# Patient Record
Sex: MALE | Race: BLACK OR AFRICAN AMERICAN | NOT HISPANIC OR LATINO | URBAN - METROPOLITAN AREA
[De-identification: names, ages, dates, MRNs, and addresses within clinical notes are randomized per-mention and may not be internally consistent; named-entity substitution may affect disease eponyms.]

---

## 2023-07-19 ENCOUNTER — OFFICE VISIT (OUTPATIENT)
Dept: FAMILY MEDICINE CLINIC | Facility: CLINIC | Age: 58
End: 2023-07-19

## 2023-07-19 VITALS
HEART RATE: 84 BPM | RESPIRATION RATE: 19 BRPM | DIASTOLIC BLOOD PRESSURE: 78 MMHG | SYSTOLIC BLOOD PRESSURE: 104 MMHG | HEIGHT: 69 IN | WEIGHT: 246 LBS | OXYGEN SATURATION: 97 % | TEMPERATURE: 98.6 F | BODY MASS INDEX: 36.43 KG/M2

## 2023-07-19 DIAGNOSIS — I10 PRIMARY HYPERTENSION: Primary | ICD-10-CM

## 2023-07-19 DIAGNOSIS — E11.311 TYPE 2 DIABETES MELLITUS WITH RETINOPATHY AND MACULAR EDEMA, UNSPECIFIED LATERALITY, UNSPECIFIED RETINOPATHY SEVERITY, UNSPECIFIED WHETHER LONG TERM INSULIN USE (HCC): ICD-10-CM

## 2023-07-19 DIAGNOSIS — Z86.79 HISTORY OF CAROTID ARTERY DISSECTION: ICD-10-CM

## 2023-07-19 DIAGNOSIS — Z13.9 DUE FOR SCREENING: ICD-10-CM

## 2023-07-19 PROCEDURE — 99203 OFFICE O/P NEW LOW 30 MIN: CPT | Performed by: FAMILY MEDICINE

## 2023-07-19 RX ORDER — INSULIN GLARGINE 100 [IU]/ML
35 INJECTION, SOLUTION SUBCUTANEOUS DAILY
Qty: 15 ML | Refills: 3
Start: 2023-07-19

## 2023-07-19 RX ORDER — VALSARTAN 320 MG/1
320 TABLET ORAL DAILY
Qty: 90 TABLET | Refills: 3
Start: 2023-07-19

## 2023-07-19 RX ORDER — ATENOLOL 25 MG/1
25 TABLET ORAL DAILY
Qty: 30 TABLET | Refills: 5
Start: 2023-07-19

## 2023-07-19 RX ORDER — ATENOLOL 25 MG/1
25 TABLET ORAL DAILY
Qty: 30 TABLET | Refills: 5 | Status: CANCELLED | OUTPATIENT
Start: 2023-07-19

## 2023-07-19 RX ORDER — ATORVASTATIN CALCIUM 40 MG/1
40 TABLET, FILM COATED ORAL DAILY
Qty: 90 TABLET | Refills: 3
Start: 2023-07-19

## 2023-07-19 RX ORDER — WARFARIN SODIUM 5 MG/1
TABLET ORAL
Qty: 90 TABLET | Refills: 1
Start: 2023-07-19

## 2023-07-19 NOTE — ASSESSMENT & PLAN NOTE
Home meds: Lantus 35 units daily, semaglutide 0.5 mg weekly, atorvastatin 40 mg daily  Endorses med adherence  No results found for: "HGBA1C"

## 2023-07-20 ENCOUNTER — APPOINTMENT (OUTPATIENT)
Dept: LAB | Facility: CLINIC | Age: 58
End: 2023-07-20

## 2023-07-20 DIAGNOSIS — Z13.9 DUE FOR SCREENING: ICD-10-CM

## 2023-07-20 DIAGNOSIS — Z86.79 HISTORY OF CAROTID ARTERY DISSECTION: ICD-10-CM

## 2023-07-20 DIAGNOSIS — E11.311 TYPE 2 DIABETES MELLITUS WITH RETINOPATHY AND MACULAR EDEMA, UNSPECIFIED LATERALITY, UNSPECIFIED RETINOPATHY SEVERITY, UNSPECIFIED WHETHER LONG TERM INSULIN USE (HCC): ICD-10-CM

## 2023-07-20 LAB
BASOPHILS # BLD AUTO: 0.06 THOUSANDS/ÂΜL (ref 0–0.1)
BASOPHILS NFR BLD AUTO: 1 % (ref 0–1)
CREAT UR-MCNC: 251 MG/DL
EOSINOPHIL # BLD AUTO: 0.31 THOUSAND/ÂΜL (ref 0–0.61)
EOSINOPHIL NFR BLD AUTO: 5 % (ref 0–6)
ERYTHROCYTE [DISTWIDTH] IN BLOOD BY AUTOMATED COUNT: 12.4 % (ref 11.6–15.1)
EST. AVERAGE GLUCOSE BLD GHB EST-MCNC: 151 MG/DL
HBA1C MFR BLD: 6.9 %
HCT VFR BLD AUTO: 45.3 % (ref 36.5–49.3)
HGB BLD-MCNC: 14.8 G/DL (ref 12–17)
IMM GRANULOCYTES # BLD AUTO: 0.02 THOUSAND/UL (ref 0–0.2)
IMM GRANULOCYTES NFR BLD AUTO: 0 % (ref 0–2)
LYMPHOCYTES # BLD AUTO: 1.74 THOUSANDS/ÂΜL (ref 0.6–4.47)
LYMPHOCYTES NFR BLD AUTO: 28 % (ref 14–44)
MCH RBC QN AUTO: 31.6 PG (ref 26.8–34.3)
MCHC RBC AUTO-ENTMCNC: 32.7 G/DL (ref 31.4–37.4)
MCV RBC AUTO: 97 FL (ref 82–98)
MICROALBUMIN UR-MCNC: 15.5 MG/L (ref 0–20)
MICROALBUMIN/CREAT 24H UR: 6 MG/G CREATININE (ref 0–30)
MONOCYTES # BLD AUTO: 0.45 THOUSAND/ÂΜL (ref 0.17–1.22)
MONOCYTES NFR BLD AUTO: 7 % (ref 4–12)
NEUTROPHILS # BLD AUTO: 3.69 THOUSANDS/ÂΜL (ref 1.85–7.62)
NEUTS SEG NFR BLD AUTO: 59 % (ref 43–75)
NRBC BLD AUTO-RTO: 0 /100 WBCS
PLATELET # BLD AUTO: 215 THOUSANDS/UL (ref 149–390)
PMV BLD AUTO: 11.4 FL (ref 8.9–12.7)
RBC # BLD AUTO: 4.68 MILLION/UL (ref 3.88–5.62)
WBC # BLD AUTO: 6.27 THOUSAND/UL (ref 4.31–10.16)

## 2023-07-20 PROCEDURE — 36415 COLL VENOUS BLD VENIPUNCTURE: CPT

## 2023-07-20 PROCEDURE — 83036 HEMOGLOBIN GLYCOSYLATED A1C: CPT

## 2023-07-20 PROCEDURE — 86803 HEPATITIS C AB TEST: CPT

## 2023-07-20 PROCEDURE — 85025 COMPLETE CBC W/AUTO DIFF WBC: CPT

## 2023-07-20 PROCEDURE — 87389 HIV-1 AG W/HIV-1&-2 AB AG IA: CPT

## 2023-07-20 PROCEDURE — 82570 ASSAY OF URINE CREATININE: CPT

## 2023-07-20 PROCEDURE — 82043 UR ALBUMIN QUANTITATIVE: CPT

## 2023-07-20 NOTE — PROGRESS NOTES
Name: Carnella Skiff      : 1965      MRN: [id number]  Encounter Provider: Sam Mayberry MD  Encounter Date: 2023   Encounter department: [address] [facility]     1. Primary hypertension  Assessment & Plan:  Home meds: Valsartan 320 mg daily; atenolol 25 mg daily  Blood pressure today 104/78 ; at goal        Orders:  -     valsartan (DIOVAN) 320 MG tablet; Take 1 tablet (320 mg total) by mouth daily  -     atenolol (TENORMIN) 25 mg tablet; Take 1 tablet (25 mg total) by mouth daily    2. 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, unspecified laterality, unspecified retinopathy severity, unspecified whether long term insulin use (HCC)  Assessment & Plan:  Home meds: Lantus 35 units daily, semaglutide 0.5 mg weekly, atorvastatin 40 mg daily  Endorses med adherence  No results found for: "HGBA1C"    Orders:  -     Insulin Glargine Solostar (Lantus SoloStar) 100 UNIT/ML SOPN; Inject 0.35 mL (35 Units total) under the skin in the morning  -     semaglutide, 0.25 or 0.5 mg/dose, (Ozempic, 0.25 or 0.5 MG/DOSE,) 2 mg/3 mL injection pen; Inject 0.75 mL (0.5 mg total) under the skin every 7 days  -     atorvastatin (LIPITOR) 40 mg tablet; Take 1 tablet (40 mg total) by mouth daily  -     HEMOGLOBIN A1C W/ EAG ESTIMATION; Future  -     Albumin / creatinine urine ratio; Future    3. History of carotid artery dissection  Comments:  Patient will benefit from establishing care with cardiology as soon as possible. Orders:  -     warfarin (Coumadin) 5 mg tablet; Patient is establishing care and brought in med list. Takes Warfarin once daily.  -     Protime-INR; Future  -     CBC and differential; Future  -     Ambulatory Referral to Cardiology; Future    4. Due for screening  -     : HIV 1/2 [facility] for 2 yr old and above; Future  -     Hepatitis C antibody;  Future      Depression Screening and Follow-up Plan: Patient was screened for depression during today's encounter. They screened negative with a PHQ-2 score of 0. Subjective     Melony Santos is a 61 y.o. male with pmhx of carotid artery dissection, type 2 diabetes mellitus, sarcoidosis and hypertension presenting today to establish care and for review of chronic conditions. Patient recently moved from [place] in February 2023. He has not sought primary care till now. Follows cardiology in [place] but plans to transition care in the near future. States has MRI/CT scanning of the chest between July 25 to 32 in [place]. He just started work orientation at [email or web address]. He used to work in the [employer] prior to this. We will gradually transition care and follow patient for chronic conditions. Patient reports that they are doing well/ have been compliant with medications.  Problems stable unless otherwise mentioned.  No acute complaints. Review of Systems   Respiratory: Positive for shortness of breath. Cardiovascular: Positive for leg swelling. Negative for chest pain and palpitations. Gastrointestinal: Negative for abdominal pain. No past medical history on file. No past surgical history on file. No family history on file.   Social History     Socioeconomic History   • Marital status: Unknown     Spouse name: None   • Number of children: None   • Years of education: None   • Highest education level: None   Occupational History   • None   Tobacco Use   • Smoking status: Never   • Smokeless tobacco: Never   Substance and Sexual Activity   • Alcohol use: Never   • Drug use: Never   • Sexual activity: None   Other Topics Concern   • None   Social History Narrative   • None     Social Determinants of Health     Financial Resource Strain: Low Risk  (7/19/2023)    Overall Financial Resource Strain (CARDIA)    • Difficulty of Paying Living Expenses: Not hard at all   Food Insecurity: No Food Insecurity (7/19/2023)    Hunger Vital Sign    • Worried About [place] in the Last Year: Never true    • Ran Out of Food in the Last Year: Never true   Transportation Needs: No Transportation Needs (7/19/2023)    PRAPARE - Transportation    • Lack of Transportation (Medical): No    • Lack of Transportation (Non-Medical): No   Physical Activity: Not on file   Stress: Not on file   Social Connections: Not on file   Intimate Partner Violence: Not on file   Housing Stability: Not on file     No current outpatient medications on file prior to visit. No Known Allergies    There is no immunization history on file for this patient. Objective     /78 (BP Location: Right arm, Patient Position: Sitting, Cuff Size: Large)   Pulse 84   Temp 98.6 °F (37 °C) (Temporal)   Resp 19   Ht 5' 9" (1.753 m)   Wt 112 kg (246 lb)   SpO2 97%   BMI 36.33 kg/m²     Physical Exam  Constitutional:       General: He is not in acute distress. Appearance: He is obese. He is not ill-appearing or toxic-appearing. Eyes:      General: No scleral icterus. Conjunctiva/sclera: Conjunctivae normal.   Cardiovascular:      Rate and Rhythm: Normal rate and regular rhythm. Pulses: Normal pulses. Pulmonary:      Effort: Pulmonary effort is normal.      Breath sounds: Normal breath sounds. No wheezing. Abdominal:      Tenderness: There is no right CVA tenderness or left CVA tenderness. Musculoskeletal:         General: Swelling present. Right lower leg: Edema present. Left lower leg: Edema present. Comments: Observable swelling of the right upper and lower extremities  +2 pitting edema to right lower extremity  +3 pitting edema to left lower extremity  Left upper extremity weakness noted   Skin:     General: Skin is warm. Capillary Refill: Capillary refill takes less than 2 seconds. Neurological:      Mental Status: He is alert and oriented to person, place, and time. Motor: Weakness present.       Gait: Gait abnormal. Psychiatric:         Behavior: Behavior normal.       Otis Hernandez MD

## 2023-07-21 LAB
HCV AB SER QL: NORMAL
HIV 1+2 AB+HIV1 P24 AG SERPL QL IA: NORMAL
HIV 2 AB SERPL QL IA: NORMAL
HIV1 AB SERPL QL IA: NORMAL
HIV1 P24 AG SERPL QL IA: NORMAL

## 2023-07-25 ENCOUNTER — TELEPHONE (OUTPATIENT)
Dept: FAMILY MEDICINE CLINIC | Facility: CLINIC | Age: 58
End: 2023-07-25

## 2023-07-25 DIAGNOSIS — Z86.79 HISTORY OF CAROTID ARTERY DISSECTION: Primary | ICD-10-CM

## 2023-07-26 NOTE — TELEPHONE ENCOUNTER
Telephone call to patient to discuss lab results. Patient states he was told by lab personnel that there was not enough sample for the PT-INR. Reordered. - Discussed Hgb A1c goal, importance of good glycemic and BP control on kidney function. Patient stated he has been noticing hypoglycemic episodes throughout the day and at night but experiences no symptoms. He stat ed the numbers come back up without an intervention. He wears a CGM. Patient instructed will evaluate next office visit. Demonstrates understanding and is in agreement with plan.

## 2023-08-03 ENCOUNTER — APPOINTMENT (OUTPATIENT)
Dept: LAB | Facility: CLINIC | Age: 58
End: 2023-08-03
Payer: COMMERCIAL

## 2023-08-03 DIAGNOSIS — Z86.79 HISTORY OF CAROTID ARTERY DISSECTION: ICD-10-CM

## 2023-08-03 LAB
INR PPP: 2.19 (ref 0.84–1.19)
PROTHROMBIN TIME: 24.6 SECONDS (ref 11.6–14.5)

## 2023-08-03 PROCEDURE — 36415 COLL VENOUS BLD VENIPUNCTURE: CPT

## 2023-08-03 PROCEDURE — 85610 PROTHROMBIN TIME: CPT

## 2023-08-15 ENCOUNTER — HOSPITAL ENCOUNTER (OUTPATIENT)
Dept: RADIOLOGY | Facility: HOSPITAL | Age: 58
Discharge: HOME | End: 2023-08-15
Payer: COMMERCIAL

## 2023-08-15 ENCOUNTER — TELEPHONE (OUTPATIENT)
Dept: OTHER | Facility: HOSPITAL | Age: 58
End: 2023-08-15

## 2023-08-15 ENCOUNTER — OFFICE VISIT (OUTPATIENT)
Dept: FAMILY MEDICINE CLINIC | Facility: CLINIC | Age: 58
End: 2023-08-15

## 2023-08-15 VITALS
OXYGEN SATURATION: 98 % | TEMPERATURE: 98 F | DIASTOLIC BLOOD PRESSURE: 82 MMHG | BODY MASS INDEX: 35.1 KG/M2 | SYSTOLIC BLOOD PRESSURE: 126 MMHG | HEIGHT: 69 IN | WEIGHT: 237 LBS | HEART RATE: 76 BPM | RESPIRATION RATE: 18 BRPM

## 2023-08-15 DIAGNOSIS — M79.5 FOREIGN BODY (FB) IN SOFT TISSUE: ICD-10-CM

## 2023-08-15 DIAGNOSIS — L03.011 PARONYCHIA OF RIGHT INDEX FINGER: Primary | ICD-10-CM

## 2023-08-15 PROCEDURE — 99214 OFFICE O/P EST MOD 30 MIN: CPT

## 2023-08-15 PROCEDURE — 73140 X-RAY EXAM OF FINGER(S): CPT

## 2023-08-15 RX ORDER — CEPHALEXIN 500 MG/1
500 CAPSULE ORAL EVERY 8 HOURS SCHEDULED
Qty: 21 CAPSULE | Refills: 0 | Status: SHIPPED | OUTPATIENT
Start: 2023-08-15 | End: 2023-08-22

## 2023-08-15 NOTE — ASSESSMENT & PLAN NOTE
Erythema and swelling lateral edge of nail bed right index finger with fluctuant area/visible pus under the skin. No fever, no drainage. Pt concerned that foreign body (steel wool) is embedded in his finger.  - Keflex 500 mg TID x7 days.   - Soak finger in warm water TID and then apply mupirocin TID.  - Follow up with PCP as scheduled tomorrow.
                      15.6   8.95  )-----------( 265      ( 27 Jul 2023 22:14 )             44.6   07-27    139  |  109<H>  |  13  ----------------------------<  114<H>  3.4<L>   |  25  |  1.11    Ca    8.8      27 Jul 2023 22:14    TPro  8.1  /  Alb  4.2  /  TBili  0.5  /  DBili  x   /  AST  18  /  ALT  26  /  AlkPhos  57  07-27

## 2023-08-15 NOTE — TELEPHONE ENCOUNTER
Attempted to call patient to discuss x-ray reading after receiving "immediate finding" message via [employer]. Unfortunately, I had no answer, but left a voice mail recommending patient to follow up with his PCP tomorrow, since he has a visit scheduled. Patient was seen in the office today for possible foreign body in finger and started on antibiotics.

## 2023-08-15 NOTE — PROGRESS NOTES
Name: Hermann Hernandez      : 1965      MRN: [id number]  Encounter Provider: MARIE Rascon  Encounter Date: 8/15/2023   Encounter department: [address]     1. Paronychia of right index finger  Assessment & Plan:  Erythema and swelling lateral edge of nail bed right index finger with fluctuant area/visible pus under the skin. No fever, no drainage. Pt concerned that foreign body (steel wool) is embedded in his finger.  - Keflex 500 mg TID x7 days.   - Soak finger in warm water TID and then apply mupirocin TID.  - Follow up with PCP as scheduled tomorrow. Orders:  -     cephalexin (KEFLEX) 500 mg capsule; Take 1 capsule (500 mg total) by mouth every 8 (eight) hours for 7 days  -     mupirocin (BACTROBAN) 2 % ointment; Apply topically 3 (three) times a day    2. Foreign body (FB) in soft tissue  -     XR finger right second digit-index; Future; Expected date: 08/15/2023         Subjective     HPI     Luis Carrasco presents to the office for a SAME DAY appt for c/o finger swelling that started about 1 week ago and is getting worse over time. Pt states the skin of his finger was pierced with a piece of steel wool and believes a fragment may have become embedded in his skin. Swelling and pain have worsened over the past week, and today, pt noticed a collection of pus under the skin. There has been no open area or drainage, no fever or other symptoms. Pt has applied alcohol to the area but this did not help. Review of Systems   Constitutional: Negative for chills, fatigue, fever and unexpected weight change. Respiratory: Negative. Cardiovascular: Negative. Gastrointestinal: Negative. Musculoskeletal:        Finger pain and swelling. Neurological: Negative. All other systems reviewed and are negative. No past medical history on file.   Past Surgical History:   Procedure Laterality Date   • HERNIA REPAIR      At 22years old     No family history on file. Social History     Socioeconomic History   • Marital status: Unknown     Spouse name: None   • Number of children: None   • Years of education: None   • Highest education level: None   Occupational History   • None   Tobacco Use   • Smoking status: Never     Passive exposure: Never   • Smokeless tobacco: Never   Substance and Sexual Activity   • Alcohol use: Never   • Drug use: Never   • Sexual activity: None   Other Topics Concern   • None   Social History Narrative   • None     Social Determinants of Health     Financial Resource Strain: Low Risk  (7/19/2023)    Overall Financial Resource Strain (CARDIA)    • Difficulty of Paying Living Expenses: Not hard at all   Food Insecurity: No Food Insecurity (7/19/2023)    Hunger Vital Sign    • Worried About Running Out of Food in the Last Year: Never true    • Ran Out of Food in the Last Year: Never true   Transportation Needs: No Transportation Needs (7/19/2023)    PRAPARE - Transportation    • Lack of Transportation (Medical): No    • Lack of Transportation (Non-Medical): No   Physical Activity: Not on file   Stress: Not on file   Social Connections: Not on file   Intimate Partner Violence: Not on file   Housing Stability: Not on file     Current Outpatient Medications on File Prior to Visit   Medication Sig   • atenolol (TENORMIN) 25 mg tablet Take 1 tablet (25 mg total) by mouth daily   • atorvastatin (LIPITOR) 40 mg tablet Take 1 tablet (40 mg total) by mouth daily   • Insulin Glargine Solostar (Lantus SoloStar) 100 UNIT/ML SOPN Inject 0.35 mL (35 Units total) under the skin in the morning   • semaglutide, 0.25 or 0.5 mg/dose, (Ozempic, 0.25 or 0.5 MG/DOSE,) 2 mg/3 mL injection pen Inject 0.75 mL (0.5 mg total) under the skin every 7 days   • valsartan (DIOVAN) 320 MG tablet Take 1 tablet (320 mg total) by mouth daily   • warfarin (Coumadin) 5 mg tablet Patient is establishing care and brought in med list. Takes Warfarin once daily.      No Known Allergies    There is no immunization history on file for this patient. Objective     /82 (BP Location: Right arm, Patient Position: Sitting, Cuff Size: Large)   Pulse 76   Temp 98 °F (36.7 °C) (Temporal)   Resp 18   Ht 5' 9" (1.753 m)   Wt 108 kg (237 lb)   SpO2 98%   BMI 35.00 kg/m²     Physical Exam  Vitals reviewed. Constitutional:       General: He is not in acute distress. Appearance: He is obese. He is not ill-appearing or diaphoretic. HENT:      Head: Normocephalic and atraumatic. Cardiovascular:      Rate and Rhythm: Normal rate and regular rhythm. Pulmonary:      Effort: Pulmonary effort is normal. No tachypnea. Breath sounds: Normal breath sounds. No decreased breath sounds or wheezing. Musculoskeletal:      Left hand: Normal.      Comments: Erythema and swelling lateral edge of nail bed right index finger with fluctuant area/visible pus under the skin. Skin:     General: Skin is warm and dry. Capillary Refill: Capillary refill takes less than 2 seconds. Neurological:      Mental Status: He is alert and oriented to person, place, and time.    Psychiatric:         Attention and Perception: Attention normal.         Mood and Affect: Mood and affect normal.       MARIE Marte

## 2023-08-22 ENCOUNTER — TELEPHONE (OUTPATIENT)
Dept: FAMILY MEDICINE CLINIC | Facility: CLINIC | Age: 58
End: 2023-08-22

## 2023-08-23 ENCOUNTER — RA CDI HCC (OUTPATIENT)
Dept: OTHER | Facility: HOSPITAL | Age: 58
End: 2023-08-23

## 2023-08-23 ENCOUNTER — OFFICE VISIT (OUTPATIENT)
Dept: FAMILY MEDICINE CLINIC | Facility: CLINIC | Age: 58
End: 2023-08-23

## 2023-08-23 VITALS
WEIGHT: 235 LBS | RESPIRATION RATE: 18 BRPM | BODY MASS INDEX: 34.8 KG/M2 | HEART RATE: 82 BPM | HEIGHT: 69 IN | TEMPERATURE: 98.1 F | OXYGEN SATURATION: 96 % | DIASTOLIC BLOOD PRESSURE: 80 MMHG | SYSTOLIC BLOOD PRESSURE: 126 MMHG

## 2023-08-23 DIAGNOSIS — I10 PRIMARY HYPERTENSION: ICD-10-CM

## 2023-08-23 DIAGNOSIS — E11.311 TYPE 2 DIABETES MELLITUS WITH RETINOPATHY AND MACULAR EDEMA, UNSPECIFIED LATERALITY, UNSPECIFIED RETINOPATHY SEVERITY, UNSPECIFIED WHETHER LONG TERM INSULIN USE (HCC): Primary | ICD-10-CM

## 2023-08-23 DIAGNOSIS — Z13.9 DUE FOR SCREENING: ICD-10-CM

## 2023-08-23 DIAGNOSIS — L03.011 PARONYCHIA OF RIGHT INDEX FINGER: ICD-10-CM

## 2023-08-23 PROCEDURE — 99214 OFFICE O/P EST MOD 30 MIN: CPT | Performed by: FAMILY MEDICINE

## 2023-08-23 RX ORDER — VALSARTAN 320 MG/1
320 TABLET ORAL DAILY
Qty: 90 TABLET | Refills: 3 | Status: SHIPPED | OUTPATIENT
Start: 2023-08-23

## 2023-08-23 RX ORDER — FUROSEMIDE 40 MG/1
40 TABLET ORAL DAILY
COMMUNITY
End: 2023-08-31 | Stop reason: SDUPTHER

## 2023-08-23 NOTE — PROGRESS NOTES
Diabetic Foot Exam    Patient's shoes and socks removed. Right Foot/Ankle   Right Foot Inspection  Skin Exam: Skin not intact, no dry skin and no ulcer. Toe Exam: ROM and strength within normal limits. Sensory   Vibration: intact  Proprioception: intact  Monofilament testing: intact    Vascular  Capillary refills: < 3 seconds  The right DP pulse is 2+. The right PT pulse is 2+. Left Foot/Ankle  Left Foot Inspection  Skin Exam: Skin not intact, no dry skin and no ulcer. Toe Exam: ROM and strength within normal limits. Sensory   Vibration: intact  Proprioception: intact  Monofilament testing: intact    Vascular  Capillary refills: < 3 seconds  The left DP pulse is 2+. The left PT pulse is 2+. Assign Risk Category  No deformity present  No loss of protective sensation  No weak pulses  Risk: 0     Name: Roxane Ortiz      : 1965      MRN: [id number]  Encounter Provider: Casimiro Sadler MD  Encounter Date: 2023   Encounter department: [address] [place] [address],[address]     1. 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 Paronychia of right index finger  Assessment & Plan:  Returning to reevaluate right index finger  No fluctuance or pus noted; dry, desquamation noted along with some residual swelling. No erythema noted.   Impression: healing wound. Completed 7 days of Keflex    - Instructed patient to continue mupirocin ointment use  - keep the area clean and dry as much as possible  - Discussed strict ED precautions such as fever, pain, redness, worsening swelling.  - Schedule same day appointment if concerns. - Consider repeating XR once completely healed      3. Primary hypertension  Assessment & Plan:  126/80. At goal  Occasional dizziness with position changes- bending down to standing up. Denies fatigue, SOB or lightheadedness when walking or climbing stairs  Brought in bottle of Furosemide stating was prescribed by his cardiologist in [facility] when he began to get short of breath months ago. Of note, patient is new to practice and is starting to transition care to [place] from [facility]. See media for scanned results. Home meds: Valsartan 320 mg qd; Furosemide 40 mg qd    - Continue medication reconciliation each visit  - Monitor weight daily  - Orthostatics next visit  - Low sodium diet along with increasing movement daily. Orders:  -     valsartan (DIOVAN) 320 MG tablet; Take 1 tablet (320 mg total) by mouth daily    4. Due for screening  -     Ambulatory Referral to Gastroenterology; Future         Subjective      Hardik Nguyễn is a 61 y.o. male with pmhx of carotid artery dissection, T2DM and HTN presenting today for review of chronic conditions. Patient reports that they are doing well/ have been compliant with medications.  Problems stable unless otherwise mentioned.  No acute complaints. Review of Systems   Respiratory: Negative for shortness of breath. Cardiovascular: Negative for chest pain and palpitations. Gastrointestinal: Negative for abdominal distention. Musculoskeletal: Negative for arthralgias. Skin: Negative for color change. Neurological: Positive for weakness. Negative for light-headedness and headaches.        Current Outpatient Medications on File Prior to Visit   Medication Sig   • atenolol (TENORMIN) 25 mg tablet Take 1 tablet (25 mg total) by mouth daily   • atorvastatin (LIPITOR) 40 mg tablet Take 1 tablet (40 mg total) by mouth daily   • furosemide (LASIX) 40 mg tablet Take 40 mg by mouth in the morning   • Insulin Glargine Solostar (Lantus SoloStar) 100 UNIT/ML SOPN Inject 0.35 mL (35 Units total) under the skin in the morning   • mupirocin (BACTROBAN) 2 % ointment Apply topically 3 (three) times a day   • semaglutide, 0.25 or 0.5 mg/dose, (Ozempic, 0.25 or 0.5 MG/DOSE,) 2 mg/3 mL injection pen Inject 0.75 mL (0.5 mg total) under the skin every 7 days   • warfarin (Coumadin) 5 mg tablet Patient is establishing care and brought in med list. Takes Warfarin once daily. Objective     /80 (BP Location: Right arm, Patient Position: Sitting, Cuff Size: Standard)   Pulse 82   Temp 98.1 °F (36.7 °C) (Temporal)   Resp 18   Ht 5' 9" (1.753 m)   Wt 107 kg (235 lb)   SpO2 96%   BMI 34.70 kg/m²     Physical Exam  Constitutional:       General: He is not in acute distress. Appearance: He is obese. He is not ill-appearing. Eyes:      General: No scleral icterus. Cardiovascular:      Pulses: Normal pulses. no weak pulses          Dorsalis pedis pulses are 2+ on the right side and 2+ on the left side. Posterior tibial pulses are 2+ on the right side and 2+ on the left side. Heart sounds: Normal heart sounds. Pulmonary:      Effort: Pulmonary effort is normal.      Breath sounds: Normal breath sounds. No wheezing or rales. Abdominal:      Tenderness: There is no abdominal tenderness. Musculoskeletal:      Right lower leg: Edema present. Left lower leg: Edema present. Comments: trace edema to RLE  +1 pitting edema to LLE   Feet:      Right foot:      Skin integrity: No ulcer or dry skin. Left foot:      Skin integrity: No ulcer or dry skin. Skin:     General: Skin is warm. Capillary Refill: Capillary refill takes less than 2 seconds. Findings: No lesion.       Comments: Right index finger paronychia appears dry, no fluctuance, erythema or increased warmth. Desquamation noted. Neurological:      Mental Status: He is alert and oriented to person, place, and time. Mental status is at baseline. Motor: Weakness present.       Gait: Gait abnormal.      Comments: Residual left sided weakness from prior strokes   Psychiatric:         Mood and Affect: Mood normal.       Nisha Abbott MD

## 2023-08-23 NOTE — TELEPHONE ENCOUNTER
Telephone call to patient to discuss INR result. States still has ongoing swelling of his finger for which he was treated with Keflex x7 days. He will be seen in the office tomorrow.

## 2023-08-23 NOTE — PROGRESS NOTES
[address] [facility] coding opportunities     Z79.4     Chart Reviewed number of suggestions sent to Provider: 1     Patients Insurance     Medicare Insurance: [employer]

## 2023-08-23 NOTE — PATIENT INSTRUCTIONS
Please schedule nurse visit for IR[facility] eye exam on next Tuesday. You also need a medicare annual wellness visit soon.

## 2023-08-24 NOTE — ASSESSMENT & PLAN NOTE
Lab Results   Component Value Date    HGBA1C 6.9 (H) 07/20/2023     Patient reports he wears a CGM  Notices hypoglycemia in the AM before breakfast- 50's but not consistently  Home meds:  Lantus 35 units in the AM, Ozempic weekly    - Discussed having a bedtime snack with complex carbohydrate and healthy fats  - Continue home meds  - Reassess next visit

## 2023-08-24 NOTE — ASSESSMENT & PLAN NOTE
Returning to reevaluate right index finger  No fluctuance or pus noted; dry, desquamation noted along with some residual swelling. No erythema noted. Impression: healing wound. Completed 7 days of Keflex    - Instructed patient to continue mupirocin ointment use  - keep the area clean and dry as much as possible  - Discussed strict ED precautions such as fever, pain, redness, worsening swelling.  - Schedule same day appointment if concerns.   - Consider repeating XR once completely healed

## 2023-08-24 NOTE — ASSESSMENT & PLAN NOTE
126/80. At goal  Occasional dizziness with position changes- bending down to standing up. Denies fatigue, SOB or lightheadedness when walking or climbing stairs  Brought in bottle of Furosemide stating was prescribed by his cardiologist in [facility] when he began to get short of breath months ago. Of note, patient is new to practice and is starting to transition care to [place] from [facility]. See media for scanned results. Home meds: Valsartan 320 mg qd; Furosemide 40 mg qd    - Continue medication reconciliation each visit  - Monitor weight daily  - Orthostatics next visit  - Low sodium diet along with increasing movement daily.

## 2023-08-31 ENCOUNTER — TELEPHONE (OUTPATIENT)
Dept: FAMILY MEDICINE CLINIC | Facility: CLINIC | Age: 58
End: 2023-08-31

## 2023-08-31 DIAGNOSIS — Z86.79 HISTORY OF CAROTID ARTERY DISSECTION: ICD-10-CM

## 2023-08-31 DIAGNOSIS — E11.311 TYPE 2 DIABETES MELLITUS WITH RETINOPATHY AND MACULAR EDEMA, UNSPECIFIED LATERALITY, UNSPECIFIED RETINOPATHY SEVERITY, UNSPECIFIED WHETHER LONG TERM INSULIN USE (HCC): Primary | ICD-10-CM

## 2023-08-31 RX ORDER — FUROSEMIDE 40 MG/1
40 TABLET ORAL DAILY
Qty: 90 TABLET | Refills: 3 | Status: SHIPPED | OUTPATIENT
Start: 2023-08-31

## 2023-08-31 NOTE — TELEPHONE ENCOUNTER
My name is Hayden Alba.  I needed a refill for my water pills and the needles for the pin for the insulin

## 2023-10-03 DIAGNOSIS — E11.311 TYPE 2 DIABETES MELLITUS WITH RETINOPATHY AND MACULAR EDEMA, UNSPECIFIED LATERALITY, UNSPECIFIED RETINOPATHY SEVERITY, UNSPECIFIED WHETHER LONG TERM INSULIN USE (HCC): ICD-10-CM

## 2023-10-04 RX ORDER — INSULIN GLARGINE 100 [IU]/ML
35 INJECTION, SOLUTION SUBCUTANEOUS DAILY
Qty: 15 ML | Refills: 3 | Status: SHIPPED | OUTPATIENT
Start: 2023-10-04

## 2023-10-06 ENCOUNTER — CONSULT (OUTPATIENT)
Dept: CARDIOLOGY CLINIC | Facility: CLINIC | Age: 58
End: 2023-10-06
Payer: COMMERCIAL

## 2023-10-06 VITALS
DIASTOLIC BLOOD PRESSURE: 78 MMHG | HEIGHT: 69 IN | HEART RATE: 61 BPM | WEIGHT: 234 LBS | BODY MASS INDEX: 34.66 KG/M2 | SYSTOLIC BLOOD PRESSURE: 114 MMHG

## 2023-10-06 DIAGNOSIS — I10 PRIMARY HYPERTENSION: ICD-10-CM

## 2023-10-06 DIAGNOSIS — Z86.79 HISTORY OF CAROTID ARTERY DISSECTION: Primary | ICD-10-CM

## 2023-10-06 DIAGNOSIS — R20.9 ALTERATION OF SENSATION AS LATE EFFECT OF CEREBROVASCULAR ACCIDENT (CVA): ICD-10-CM

## 2023-10-06 DIAGNOSIS — I63.031 CEREBROVASCULAR ACCIDENT (CVA) DUE TO THROMBOSIS OF RIGHT CAROTID ARTERY (HCC): ICD-10-CM

## 2023-10-06 DIAGNOSIS — E11.311 TYPE 2 DIABETES MELLITUS WITH RETINOPATHY AND MACULAR EDEMA, WITH LONG-TERM CURRENT USE OF INSULIN, UNSPECIFIED LATERALITY, UNSPECIFIED RETINOPATHY SEVERITY (HCC): ICD-10-CM

## 2023-10-06 DIAGNOSIS — I69.398 ALTERATION OF SENSATION AS LATE EFFECT OF CEREBROVASCULAR ACCIDENT (CVA): ICD-10-CM

## 2023-10-06 DIAGNOSIS — Z79.4 TYPE 2 DIABETES MELLITUS WITH RETINOPATHY AND MACULAR EDEMA, WITH LONG-TERM CURRENT USE OF INSULIN, UNSPECIFIED LATERALITY, UNSPECIFIED RETINOPATHY SEVERITY (HCC): ICD-10-CM

## 2023-10-06 DIAGNOSIS — I44.7 LBBB (LEFT BUNDLE BRANCH BLOCK): ICD-10-CM

## 2023-10-06 PROCEDURE — 99204 OFFICE O/P NEW MOD 45 MIN: CPT

## 2023-10-06 PROCEDURE — 93000 ELECTROCARDIOGRAM COMPLETE: CPT

## 2023-10-06 RX ORDER — METOPROLOL SUCCINATE 25 MG/1
25 TABLET, EXTENDED RELEASE ORAL
COMMUNITY
Start: 2023-09-25

## 2023-10-06 RX ORDER — SILDENAFIL 50 MG/1
50 TABLET, FILM COATED ORAL DAILY PRN
COMMUNITY
Start: 2023-07-06

## 2023-10-06 NOTE — PROGRESS NOTES
Cardiology Consultation   MD Atul Atkins MD, Job DO Etienne, [place] Rebecca, FACP  MD Ganesh Jones DO, Dorn Kehr, DO, [facility]  -------------------------------------------------------------------  [facility]  [address] - [address] [address]  Phone: [phone]  Fax: [phone]  10/06/23  Ruchi Quigley  YOB: 1965   MRN: [id number]      Referring Physician: Chavez Salinas MD  [facility],  [address] [place] [address]     HPI:  I am seeing this patient in cardiology consultation for:  Probable Cardiomyopathy    Ruchi Quigley is a 61 y.o. male with:   3 Strokes 2 in 2006 and another in 2009 - on warfarin now  Sarcoidosis - Known in lungs and liver, not known if is in his heart  Carotid dissection  LBBB  Insulin Dependant DM  HTN    Tobacco: None  Alcohol: 3x/year  Drugs: none    Family History: no premature CAD or SCD    In July 2023 in [place] he had a CT angiogram of the coronaries  That showed a coronary calcium score 6 with all the calcium located in the LAD territory  He also has significant elevation of the right hemidiaphragm with significant right lower lobe and less prominent right middle lobe atelectatic changes  The angiogram portion of the test is not in the scanned documents in his chart. His other workup for [facility] is also not available right now but he states he has a cardiac MRI scheduled there this month    He has difficulty articulating his past history due to the 3 strokes that he has had    EKG today is with LBBB    He does not recall being told he has heart failure? But his is on meds for CHF and my suspicion for a NICM is high with his med profile, and LBBB, and need for cardiac MRI - will need his records from [facility] to confirm        Review of Systems   Constitutional:  Negative for chills and fever. HENT:  Negative for facial swelling and sore throat.     Eyes:  Negative for visual disturbance. Respiratory:  Negative for cough, chest tightness, shortness of breath and wheezing. Cardiovascular:  Positive for leg swelling (l leg swelling). Negative for chest pain and palpitations. Gastrointestinal:  Negative for abdominal pain, blood in stool, constipation, diarrhea, nausea and vomiting. Endocrine: Negative for cold intolerance and heat intolerance. Genitourinary:  Negative for decreased urine volume, difficulty urinating, dysuria and hematuria. Musculoskeletal:  Negative for arthralgias, back pain and myalgias. Skin:  Negative for rash. Neurological:  Negative for dizziness, syncope, weakness and numbness. Psychiatric/Behavioral:  Negative for agitation, behavioral problems and confusion. The patient is not nervous/anxious. OBJECTIVE  Vitals:    10/06/23 1018   BP: 114/78   Pulse: 61       Physical Exam   Constitutional: awake, alert and oriented, in no acute distress, no obvious deformities, obese male  Head: Normocephalic, without obvious abnormality, atraumatic  Eyes: conjunctivae clear and moist. Sclera anicteric. No xanthelasmas. Pupils equal bilaterally. Extraocular motions are full. Ear nose mouth and throat: ears are symmetrical bilaterally, hearing appears to be equal bilaterally, no nasal discharge or epistaxis, oropharynx is clear with moist mucous membranes  Neck: Trachea is midline, neck is supple, no thyromegaly or significant lymphadenopathy, there is full range of motion.   Lungs: clear to auscultation bilaterally, no wheezes, no rales, no rhonchi, no accessory muscle use, breathing is nonlabored  Heart: regular rate and rhythm, S1, S2 normal, no murmur, no click, no rub and no gallop, no lower extremity edema  Abdomen: Obese, soft, non-tender; bowel sounds normal; no masses, no organomegaly  Psychiatric: Patient is oriented to time, place, person, mood/affect is negative for depression, anxiety, agitation, appears to have appropriate insight  Skin: Skin is warm, dry, intact. No obvious rashes or lesions on exposed extremities. Nail beds are pink with no cyanosis or clubbing. EKG:  Results for orders placed or performed in visit on 10/06/23   POCT ECG    Impression    Sinus rhythm at 61 bpm with a left bundle branch block        The ASCVD Risk score (Javan STOKES, et al., 2019) failed to calculate for the following reasons: The patient has a prior MI or stroke diagnosis    IMPRESSION:  Probable Non ischemic cardiomyopathy? Need his records from [address] in lungs and liver, not known if is in his heart  Need his records from [facility]  3 Strokes 2 in 2006 and another in 2009 - on warfarin now  Carotid dissection  LBBB  Insulin Dependant DM  HTN    DISCUSSION/RECOMMENDATIONS:  He presents today to establish care with cardiology in [place]. He has had extensive work-up in [place] but the only records I have regarding this are the CT calcium score portion of his CT coronary angiogram.  The angiogram portion is not available nor are prior echoes or ischemic evaluations other than this. He states that he does have a cardiac MRI scheduled in October. He denies being told he ever has heart failure but my suspicion for an underlying cardiomyopathy is high with his ECG showing a left bundle branch block as well as his medication profile including Toprol-XL, valsartan and Lasix. We will fax for the rest of his records from his hospital in [place] today to get a more complete picture of his overall condition. He is currently on warfarin as well but this is for his prior stroke history. Continue with warfarin  Continue with current dose Lipitor 40 mg  Continue with Lasix Toprol-XL valsartan at current doses without alteration today until we get his prior records. I will plan to follow-up with him once we get his records from [place] as well as his new cardiac MRI.   He also states he has a history of sarcoidosis but is unsure if this is affecting his heart. MRI will delineate this further    Cliff Ruby DO, [facility]  --------------------------------------------------------------------------------  TREADMILL STRESS  No results found for this or any previous visit.     ----------------------------------------------------------------------------------------------  NUCLEAR STRESS TEST: No results found for this or any previous visit. No results found for this or any previous visit.      --------------------------------------------------------------------------------  CATH:  No results found for this or any previous visit.    --------------------------------------------------------------------------------  ECHO:   No results found for this or any previous visit. No results found for this or any previous visit.    --------------------------------------------------------------------------------  HOLTER  No results found for this or any previous visit.    --------------------------------------------------------------------------------  CAROTIDS  No results found for this or any previous visit. Diagnoses and all orders for this visit:    History of carotid artery dissection  Comments:  Patient will benefit from establishing care with cardiology as soon as possible. 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 No pertinent past medical history. Past Surgical History:   Procedure Laterality Date   • HERNIA REPAIR      At 22years old         Medications  Current Outpatient Medications   Medication Sig Dispense Refill   • atorvastatin (LIPITOR) 40 mg tablet Take 1 tablet (40 mg total) by mouth daily 90 tablet 3   • furosemide (LASIX) 40 mg tablet Take 1 tablet (40 mg total) by mouth in the morning 90 tablet 3   • Insulin Glargine Solostar (Lantus SoloStar) 100 UNIT/ML SOPN Inject 0.35 mL (35 Units total) under the skin in the morning 15 mL 3   • metoprolol succinate (TOPROL-XL) 25 mg 24 hr tablet Take 25 mg by mouth     • semaglutide, 0.25 or 0.5 mg/dose, (Ozempic, 0.25 or 0.5 MG/DOSE,) 2 mg/3 mL injection pen Inject 0.75 mL (0.5 mg total) under the skin every 7 days 9 mL 1   • valsartan (DIOVAN) 320 MG tablet Take 1 tablet (320 mg total) by mouth daily 90 tablet 3   • warfarin (Coumadin) 5 mg tablet Patient is establishing care and brought in med list. Takes Warfarin once daily. 90 tablet 1   • Continuous Blood Gluc Sensor (FreeStyle Ana María 2 Sensor) MISC      • Insulin Pen Needle 32G X 4 MM MISC Use 1 (one) time for 1 dose 90 each 3   • mupirocin (BACTROBAN) 2 % ointment Apply topically 3 (three) times a day (Patient not taking: Reported on 10/6/2023) 30 g 0   • sildenafil (VIAGRA) 50 MG tablet Take 50 mg by mouth daily as needed (Patient not taking: Reported on 10/6/2023)       No current facility-administered medications for this visit.         No Known Allergies    Social History     Socioeconomic History   • Marital status: Unknown     Spouse name: Not on file   • Number of children: Not on file   • Years of education: Not on file   • Highest education level: Not on file   Occupational History   • Not on file   Tobacco Use   • Smoking status: Never     Passive exposure: Never • Smokeless tobacco: Never   Substance and Sexual Activity   • Alcohol use: Never   • Drug use: Never   • Sexual activity: Not on file   Other Topics Concern   • Not on file   Social History Narrative   • Not on file     Social Determinants of Health     Financial Resource Strain: Low Risk  (7/19/2023)    Overall Financial Resource Strain (CARDIA)    • Difficulty of Paying Living Expenses: Not hard at all   Food Insecurity: No Food Insecurity (7/19/2023)    Hunger Vital Sign    • Worried About Running Out of Food in the Last Year: Never true    • Ran Out of Food in the Last Year: Never true   Transportation Needs: No Transportation Needs (7/19/2023)    PRAPARE - Transportation    • Lack of Transportation (Medical): No    • Lack of Transportation (Non-Medical): No   Physical Activity: Not on file   Stress: Not on file   Social Connections: Not on file   Intimate Partner Violence: Not on file   Housing Stability: Not on file        History reviewed. No pertinent family history.     Lab Results   Component Value Date    WBC 6.27 07/20/2023    HGB 14.8 07/20/2023    HCT 45.3 07/20/2023    MCV 97 07/20/2023     07/20/2023      No results found for: "SODIUM", "K", "CL", "CO2", "BUN", "CREATININE", "GLUC", "CALCIUM"   Lab Results   Component Value Date    HGBA1C 6.9 (H) 07/20/2023      No results found for: "CHOL"  No results found for: "HDL"  No results found for: "LDLCALC"  No results found for: "TRIG"  No results found for: "CHOLHDL"   Lab Results   Component Value Date    INR 2.19 (H) 08/03/2023    PROTIME 24.6 (H) 08/03/2023          Patient Active Problem List    Diagnosis Date Noted   • LBBB (left bundle branch block) 10/06/2023   • Alteration of sensation as late effect of cerebrovascular accident (CVA) 10/06/2023   • Paronychia of right index finger 08/15/2023   • Primary hypertension 07/19/2023   • 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 Occasional wrong word or "sound a like" substitutions may have occurred due to the inherent limitations of voice recognition software. Read the chart carefully and recognize, using context, where substitutions have occurred.     William Louis DO, [facility]  10/6/2023 11:05 AM

## 2023-10-13 ENCOUNTER — TELEPHONE (OUTPATIENT)
Dept: FAMILY MEDICINE CLINIC | Facility: CLINIC | Age: 58
End: 2023-10-13

## 2023-10-13 NOTE — TELEPHONE ENCOUNTER
10/13/23    Pt came into the office today requesting refill on the following med:  warfarin (Coumadin) 5 mg tablet   atorvastatin (LIPITOR) 40 mg tablet     Pt state that he warfarin  Med is most Important for him to have first.    Pt would like for med to be sent to the [address] [facility] in his chart. Pt also stated that Pharmacy states that they dont have any script on file for the Med and scripts needs to be resent. Any questions, please contact Pt.       PT last Appt: 08/23/23  PT Next Appt: 11/24/23

## 2023-10-14 DIAGNOSIS — Z86.79 HISTORY OF CAROTID ARTERY DISSECTION: ICD-10-CM

## 2023-10-14 DIAGNOSIS — E11.311 TYPE 2 DIABETES MELLITUS WITH RETINOPATHY AND MACULAR EDEMA, UNSPECIFIED LATERALITY, UNSPECIFIED RETINOPATHY SEVERITY, UNSPECIFIED WHETHER LONG TERM INSULIN USE (HCC): ICD-10-CM

## 2023-10-14 RX ORDER — WARFARIN SODIUM 5 MG/1
TABLET ORAL
Qty: 90 TABLET | Refills: 1 | Status: SHIPPED | OUTPATIENT
Start: 2023-10-14

## 2023-10-14 RX ORDER — ATORVASTATIN CALCIUM 40 MG/1
40 TABLET, FILM COATED ORAL DAILY
Qty: 90 TABLET | Refills: 3 | Status: SHIPPED | OUTPATIENT
Start: 2023-10-14

## 2023-11-13 ENCOUNTER — TELEPHONE (OUTPATIENT)
Dept: GASTROENTEROLOGY | Facility: MEDICAL CENTER | Age: 58
End: 2023-11-13

## 2023-11-13 ENCOUNTER — CONSULT (OUTPATIENT)
Dept: GASTROENTEROLOGY | Facility: CLINIC | Age: 58
End: 2023-11-13
Payer: COMMERCIAL

## 2023-11-13 VITALS
WEIGHT: 234.4 LBS | SYSTOLIC BLOOD PRESSURE: 120 MMHG | TEMPERATURE: 96.6 F | BODY MASS INDEX: 34.61 KG/M2 | HEART RATE: 69 BPM | DIASTOLIC BLOOD PRESSURE: 77 MMHG

## 2023-11-13 DIAGNOSIS — Z13.9 DUE FOR SCREENING: ICD-10-CM

## 2023-11-13 DIAGNOSIS — Z86.79 HISTORY OF CAROTID ARTERY DISSECTION: ICD-10-CM

## 2023-11-13 DIAGNOSIS — E66.9 CLASS 1 OBESITY WITH SERIOUS COMORBIDITY IN ADULT, UNSPECIFIED BMI, UNSPECIFIED OBESITY TYPE: ICD-10-CM

## 2023-11-13 DIAGNOSIS — Z86.010 HX OF COLONIC POLYPS: Primary | ICD-10-CM

## 2023-11-13 PROCEDURE — 99204 OFFICE O/P NEW MOD 45 MIN: CPT | Performed by: INTERNAL MEDICINE

## 2023-11-13 RX ORDER — POLYETHYLENE GLYCOL 3350, SODIUM SULFATE ANHYDROUS, SODIUM BICARBONATE, SODIUM CHLORIDE, POTASSIUM CHLORIDE 236; 22.74; 6.74; 5.86; 2.97 G/4L; G/4L; G/4L; G/4L; G/4L
4000 POWDER, FOR SOLUTION ORAL ONCE
Qty: 4000 ML | Refills: 0 | Status: SHIPPED | OUTPATIENT
Start: 2023-11-13 | End: 2023-11-13

## 2023-11-13 NOTE — TELEPHONE ENCOUNTER
BLOOD THINNER CLEARANCE     Our mutual patient is scheduled for procedure: Colonoscopy     On: 12/19/2023     With: Dr. Santana Pittman     He is taking the following blood thinner: Coumadin                                              Can this be stopped 5 days prior to the procedure?             Physician Approving clearance: ________________________

## 2023-11-13 NOTE — PROGRESS NOTES
Consultation - [address]  [facility]  Suze Khan Kehinde 60 y.o. male MRN: [id number]  @ Encounter: [id number]    ASSESSMENT AND PLAN:      69-year-old male with past medical history of hypertension, diabetes mellitus type 2, carotid artery dissection (2006) on Coumadin who is presenting for colon cancer screening. History of colon polyps  History of carotid artery dissection on Coumadin  Obesity  Average risk. Coumadin will need to be held. Has not been followed by neurology so we will need to request PCP for clearance. Schedule screening colonoscopy  GoLytely/Dulcolax prep ordered    Follow up as needed. Thank you for this consultation. ______________________________________________________________________    HPI:  69-year-old male with past medical history of hypertension, diabetes mellitus type 2, carotid artery dissection on Coumadin who is presenting for colon cancer screening. He feels well and denies any nausea, vomiting, abdominal pain, diarrhea, constipation, blood in stool. Has lost weight but intentionally. Had colonoscopy 8 years ago and reports having 2-3 polyps. No report is available. HEALTHCARE MAINTENANCE:   Hepatitis C screening negative in 2023  Last EGD: None prior  Last Colonoscopy: 8 years ago, per patient, no report available. Does report history of polyps. REVIEW OF SYSTEMS:    Review of Systems   Constitutional:  Negative for chills and fever. HENT:  Negative for congestion and sinus pressure. Respiratory:  Negative for cough and shortness of breath. Cardiovascular:  Negative for chest pain, palpitations and leg swelling. Gastrointestinal:  Negative for abdominal pain, diarrhea, nausea and vomiting. Genitourinary:  Negative for dysuria and hematuria. Musculoskeletal:  Negative for arthralgias and back pain. Skin:  Negative for color change and rash. Neurological:  Negative for dizziness and headaches.    Psychiatric/Behavioral:  Negative for agitation and confusion. All other systems reviewed and are negative. Historical Information   No past medical history on file. Past Surgical History:   Procedure Laterality Date    HERNIA REPAIR      At 22years old     Social History   Social History     Substance and Sexual Activity   Alcohol Use Never     Social History     Substance and Sexual Activity   Drug Use Never     Social History     Tobacco Use   Smoking Status Never    Passive exposure: Never   Smokeless Tobacco Never     No family history on file. Meds/Allergies     (Not in a hospital admission)    No current facility-administered medications for this visit. No Known Allergies    Objective     There were no vitals taken for this visit. There is no height or weight on file to calculate BMI. [unfilled]      PHYSICAL EXAM:      Physical Exam  Vitals and nursing note reviewed. Constitutional:       General: He is not in acute distress. Appearance: Normal appearance. He is well-developed. He is obese. He is not ill-appearing. HENT:      Head: Normocephalic and atraumatic. Mouth/Throat:      Mouth: Mucous membranes are moist.   Eyes:      Extraocular Movements: Extraocular movements intact. Conjunctiva/sclera: Conjunctivae normal.   Cardiovascular:      Rate and Rhythm: Normal rate. Pulses: Normal pulses. Pulmonary:      Effort: Pulmonary effort is normal.   Abdominal:      General: Abdomen is flat. Bowel sounds are normal. There is no distension. Palpations: Abdomen is soft. Tenderness: There is no abdominal tenderness. There is no guarding. Musculoskeletal:      Cervical back: Neck supple. Right lower leg: No edema. Left lower leg: No edema. Skin:     General: Skin is warm and dry. Neurological:      General: No focal deficit present. Mental Status: He is alert and oriented to person, place, and time.    Psychiatric:         Mood and Affect: Mood normal.         Behavior: Behavior normal.         Lab Results:   No visits with results within 1 Day(s) from this visit. Latest known visit with results is:   Appointment on 08/03/2023   Component Date Value    Protime 08/03/2023 24.6 (H)     INR 08/03/2023 2.19 (H)        Imaging Studies: I have personally reviewed pertinent imaging studies. [address] [facility] [address]   Gastroenterology Fellow  PGY-5  Available via [employer]  11/13/2023 1:25 PM

## 2023-11-14 ENCOUNTER — TELEPHONE (OUTPATIENT)
Dept: FAMILY MEDICINE CLINIC | Facility: CLINIC | Age: 58
End: 2023-11-14

## 2023-11-14 DIAGNOSIS — Z86.79 HISTORY OF CAROTID ARTERY DISSECTION: Primary | ICD-10-CM

## 2023-11-14 NOTE — TELEPHONE ENCOUNTER
Spoke with PCP about this case    Patient has had multiple strokes in the past.  Unclear full cardiac history, may be atrial fibrillation related    Upon chart review it looks like patient has had history of pulmonary embolism also    Clinical pharmacist feels patient is high enough risk that they should be bridged with Lovenox if warfarin is to be held for upcoming procedure in December    Reviewed [id number] score and warfarin bridging with PCP - happy to help with further recommendations as needed    Pharmacist Tracking Tool  Reason For Outreach: Embedded Pharmacist  Demographics:  Intervention Method: Chart Review  Type of Intervention: New  Topics Addressed: Anticoagulation  Pharmacologic Interventions: Dose or Frequency Adjusted  Non-Pharmacologic Interventions: Care coordination, Disease state education, and Medication Monitoring  Time:  Direct Patient Care:  0  mins  Care Coordination:  10  mins  Recommendation Recipient: Provider  Outcome: Accepted

## 2023-11-17 ENCOUNTER — TELEPHONE (OUTPATIENT)
Dept: FAMILY MEDICINE CLINIC | Facility: CLINIC | Age: 58
End: 2023-11-17

## 2023-11-17 DIAGNOSIS — E11.311 TYPE 2 DIABETES MELLITUS WITH RETINOPATHY AND MACULAR EDEMA, UNSPECIFIED LATERALITY, UNSPECIFIED RETINOPATHY SEVERITY, UNSPECIFIED WHETHER LONG TERM INSULIN USE (HCC): Primary | ICD-10-CM

## 2023-11-17 NOTE — TELEPHONE ENCOUNTER
Telephone call made to patient to discuss need to bridge coumadin with Lovenox for his colonoscopy coming up on December 19th 2023. Once renal function is determined, will determine Lovenox bridge schedule and discuss at next appointment on 11/24/23. CMP ordered and dicussed with patient. He demonstrates understanding and is in agreement with plan.

## 2023-11-17 NOTE — TELEPHONE ENCOUNTER
Patient will need a Lovenox bridge leading up to his colonoscopy. I have contacted him regarding this and he is scheduled to see me in the office on November 24 th 2023 when we will go over this in more detail. Thank you.

## 2023-11-21 ENCOUNTER — APPOINTMENT (OUTPATIENT)
Dept: LAB | Facility: CLINIC | Age: 58
End: 2023-11-21
Payer: COMMERCIAL

## 2023-11-21 DIAGNOSIS — E11.311 TYPE 2 DIABETES MELLITUS WITH RETINOPATHY AND MACULAR EDEMA, UNSPECIFIED LATERALITY, UNSPECIFIED RETINOPATHY SEVERITY, UNSPECIFIED WHETHER LONG TERM INSULIN USE (HCC): ICD-10-CM

## 2023-11-21 LAB
ALBUMIN SERPL BCP-MCNC: 4.2 G/DL (ref 3.5–5)
ALP SERPL-CCNC: 81 U/L (ref 34–104)
ALT SERPL W P-5'-P-CCNC: 36 U/L (ref 7–52)
ANION GAP SERPL CALCULATED.3IONS-SCNC: 9 MMOL/L
AST SERPL W P-5'-P-CCNC: 28 U/L (ref 13–39)
BILIRUB SERPL-MCNC: 0.94 MG/DL (ref 0.2–1)
BUN SERPL-MCNC: 18 MG/DL (ref 5–25)
CALCIUM SERPL-MCNC: 9.3 MG/DL (ref 8.4–10.2)
CHLORIDE SERPL-SCNC: 102 MMOL/L (ref 96–108)
CHOLEST SERPL-MCNC: 122 MG/DL
CO2 SERPL-SCNC: 30 MMOL/L (ref 21–32)
CREAT SERPL-MCNC: 1.06 MG/DL (ref 0.6–1.3)
GFR SERPL CREATININE-BSD FRML MDRD: 76 ML/MIN/1.73SQ M
GLUCOSE P FAST SERPL-MCNC: 153 MG/DL (ref 65–99)
HDLC SERPL-MCNC: 40 MG/DL
INR PPP: 3.46 (ref 0.84–1.19)
LDLC SERPL CALC-MCNC: 58 MG/DL (ref 0–100)
POTASSIUM SERPL-SCNC: 3.9 MMOL/L (ref 3.5–5.3)
PROT SERPL-MCNC: 7.1 G/DL (ref 6.4–8.4)
PROTHROMBIN TIME: 34.1 SECONDS (ref 11.6–14.5)
SODIUM SERPL-SCNC: 141 MMOL/L (ref 135–147)
TRIGL SERPL-MCNC: 122 MG/DL

## 2023-11-21 PROCEDURE — 80053 COMPREHEN METABOLIC PANEL: CPT

## 2023-11-21 PROCEDURE — 80061 LIPID PANEL: CPT

## 2023-11-21 PROCEDURE — 36415 COLL VENOUS BLD VENIPUNCTURE: CPT

## 2023-11-21 PROCEDURE — 85610 PROTHROMBIN TIME: CPT

## 2023-11-29 ENCOUNTER — ANTICOAG VISIT (OUTPATIENT)
Dept: FAMILY MEDICINE CLINIC | Facility: CLINIC | Age: 58
End: 2023-11-29

## 2023-11-29 DIAGNOSIS — Z79.01 LONG TERM (CURRENT) USE OF ANTICOAGULANTS: Primary | ICD-10-CM

## 2023-11-29 DIAGNOSIS — Z86.79 HISTORY OF CAROTID ARTERY DISSECTION: ICD-10-CM

## 2023-11-29 DIAGNOSIS — Z86.711 HISTORY OF PULMONARY EMBOLISM: ICD-10-CM

## 2023-11-29 RX ORDER — LIDOCAINE AND PRILOCAINE 25; 25 MG/G; MG/G
CREAM TOPICAL AS NEEDED
Qty: 1 G | Refills: 0 | Status: SHIPPED | OUTPATIENT
Start: 2023-11-29

## 2023-11-29 NOTE — PROGRESS NOTES
Date Enoxaparin AM Enoxaparin PM Warfarin Notes   Pre-op Day -5 HOLD HOLD HOLD    Pre-op Day -4 HOLD HOLD HOLD    Pre-op Day -3 100 mg 100 mg HOLD    Pre-op Day -2 100 mg 100 mg HOLD    Pre-op Day -1   100 mg HOLD HOLD    Procedure Day- 12/19/2023 HOLD HOLD 10 mg (2 tabs) Confirm with surgery team anticoagulation can be restarted (2x average daily warfarin  dose)   Post-op Day +1 HOLD 100 mg 10 mg (2 tabs) (2x average daily warfarin dose)   Post-op Day +2 100 mg 100 mg 7.5 mg (1+ 1/2 tabs) (1.5x weekly warfarin dose)   Post-op Day +3 100 mg 100 mg 5 mg (1 tabs) Return to home dose (Return to weekly dose) Consider INR   Post-op Day +4 100 mg 100 mg 5 mg (1 tabs) Last lovenox dose today. Consider INR     Telephone call placed to [place]. Unable to reach. Left a voicemail detailing need for bridging with Lovenox prior to surgery given his risk factors. Patient is scheduled to see me on Friday, December 1, 2023 at which time we will discuss the schedule in greater detail and provide a copy of the schedule to the patient. There is no need to hold or stop warfarin at this time. Patient to continue to take current dose of warfarin 5 mg daily. We will proceed with hold to start 5 days before the day of colonoscopy-12/19/2023. Patient to repeat INR 1 to 2 weeks after procedure.

## 2023-11-30 NOTE — PATIENT INSTRUCTIONS
Please schedule an appointment to get your Medicare Annual Wellness visit in January 2024. You should schedule an appointment for your IRIS eye exam on any available Tuesday.

## 2023-11-30 NOTE — PROGRESS NOTES
Name: Doron Russell      : 1965      MRN: [id number]  Encounter Provider: Jose Angel Castañeda MD  Encounter Date: 2023   Encounter department: [address] [facility],[address]     1. 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 Called pharmacy- confirmed last filled about a year ago  Fasting BG in the 120's; random in office today was 146. Had waffles for breakfast.    - Discussed need to restart Ozempic/Semaglutide weekly considering generic formulary  - Return to clinic in 3 months to follow up  - Discussed need for IRIS eye exam      Orders:  -     POCT hemoglobin A1c  -     semaglutide, 0.25 or 0.5 mg/dose, (Ozempic, 0.25 or 0.5 MG/DOSE,) 2 mg/3 mL injection pen; Inject 0.75 mL (0.5 mg total) under the skin every 7 days  -     Insulin Pen Needle 32G X 4 MM MISC; Use 1 (one) time for 1 dose    2. Long term (current) use of anticoagulants  Assessment & Plan:  Patient scheduled for colonoscopy on 2023  Placed on lovenox bridge per schedule on chart- See anticoag visit note. Verbalized understanding.    - Phone call to pharmacy to dispense 12 doses per schedule  - Discussed proper use to reduce discomfort and skin irritation  - could use EMLA cream prior to injection. Orders:  -     Protime-INR; Future; Expected date: 2023  -     enoxaparin (Lovenox) 100 mg/mL; Inject 1 mL (100 mg total) under the skin every 12 (twelve) hours for 12 doses Per guidelines provided to patient. Please follow the schedule as provided to you. Do not start before 2023.     3. Primary hypertension  Assessment & Plan:  112/70; at goal  Valsartan 320 mg daily; Toprol-XL 25 mg; Furosemide 40 mg daily      - continue to reconcile meds each visit  - continue current meds; no dose adjustment today. 4. 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 The BMI is above normal. Exercise recommendations include exercising 3-5 times per week. Rationale for BMI follow-up plan is due to patient being overweight or obese. Subjective        Vero Hogan is a 60 y.o. male with pmhx of HTN, T2DM, and long term use of anticoagulants presenting today for the review of chronic conditions- Lovenox bridge for upcoming colonoscopy and following up on glycemic control. Patient is accompanied by significant other. Patient reports that they are doing well and have been compliant with medications. Problems stable unless otherwise mentioned. No acute complaints. Update: Phone call to pharmacy to reconcile meds: Patient has not filled Cristofer Elders or Ozempic for over a year. He only fills the Lantus which he is currently taking. Telephone call to Leana who stated he has since discarded the short acting insulin which he got from a doctor in [place]. We will continue to follow up with Leana to get his meds fully reconciled and ensure safety. Review of Systems   Constitutional:  Negative for chills and fever. HENT:  Negative for ear pain and sore throat. Eyes:  Negative for pain and visual disturbance. Respiratory:  Negative for cough and shortness of breath. Cardiovascular:  Negative for chest pain and palpitations. Gastrointestinal:  Negative for abdominal pain and vomiting. Genitourinary:  Negative for dysuria and hematuria. Musculoskeletal:  Negative for arthralgias and back pain.    Skin:  Negative for color change and rash. Neurological:  Positive for dizziness. Negative for syncope. All other systems reviewed and are negative. Current Outpatient Medications on File Prior to Visit   Medication Sig    atorvastatin (LIPITOR) 40 mg tablet Take 1 tablet (40 mg total) by mouth daily    Continuous Blood Gluc Sensor (FreeStyle Ana María 2 Sensor) MISC     furosemide (LASIX) 40 mg tablet Take 1 tablet (40 mg total) by mouth in the morning    Insulin Glargine Solostar (Lantus SoloStar) 100 UNIT/ML SOPN Inject 0.35 mL (35 Units total) under the skin in the morning    lidocaine-prilocaine (EMLA) cream Apply topically as needed for mild pain    metoprolol succinate (TOPROL-XL) 25 mg 24 hr tablet Take 25 mg by mouth    valsartan (DIOVAN) 320 MG tablet Take 1 tablet (320 mg total) by mouth daily    warfarin (Coumadin) 5 mg tablet Patient is establishing care and brought in med list. Takes Warfarin once daily. [DISCONTINUED] semaglutide, 0.25 or 0.5 mg/dose, (Ozempic, 0.25 or 0.5 MG/DOSE,) 2 mg/3 mL injection pen Inject 0.75 mL (0.5 mg total) under the skin every 7 days    mupirocin (BACTROBAN) 2 % ointment Apply topically 3 (three) times a day (Patient not taking: Reported on 10/6/2023)    polyethylene glycol (Golytely) 4000 mL solution Take 4,000 mL by mouth once for 1 dose Take 4000 mL by mouth once for 1 dose. Use as directed    sildenafil (VIAGRA) 50 MG tablet Take 50 mg by mouth daily as needed (Patient not taking: Reported on 10/6/2023)    [DISCONTINUED] Insulin Pen Needle 32G X 4 MM MISC Use 1 (one) time for 1 dose       Objective     /70 (BP Location: Right arm, Patient Position: Sitting, Cuff Size: Standard)   Pulse 61   Temp 98.2 °F (36.8 °C) (Temporal)   Wt 109 kg (239 lb 3.2 oz)   SpO2 95%   BMI 35.32 kg/m²     Physical Exam  Vitals reviewed. Constitutional:       General: He is not in acute distress. Appearance: He is not ill-appearing, toxic-appearing or diaphoretic.    HENT: Head: Normocephalic. Right Ear: External ear normal.      Left Ear: External ear normal.      Nose: Nose normal.      Mouth/Throat:      Mouth: Mucous membranes are moist.   Eyes:      Extraocular Movements: Extraocular movements intact. Cardiovascular:      Rate and Rhythm: Normal rate and regular rhythm. Pulses: Normal pulses. Heart sounds: Normal heart sounds. No murmur heard. Pulmonary:      Effort: Pulmonary effort is normal. No respiratory distress. Chest:      Chest wall: No tenderness. Abdominal:      General: Bowel sounds are normal.      Palpations: Abdomen is soft. Musculoskeletal:         General: Normal range of motion. Cervical back: Normal range of motion and neck supple. Right lower leg: No edema. Left lower leg: No edema. Skin:     General: Skin is warm. Capillary Refill: Capillary refill takes less than 2 seconds. Neurological:      General: No focal deficit present. Mental Status: He is alert and oriented to person, place, and time. Motor: Weakness present.       Gait: Gait abnormal.      Comments: Left-sided weakness   Psychiatric:         Mood and Affect: Mood normal.         Behavior: Behavior normal.     Edith Garcias MD

## 2023-12-01 ENCOUNTER — OFFICE VISIT (OUTPATIENT)
Dept: FAMILY MEDICINE CLINIC | Facility: CLINIC | Age: 58
End: 2023-12-01

## 2023-12-01 VITALS
TEMPERATURE: 98.2 F | HEART RATE: 61 BPM | SYSTOLIC BLOOD PRESSURE: 112 MMHG | WEIGHT: 239.2 LBS | BODY MASS INDEX: 35.32 KG/M2 | OXYGEN SATURATION: 95 % | DIASTOLIC BLOOD PRESSURE: 70 MMHG

## 2023-12-01 DIAGNOSIS — R20.9 ALTERATION OF SENSATION AS LATE EFFECT OF CEREBROVASCULAR ACCIDENT (CVA): ICD-10-CM

## 2023-12-01 DIAGNOSIS — I69.398 ALTERATION OF SENSATION AS LATE EFFECT OF CEREBROVASCULAR ACCIDENT (CVA): ICD-10-CM

## 2023-12-01 DIAGNOSIS — Z79.01 LONG TERM (CURRENT) USE OF ANTICOAGULANTS: ICD-10-CM

## 2023-12-01 DIAGNOSIS — E11.311 TYPE 2 DIABETES MELLITUS WITH RETINOPATHY AND MACULAR EDEMA, UNSPECIFIED LATERALITY, UNSPECIFIED RETINOPATHY SEVERITY, UNSPECIFIED WHETHER LONG TERM INSULIN USE (HCC): Primary | ICD-10-CM

## 2023-12-01 DIAGNOSIS — I10 PRIMARY HYPERTENSION: ICD-10-CM

## 2023-12-01 PROBLEM — L03.011 PARONYCHIA OF RIGHT INDEX FINGER: Status: RESOLVED | Noted: 2023-08-15 | Resolved: 2023-12-01

## 2023-12-01 LAB — SL AMB POCT HEMOGLOBIN AIC: 7.9 (ref ?–6.5)

## 2023-12-01 PROCEDURE — 83036 HEMOGLOBIN GLYCOSYLATED A1C: CPT | Performed by: FAMILY MEDICINE

## 2023-12-01 PROCEDURE — 99213 OFFICE O/P EST LOW 20 MIN: CPT | Performed by: FAMILY MEDICINE

## 2023-12-01 RX ORDER — SEMAGLUTIDE 0.68 MG/ML
0.5 INJECTION, SOLUTION SUBCUTANEOUS
Qty: 9 ML | Refills: 1 | Status: SHIPPED | OUTPATIENT
Start: 2023-12-01

## 2023-12-01 RX ORDER — ENOXAPARIN SODIUM 100 MG/ML
100 INJECTION SUBCUTANEOUS EVERY 12 HOURS
Qty: 12 ML | Refills: 0 | Status: SHIPPED | OUTPATIENT
Start: 2023-12-01 | End: 2023-12-01 | Stop reason: SDUPTHER

## 2023-12-01 RX ORDER — ENOXAPARIN SODIUM 100 MG/ML
100 INJECTION SUBCUTANEOUS EVERY 12 HOURS
Qty: 12 ML | Refills: 0 | Status: SHIPPED | OUTPATIENT
Start: 2023-12-14 | End: 2023-12-20

## 2023-12-01 NOTE — ASSESSMENT & PLAN NOTE
112/70; at goal  Valsartan 320 mg daily; Toprol-XL 25 mg; Furosemide 40 mg daily      - continue to reconcile meds each visit  - continue current meds; no dose adjustment today.

## 2023-12-01 NOTE — ASSESSMENT & PLAN NOTE
Patient scheduled for colonoscopy on 12/19/2023  Placed on lovenox bridge per schedule on chart- See anticoag visit note. Verbalized understanding.    - Phone call to pharmacy to dispense 12 doses per schedule  - Discussed proper use to reduce discomfort and skin irritation  - could use EMLA cream prior to injection.

## 2023-12-01 NOTE — ASSESSMENT & PLAN NOTE
Lab Results   Component Value Date    HGBA1C 6.9 (H) 07/20/2023      POCT Hgb A1c today- 7.9  Currently on 35 units of Glargine nightly  Stopped taking Ozempic due to cost. Called pharmacy- confirmed last filled about a year ago  Fasting BG in the 120's; random in office today was 146.  Had waffles for breakfast.    - Discussed need to restart Ozempic/Semaglutide weekly considering generic formulary  - Return to clinic in 3 months to follow up  - Discussed need for IRIS eye exam

## 2023-12-04 ENCOUNTER — TELEPHONE (OUTPATIENT)
Dept: FAMILY MEDICINE CLINIC | Facility: CLINIC | Age: 58
End: 2023-12-04

## 2023-12-04 NOTE — TELEPHONE ENCOUNTER
PCP SIGNATURE NEEDED FOR [facility] - medical Clarification Request FORM RECEIVED VIA FAX AND PLACED IN PCP FOLDER TO BE DELIVERED AT ASSIGNED TIMES.

## 2023-12-05 ENCOUNTER — ANESTHESIA (OUTPATIENT)
Dept: ANESTHESIOLOGY | Facility: HOSPITAL | Age: 58
End: 2023-12-05

## 2023-12-05 ENCOUNTER — ANESTHESIA EVENT (OUTPATIENT)
Dept: ANESTHESIOLOGY | Facility: HOSPITAL | Age: 58
End: 2023-12-05

## 2023-12-05 ENCOUNTER — TELEPHONE (OUTPATIENT)
Dept: FAMILY MEDICINE CLINIC | Facility: CLINIC | Age: 58
End: 2023-12-05

## 2023-12-05 NOTE — TELEPHONE ENCOUNTER
Pt came into our office to [facility] from provider in [place]. Copies will be scanned into pt's chart for review.      Also pt stated in the last visit was discuss for him to provide name of insulin he was prescribed, (Humulin kwik pen)

## 2023-12-07 ENCOUNTER — TELEPHONE (OUTPATIENT)
Dept: GASTROENTEROLOGY | Facility: MEDICAL CENTER | Age: 58
End: 2023-12-07

## 2023-12-07 NOTE — TELEPHONE ENCOUNTER
Called to confirm procedure and confirm coumadin hold and lovenox bridge. Patient stated he cannot afford Lovenox and the pharmacy is trying to charge him $700. Please advise on next steps. Thank you . Included Dr. Santana Pittman in message so he can review recommendations.

## 2023-12-11 ENCOUNTER — TELEPHONE (OUTPATIENT)
Dept: FAMILY MEDICINE CLINIC | Facility: CLINIC | Age: 58
End: 2023-12-11

## 2023-12-11 NOTE — TELEPHONE ENCOUNTER
Spoke with Dr. Daphney Matt about patient case. Patient is a candidate for Lovenox bridging as warfarin needs to be held for 5 days prior to his procedure. Patient has a history of DVT and multiple strokes. Dr. Jace Valentin did an excellent job covering Lovenox bridge instructions and her last note. (See last coag note)     Was informed that patient was told that the Lovenox was 100s of dollars from the pharmacy. This should not be the case under Medicare, called the pharmacy to clarify. Pharmacist there confirms that the Lovenox is $29. The Jennifer Platts however was over $440. Recommend patient continue with Lovenox plan and consider obtaining Jardiance or Jennifer Platts through  discount programs in the future as he will very likely be approved.   Recommend reaching out to Ortiz Bunn if the team is interested in pursuing this    Dr. Daphney Matt confirms she will contact pt    Pharmacist Tracking Tool  Reason For Outreach: Embedded Pharmacist  Demographics:  Intervention Method: Phone  Type of Intervention: Follow-Up  Topics Addressed: Anticoagulation  Pharmacologic Interventions: N/A  Non-Pharmacologic Interventions: Care coordination and Chart update  Time:  Direct Patient Care:  0  mins  Care Coordination:  10  mins  Recommendation Recipient: Provider  Outcome: Accepted     Vasyl Yarbrough, PharmD, [facility]  Ambulatory Care Clinical Pharmacist

## 2023-12-12 ENCOUNTER — TELEPHONE (OUTPATIENT)
Dept: FAMILY MEDICINE CLINIC | Facility: CLINIC | Age: 58
End: 2023-12-12

## 2023-12-12 NOTE — TELEPHONE ENCOUNTER
PCP SIGNATURE NEEDED FOR [facility] FORM RECEIVED VIA FAX AND PLACED IN PCP FOLDER TO BE DELIVERED AT ASSIGNED TIMES.       Medical clarification order # [id number]

## 2023-12-18 ENCOUNTER — ANESTHESIA EVENT (OUTPATIENT)
Dept: GASTROENTEROLOGY | Facility: HOSPITAL | Age: 58
End: 2023-12-18

## 2023-12-18 RX ORDER — SODIUM CHLORIDE, SODIUM LACTATE, POTASSIUM CHLORIDE, CALCIUM CHLORIDE 600; 310; 30; 20 MG/100ML; MG/100ML; MG/100ML; MG/100ML
125 INJECTION, SOLUTION INTRAVENOUS CONTINUOUS
Status: CANCELLED | OUTPATIENT
Start: 2023-12-18

## 2023-12-19 ENCOUNTER — ANESTHESIA (OUTPATIENT)
Dept: GASTROENTEROLOGY | Facility: HOSPITAL | Age: 58
End: 2023-12-19

## 2023-12-19 ENCOUNTER — HOSPITAL ENCOUNTER (OUTPATIENT)
Dept: GASTROENTEROLOGY | Facility: HOSPITAL | Age: 58
Setting detail: OUTPATIENT SURGERY
Discharge: HOME | End: 2023-12-19
Payer: COMMERCIAL

## 2023-12-19 VITALS
DIASTOLIC BLOOD PRESSURE: 86 MMHG | OXYGEN SATURATION: 96 % | HEART RATE: 65 BPM | SYSTOLIC BLOOD PRESSURE: 128 MMHG | TEMPERATURE: 98 F | RESPIRATION RATE: 16 BRPM

## 2023-12-19 DIAGNOSIS — Z86.010 HX OF COLONIC POLYPS: ICD-10-CM

## 2023-12-19 LAB — GLUCOSE SERPL-MCNC: 118 MG/DL (ref 65–140)

## 2023-12-19 PROCEDURE — 45385 COLONOSCOPY W/LESION REMOVAL: CPT | Performed by: STUDENT IN AN ORGANIZED HEALTH CARE EDUCATION/TRAINING PROGRAM

## 2023-12-19 PROCEDURE — 82948 REAGENT STRIP/BLOOD GLUCOSE: CPT

## 2023-12-19 PROCEDURE — 88305 TISSUE EXAM BY PATHOLOGIST: CPT | Performed by: STUDENT IN AN ORGANIZED HEALTH CARE EDUCATION/TRAINING PROGRAM

## 2023-12-19 RX ORDER — SODIUM CHLORIDE, SODIUM LACTATE, POTASSIUM CHLORIDE, CALCIUM CHLORIDE 600; 310; 30; 20 MG/100ML; MG/100ML; MG/100ML; MG/100ML
125 INJECTION, SOLUTION INTRAVENOUS CONTINUOUS
Status: DISCONTINUED | OUTPATIENT
Start: 2023-12-19 | End: 2023-12-23 | Stop reason: HOSPADM

## 2023-12-19 RX ORDER — PROPOFOL 10 MG/ML
INJECTION, EMULSION INTRAVENOUS AS NEEDED
Status: DISCONTINUED | OUTPATIENT
Start: 2023-12-19 | End: 2023-12-19

## 2023-12-19 RX ADMIN — SIMETHICONE 40 MG: 20 EMULSION ORAL at 11:19

## 2023-12-19 RX ADMIN — PROPOFOL 50 MG: 10 INJECTION, EMULSION INTRAVENOUS at 11:11

## 2023-12-19 RX ADMIN — PROPOFOL 30 MG: 10 INJECTION, EMULSION INTRAVENOUS at 11:26

## 2023-12-19 RX ADMIN — PROPOFOL 50 MG: 10 INJECTION, EMULSION INTRAVENOUS at 11:24

## 2023-12-19 RX ADMIN — PROPOFOL 50 MG: 10 INJECTION, EMULSION INTRAVENOUS at 11:13

## 2023-12-19 RX ADMIN — PROPOFOL 20 MG: 10 INJECTION, EMULSION INTRAVENOUS at 11:14

## 2023-12-19 RX ADMIN — PROPOFOL 20 MG: 10 INJECTION, EMULSION INTRAVENOUS at 11:27

## 2023-12-19 RX ADMIN — SODIUM CHLORIDE, SODIUM LACTATE, POTASSIUM CHLORIDE, AND CALCIUM CHLORIDE 125 ML/HR: .6; .31; .03; .02 INJECTION, SOLUTION INTRAVENOUS at 10:50

## 2023-12-19 RX ADMIN — PROPOFOL 40 MG: 10 INJECTION, EMULSION INTRAVENOUS at 11:17

## 2023-12-19 RX ADMIN — PROPOFOL 40 MG: 10 INJECTION, EMULSION INTRAVENOUS at 11:20

## 2023-12-19 NOTE — H&P
History and Physical - [facility]  Fadi Busby 58 y.o. male MRN: [id number]          HPI: Fadi Busby is a 58 y.o. year old male who presents for screening colonoscopy. No family history of colon cancer.    Reports having a colonoscopy in [place] 8 years ago.      REVIEW OF SYSTEMS: Per the HPI, and otherwise unremarkable.    Historical Information   Past Medical History:   Diagnosis Date    Paronychia of right index finger 08/15/2023     Past Surgical History:   Procedure Laterality Date    HERNIA REPAIR      At 18 years old     Social History   Social History     Substance and Sexual Activity   Alcohol Use Never     Social History     Substance and Sexual Activity   Drug Use Never     Social History     Tobacco Use   Smoking Status Never    Passive exposure: Never   Smokeless Tobacco Never     History reviewed. No pertinent family history.    Meds/Allergies       Current Outpatient Medications:     enoxaparin (Lovenox) 100 mg/mL    furosemide (LASIX) 40 mg tablet    Insulin Glargine Solostar (Lantus SoloStar) 100 UNIT/ML SOPN    lidocaine-prilocaine (EMLA) cream    metoprolol succinate (TOPROL-XL) 25 mg 24 hr tablet    valsartan (DIOVAN) 320 MG tablet    atorvastatin (LIPITOR) 40 mg tablet    Continuous Blood Gluc Sensor (FreeStyle Ana María 2 Sensor) MISC    Insulin Pen Needle 32G X 4 MM MISC    mupirocin (BACTROBAN) 2 % ointment    polyethylene glycol (Golytely) 4000 mL solution    semaglutide, 0.25 or 0.5 mg/dose, (Ozempic, 0.25 or 0.5 MG/DOSE,) 2 mg/3 mL injection pen    sildenafil (VIAGRA) 50 MG tablet    warfarin (Coumadin) 5 mg tablet    No Known Allergies    Objective     There were no vitals taken for this visit.      PHYSICAL EXAM    GEN: NAD  CARDIO: RRR  PULM: CTA bilaterally  ABD: soft, non-tender, non-distended  EXT: no lower extremity edema  NEURO: AAOx3      ASSESSMENT/PLAN:  58 y.o. year old male here for colonoscopy; he is stable and optimized for his procedure.         Implemented All Universal Safety Interventions:  [place] to call system. Call bell, personal items and telephone within reach. Instruct patient to call for assistance. Room bathroom lighting operational. Non-slip footwear when patient is off stretcher. Physically safe environment: no spills, clutter or unnecessary equipment. Stretcher in lowest position, wheels locked, appropriate side rails in place.

## 2023-12-19 NOTE — ANESTHESIA POSTPROCEDURE EVALUATION
Post-Op Assessment Note    CV Status:  Stable  Pain Score: 0    Pain management: adequate       Mental Status:  Alert   Hydration Status:  Stable   PONV Controlled:  Controlled   Airway Patency:  Patent     Post Op Vitals Reviewed: Yes      Staff: CRNA               BP   123/85   Temp      Pulse  70   Resp   18   SpO2   99

## 2023-12-19 NOTE — ANESTHESIA PREPROCEDURE EVALUATION
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         Additional Monitors:     Airway Plan:            Plan Factors-Exercise tolerance (METS): <4 METS.    Chart reviewed.    Patient summary reviewed.    Patient is not a current smoker.  Patient did not smoke on day of surgery.            Induction- intravenous.    Postoperative Plan-     Informed Consent- Anesthetic plan and risks discussed with patient.  I personally reviewed this patient with the CRNA. Discussed and agreed on the Anesthesia Plan with the CRNA..

## 2023-12-22 PROCEDURE — 88305 TISSUE EXAM BY PATHOLOGIST: CPT | Performed by: STUDENT IN AN ORGANIZED HEALTH CARE EDUCATION/TRAINING PROGRAM

## 2023-12-27 ENCOUNTER — LAB (OUTPATIENT)
Dept: LAB | Facility: CLINIC | Age: 58
End: 2023-12-27
Payer: COMMERCIAL

## 2023-12-27 DIAGNOSIS — Z79.01 LONG TERM (CURRENT) USE OF ANTICOAGULANTS: ICD-10-CM

## 2023-12-27 LAB
INR PPP: 2.25 (ref 0.84–1.19)
PROTHROMBIN TIME: 24.4 SECONDS (ref 11.6–14.5)

## 2023-12-27 PROCEDURE — 36415 COLL VENOUS BLD VENIPUNCTURE: CPT

## 2023-12-27 PROCEDURE — 85610 PROTHROMBIN TIME: CPT

## 2024-01-04 DIAGNOSIS — E11.311 TYPE 2 DIABETES MELLITUS WITH RETINOPATHY AND MACULAR EDEMA, WITH LONG-TERM CURRENT USE OF INSULIN, UNSPECIFIED LATERALITY, UNSPECIFIED RETINOPATHY SEVERITY (HCC): Primary | ICD-10-CM

## 2024-01-04 DIAGNOSIS — Z79.4 TYPE 2 DIABETES MELLITUS WITH RETINOPATHY AND MACULAR EDEMA, WITH LONG-TERM CURRENT USE OF INSULIN, UNSPECIFIED LATERALITY, UNSPECIFIED RETINOPATHY SEVERITY (HCC): Primary | ICD-10-CM

## 2024-01-10 ENCOUNTER — OFFICE VISIT (OUTPATIENT)
Dept: CARDIOLOGY CLINIC | Facility: CLINIC | Age: 59
End: 2024-01-10
Payer: COMMERCIAL

## 2024-01-10 VITALS
HEART RATE: 69 BPM | BODY MASS INDEX: 34.63 KG/M2 | WEIGHT: 233.8 LBS | DIASTOLIC BLOOD PRESSURE: 59 MMHG | SYSTOLIC BLOOD PRESSURE: 101 MMHG | HEIGHT: 69 IN

## 2024-01-10 DIAGNOSIS — I44.7 LBBB (LEFT BUNDLE BRANCH BLOCK): ICD-10-CM

## 2024-01-10 DIAGNOSIS — I42.8 NICM (NONISCHEMIC CARDIOMYOPATHY) (HCC): Primary | ICD-10-CM

## 2024-01-10 DIAGNOSIS — I50.20 HFREF (HEART FAILURE WITH REDUCED EJECTION FRACTION) (HCC): ICD-10-CM

## 2024-01-10 DIAGNOSIS — E66.01 OBESITY, MORBID (HCC): ICD-10-CM

## 2024-01-10 DIAGNOSIS — E11.311 TYPE 2 DIABETES MELLITUS WITH RETINOPATHY AND MACULAR EDEMA, UNSPECIFIED LATERALITY, UNSPECIFIED RETINOPATHY SEVERITY, UNSPECIFIED WHETHER LONG TERM INSULIN USE (HCC): ICD-10-CM

## 2024-01-10 PROCEDURE — 99214 OFFICE O/P EST MOD 30 MIN: CPT

## 2024-01-10 NOTE — PROGRESS NOTES
Cardiology   MD Dannie Gamez MD, FACC  Eulogio Ballesteros DO, [facility] FACP Ather Mansoor, MD Rujul Patel, DO, [facility]  Dayton Calderon DO, [facility]  -------------------------------------------------------------------  [facility]  [address] [place] [address]  Phone: [phone]  Fax: [phone]  01/10/24  Fadi Busby  YOB: 1965   MRN: [id number]      Referring Physician: Osmany Thornton MD  [address]  [address]  [place] [address]     HPI: Fadi Busby is a 58 y.o. male with:   Non ischemic CM EF 25-30%    Sarcoidosis - Known in lungs and liver    3 Strokes 2 in 2006 and another in 2009 - on warfarin now  Carotid dissection  LBBB  Insulin Dependant DM  HTN  Hx of DVT / PE in past    He had an echo in June 2023 that shows severely dilated LV  Dilated cardiomyopathy  Severe global hypokinesis  EF 25 to 30%  Paradoxical septal motion  Left atrium mildly dilated  Mild MR  Trace TR    Cardiac MRI October 25, 2023 in [place] showed  Leak gadolinium enhancement in a nonischemic distribution, small amount of patchy mid myocardial late gadolinium enhancement in the mid to apical inferolateral wall, findings suggestive of prior myocarditis versus nonischemic cardiomyopathy  EF 30%  RVEF 56%    Coronary CT angiogram  Calcium score 6 in the LAD  *The results of the angiogram portion were not included in the scanned records*    He presents for follow up, He states he is doing well, no CP or SOB or LE edema or weight gain. We received his records from [place].    Review of Systems   Constitutional:  Negative for chills and fever.   HENT:  Negative for facial swelling and sore throat.    Eyes:  Negative for visual disturbance.   Respiratory:  Negative for cough, chest tightness, shortness of breath and wheezing.    Cardiovascular:  Negative for chest pain, palpitations and leg swelling.   Gastrointestinal:  Negative for abdominal pain,  blood in stool, constipation, diarrhea, nausea and vomiting.   Endocrine: Negative for cold intolerance and heat intolerance.   Genitourinary:  Negative for decreased urine volume, difficulty urinating, dysuria and hematuria.   Musculoskeletal:  Negative for arthralgias, back pain and myalgias.   Skin:  Negative for rash.   Neurological:  Negative for dizziness, syncope, weakness and numbness.   Psychiatric/Behavioral:  Negative for agitation, behavioral problems and confusion. The patient is not nervous/anxious.         OBJECTIVE  Vitals:    01/10/24 0804   BP: 101/59   Pulse: 69       Physical Exam  Constitutional: awake, alert and oriented, in no acute distress, no obvious deformities, obese male  Head: Normocephalic, without obvious abnormality, atraumatic  Eyes: conjunctivae clear and moist. Sclera anicteric. No xanthelasmas. Pupils equal bilaterally. Extraocular motions are full.  Ear nose mouth and throat: ears are symmetrical bilaterally, hearing appears to be equal bilaterally, no nasal discharge or epistaxis, oropharynx is clear with moist mucous membranes  Neck: Trachea is midline, neck is supple, no thyromegaly or significant lymphadenopathy, there is full range of motion.  Lungs: clear to auscultation bilaterally, no wheezes, no rales, no rhonchi, no accessory muscle use, breathing is nonlabored  Heart: regular rate and rhythm, S1, S2 normal, no murmur, no click, no rub and no gallop, no lower extremity edema  Abdomen: Obese, soft, non-tender; bowel sounds normal; no masses, no organomegaly  Psychiatric: Patient is oriented to time, place, person, mood/affect is negative for depression, anxiety, agitation, appears to have appropriate insight  Skin: Skin is warm, dry, intact. No obvious rashes or lesions on exposed extremities. Nail beds are pink with no cyanosis or clubbing.     EKG:  No results found for this visit on 01/10/24.     IMPRESSION:  Non ischemic CM EF 25-30%  He had an echo in June 2023  that shows severely dilated LV  Dilated cardiomyopathy  Severe global hypokinesis  EF 25 to 30%  Paradoxical septal motion  Left atrium mildly dilated  Mild MR  Trace TR    Cardiac MRI October 25, 2023 in [place] showed  Leak gadolinium enhancement in a nonischemic distribution, small amount of patchy mid myocardial late gadolinium enhancement in the mid to apical inferolateral wall, findings suggestive of prior myocarditis versus nonischemic cardiomyopathy  LVEF 30%  RVEF 56%    He says he had what sounds to be a cardiac catheterization 2006 when he was having chest pain, was told he had no blockages at that time and likely a Takotsubo cardiomyopathy.    Sarcoidosis - Known in lungs and liver    3 Strokes 2 in 2006 and another in 2009 - on warfarin now  Carotid dissection  LBBB  Insulin Dependant DM  HTN  Hx of DVT / PE in past      DISCUSSION/RECOMMENDATIONS:  He presents today for follow-up.  He states he is feeling well but his records indicate what I suspected when I first met him, he has a what appears to be nonischemic cardiomyopathy.  EF is reduced to about 25 to 30%, most recently an MRI in October showed EF 30%  He is currently on valsartan 320 mg and Toprol-XL as well as Lasix.  He appears euvolemic today.  Given his persistent reduced EF and left bundle branch block, will plan for referral to EP for evaluation for pacemaker ICD  With his nonischemic cardiomyopathy, we will also plan for referral to advanced heart failure for further workup, in addition to getting a new echo with 3D EF and strain imaging.  May ultimately need workup for amyloid or cardiac sarcoid depending.  Continue with Coumadin with his multiple stroke history as well as DVT and PE history in the past  Continue atorvastatin for CVA history    Dayton Calderon DO, [facility], [facility]ALPH  --------------------------------------------------------------------------------  TREADMILL STRESS  No results found for this or any previous visit.      ----------------------------------------------------------------------------------------------  NUCLEAR STRESS TEST: No results found for this or any previous visit.    No results found for this or any previous visit.      --------------------------------------------------------------------------------  CATH:  No results found for this or any previous visit.    --------------------------------------------------------------------------------  ECHO:   No results found for this or any previous visit.    No results found for this or any previous visit.    --------------------------------------------------------------------------------  HOLTER  No results found for this or any previous visit.    No results found for this or any previous visit.    --------------------------------------------------------------------------------  CAROTIDS  No results found for this or any previous visit.     --------------------------------------------------------------------------------  Diagnoses and all orders for this visit:    NICM (nonischemic cardiomyopathy) ([facility])  -     Ambulatory referral to Cardiac Electrophysiology; Future  -     Ambulatory Referral to Cardiology; Future  -     Echo complete w/ contrast if indicated; Future    HFrEF (heart failure with reduced ejection fraction) ([facility])  -     Ambulatory referral to Cardiac Electrophysiology; Future  -     Ambulatory Referral to Cardiology; Future  -     Echo complete w/ contrast if indicated; Future    LBBB (left bundle branch block)  -     Ambulatory referral to Cardiac Electrophysiology; Future  -     Ambulatory Referral to Cardiology; Future    Obesity, morbid ([facility])    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, unspecified laterality, unspecified retinopathy severity, unspecified whether long term insulin use ([facility])       ======================================================    Past Medical History:   Diagnosis Date   • Paronychia of right index finger  08/15/2023     Past Surgical History:   Procedure Laterality Date   • HERNIA REPAIR      At 18 years old         Medications  Current Outpatient Medications   Medication Sig Dispense Refill   • atorvastatin (LIPITOR) 40 mg tablet Take 1 tablet (40 mg total) by mouth daily 90 tablet 3   • Continuous Blood Gluc Sensor (FreeStyle Ana María 2 Sensor) MISC TAKE AS DIRECTED 2 each 1   • furosemide (LASIX) 40 mg tablet Take 1 tablet (40 mg total) by mouth in the morning 90 tablet 3   • Insulin Glargine Solostar (Lantus SoloStar) 100 UNIT/ML SOPN Inject 0.35 mL (35 Units total) under the skin in the morning 15 mL 3   • metoprolol succinate (TOPROL-XL) 25 mg 24 hr tablet Take 25 mg by mouth     • valsartan (DIOVAN) 320 MG tablet Take 1 tablet (320 mg total) by mouth daily 90 tablet 3   • warfarin (Coumadin) 5 mg tablet Patient is establishing care and brought in med list. Takes Warfarin once daily. 90 tablet 1   • enoxaparin (Lovenox) 100 mg/mL Inject 1 mL (100 mg total) under the skin every 12 (twelve) hours for 12 doses Per guidelines provided to patient. Please follow the schedule as provided to you. Do not start before December 14, 2023. 12 mL 0   • Insulin Pen Needle 32G X 4 MM MISC Use 1 (one) time for 1 dose 90 each 3   • lidocaine-prilocaine (EMLA) cream Apply topically as needed for mild pain (Patient not taking: Reported on 1/10/2024) 1 g 0   • mupirocin (BACTROBAN) 2 % ointment Apply topically 3 (three) times a day (Patient not taking: Reported on 10/6/2023) 30 g 0   • polyethylene glycol (Golytely) 4000 mL solution Take 4,000 mL by mouth once for 1 dose Take 4000 mL by mouth once for 1 dose. Use as directed 4000 mL 0   • semaglutide, 0.25 or 0.5 mg/dose, (Ozempic, 0.25 or 0.5 MG/DOSE,) 2 mg/3 mL injection pen Inject 0.75 mL (0.5 mg total) under the skin every 7 days (Patient not taking: Reported on 12/19/2023) 9 mL 1   • sildenafil (VIAGRA) 50 MG tablet Take 50 mg by mouth daily as needed (Patient not taking:  "Reported on 10/6/2023)       No current facility-administered medications for this visit.        No Known Allergies    Social History     Socioeconomic History   • Marital status: Unknown     Spouse name: Not on file   • Number of children: Not on file   • Years of education: Not on file   • Highest education level: Not on file   Occupational History   • Not on file   Tobacco Use   • Smoking status: Never     Passive exposure: Never   • Smokeless tobacco: Never   Substance and Sexual Activity   • Alcohol use: Never   • Drug use: Never   • Sexual activity: Not on file   Other Topics Concern   • Not on file   Social History Narrative   • Not on file     Social Determinants of Health     Financial Resource Strain: Low Risk  (7/19/2023)    Overall Financial Resource Strain (CARDIA)    • Difficulty of Paying Living Expenses: Not hard at all   Food Insecurity: No Food Insecurity (7/19/2023)    Hunger Vital Sign    • Worried About Running Out of Food in the Last Year: Never true    • Ran Out of Food in the Last Year: Never true   Transportation Needs: No Transportation Needs (7/19/2023)    PRAPARE - Transportation    • Lack of Transportation (Medical): No    • Lack of Transportation (Non-Medical): No   Physical Activity: Not on file   Stress: Not on file   Social Connections: Not on file   Intimate Partner Violence: Not on file   Housing Stability: Not on file        History reviewed. No pertinent family history.    Lab Results   Component Value Date    WBC 6.27 07/20/2023    HGB 14.8 07/20/2023    HCT 45.3 07/20/2023    MCV 97 07/20/2023     07/20/2023      Lab Results   Component Value Date    SODIUM 141 11/21/2023    K 3.9 11/21/2023     11/21/2023    CO2 30 11/21/2023    BUN 18 11/21/2023    CREATININE 1.06 11/21/2023    CALCIUM 9.3 11/21/2023      Lab Results   Component Value Date    HGBA1C 7.9 (A) 12/01/2023      No results found for: \"CHOL\"  Lab Results   Component Value Date    HDL 40 11/21/2023 " "    Lab Results   Component Value Date    LDLCALC 58 11/21/2023     Lab Results   Component Value Date    TRIG 122 11/21/2023     No results found for: \"CHOLHDL\"   Lab Results   Component Value Date    INR 2.25 (H) 12/27/2023    INR 3.46 (H) 11/21/2023    INR 2.19 (H) 08/03/2023    PROTIME 24.4 (H) 12/27/2023    PROTIME 34.1 (H) 11/21/2023    PROTIME 24.6 (H) 08/03/2023          Patient Active Problem List    Diagnosis Date Noted   • Obesity, morbid (HCC) 01/10/2024   • Long term (current) use of anticoagulants 11/29/2023   • History of pulmonary embolism 11/29/2023   • LBBB (left bundle branch block) 10/06/2023   • 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 Occasional wrong word or \"sound a like\" substitutions may have occurred due to the inherent limitations of voice recognition software. Read the chart carefully and recognize, using context, where substitutions have occurred.    Dayton Calderon DO, FACC  1/10/2024 8:49 AM          "

## 2024-01-12 DIAGNOSIS — I42.8 NICM (NONISCHEMIC CARDIOMYOPATHY) (HCC): ICD-10-CM

## 2024-01-12 DIAGNOSIS — I50.20 HFREF (HEART FAILURE WITH REDUCED EJECTION FRACTION) (HCC): Primary | ICD-10-CM

## 2024-01-12 DIAGNOSIS — I44.7 LBBB (LEFT BUNDLE BRANCH BLOCK): ICD-10-CM

## 2024-01-24 ENCOUNTER — OFFICE VISIT (OUTPATIENT)
Dept: FAMILY MEDICINE CLINIC | Facility: CLINIC | Age: 59
End: 2024-01-24

## 2024-01-24 ENCOUNTER — PATIENT OUTREACH (OUTPATIENT)
Dept: FAMILY MEDICINE CLINIC | Facility: CLINIC | Age: 59
End: 2024-01-24

## 2024-01-24 ENCOUNTER — TELEPHONE (OUTPATIENT)
Dept: FAMILY MEDICINE CLINIC | Facility: CLINIC | Age: 59
End: 2024-01-24

## 2024-01-24 ENCOUNTER — HOSPITAL ENCOUNTER (OUTPATIENT)
Dept: NON INVASIVE DIAGNOSTICS | Facility: HOSPITAL | Age: 59
Discharge: HOME | End: 2024-01-24
Payer: COMMERCIAL

## 2024-01-24 ENCOUNTER — RA CDI HCC (OUTPATIENT)
Dept: OTHER | Facility: HOSPITAL | Age: 59
End: 2024-01-24

## 2024-01-24 ENCOUNTER — OFFICE VISIT (OUTPATIENT)
Dept: CARDIOLOGY CLINIC | Facility: CLINIC | Age: 59
End: 2024-01-24
Payer: COMMERCIAL

## 2024-01-24 VITALS
WEIGHT: 238 LBS | OXYGEN SATURATION: 97 % | HEART RATE: 64 BPM | DIASTOLIC BLOOD PRESSURE: 64 MMHG | TEMPERATURE: 98 F | BODY MASS INDEX: 35.25 KG/M2 | RESPIRATION RATE: 18 BRPM | SYSTOLIC BLOOD PRESSURE: 102 MMHG | HEIGHT: 69 IN

## 2024-01-24 VITALS
HEART RATE: 56 BPM | WEIGHT: 235.1 LBS | BODY MASS INDEX: 34.82 KG/M2 | SYSTOLIC BLOOD PRESSURE: 106 MMHG | DIASTOLIC BLOOD PRESSURE: 66 MMHG | HEIGHT: 69 IN

## 2024-01-24 VITALS
WEIGHT: 233 LBS | BODY MASS INDEX: 34.51 KG/M2 | HEART RATE: 69 BPM | HEIGHT: 69 IN | SYSTOLIC BLOOD PRESSURE: 101 MMHG | DIASTOLIC BLOOD PRESSURE: 59 MMHG

## 2024-01-24 DIAGNOSIS — Z87.891 HISTORY OF SMOKING 10-25 PACK YEARS: ICD-10-CM

## 2024-01-24 DIAGNOSIS — I50.20 HFREF (HEART FAILURE WITH REDUCED EJECTION FRACTION) (HCC): ICD-10-CM

## 2024-01-24 DIAGNOSIS — I42.8 NICM (NONISCHEMIC CARDIOMYOPATHY) (HCC): ICD-10-CM

## 2024-01-24 DIAGNOSIS — Z79.01 LONG TERM (CURRENT) USE OF ANTICOAGULANTS: ICD-10-CM

## 2024-01-24 DIAGNOSIS — Z00.00 MEDICARE ANNUAL WELLNESS VISIT, INITIAL: Primary | ICD-10-CM

## 2024-01-24 DIAGNOSIS — M25.511 ACUTE PAIN OF RIGHT SHOULDER: ICD-10-CM

## 2024-01-24 DIAGNOSIS — I44.7 LBBB (LEFT BUNDLE BRANCH BLOCK): ICD-10-CM

## 2024-01-24 DIAGNOSIS — E11.311 TYPE 2 DIABETES MELLITUS WITH RETINOPATHY AND MACULAR EDEMA, WITH LONG-TERM CURRENT USE OF INSULIN, UNSPECIFIED LATERALITY, UNSPECIFIED RETINOPATHY SEVERITY (HCC): ICD-10-CM

## 2024-01-24 DIAGNOSIS — Z79.4 TYPE 2 DIABETES MELLITUS WITH RETINOPATHY AND MACULAR EDEMA, WITH LONG-TERM CURRENT USE OF INSULIN, UNSPECIFIED LATERALITY, UNSPECIFIED RETINOPATHY SEVERITY (HCC): ICD-10-CM

## 2024-01-24 DIAGNOSIS — Z20.2 TRICHOMONAS CONTACT, TREATED: ICD-10-CM

## 2024-01-24 LAB
AORTIC ROOT: 3.7 CM
AORTIC VALVE MEAN VELOCITY: 8.7 M/S
APICAL FOUR CHAMBER EJECTION FRACTION: 29 %
ASCENDING AORTA: 3.5 CM
AV LVOT MEAN GRADIENT: 3 MMHG
AV LVOT PEAK GRADIENT: 4 MMHG
AV MEAN GRADIENT: 4 MMHG
AV PEAK GRADIENT: 6 MMHG
AV VELOCITY RATIO: 0.83
BSA FOR ECHO PROCEDURE: 2.2 M2
DOP CALC AO PEAK VEL: 1.25 M/S
DOP CALC AO VTI: 21.73 CM
DOP CALC LVOT PEAK VEL VTI: 20.1 CM
DOP CALC LVOT PEAK VEL: 1.04 M/S
E WAVE DECELERATION TIME: 297 MS
E/A RATIO: 0.93
FRACTIONAL SHORTENING: 19 (ref 28–44)
GLOBAL LONGITUIDAL STRAIN: -11 %
INTERVENTRICULAR SEPTUM IN DIASTOLE (PARASTERNAL SHORT AXIS VIEW): 1.2 CM
INTERVENTRICULAR SEPTUM: 1.2 CM (ref 0.6–1.1)
LAAS-AP2: 16.9 CM2
LAAS-AP4: 13.2 CM2
LEFT ATRIUM SIZE: 4.2 CM
LEFT ATRIUM VOLUME (MOD BIPLANE): 40 ML
LEFT ATRIUM VOLUME INDEX (MOD BIPLANE): 18.1 ML/M2
LEFT INTERNAL DIMENSION IN SYSTOLE: 3.8 CM (ref 2.1–4)
LEFT VENTRICLE DIASTOLIC VOLUME (MOD BIPLANE): 159 ML
LEFT VENTRICLE DIASTOLIC VOLUME INDEX (MOD BIPLANE): 72.3 ML/M2
LEFT VENTRICLE SYSTOLIC VOLUME (MOD BIPLANE): 80 ML
LEFT VENTRICLE SYSTOLIC VOLUME INDEX (MOD BIPLANE): 36.4 ML/M2
LEFT VENTRICULAR INTERNAL DIMENSION IN DIASTOLE: 4.7 CM (ref 3.5–6)
LEFT VENTRICULAR POSTERIOR WALL IN END DIASTOLE: 1.2 CM
LEFT VENTRICULAR STROKE VOLUME: 38 ML
LV EF: 50 %
LVSV (TEICH): 38 ML
MV E'TISSUE VEL-SEP: 7 CM/S
MV PEAK A VEL: 0.82 M/S
MV PEAK E VEL: 76 CM/S
MV STENOSIS PRESSURE HALF TIME: 86 MS
MV VALVE AREA P 1/2 METHOD: 2.56
RA PRESSURE ESTIMATED: 3 MMHG
RIGHT VENTRICLE ID DIMENSION: 3.2 CM
SL CV LEFT ATRIUM LENGTH A2C: 4.7 CM
SL CV LV EF: 45
SL CV PED ECHO LEFT VENTRICLE DIASTOLIC VOLUME (MOD BIPLANE) 2D: 100 ML
SL CV PED ECHO LEFT VENTRICLE SYSTOLIC VOLUME (MOD BIPLANE) 2D: 62 ML
TRICUSPID ANNULAR PLANE SYSTOLIC EXCURSION: 2.4 CM

## 2024-01-24 PROCEDURE — 93000 ELECTROCARDIOGRAM COMPLETE: CPT | Performed by: INTERNAL MEDICINE

## 2024-01-24 PROCEDURE — 99204 OFFICE O/P NEW MOD 45 MIN: CPT | Performed by: INTERNAL MEDICINE

## 2024-01-24 PROCEDURE — 93306 TTE W/DOPPLER COMPLETE: CPT | Performed by: INTERNAL MEDICINE

## 2024-01-24 PROCEDURE — C8929 TTE W OR WO FOL WCON,DOPPLER: HCPCS

## 2024-01-24 PROCEDURE — G0439 PPPS, SUBSEQ VISIT: HCPCS | Performed by: FAMILY MEDICINE

## 2024-01-24 RX ORDER — METRONIDAZOLE 500 MG/1
500 TABLET ORAL EVERY 12 HOURS SCHEDULED
Qty: 14 TABLET | Refills: 0 | Status: SHIPPED | OUTPATIENT
Start: 2024-01-24 | End: 2024-01-31

## 2024-01-24 RX ORDER — SENNOSIDES 8.6 MG
650 CAPSULE ORAL EVERY 8 HOURS PRN
Qty: 30 TABLET | Refills: 0 | Status: SHIPPED | OUTPATIENT
Start: 2024-01-24

## 2024-01-24 RX ADMIN — PERFLUTREN 0.8 ML/MIN: 6.52 INJECTION, SUSPENSION INTRAVENOUS at 09:30

## 2024-01-24 NOTE — ASSESSMENT & PLAN NOTE
Lab Results   Component Value Date    HGBA1C 7.9 (A) 12/01/2023   BG-69 last night  current meds labs: Lantus nightly  Has not been able to afford Ozempic  Query dietary adherence    - referral to on site medication  to discuss options; appreciate recommendations  - Discussed walking to help control BG but with snack bars or similar options to avoid hypoglycemic episodes  - Discussed dietary options for foods with lower glycemic index such as high fiber foods and fruits- Apples, oats etc  - RTC in 3 months to fu

## 2024-01-24 NOTE — PROGRESS NOTES
HCC coding opportunities    D86.0, I11.0, Z79.4     Chart Reviewed number of suggestions sent to Provider: 3     Patients Insurance     Medicare Insurance: [employer]age

## 2024-01-24 NOTE — PATIENT INSTRUCTIONS
For the shoulder pain: Alternate using warm and cold pack several times daily and Take Tylenol every 8 hours for 4 days or 7 days if pain/discomfort persists.   Warfarin schedule while you are on Metronidazole for 7 days  Take 5 mg on Mon, Wed, Fri  Take 2.5 mg on Tues, Thurs, Saturday, Sunday    Medicare Preventive Visit Patient Instructions  Thank you for completing your Welcome to Medicare Visit or Medicare Annual Wellness Visit today. Your next wellness visit will be due in one year (1/24/2025).  The screening/preventive services that you may require over the next 5-10 years are detailed below. Some tests may not apply to you based off risk factors and/or age. Screening tests ordered at today's visit but not completed yet may show as past due. Also, please note that scanned in results may not display below.  Preventive Screenings:  Service Recommendations Previous Testing/Comments   Colorectal Cancer Screening  Colonoscopy    Fecal Occult Blood Test (FOBT)/Fecal Immunochemical Test (FIT)  Fecal DNA/Cologuard Test  Flexible Sigmoidoscopy Age: 45-75 years old   Colonoscopy: every 10 years (May be performed more frequently if at higher risk)  OR  FOBT/FIT: every 1 year  OR  Cologuard: every 3 years  OR  Sigmoidoscopy: every 5 years  Screening may be recommended earlier than age 45 if at higher risk for colorectal cancer. Also, an individualized decision between you and your healthcare provider will decide whether screening between the ages of 76-85 would be appropriate. Colonoscopy: 12/19/2023  FOBT/FIT: Not on file  Cologuard: Not on file  Sigmoidoscopy: Not on file    Screening Current     Prostate Cancer Screening Individualized decision between patient and health care provider in men between ages of 55-69   Medicare will cover every 12 months beginning on the day after your 50th birthday PSA: No results in last 5 years           Hepatitis C Screening Once for adults born between 1945 and 1965  More frequently  in patients at high risk for Hepatitis C Hep C Antibody: 07/20/2023    Screening Current   Diabetes Screening 1-2 times per year if you're at risk for diabetes or have pre-diabetes Fasting glucose: 153 mg/dL (11/21/2023)  A1C: 7.9 (12/1/2023)  Screening Not Indicated  History Diabetes   Cholesterol Screening Once every 5 years if you don't have a lipid disorder. May order more often based on risk factors. Lipid panel: 11/21/2023  Screening Current      Other Preventive Screenings Covered by Medicare:  Abdominal Aortic Aneurysm (AAA) Screening: covered once if your at risk. You're considered to be at risk if you have a family history of AAA or a male between the age of 65-75 who smoking at least 100 cigarettes in your lifetime.  Lung Cancer Screening: covers low dose CT scan once per year if you meet all of the following conditions: (1) Age 55-77; (2) No signs or symptoms of lung cancer; (3) Current smoker or have quit smoking within the last 15 years; (4) You have a tobacco smoking history of at least 20 pack years (packs per day x number of years you smoked); (5) You get a written order from a healthcare provider.  Glaucoma Screening: covered annually if you're considered high risk: (1) You have diabetes OR (2) Family history of glaucoma OR (3)  aged 50 and older OR (4)  American aged 65 and older  Osteoporosis Screening: covered every 2 years if you meet one of the following conditions: (1) Have a vertebral abnormality; (2) On glucocorticoid therapy for more than 3 months; (3) Have primary hyperparathyroidism; (4) On osteoporosis medications and need to assess response to drug therapy.  HIV Screening: covered annually if you're between the age of 15-65. Also covered annually if you are younger than 15 and older than 65 with risk factors for HIV infection. For pregnant patients, it is covered up to 3 times per pregnancy.    Immunizations:  Immunization Recommendations   Influenza Vaccine  Annual influenza vaccination during flu season is recommended for all persons aged >= 6 months who do not have contraindications   Pneumococcal Vaccine   * Pneumococcal conjugate vaccine = PCV13 (Prevnar 13), PCV15 (Vaxneuvance), PCV20 (Prevnar 20)  * Pneumococcal polysaccharide vaccine = PPSV23 (Pneumovax) Adults 19-65 yo with certain risk factors or if 65+ yo  If never received any pneumonia vaccine: recommend Prevnar 20 (PCV20)  Give PCV20 if previously received 1 dose of PCV13 or PPSV23   Hepatitis B Vaccine 3 dose series if at intermediate or high risk (ex: diabetes, end stage renal disease, liver disease)   Respiratory syncytial virus (RSV) Vaccine - COVERED BY MEDICARE PART D  * RSVPreF3 (Arexvy) CDC recommends that adults 60 years of age and older may receive a single dose of RSV vaccine using shared clinical decision-making (SCDM)   Tetanus (Td) Vaccine - COST NOT COVERED BY MEDICARE PART B Following completion of primary series, a booster dose should be given every 10 years to maintain immunity against tetanus. Td may also be given as tetanus wound prophylaxis.   Tdap Vaccine - COST NOT COVERED BY MEDICARE PART B Recommended at least once for all adults. For pregnant patients, recommended with each pregnancy.   Shingles Vaccine (Shingrix) - COST NOT COVERED BY MEDICARE PART B  2 shot series recommended in those 19 years and older who have or will have weakened immune systems or those 50 years and older     Health Maintenance Due:      Topic Date Due    Colorectal Cancer Screening  12/16/2033    HIV Screening  Completed    Hepatitis C Screening  Completed     Immunizations Due:      Topic Date Due    COVID-19 Vaccine (1) Never done    Pneumococcal Vaccine: Pediatrics (0 to 5 Years) and At-Risk Patients (6 to 64 Years) (1 - PCV) Never done    Influenza Vaccine (1) Never done     Advance Directives   What are advance directives?  Advance directives are legal documents that state your wishes and plans for  medical care. These plans are made ahead of time in case you lose your ability to make decisions for yourself. Advance directives can apply to any medical decision, such as the treatments you want, and if you want to donate organs.   What are the types of advance directives?  There are many types of advance directives, and each state has rules about how to use them. You may choose a combination of any of the following:  Living will:  This is a written record of the treatment you want. You can also choose which treatments you do not want, which to limit, and which to stop at a certain time. This includes surgery, medicine, IV fluid, and tube feedings.   Durable power of  for healthcare (DPAHC):  This is a written record that states who you want to make healthcare choices for you when you are unable to make them for yourself. This person, called a proxy, is usually a family member or a friend. You may choose more than 1 proxy.  Do not resuscitate (DNR) order:  A DNR order is used in case your heart stops beating or you stop breathing. It is a request not to have certain forms of treatment, such as CPR. A DNR order may be included in other types of advance directives.  Medical directive:  This covers the care that you want if you are in a coma, near death, or unable to make decisions for yourself. You can list the treatments you want for each condition. Treatment may include pain medicine, surgery, blood transfusions, dialysis, IV or tube feedings, and a ventilator (breathing machine).  Values history:  This document has questions about your views, beliefs, and how you feel and think about life. This information can help others choose the care that you would choose.  Why are advance directives important?  An advance directive helps you control your care. Although spoken wishes may be used, it is better to have your wishes written down. Spoken wishes can be misunderstood, or not followed. Treatments may be given  even if you do not want them. An advance directive may make it easier for your family to make difficult choices about your care.   Weight Management   Why it is important to manage your weight:  Being overweight increases your risk of health conditions such as heart disease, high blood pressure, type 2 diabetes, and certain types of cancer. It can also increase your risk for osteoarthritis, sleep apnea, and other respiratory problems. Aim for a slow, steady weight loss. Even a small amount of weight loss can lower your risk of health problems.  How to lose weight safely:  A safe and healthy way to lose weight is to eat fewer calories and get regular exercise. You can lose up about 1 pound a week by decreasing the number of calories you eat by 500 calories each day.   Healthy meal plan for weight management:  A healthy meal plan includes a variety of foods, contains fewer calories, and helps you stay healthy. A healthy meal plan includes the following:  Eat whole-grain foods more often.  A healthy meal plan should contain fiber. Fiber is the part of grains, fruits, and vegetables that is not broken down by your body. Whole-grain foods are healthy and provide extra fiber in your diet. Some examples of whole-grain foods are whole-wheat breads and pastas, oatmeal, brown rice, and bulgur.  Eat a variety of vegetables every day.  Include dark, leafy greens such as spinach, kale, yaneth greens, and mustard greens. Eat yellow and orange vegetables such as carrots, sweet potatoes, and winter squash.   Eat a variety of fruits every day.  Choose fresh or canned fruit (canned in its own juice or light syrup) instead of juice. Fruit juice has very little or no fiber.  Eat low-fat dairy foods.  Drink fat-free (skim) milk or 1% milk. Eat fat-free yogurt and low-fat cottage cheese. Try low-fat cheeses such as mozzarella and other reduced-fat cheeses.  Choose meat and other protein foods that are low in fat.  Choose beans or other  legumes such as split peas or lentils. Choose fish, skinless poultry (chicken or turkey), or lean cuts of red meat (beef or pork). Before you cook meat or poultry, cut off any visible fat.   Use less fat and oil.  Try baking foods instead of frying them. Add less fat, such as margarine, sour cream, regular salad dressing and mayonnaise to foods. Eat fewer high-fat foods. Some examples of high-fat foods include french fries, doughnuts, ice cream, and cakes.  Eat fewer sweets.  Limit foods and drinks that are high in sugar. This includes candy, cookies, regular soda, and sweetened drinks.  Exercise:  Exercise at least 30 minutes per day on most days of the week. Some examples of exercise include walking, biking, dancing, and swimming. You can also fit in more physical activity by taking the stairs instead of the elevator or parking farther away from stores. Ask your healthcare provider about the best exercise plan for you.      © Copyright [employer] 2018 Information is for End User's use only and may not be sold, redistributed or otherwise used for commercial purposes. All illustrations and images included in CareNotes® are the copyrighted property of [employer] or [employer]

## 2024-01-24 NOTE — PROGRESS NOTES
Assessment and Plan:     Problem List Items Addressed This Visit       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 (HCC)       Lab Results   Component Value Date    HGBA1C 7.9 (A) 12/01/2023   BG-69 last night  current meds labs: Lantus nightly  Has not been able to afford Ozempic  Query dietary adherence    - referral to on site medication  to discuss options; appreciate recommendations  - Discussed walking to help control BG but with snack bars or similar options to avoid hypoglycemic episodes  - Discussed dietary options for foods with lower glycemic index such as high fiber foods and fruits- Apples, oats etc  - RTC in 3 months to fu             Relevant Orders    Ambulatory Referral to Ophthalmology    LBBB (left bundle branch block)     Follows with Cardiologist- Dr Calderon  Appreciate his recommendations below:  Given his persistent reduced EF and left bundle branch block, will plan for referral to EP for evaluation for pacemaker ICD  With his nonischemic cardiomyopathy, we will also plan for referral to advanced heart failure for further workup, in addition to getting a new echo with 3D EF and strain imaging.  May ultimately need workup for amyloid or cardiac sarcoid depending.  - reviewed the above with patient including the possibility of a pacemaker ICD in the near future; he has an appointment with EP today.   - Will follow accordingly; treatment medically optimized currently  - May benefit from a DOAC for easier management. Thi has been communicated with cardiology by clinical pharmacist- Chente Lewis. Will touch base with cardiology again to obtain recommendation.         Long term (current) use of anticoagulants     See A/P trichomonas         Acute pain of right shoulder     Suspect muscle strain vs rotator cuff tear due to favoring the right arm 2/2 left sided weakness  Challenge with avoiding NSAIDS     - Trial Tylenol scheduled for 4 days with heat/cold pad  application. May continue for a week if needed.  - Rest RUE as much as possible to avoid overuse injury  - RTC in 2 weeks if no relieve or worsening symptoms         Relevant Medications    acetaminophen (TYLENOL) 650 mg CR tablet    Trichomonas contact, treated     Partner was recently diagnosed with Trichomonas. She was present during today's visit and is currently being treated.  Fadi is currently taking Warfarin and Flagyl could increase the risk of bleeding.    - Consulted onsite clinical pharmacist. Plan to adjust Warfarin dose by a 30% decrease in weekly dose thus  - Take 5 mg M,W,F and 2.5 mg T, Th, Sat, Sun.  - Take Flagyl as ordered  - Recheck INR in ~2 weeks         Relevant Medications    metroNIDAZOLE (FLAGYL) 500 mg tablet     Other Visit Diagnoses       Medicare annual wellness visit, initial    -  Primary    History of smoking 10-25 pack years        LDCT lung needed. Quit smoking.    Relevant Orders    CT lung screening program             Preventive health issues were discussed with patient, and age appropriate screening tests were ordered as noted in patient's After Visit Summary.  Personalized health advice and appropriate referrals for health education or preventive services given if needed, as noted in patient's After Visit Summary.     History of Present Illness:     Patient presents for a Medicare Wellness Visit      Fadi Busby is a 58 y.o. male with pmhx of pmhx of HTN, T2DM, and long term use of anticoagulants presenting today for medicare annual wellness visit. Patient is accompanied by his significant other who was diagnosed with trichomonas infection requiring treatment of her partner- Fadi. His primary complaint today is right shoulder pain that began about a week or so ago without any apparent trauma/fall. His partner believes this is due to the preferred use of his right arm due to the left sided weakness from previous strokes.     Patient reports that they are doing well. He  denies headache, SOB, palpitations and chest pain. 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 His significant other had concerns due to his prolonged periods of sitting. He endorses adherence with medications.  Problems stable unless otherwise mentioned.       Patient Care Team:  Osmany Thornton MD as PCP - General (Family Medicine)  Oriana Oconnor MD as PCP - PCP-[facility] ([facility])     Review of Systems:     Review of Systems   Eyes:  Negative for visual disturbance.   Respiratory:  Negative for shortness of breath.    Cardiovascular:  Negative for chest pain and palpitations.   Musculoskeletal:  Positive for myalgias. Negative for arthralgias.   Neurological:  Positive for weakness. Negative for dizziness, light-headedness and headaches.        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 No pertinent family history.   Social History:     Social History     Socioeconomic History    Marital status: Single     Spouse name: None    Number of children: None    Years of education: None    Highest education level: None   Occupational History    None   Tobacco Use    Smoking status: Never     Passive exposure: Never    Smokeless tobacco: Never   Vaping Use    Vaping status: Never Used   Substance and Sexual Activity     Alcohol use: Never    Drug use: Never    Sexual activity: None   Other Topics Concern    None   Social History Narrative    None     Social Determinants of Health     Financial Resource Strain: Low Risk  (1/24/2024)    Overall Financial Resource Strain (CARDIA)     Difficulty of Paying Living Expenses: Not very hard   Food Insecurity: No Food Insecurity (1/24/2024)    Hunger Vital Sign     Worried About Running Out of Food in the Last Year: Never true     Ran Out of Food in the Last Year: Never true   Transportation Needs: No Transportation Needs (1/24/2024)    PRAPARE - Transportation     Lack of Transportation (Medical): No     Lack of Transportation (Non-Medical): No   Physical Activity: Not on file   Stress: Not on file   Social Connections: Not on file   Intimate Partner Violence: Not on file   Housing Stability: Not on file      Medications and Allergies:     Current Outpatient Medications   Medication Sig Dispense Refill    acetaminophen (TYLENOL) 650 mg CR tablet Take 1 tablet (650 mg total) by mouth every 8 (eight) hours as needed for mild pain 30 tablet 0    atorvastatin (LIPITOR) 40 mg tablet Take 1 tablet (40 mg total) by mouth daily 90 tablet 3    Continuous Blood Gluc Sensor (FreeStyle Ana María 2 Sensor) MISC TAKE AS DIRECTED 2 each 1    furosemide (LASIX) 40 mg tablet Take 1 tablet (40 mg total) by mouth in the morning 90 tablet 3    Insulin Glargine Solostar (Lantus SoloStar) 100 UNIT/ML SOPN Inject 0.35 mL (35 Units total) under the skin in the morning 15 mL 3    metoprolol succinate (TOPROL-XL) 25 mg 24 hr tablet Take 25 mg by mouth      metroNIDAZOLE (FLAGYL) 500 mg tablet Take 1 tablet (500 mg total) by mouth every 12 (twelve) hours for 7 days 14 tablet 0    valsartan (DIOVAN) 320 MG tablet Take 1 tablet (320 mg total) by mouth daily 90 tablet 3    warfarin (Coumadin) 5 mg tablet Patient is establishing care and brought in med list. Takes Warfarin once daily. 90 tablet 1    Insulin Pen Needle 32G X  4 MM MISC Use 1 (one) time for 1 dose 90 each 3    semaglutide, 0.25 or 0.5 mg/dose, (Ozempic, 0.25 or 0.5 MG/DOSE,) 2 mg/3 mL injection pen Inject 0.75 mL (0.5 mg total) under the skin every 7 days (Patient not taking: Reported on 1/24/2024) 9 mL 1    sildenafil (VIAGRA) 50 MG tablet Take 50 mg by mouth daily as needed       No current facility-administered medications for this visit.     No Known Allergies   Immunizations:       There is no immunization history on file for this patient.   Health Maintenance:         Topic Date Due    Colorectal Cancer Screening  12/16/2033    HIV Screening  Completed    Hepatitis C Screening  Completed         Topic Date Due    COVID-19 Vaccine (1) Never done    Pneumococcal Vaccine: Pediatrics (0 to 5 Years) and At-Risk Patients (6 to 64 Years) (1 - PCV) Never done    Influenza Vaccine (1) Never done      Medicare Screening Tests and Risk Assessments:         Health Risk Assessment:   Patient rates overall health as fair. Patient feels that their physical health rating is same. Patient is satisfied with their life. Eyesight was rated as same. Hearing was rated as same. Patient feels that their emotional and mental health rating is same. Patients states they are often angry. Patient states they are sometimes unusually tired/fatigued. Pain experienced in the last 7 days has been none. Patient states that he has experienced no weight loss or gain in last 6 months.     Fall Risk Screening:   In the past year, patient has experienced: no history of falling in past year      Home Safety:  Patient has trouble with stairs inside or outside of their home. Patient has working smoke alarms and has working carbon monoxide detector. Home safety hazards include: none.     Nutrition:   Current diet is Regular and Limited junk food.     Medications:   Patient is not currently taking any over-the-counter supplements. Patient is able to manage medications.     Activities of Daily Living  (ADLs)/Instrumental Activities of Daily Living (IADLs):   Walk and transfer into and out of bed and chair?: Yes  Dress and groom yourself?: Yes    Bathe or shower yourself?: Yes    Feed yourself? Yes  Do your laundry/housekeeping?: No  Manage your money, pay your bills and track your expenses?: Yes  Make your own meals?: Yes    Do your own shopping?: Yes    Previous Hospitalizations:   Any hospitalizations or ED visits within the last 12 months?: No      Advance Care Planning:   Living will: No    Durable POA for healthcare: No    Advanced directive: No    Advanced directive counseling given: Yes    ACP document given: Yes    Patient declined ACP directive: No    End of Life Decisions reviewed with patient: Yes    Provider agrees with end of life decisions: Yes      Comments: Provided patient with the 5 wishes booklet.    Cognitive Screening:   Provider or family/friend/caregiver concerned regarding cognition?: No    PREVENTIVE SCREENINGS      Cardiovascular Screening:    General: Screening Current      Diabetes Screening:     General: Screening Not Indicated and History Diabetes      Colorectal Cancer Screening:     General: Screening Current      Prostate Cancer Screening:    General: Screening Not Indicated      Osteoporosis Screening:    General: Screening Not Indicated      Lung Cancer Screening:     General: Screening Not Indicated    Due for: Low Dose CT (LDCT)      Hepatitis C Screening:    General: Screening Current      Preventive Screening Comments: Patient smoked for more than 20 years but quit more than 10 years ago. However, he smoked last August 2023 during a brief period of stress and has not smoked since. He meets criteria for LDCT of lungs.     Screening, Brief Intervention, and Referral to Treatment (SBIRT)    Screening  Typical number of drinks in a day: 0  Typical number of drinks in a week: 0  Interpretation: Low risk drinking behavior.    Single Item Drug Screening:  How often have you used an  "illegal drug (including marijuana) or a prescription medication for non-medical reasons in the past year? never    Single Item Drug Screen Score: 0  Interpretation: Negative screen for possible drug use disorder    Other Counseling Topics:   Car/seat belt/driving safety and skin self-exam.     No results found.     Physical Exam:     /64 (BP Location: Left arm, Patient Position: Sitting, Cuff Size: Large)   Pulse 64   Temp 98 °F (36.7 °C) (Temporal)   Resp 18   Ht 5' 9\" (1.753 m)   Wt 108 kg (238 lb)   SpO2 97%   BMI 35.15 kg/m²     Physical Exam  HENT:      Nose: No congestion.   Cardiovascular:      Rate and Rhythm: Normal rate and regular rhythm.      Pulses: Normal pulses.      Heart sounds: Normal heart sounds.   Pulmonary:      Effort: Pulmonary effort is normal.      Breath sounds: Normal breath sounds and air entry.      Comments: Decreases breath sounds in bases. No crackles or rales.   Musculoskeletal:         General: Normal range of motion.      Right lower leg: No edema.      Left lower leg: No edema.      Comments: Full active ROM. No clicking or popping with shoulder movements.  No point tenderness on the GH or AC joints. Pain elicited with shoulder flexion only along the deltoid muscle.    Skin:     General: Skin is warm.   Neurological:      Mental Status: Mental status is at baseline.      Motor: Weakness present.      Comments: Left sided weakness-Stroke hx   Psychiatric:         Mood and Affect: Mood normal.         Behavior: Behavior normal.          Osmany Thornton MD  "

## 2024-01-24 NOTE — TELEPHONE ENCOUNTER
Discussed metronidazole treatment for trich, however patient already taking warfarin    Recommend 30% warfarin reduction while taking metronidazole.     Pt takes 5 mg daily therefore     ~ 5mg MWF, and 2.5 mg all other days is logical while taking flagyl    Then return to normal dose    ---------------------------------------------------    Pharmacist Tracking Tool  Reason For Outreach: Embedded Pharmacist  Demographics:  Intervention Method: Chart Review  Type of Intervention: Follow-Up  Topics Addressed: Anticoagulation  Pharmacologic Interventions: Drug Interaction   Non-Pharmacologic Interventions: Care coordination, Chart update, Disease state education, and Medication Monitoring  Time:  Direct Patient Care:  0  mins  Care Coordination:  10  mins  Recommendation Recipient: Provider  Outcome: Accepted     Chente Lewis, PharmD, BCACP  Ambulatory Care Clinical Pharmacist

## 2024-01-24 NOTE — PROGRESS NOTES
Medication patient assistance   was requested by provider to meet with patient following their office visit. Patient expressed difficulty affording Ozempic and his medications. Patient states he currently has Medicare with prescription Part D coverage. MPAP specialist explained that not all patients with part D coverage are eligible for the [employer] patient assistance program for Ozempic. Patient stated he is also taking Lantus. MPAP specialist gave patient and his Significant other her card. MPAP specialist gave patient applications for both the Ozempic patient assistance program and [employer] patient assistance program for Lantus. MPAP specialist encouraged patient to call if he had any questions. Patient's significant other stated they would bring application back. MPAP specialist explained that income verification may be required for the applications. MPAP specialist will complete application process once applications and all documentation is received.

## 2024-01-24 NOTE — ASSESSMENT & PLAN NOTE
Partner was recently diagnosed with Trichomonas. She was present during today's visit and is currently being treated.  Fadi is currently taking Warfarin and Flagyl could increase the risk of bleeding.    - Consulted onsite clinical pharmacist. Plan to adjust Warfarin dose by a 30% decrease in weekly dose thus  - Take 5 mg M,W,F and 2.5 mg T, Th, Sat, Sun.  - Take Flagyl as ordered  - Recheck INR in ~2 weeks

## 2024-01-24 NOTE — PROGRESS NOTES
EPS Consultation/New Patient Evaluation - Fadi Busby 58 y.o. male MRN: [id number]       Referring:Dr. Calderon    CC/HPI:   It was a pleasure to see Fadi Busby in our arrhythmia clinic at [facility]. As you know he is a 58 y.o. man with nonischemic cardiomyopathy ejection fraction of 25 to 30%, sarcoidosis (lungs, liver), carotid dissection, left bundle branch block, diabetes type 2, hypertension, history of DVT/PE who presents to discuss management of his cardiomyopathy with primary prevention defibrillator.    Patient was diagnosed with nonischemic cardiomyopathy on echocardiogram performed in June 2023 which showed severely dilated left ventricle.  Ejection fraction was noted to be 25-30%.  Patient had cardiac MRI in October 2023  which showed late gadolinium enhancement in nonischemic distribution. Patient also had coronary CT angiogram which showed very low calcium score.  He had another echocardiogram performed today which showed ejection fraction of 45%.  He now presents to discuss management of his cardiomyopathy.  His wife also provides further history.    He currently denies any significant symptoms at baseline.  He occasionally does get fatigued with exertion but denies any significant swelling in lower extremities, chest pain, orthopnea, PND or syncope.  He does have paralysis on the left side of the body and the face.  He is able to walk with little support.  His left arm has minimal movements.  He reports compliance with all his medications.    Past Medical History:  Past Medical History:   Diagnosis Date    Paronychia of right index finger 08/15/2023       Medications:      Current Outpatient Medications:     acetaminophen (TYLENOL) 650 mg CR tablet, Take 1 tablet (650 mg total) by mouth every 8 (eight) hours as needed for mild pain, Disp: 30 tablet, Rfl: 0    atorvastatin (LIPITOR) 40 mg tablet, Take 1 tablet (40 mg total) by mouth daily, Disp: 90 tablet, Rfl: 3     Continuous Blood Gluc Sensor (FreeStyle Ana María 2 Sensor) MISC, TAKE AS DIRECTED, Disp: 2 each, Rfl: 1    furosemide (LASIX) 40 mg tablet, Take 1 tablet (40 mg total) by mouth in the morning, Disp: 90 tablet, Rfl: 3    Insulin Glargine Solostar (Lantus SoloStar) 100 UNIT/ML SOPN, Inject 0.35 mL (35 Units total) under the skin in the morning, Disp: 15 mL, Rfl: 3    Insulin Pen Needle 32G X 4 MM MISC, Use 1 (one) time for 1 dose, Disp: 90 each, Rfl: 3    metoprolol succinate (TOPROL-XL) 25 mg 24 hr tablet, Take 25 mg by mouth, Disp: , Rfl:     metroNIDAZOLE (FLAGYL) 500 mg tablet, Take 1 tablet (500 mg total) by mouth every 12 (twelve) hours for 7 days, Disp: 14 tablet, Rfl: 0    sildenafil (VIAGRA) 50 MG tablet, Take 50 mg by mouth daily as needed, Disp: , Rfl:     valsartan (DIOVAN) 320 MG tablet, Take 1 tablet (320 mg total) by mouth daily, Disp: 90 tablet, Rfl: 3    warfarin (Coumadin) 5 mg tablet, Patient is establishing care and brought in med list. Takes Warfarin once daily., Disp: 90 tablet, Rfl: 1    semaglutide, 0.25 or 0.5 mg/dose, (Ozempic, 0.25 or 0.5 MG/DOSE,) 2 mg/3 mL injection pen, Inject 0.75 mL (0.5 mg total) under the skin every 7 days (Patient not taking: Reported on 1/24/2024), Disp: 9 mL, Rfl: 1  No current facility-administered medications for this visit.     No family history on file.  Social History     Socioeconomic History    Marital status: Single     Spouse name: Not on file    Number of children: Not on file    Years of education: Not on file    Highest education level: Not on file   Occupational History    Not on file   Tobacco Use    Smoking status: Never     Passive exposure: Never    Smokeless tobacco: Never   Vaping Use    Vaping status: Never Used   Substance and Sexual Activity    Alcohol use: Never    Drug use: Never    Sexual activity: Not on file   Other Topics Concern    Not on file   Social History Narrative    Not on file     Social Determinants of Health     Financial  "Resource Strain: Low Risk  (1/24/2024)    Overall Financial Resource Strain (CARDIA)     Difficulty of Paying Living Expenses: Not very hard   Food Insecurity: No Food Insecurity (1/24/2024)    Hunger Vital Sign     Worried About Running Out of Food in the Last Year: Never true     Ran Out of Food in the Last Year: Never true   Transportation Needs: No Transportation Needs (1/24/2024)    PRAPARE - Transportation     Lack of Transportation (Medical): No     Lack of Transportation (Non-Medical): No   Physical Activity: Not on file   Stress: Not on file   Social Connections: Not on file   Intimate Partner Violence: Not on file   Housing Stability: Not on file     Social History     Tobacco Use   Smoking Status Never    Passive exposure: Never   Smokeless Tobacco Never     Social History     Substance and Sexual Activity   Alcohol Use Never       Review of Systems   Constitutional: Positive for malaise/fatigue. Negative for chills and fever.   HENT: Negative.     Eyes:  Negative for blurred vision and double vision.   Cardiovascular:  Negative for chest pain, dyspnea on exertion, leg swelling, near-syncope, orthopnea, palpitations, paroxysmal nocturnal dyspnea and syncope.   Respiratory:  Negative for cough and sputum production.    Endocrine: Negative.    Skin: Negative.  Negative for rash.   Musculoskeletal: Negative.  Negative for arthritis and joint pain.   Gastrointestinal:  Negative for abdominal pain, nausea and vomiting.   Genitourinary: Negative.    Neurological:  Positive for focal weakness and paresthesias. Negative for dizziness and light-headedness.   Psychiatric/Behavioral: Negative.  The patient is not nervous/anxious.         Objective:     Vitals: Blood pressure 106/66, pulse 56, height 5' 9\" (1.753 m), weight 107 kg (235 lb 1.6 oz)., Body mass index is 34.72 kg/m².,        Physical Exam:    GEN: Fadi Busby appears well, alert and oriented x 3, pleasant and cooperative   HEENT: pupils equal, round, " and reactive to light; extraocular muscles intact  NECK: supple, no carotid bruits   HEART: regular rhythm, normal S1 and S2, no murmurs, clicks, gallops or rubs   LUNGS: clear to auscultation bilaterally; no wheezes, rales, or rhonchi   ABDOMEN: normal bowel sounds, soft, no tenderness, no distention  EXTREMITIES: peripheral pulses normal; no clubbing, cyanosis, or edema  NEURO: left arm leg weakness, facial paralysis  SKIN: normal without suspicious lesions on exposed skin      Labs & Results:  Below is the patient's most recent value for Albumin, ALT, AST, BUN, Calcium, Chloride, Cholesterol, CO2, Creatinine, GFR, Glucose, HDL, Hematocrit, Hemoglobin, Hemoglobin A1C, LDL, Magnesium, Phosphorus, Platelets, Potassium, PSA, Sodium, Triglycerides, and WBC.   Lab Results   Component Value Date    ALT 36 11/21/2023    AST 28 11/21/2023    BUN 18 11/21/2023    CALCIUM 9.3 11/21/2023     11/21/2023    CO2 30 11/21/2023    CREATININE 1.06 11/21/2023    HDL 40 11/21/2023    HCT 45.3 07/20/2023    HGB 14.8 07/20/2023    HGBA1C 7.9 (A) 12/01/2023     07/20/2023    K 3.9 11/21/2023    TRIG 122 11/21/2023    WBC 6.27 07/20/2023     Note: for a comprehensive list of the patient's lab results, access the Results Review activity.          Cardiac testing:     I personally reviewed the ECG performed in the clinic on 01/24/24. It reveals sinus bradycardia with left bundle branch block, QRS width of 168 ms.    Echocardiograms:  No results found for this or any previous visit.    No results found for this or any previous visit.      Catheterizations:   No results found for this or any previous visit.      Stress Tests:  No results found for this or any previous visit.      Holter monitor -   No results found for this or any previous visit.    No results found for this or any previous visit.        ASSESSMENT/PLAN:  Dilated cardiomyopathy  Diagnosed in June 2023 on ECHO  cMRI with non-ischemic findings   Also has  LBBB  Patient has been on optimal medical therapy with metoprolol and valsartan for > 90 days  ECHO today showed EF improved from 25-30% to 45%  He will benefit from CRT-P.  We discussed the procedure in detail and after answering questions, he opted to proceed with it  Our office will be in touch with him to schedule the procedure  Sarcoidosis  Involvement in lungs, liver   No signs of flare-up  DM II  On insulin  Last A1c 7.9  HTN  Normotensive in the clinic  Maintained on valsartan  Hx of PE  Maintained on warfarin    Follow-up after PPM

## 2024-01-25 ENCOUNTER — TELEPHONE (OUTPATIENT)
Dept: CARDIOLOGY CLINIC | Facility: CLINIC | Age: 59
End: 2024-01-25

## 2024-01-25 NOTE — ASSESSMENT & PLAN NOTE
Suspect muscle strain vs rotator cuff tear due to favoring the right arm 2/2 left sided weakness  Challenge with avoiding NSAIDS     - Trial Tylenol scheduled for 4 days with heat/cold pad application. May continue for a week if needed.  - Rest RUE as much as possible to avoid overuse injury  - RTC in 2 weeks if no relieve or worsening symptoms

## 2024-01-25 NOTE — RESULT ENCOUNTER NOTE
Please call the patient regarding their Echocardiogram results. There were some abnormalities that require routine follow up, but overall his heart appeared improved from his records in the summertime / fall EF now 45% - close to normal now - (was 25-30% in June 2023, 30% in october). Plan for follow up at next scheduled appointment.. Continue current medications at the same doses..

## 2024-01-25 NOTE — TELEPHONE ENCOUNTER
----- Message from Dayton Calderon DO sent at 1/24/2024 10:18 PM EST -----  Please call the patient regarding their Echocardiogram results. There were some abnormalities that require routine follow up, but overall his heart appeared improved from his records in the summertime / fall EF now 45% - close to normal now - (was 25-30% in June 2023, 30% in october). Plan for follow up at next scheduled appointment.. Continue current medications at the same doses..

## 2024-01-25 NOTE — ASSESSMENT & PLAN NOTE
Follows with Cardiologist- Dr Calderon  Appreciate his recommendations below:  Given his persistent reduced EF and left bundle branch block, will plan for referral to EP for evaluation for pacemaker ICD  With his nonischemic cardiomyopathy, we will also plan for referral to advanced heart failure for further workup, in addition to getting a new echo with 3D EF and strain imaging.  May ultimately need workup for amyloid or cardiac sarcoid depending.  - reviewed the above with patient including the possibility of a pacemaker ICD in the near future; he has an appointment with EP today.   - Will follow accordingly; treatment medically optimized currently  - May benefit from a DOAC for easier management. Thi has been communicated with cardiology by clinical pharmacist- Chente Lewis. Will touch base with cardiology again to obtain recommendation.

## 2024-01-26 ENCOUNTER — TELEPHONE (OUTPATIENT)
Dept: CARDIOLOGY CLINIC | Facility: CLINIC | Age: 59
End: 2024-01-26

## 2024-01-26 ENCOUNTER — PREP FOR PROCEDURE (OUTPATIENT)
Dept: CARDIOLOGY CLINIC | Facility: CLINIC | Age: 59
End: 2024-01-26

## 2024-01-26 DIAGNOSIS — I42.8 NICM (NONISCHEMIC CARDIOMYOPATHY) (HCC): ICD-10-CM

## 2024-01-26 DIAGNOSIS — I44.7 LBBB (LEFT BUNDLE BRANCH BLOCK): Primary | ICD-10-CM

## 2024-01-26 DIAGNOSIS — I44.7 LBBB (LEFT BUNDLE BRANCH BLOCK): ICD-10-CM

## 2024-01-26 DIAGNOSIS — I50.20 HFREF (HEART FAILURE WITH REDUCED EJECTION FRACTION) (HCC): Primary | ICD-10-CM

## 2024-01-26 NOTE — TELEPHONE ENCOUNTER
----- Message from Nahun Rees MD sent at 1/24/2024  8:41 PM EST -----  Regarding: biv ppm  Ishmael Owens/Lyndsay/Natasha,    Please schedule this patient for biv PPM.     Routine labs    Hold medication: none    System/Device company: [employer]    Thank you

## 2024-01-26 NOTE — TELEPHONE ENCOUNTER
Contacted patient to let him know that we are arranging Dr Rees scheduled in order to give a date for his procedure.

## 2024-01-26 NOTE — LETTER
DEVICE PROCEDURE INSTRUCTIONS    Fadi Busby   : 1965  MRN: [id number]  [address]  [facility] [address]    Procedure: Cardiac device intervention    Procedure Date: 2024    Location: [facility]  Address: [address] [place] [id number]        Labs to be done before  2024   CMP /  CBC (FASTING 8 HOURS)    BLOOD THINNER INSTRUCTIONS (Coumadin / Warfarin / Pradaxa / Eliquis / Xarelto):   WARFARIN: Continue taking this medication as prescribed.    Medication Hold:   OZEMPIC: If apply WEEKLY, DO NOT apply in the 7 day’s before your procedure.   LANTUS: TAKE your FULL regular dose the day before procedure, DO NOT apply any amount in the morning of the procedure.   LASIX: DO NOT take this medication the morning of the procedure.   VALSARTAN: DO NOT take this medication the day prior to the procedure and in the morning of the procedure.         Arrival Time: The Hospital will contact you the day prior to your procedure, usually between 4PM - 6PM to instruct you on the time and place to report. If you do not hear from a [facility]  by 6PM the evening prior to your procedure, please contact the campus you are scheduled at.     DO NOT drink or eat anything after midnight the night prior to your procedure. You may have a minimal amount of water with your AM medications. If your procedure is scheduled after 12:00 noon, you may have clear liquids until 8:00AM the morning of your procedure. (Clear liquids: 7UP, ginger ale, jello, or broth.)    Arrange for a responsible adult to drive you to and from the hospital.    You should continue to take your morning medications with a sip of water UNLESS ADVISED OTHERWISE.       DO NOT stop taking Plavix or Aspirin unless advised otherwise.     If you are currently taking Fish Oil, Krill oil and/or Vitamin E please DO NOT TAKE FOR A WEEK PRIOR TO PROCEDURE.     If you are diabetic, DO NOT take any of your  diabetic pills the morning of your procedure. Oral diabetic medications may include Glucophage, Prandin, Glyburide, Micronase, Avandia, Glucovance, Precose, Glynase, and Starlix.     Bring a list of daily medications, vitamins, minerals, herbals and nutritional supplements you take. Please include dosages and the times you take them each day.     If you are packing an overnight bag, pack minimal clothing, you will be given hospital sleepwear.   DO NOT bring money, valuables or jewelry. The wedding band is ok.     If you use CPAP machine, bring it to the hospital.     Bring your Photo ID and Insurance cards with you.     Please notify us if you have been admitted to the hospital within 30 days.    Pre-Operative Showering Instructions. ONLY FOR DEVICE PROCEDURE.    The two nights prior to your procedure, take a shower each night using the special antiseptic body scrub (Chlorhexidine Scrub Brush) you received from the office or hospital. This scrub will replace your soap at home, the sponge is pre-filled with soap.     The scrub brushes are single use only and should be discarded after use.     Shampoo your hair with regular shampoo and rinse completely before using the antiseptic scrub brush.     Using the sponge side of the scrub brush to wash your entire body from your neck down, with special attention to your armpits, chest and groin area. DO NOT USE the scrub on your face and avoid any open wounds. Do not use any other soap or wash your skin.    DO NOT USE any lotion, powder, deodorant or perfume of any kind on your skin after you shower.    AFTER THE FIRST NIGHT of using the scrub, change your bed linen sheets to a fresh clean set and clean pajamas for bedtime.    AFTER THE SECOND NIGHT of using the scrub, use clean pajamas for bedtime.    DO NOT SHOWER THE MORNING OF SURGERY, you will be prepped and cleansed at the hospital prior to your procedure.       PLEASE  THE SPONGES AT YOUR MOST CONVENIENT [facility].      FAILURE TO FOLLOW ANY OF THESE INSTRUCTIONS COULD RESULT IN THE CANCELLATION OF YOUR PROCEDURE      Please call  [phone] ([place]) or [phone] (Mg) if you do not hear from the  by 6:00PM the night before your procedure.    All patients enter through ENTRANCE B. [employer] Parking is available free of charge or park on Parking Deck B.        Thank you,   Graciela Jamison  Surgery Coordinator  [facility]   [address] [place] [address]  [place] [address]  Ph: [phone]

## 2024-01-30 NOTE — TELEPHONE ENCOUNTER
Patient scheduled for BIV  pacer implant at [facility] on  02/23/2024  with Dr Rees.    Patient aware of general instructions, labs test required.     Meds holds:   OZEMPIC: If apply WEEKLY, DO NOT apply in the 7 day’s before your procedure.   LANTUS: TAKE your FULL regular dose the day before procedure, DO NOT apply any amount in the morning of the procedure.   LASIX: DO NOT take this medication the morning of the procedure.   VALSARTAN: DO NOT take this medication the day prior to the procedure and in the morning of the procedure.     Can we please check insurance for service.

## 2024-02-05 DIAGNOSIS — E11.311 TYPE 2 DIABETES MELLITUS WITH RETINOPATHY AND MACULAR EDEMA, UNSPECIFIED LATERALITY, UNSPECIFIED RETINOPATHY SEVERITY, UNSPECIFIED WHETHER LONG TERM INSULIN USE (HCC): ICD-10-CM

## 2024-02-05 RX ORDER — INSULIN GLARGINE 100 [IU]/ML
INJECTION, SOLUTION SUBCUTANEOUS
Qty: 15 ML | Refills: 3 | Status: SHIPPED | OUTPATIENT
Start: 2024-02-05

## 2024-02-08 ENCOUNTER — PATIENT OUTREACH (OUTPATIENT)
Dept: FAMILY MEDICINE CLINIC | Facility: CLINIC | Age: 59
End: 2024-02-08

## 2024-02-08 NOTE — PROGRESS NOTES
Medication Patient Assistance Program (MPAP) Specialist  received personal reminder notification. MPAP specialist reviewed patient chart prior to outreach. MPAP specialist reached out patient regarding the applications for Jardiance through [facility] patient assistance program and Lantus through [employer] patient assistance program that MPAP specialist had provided at  patient's prior visit on 01/24/2024. Per patient he believes that application had been filled out but his partner would know. Patient stated his partner is currently at work. MPAP specialist stated she could call another time or leave patient her name and number so patient could reach out when convenient. Patient stated MPAP specialist can call back on 02/09/2024. MPAP specialist has added appointment to her calendar and placed a personal reminder. MPAP specialist thanked patient and stated she will call again tomorrow 02/09/2024 at 10:00.  MPAP specialist placed return call to patient. MPAP specialist asked patient if their meeting could be rescheduled to 11 since she has a prior appointment at 10:00. Patient stated understanding and agreed to time change from 10:00 to 11:00 am. MPAP specialist has placed meeting on her calendar for 11:00 am. MPAP specialist thanked patient.

## 2024-02-09 ENCOUNTER — PATIENT OUTREACH (OUTPATIENT)
Dept: FAMILY MEDICINE CLINIC | Facility: CLINIC | Age: 59
End: 2024-02-09

## 2024-02-09 NOTE — PROGRESS NOTES
Medication Patient Assistance Program (MPAP) Specialist  received personal reminder notification regarding return call to patient. MPAP specialist attempted to reach patient regarding his application for patient assistance program through [facility] and [employer]. MPAP specialist reviewed patient chart prior to outreach.  Patient outreach was unsuccessful. MPAP specialist left a voicemail with her name number and requested a return call regarding patient assistance program applications. MPAP specialist will attempt to reach patient in 2 weeks if return call is not received. MPAP specialist has set a personal reminder.

## 2024-02-15 ENCOUNTER — PATIENT OUTREACH (OUTPATIENT)
Dept: FAMILY MEDICINE CLINIC | Facility: CLINIC | Age: 59
End: 2024-02-15

## 2024-02-21 ENCOUNTER — APPOINTMENT (OUTPATIENT)
Dept: LAB | Facility: CLINIC | Age: 59
End: 2024-02-21
Payer: COMMERCIAL

## 2024-02-21 ENCOUNTER — PATIENT OUTREACH (OUTPATIENT)
Dept: FAMILY MEDICINE CLINIC | Facility: CLINIC | Age: 59
End: 2024-02-21

## 2024-02-21 ENCOUNTER — OFFICE VISIT (OUTPATIENT)
Dept: FAMILY MEDICINE CLINIC | Facility: CLINIC | Age: 59
End: 2024-02-21

## 2024-02-21 VITALS
OXYGEN SATURATION: 94 % | SYSTOLIC BLOOD PRESSURE: 120 MMHG | TEMPERATURE: 97.9 F | BODY MASS INDEX: 35.29 KG/M2 | HEART RATE: 69 BPM | WEIGHT: 239 LBS | DIASTOLIC BLOOD PRESSURE: 60 MMHG

## 2024-02-21 DIAGNOSIS — I42.8 NONISCHEMIC CARDIOMYOPATHY (HCC): ICD-10-CM

## 2024-02-21 DIAGNOSIS — E11.311 TYPE 2 DIABETES MELLITUS WITH RETINOPATHY AND MACULAR EDEMA, UNSPECIFIED LATERALITY, UNSPECIFIED RETINOPATHY SEVERITY, UNSPECIFIED WHETHER LONG TERM INSULIN USE (HCC): ICD-10-CM

## 2024-02-21 DIAGNOSIS — Z79.01 LONG TERM (CURRENT) USE OF ANTICOAGULANTS: ICD-10-CM

## 2024-02-21 DIAGNOSIS — E11.311 TYPE 2 DIABETES MELLITUS WITH RETINOPATHY AND MACULAR EDEMA, UNSPECIFIED LATERALITY, UNSPECIFIED RETINOPATHY SEVERITY, UNSPECIFIED WHETHER LONG TERM INSULIN USE (HCC): Primary | ICD-10-CM

## 2024-02-21 DIAGNOSIS — I10 PRIMARY HYPERTENSION: ICD-10-CM

## 2024-02-21 DIAGNOSIS — Z23 ENCOUNTER FOR IMMUNIZATION: ICD-10-CM

## 2024-02-21 PROBLEM — M25.511 ACUTE PAIN OF RIGHT SHOULDER: Status: RESOLVED | Noted: 2024-01-24 | Resolved: 2024-02-21

## 2024-02-21 PROCEDURE — 99213 OFFICE O/P EST LOW 20 MIN: CPT | Performed by: FAMILY MEDICINE

## 2024-02-21 PROCEDURE — 90686 IIV4 VACC NO PRSV 0.5 ML IM: CPT | Performed by: FAMILY MEDICINE

## 2024-02-21 PROCEDURE — 90471 IMMUNIZATION ADMIN: CPT | Performed by: FAMILY MEDICINE

## 2024-02-21 PROCEDURE — 90472 IMMUNIZATION ADMIN EACH ADD: CPT | Performed by: FAMILY MEDICINE

## 2024-02-21 PROCEDURE — 90677 PCV20 VACCINE IM: CPT | Performed by: FAMILY MEDICINE

## 2024-02-21 NOTE — PATIENT INSTRUCTIONS
Please schedule an appointment for the IRIS exam to check for damage due to diabetes.     Needs second dose of shingles vaccine

## 2024-02-21 NOTE — ASSESSMENT & PLAN NOTE
- Diagnosed on echocardiogram in June 2023 which showed severely dilated left ventricle.  Ejection fraction was noted to be 25-30%.    - Another echocardiogram performed in January 2024 which showed ejection fraction of 45%.   - Cardiac MRI in October 2023  which showed late gadolinium enhancement in nonischemic distribution.   - Coronary CT angiogram which showed very low calcium score.    - Scheduled for cardiac biventricular pacer implant on March 21, 2024

## 2024-02-21 NOTE — ASSESSMENT & PLAN NOTE
Needs PT-INR  Orders placed  Reminded patient  Plan to transition patient to a DOAC specifically Eliquis  Will transition based on PT/INR results.

## 2024-02-21 NOTE — ASSESSMENT & PLAN NOTE
Lab Results   Component Value Date    HGBA1C 7.9 (A) 12/01/2023     - Symptomatic, borderline controlled, needs further observation, and needs to follow diet more regularly   - Lantus 25 units nightly   - Home  to 334  with fasting in the mid 200's  - Processing application to be approved for Ozempic.  - Reevaluate Hg A1C next month and hopefully he will be approved for Ozempic prior to that  - Met with Medication Patient Assistance Program (MPAP) Specialist in office today  - If he fails to be approved, will put him back on insulin coverage with meals or increase Lantus to 35 units.  - Reminded re:IRIS eye exam.   - Issues reviewed with him: low cholesterol diet, weight control and daily exercise discussed, annual eye examinations at Ophthalmology discussed, glycohemoglobin and other lab monitoring discussed, and labs immediately prior to next visit

## 2024-02-21 NOTE — PROGRESS NOTES
Medication Patient Assistance Program (MPAP) Specialist  met with patient after his office visit regarding patient's applications for Lantus through [employer] and Ozempic through [employer] patient assistance programs. MPAP specialist reviewed patient chart prior to outreach. MPAP specialist reviewed patient's completed application for Ozempic. MPAP specialist highlighted and explained each section of [employer] application prior to patient filling it out. MPAP specialist confirmed that patient has her card. Patient stated he does. MPAP specialist reviewed applications for completion. MPAP specialist explained to patient it may take 10-14 business days for applications to be processed.MPAP specialist gave provider portion to provider to fill out MPAP specialist will complete application process once all documentation and portions of applications are completed.

## 2024-02-21 NOTE — PROGRESS NOTES
Name: Fadi Busby      : 1965      MRN: [id number]  Encounter Provider: Osmany Thornton MD  Encounter Date: 2024   Encounter department: [facility]    Assessment & Plan     1. 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  - Reevaluate Hg A1C next month and hopefully he will be approved for Ozempic prior to that  - Met with Medication Patient Assistance Program (MPAP) Specialist in office today  - If he fails to be approved, will put him back on insulin coverage with meals or increase Lantus to 35 units.  - Reminded re:IRIS eye exam.   - Issues reviewed with him: low cholesterol diet, weight control and daily exercise discussed, annual eye examinations at Ophthalmology discussed, glycohemoglobin and other lab monitoring discussed, and labs immediately prior to next visit     Orders:  -     Hemoglobin A1C; Future    2. Nonischemic cardiomyopathy (HCC)  Assessment & Plan:  - Diagnosed on echocardiogram in 2023 which showed severely dilated left ventricle.  Ejection fraction was noted to be 25-30%.    - Another echocardiogram performed in 2024 which showed ejection fraction of 45%.   - Cardiac MRI in 2023  which showed late gadolinium enhancement in nonischemic distribution.   - Coronary CT angiogram which showed very low calcium score.    - Scheduled for cardiac biventricular pacer implant on 2024      3. Long term (current) use of anticoagulants  Assessment & Plan:  Needs  PT-INR  Orders placed  Reminded patient  Plan to transition patient to a DOAC specifically Mateo  Will transition based on PT/INR results.      4. Primary hypertension  Assessment & Plan:  120/60; at goal    - Continue home meds no dose adjustment needed today.  Stable.      5. Encounter for immunization  Comments:  Discussed vaccines and patient agreeable  Will defer Shingles vaccine as not availble in clinic today.  Orders:  -     Pneumococcal Conjugate Vaccine 20-valent (Pcv20)  -     influenza vaccine, quadrivalent, 0.5 mL, preservative-free, for adult and pediatric patients 6 mos+ (AFLURIA, FLUARIX, FLULAVAL, FLUZONE)           Subjective      58 y.o. male for follow up of diabetes.  He is compliant most of the time with his medications although has stated that he stopped taking some of his meds due to loss of insurance at some point.  He now has reinstated medical insurance and stated that he has all his medications except Ozempic which he is currently applying to receive.  He is: compliant most of the time with his diabetic diet and home glucose monitoring. He still needs an eye exam; last eye exam result not available. No acute symptoms.   Issues addressed were low cholesterol diet, weight control and annual eye examinations at Ophthalmology and in office.              Review of Systems   HENT: Negative.     Respiratory:  Positive for shortness of breath. Negative for chest tightness and wheezing.    Cardiovascular:  Negative for chest pain, palpitations and leg swelling.   Genitourinary: Negative.    Neurological:  Positive for dizziness and weakness. Negative for syncope and headaches.       Current Outpatient Medications on File Prior to Visit   Medication Sig    Lantus SoloStar 100 units/mL SOPN INJECT 0.35 ML (35 UNITS ) UNDER THE SKIN IN THE MORNING    metoprolol succinate (TOPROL-XL) 25 mg 24 hr tablet Take 25 mg by mouth    valsartan (DIOVAN) 320 MG tablet Take 1 tablet (320 mg total) by mouth  daily    acetaminophen (TYLENOL) 650 mg CR tablet Take 1 tablet (650 mg total) by mouth every 8 (eight) hours as needed for mild pain    atorvastatin (LIPITOR) 40 mg tablet Take 1 tablet (40 mg total) by mouth daily    Continuous Blood Gluc Sensor (FreeStyle Ana María 2 Sensor) MISC TAKE AS DIRECTED    furosemide (LASIX) 40 mg tablet Take 1 tablet (40 mg total) by mouth in the morning    Insulin Pen Needle 32G X 4 MM MISC Use 1 (one) time for 1 dose    semaglutide, 0.25 or 0.5 mg/dose, (Ozempic, 0.25 or 0.5 MG/DOSE,) 2 mg/3 mL injection pen Inject 0.75 mL (0.5 mg total) under the skin every 7 days (Patient not taking: Reported on 1/24/2024)    sildenafil (VIAGRA) 50 MG tablet Take 50 mg by mouth daily as needed    warfarin (Coumadin) 5 mg tablet Patient is establishing care and brought in med list. Takes Warfarin once daily.       Objective     /60 (BP Location: Left arm, Patient Position: Sitting, Cuff Size: Standard)   Pulse 69   Temp 97.9 °F (36.6 °C) (Temporal)   Wt 108 kg (239 lb)   SpO2 94%   BMI 35.29 kg/m²     Physical Exam  Vitals reviewed.   Constitutional:       General: He is not in acute distress.     Appearance: Normal appearance. He is obese. He is not ill-appearing, toxic-appearing or diaphoretic.   HENT:      Head: Normocephalic.   Eyes:      General: No scleral icterus.     Extraocular Movements: Extraocular movements intact.   Cardiovascular:      Rate and Rhythm: Normal rate and regular rhythm.      Pulses: Normal pulses.      Heart sounds: Normal heart sounds.   Pulmonary:      Effort: Pulmonary effort is normal. No respiratory distress.      Breath sounds: Normal breath sounds.   Musculoskeletal:      Right lower leg: No edema.      Left lower leg: No edema.   Skin:     General: Skin is warm.   Neurological:      Mental Status: He is alert. Mental status is at baseline.      Gait: Gait abnormal.   Psychiatric:         Mood and Affect: Mood normal.         Behavior: Behavior normal.        Osmany Thornton MD

## 2024-02-22 ENCOUNTER — PATIENT OUTREACH (OUTPATIENT)
Dept: FAMILY MEDICINE CLINIC | Facility: CLINIC | Age: 59
End: 2024-02-22

## 2024-02-22 NOTE — PROGRESS NOTES
Medication Patient Assistance Program (MPAP) Specialist  received completed provider portion of both [employer] patient assistance program application for [employer] and Ozempic patient assistance program application for [employer]i [employer]isk. MPAP specialist reviewed patient chart prior to outreach. MPAP specialist reviewed provider and patient portion of [employer] and Heri Nordisk patient assistance program applications for completion. MPAP specialist faxed Both [employer] application and Heri Nordisk application. MPAP specialist will follow up with [employer] and Heri Nordisk patient assistance programs in 10-14 business days to determine application status. MPAP specialist has placed a personal reminder. MPAP specialist has scanned completed and faxed applications for Heri Nordisk and [employer] into patient's EPIC chart. MPAP specialist has updated medication tracking sheet.

## 2024-02-26 DIAGNOSIS — E11.311 TYPE 2 DIABETES MELLITUS WITH RETINOPATHY AND MACULAR EDEMA, UNSPECIFIED LATERALITY, UNSPECIFIED RETINOPATHY SEVERITY, UNSPECIFIED WHETHER LONG TERM INSULIN USE (HCC): ICD-10-CM

## 2024-02-28 ENCOUNTER — OFFICE VISIT (OUTPATIENT)
Dept: FAMILY MEDICINE CLINIC | Facility: CLINIC | Age: 59
End: 2024-02-28

## 2024-02-28 ENCOUNTER — TELEPHONE (OUTPATIENT)
Dept: FAMILY MEDICINE CLINIC | Facility: CLINIC | Age: 59
End: 2024-02-28

## 2024-02-28 VITALS
HEART RATE: 83 BPM | TEMPERATURE: 97.8 F | SYSTOLIC BLOOD PRESSURE: 116 MMHG | DIASTOLIC BLOOD PRESSURE: 74 MMHG | HEIGHT: 69 IN | WEIGHT: 236.8 LBS | OXYGEN SATURATION: 95 % | BODY MASS INDEX: 35.07 KG/M2

## 2024-02-28 DIAGNOSIS — E11.311 TYPE 2 DIABETES MELLITUS WITH RETINOPATHY AND MACULAR EDEMA, WITH LONG-TERM CURRENT USE OF INSULIN, UNSPECIFIED LATERALITY, UNSPECIFIED RETINOPATHY SEVERITY (HCC): ICD-10-CM

## 2024-02-28 DIAGNOSIS — Z79.01 LONG TERM (CURRENT) USE OF ANTICOAGULANTS: ICD-10-CM

## 2024-02-28 DIAGNOSIS — I42.8 NONISCHEMIC CARDIOMYOPATHY (HCC): Primary | ICD-10-CM

## 2024-02-28 DIAGNOSIS — Z79.4 TYPE 2 DIABETES MELLITUS WITH RETINOPATHY AND MACULAR EDEMA, WITH LONG-TERM CURRENT USE OF INSULIN, UNSPECIFIED LATERALITY, UNSPECIFIED RETINOPATHY SEVERITY (HCC): ICD-10-CM

## 2024-02-28 DIAGNOSIS — Z91.89 AT RISK FOR INCREASED ANXIETY: ICD-10-CM

## 2024-02-28 PROCEDURE — 99213 OFFICE O/P EST LOW 20 MIN: CPT | Performed by: FAMILY MEDICINE

## 2024-02-28 RX ORDER — DAPAGLIFLOZIN 10 MG/1
10 TABLET, FILM COATED ORAL DAILY
COMMUNITY
Start: 2024-02-01

## 2024-02-28 RX ORDER — SPIRONOLACTONE 25 MG/1
25 TABLET ORAL DAILY
COMMUNITY
Start: 2024-01-29

## 2024-02-28 NOTE — ASSESSMENT & PLAN NOTE
- Atypical chest pain that does not represent past anginal episodes which he has not had since his carotid dissection years ago  - Orthopnea in the setting of HFrEF  - Planned pacer implant next month  - Endorses changes in diet  - Reiterated sodium restriction  - Strict ED precautions provided

## 2024-02-28 NOTE — ASSESSMENT & PLAN NOTE
- Negative depression screen  - Environmental stressors and concerns about upcoming surgery  - Requesting psych referral for talk therapy  - Provided mental health resource in [place]

## 2024-02-28 NOTE — PATIENT INSTRUCTIONS
Outpatient Mental Health Resources     [place] Suicide Prevention Lifeline: Dial #452  If you prefer to text, you can reach the Crisis Text Line by texting “[place]” to [phone]    ¿Te encuentras en crisis? Envía un mensaje de texto con la palabra AYUDA al [phone] para comunicarte de manera gratuita con un Consejero de Crisis  Apoyo gratuito las 24 horas del día, los 7 días de la semana, al alcance de tu mano.    [facility] CRISIS for mental health emergencies and/or please go to your local Emergency Department Call [phone] if you or a loved one are in a mental health crisis.  You can Visit https://www.dhs.[place].gov/Services/Mental-Health-In-[place]/Documents/Suicide_Prevention_Hotlines.pdf to find 24/7 crisis phone line for other [facility].    [facility]   ? Location 1: [address] [place] [address] [phone]   ? Location 2: [address] [address] [phone]        ? English only* Serves ages 4+          ? Accepts Medicare and some commercial plans    [facility]   ? [address] [place] [address] [phone]; [phone]  ? Bilingual English/Vietnamese Serves ages 5+   ? Accepts Medical Assistance     [facility]   ? [address] [place] [address] [phone]   ? Bilingual English/Vietnamese Serves ages 14+   ? Accepts Medical Assistance, (Not currently accepting Medicare), & Major Commercial Insurances     [facility]   ? [address] [place] [address] [phone]        ? Bilingual English/Vietnamese Serves ages 6+   ? Accepts Medical Assistance & Commercial Insurance     [facility]   ? Previous [facility] location is closed  ? [address], [address] [phone]   ? Bilingual English/Vietnamese Serves ages 3+   ? Accepts Medical Assistance & Some Commercial Insurance     [facility]   ? [address] [place] [address] [phone]   ? Bilingual English/Vietnamese Serves ages 5+   ? Accepts Medical Assistance     [facility]   ? [address] [place] [address] [phone]  ? Bilingual English/Lebanese Serves ages 3+   ? Accepts Medical Assistance & Some Commercial Insurance     [facility]   ? Location 1: [address] [place] [address] [phone]        ? Location 2: [address] [place] [address]   ? Bilingual English/Lebanese Serves ages 18+  ? Accepts Medical Assistance    [facility], [facility]  ? [address], [place] [address] [phone]  ? Bilingual English/Lebanese  ? Accepts Aetna, Cigna, Optum/[employer] [facility], [place], [facility], [employer], Medicare, [employer], [facility]. No Medical Assistance    [facility] ([phone])  Medicare/Medicare Advantage, Medical Assistance/HealthChoices, Commercial, and self-pay as payment.    TEEN HELP LINE  ? 7-176-432-TALK    CRIME VICTIMS [place]       ? [phone]       ? 24/7 [facility] Hotline    [facility]       ? [phone]       ? 24/7 [facility] Hotline    www.psychologyFactor 14day.Shopperception is a resource to find psychotherapy providers, patients can filter therapist search list based on a number of criteria including language, specialty, gender, insurance, etc. Individuals seeking will need to reach out to perspective providers through information in the directory. You are encouraged to contact multiple providers to given that many providers have a significant wait list for services as well as to find a provider is a good fit for you!    [facility] is an organization of families, friends and individuals whose lives have been affected by mental illness. Together, we advocate for better lives for those individuals who have a m)ental illness. Visit: [facility].org to learn more or to search for local support resources including qualified mental health providers.

## 2024-02-28 NOTE — TELEPHONE ENCOUNTER
Saw pt in clinic with PCP.     Discussed typical CGM coverage, discussed fact that Medicare requires CGM to be filled via DME    Pt insists his insurance covers ana maría 2 at [employer].  Called [facility].  The pharmacist there confirms that the Ana María 2 is covered with no co-pay.     Called patient's insurance.  They state the CGM is not covered, and patient must go through DME.    I do not understand how this conflicting information exists.  Some sort of problem on their end.  However, we will proceed to obtain the CGM at [facility] for 0 co-pay as this is easiest on the patient and the clinic staff    ----------------------------------------    Also discussed the safety of sildenafil with PCP.  Patient has left bundle branch block and nonischemic cardiomyopathy.  He is not using any nitrates.  Reviewed available data in these clinical scenerios Do not see contraindication here for PDE 5 inhibitor. Will likely result in a benign and temporary BP decrease. ~3-5 mmHg. Reviewed the is safe to use responsibly for this patient.     Pharmacist Tracking Tool  Reason For Outreach: Embedded Pharmacist  Demographics:  Intervention Method: In Person  Type of Intervention: Follow-Up  Topics Addressed: Men's Health  Pharmacologic Interventions: N/A  Non-Pharmacologic Interventions: Cost, Disease state education, and Medication Monitoring  Time:  Direct Patient Care:  10  mins  Care Coordination:  30  mins  Recommendation Recipient: Patient/Caregiver  Outcome: Accepted     Chente Lewis, PharmD, BCACP  Ambulatory Care Clinical Pharmacist

## 2024-02-28 NOTE — PROGRESS NOTES
Name: Fadi Busby      : 1965      MRN: [id number]  Encounter Provider: Osmany Thornton MD  Encounter Date: 2024   Encounter department: [facility]    Assessment & Plan     1. Nonischemic cardiomyopathy (HCC)  Assessment & Plan:  - Atypical chest pain that does not represent past anginal episodes which he has not had since his carotid dissection years ago  - Orthopnea in the setting of HFrEF  - Planned pacer implant next month  - Endorses changes in diet  - Reiterated sodium restriction  - Strict ED precautions provided      2. At risk for increased anxiety  Assessment & Plan:  - Negative depression screen  - Environmental stressors and concerns about upcoming surgery  - Requesting psych referral for talk therapy  - Provided mental health resource in [facility]    Orders:  -     Ambulatory referral to Psych Services; Future    3. Long term (current) use of anticoagulants  Assessment & Plan:  Reiterated continued use of current dose of warfarin at 5 mg once daily given last INR at 2.29      4. 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 male presenting today to discuss upcoming surgery and to address medication questions and refills.  Patient states that his insurance no longer accepts [place] pharmacy and therefore would like to change [facility] pharmacy.  Patient also stated he needed to refill his CGM sensor which he believes is covered by his insurance and can be picked up at the [facility] pharmacy. He would also like to know if safe to take Viagra given his current condition and upcoming surgery.  Consulted clinical pharmacist for  recommendations and clarification.     Patient also mentioned that he occasionally gets stabbing and often aching pain in the left parasternal and left mid axillary areas.  This is not accompanied by diaphoresis, weakness, dizziness, lightheadedness, nausea or vomiting.  Patient does have scheduled biventricular cardiac pacer implant in March 2024. MAGNOLIA romo has also experienced SOB that improves when he raises his head with 2 or more pillows.         Review of Systems   Constitutional:  Negative for diaphoresis, fatigue and fever.   Respiratory:  Positive for shortness of breath.    Cardiovascular:  Positive for chest pain. Negative for palpitations.   Gastrointestinal:  Negative for abdominal pain, nausea and vomiting.   Neurological:  Negative for dizziness, light-headedness and headaches.       Current Outpatient Medications on File Prior to Visit   Medication Sig    Farxiga 10 MG tablet Take 10 mg by mouth daily    furosemide (LASIX) 40 mg tablet Take 1 tablet (40 mg total) by mouth in the morning    spironolactone (ALDACTONE) 25 mg tablet Take 25 mg by mouth daily    valsartan (DIOVAN) 320 MG tablet Take 1 tablet (320 mg total) by mouth daily    warfarin (Coumadin) 5 mg tablet Patient is establishing care and brought in med list. Takes Warfarin once daily.    acetaminophen (TYLENOL) 650 mg CR tablet Take 1 tablet (650 mg total) by mouth every 8 (eight) hours as needed for mild pain    atorvastatin (LIPITOR) 40 mg tablet Take 1 tablet (40 mg total) by mouth daily    Lantus SoloStar 100 units/mL SOPN INJECT 0.35 ML (35 UNITS ) UNDER THE SKIN IN THE MORNING    metoprolol succinate (TOPROL-XL) 25 mg 24 hr tablet Take 25 mg by mouth    semaglutide, 0.25 or 0.5 mg/dose, (Ozempic, 0.25 or 0.5 MG/DOSE,) 2 mg/3 mL injection pen Inject 0.75 mL (0.5 mg total) under the skin every 7 days (Patient not taking: Reported on 1/24/2024)    sildenafil (VIAGRA) 50 MG tablet Take 50 mg by mouth daily as needed    [DISCONTINUED]  "Continuous Blood Gluc Sensor (FreeStyle Ana María 2 Sensor) MISC TAKE AS DIRECTED    [DISCONTINUED] Insulin Pen Needle 32G X 4 MM MISC Use 1 (one) time for 1 dose       Objective     /74 (BP Location: Right arm, Patient Position: Sitting, Cuff Size: Standard) Comment (BP Location): Left arm has paralysis  Pulse 83   Temp 97.8 °F (36.6 °C) (Temporal)   Ht 5' 9\" (1.753 m)   Wt 107 kg (236 lb 12.8 oz)   SpO2 95%   BMI 34.97 kg/m²     Physical Exam  Vitals reviewed.   Constitutional:       General: He is not in acute distress.     Appearance: Normal appearance. He is obese. He is not ill-appearing, toxic-appearing or diaphoretic.   HENT:      Head: Normocephalic.      Right Ear: External ear normal.      Left Ear: External ear normal.      Nose: Nose normal.      Mouth/Throat:      Mouth: Mucous membranes are moist.   Eyes:      Extraocular Movements: Extraocular movements intact.   Cardiovascular:      Rate and Rhythm: Normal rate and regular rhythm.      Pulses: Normal pulses.      Heart sounds: Normal heart sounds.   Pulmonary:      Effort: Pulmonary effort is normal. No respiratory distress.      Breath sounds: Normal breath sounds.   Chest:      Chest wall: No tenderness.   Abdominal:      General: Bowel sounds are normal.      Palpations: Abdomen is soft.   Musculoskeletal:         General: Normal range of motion.      Cervical back: Normal range of motion and neck supple.      Right lower leg: No edema.      Left lower leg: No edema.   Skin:     General: Skin is warm.      Capillary Refill: Capillary refill takes less than 2 seconds.   Neurological:      Mental Status: He is alert and oriented to person, place, and time. Mental status is at baseline.      Motor: Weakness present.      Gait: Gait abnormal.   Psychiatric:         Mood and Affect: Mood normal.         Behavior: Behavior normal.       Osmany Thornton MD    "

## 2024-03-06 ENCOUNTER — PATIENT OUTREACH (OUTPATIENT)
Dept: FAMILY MEDICINE CLINIC | Facility: CLINIC | Age: 59
End: 2024-03-06

## 2024-03-06 ENCOUNTER — CLINICAL SUPPORT (OUTPATIENT)
Dept: FAMILY MEDICINE CLINIC | Facility: CLINIC | Age: 59
End: 2024-03-06

## 2024-03-06 DIAGNOSIS — E11.311 TYPE 2 DIABETES MELLITUS WITH RETINOPATHY AND MACULAR EDEMA, WITH LONG-TERM CURRENT USE OF INSULIN, UNSPECIFIED LATERALITY, UNSPECIFIED RETINOPATHY SEVERITY (HCC): Primary | ICD-10-CM

## 2024-03-06 DIAGNOSIS — Z79.4 TYPE 2 DIABETES MELLITUS WITH RETINOPATHY AND MACULAR EDEMA, WITH LONG-TERM CURRENT USE OF INSULIN, UNSPECIFIED LATERALITY, UNSPECIFIED RETINOPATHY SEVERITY (HCC): Primary | ICD-10-CM

## 2024-03-06 RX ORDER — INSULIN ASPART 100 [IU]/ML
INJECTION, SOLUTION INTRAVENOUS; SUBCUTANEOUS
Qty: 15 ML | Refills: 1 | Status: SHIPPED | OUTPATIENT
Start: 2024-03-06

## 2024-03-06 NOTE — PROGRESS NOTES
Medication Patient Assistance Program (MPAP) Specialist  met with patient during visit with Pharmacist. Patient inquired about Patient assistance program status. MPAP specialist reviewed patient chart prior to outreach. MPAP specialist provided Patient with her card.MPAP specialist stated she will call patient with program status.MPAP specialist thanked patient.   MPAP specialist reached out to [employer] to determine patient's application status.MPAP specialist spoke with [employer] representative Mikey. Per [employer] patient's application was received but information was missing. [employer] rep reviewed patient's application. [employer] rep confirmed household size and information with MPAP specialist. [employer] stated patient's application status should be determined in 24 hrs. MPAP specialist has placed personal reminder.   MPAP specialist reached out to [employer] connection patient assistance program to determine patient's application status. MPAP specialist spoke with Linda. Per [employer] rep, Patient is approved for [employer] patient assistance program for Lantus from 03/05/2024-12/31/2024. Patient's Lantus shipment has been processed but has not shipped. [employer] rep confirmed Lantus is still considered a low stock. Per [employer] rep patient's Lantus should ship in 7-10 business days. MPAP specialist will follow up with [employer] in 10 business days to determine shipment status. MPAP specialist has set a personal reminder. MPAP specialist has updated Care Coordination note. MPAP specialist has updated medication tracking sheet.

## 2024-03-06 NOTE — PROGRESS NOTES
[facility]  [address], [address]  [place] [id number] [phone]  Clinical Pharmacy Consultation    Pt 24 min late, squeezed in for visit     Encounter provider: Chente Lewis, Pharmacist    This patient was referred by PCP for pharmacy consultation. Thank you for the referral. Sending this note to PCP.    Assessment/Plan  1. 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, with long-term current use of insulin, unspecified laterality, unspecified retinopathy severity (HCC)        Type 2 diabetes:    Current Diabetes Regimen:  Farxiga 10 mg daily   Lantus 35 units once daily   Ozempic 0.5 mg weekly   Visit focused on t2dm, laron 2 download   Blood glucose marginally controlled   Most recent A1c above goal of <7%.   Currently asymptomatic and adherant with treatment plan  Sending this encounter note for PCP review/concurrence.  Counseled lifestyle improvement weight loss, follow a diabetic diet, and decrease high carbohydrates snacks.   Counseled on medication compliance and exercise as tolerated.  Results of recent BG monitoring: below   Sent per verbal request of PCP via CPA   Reviewed mechanism of action, adverse effects, benefits, risks, administration, frequency, and likely duration of therapy of novolog.   Glucose consistently higher than expected.  A1c likely worsened. With surgery planned soon, needs quick improvement  Pt on rapid acting insulin in past. With aspart/lispro generic now, should be affordable. Recommend he restart   Gillian MCGUIRE still working to obtain ozempic via pharm reimbursement programs - estimated time to obtain is about 2 weeks   Discussed medicae donut hole at length  Pt confirms he does have farxiga and it is affordable. If not in future, recommend jardiance through [facility]                 New DM regimen:   Farxiga 10 mg daily   Lantus 35 units once daily   Ozempic 0.5 mg weekly   Restart: Novolog 10 units three times  daily before meals  Permission given to also use 5 units without meals as correction insulin    FUTURE: formal correction factor addition? Titrate based on ana maría   RTC in in 6 weeks     Hyperlipidemia with clinical ASCVD event:  The ASCVD Risk score (Javan STOKES, et al., 2019) failed to calculate for the following reasons:    The patient has a prior MI or stroke diagnosis   Most recent lipid panel is within an acceptable range  2018 ACC/AHA blood cholesterol guidelines recommends high-intensity statin. Patient currently on high-intensity statin and tolerating well without side effects.   Continue atorvastatin 40 as prescribed.    HTN:   BP goal: <140/90 mmHg   In-office BP below goal, HR acceptable.     FYI to PCP:   Will pend Rx for PCP signature/concurrence  Orders placed under CPA    Medication list was updated and discussed with patient.   Discontinued medications: none   Clinically significant drug interactions identified: none   Side effects from medication(s) reported: none    The patient communicates understanding of the treatment plan.    M*Modal Dragon software was used to dictate this note. It may contain errors with dictating incorrect words or incorrect spelling. Please contact the provider directly with any questions. Thank you for your understanding.    Melani Busby is a 58 y.o. male who presents in-person for chart update.  Reason for visit: diabetes.   No particular questions or concerns.  HPI     Social History     Tobacco Use   Smoking Status Never    Passive exposure: Never   Smokeless Tobacco Never        No Known Allergies       Current Outpatient Medications:     acetaminophen (TYLENOL) 650 mg CR tablet, Take 1 tablet (650 mg total) by mouth every 8 (eight) hours as needed for mild pain, Disp: 30 tablet, Rfl: 0    atorvastatin (LIPITOR) 40 mg tablet, Take 1 tablet (40 mg total) by mouth daily, Disp: 90 tablet, Rfl: 3    Continuous Blood Gluc Sensor (FreeStyle Ana María 2 Sensor) MISC,  Take as directed, Disp: 6 each, Rfl: 1    Farxiga 10 MG tablet, Take 10 mg by mouth daily, Disp: , Rfl:     furosemide (LASIX) 40 mg tablet, Take 1 tablet (40 mg total) by mouth in the morning, Disp: 90 tablet, Rfl: 3    Insulin Pen Needle 32G X 4 MM MISC, Use 1 (one) time for 1 dose, Disp: 90 each, Rfl: 3    Lantus SoloStar 100 units/mL SOPN, INJECT 0.35 ML (35 UNITS ) UNDER THE SKIN IN THE MORNING, Disp: 15 mL, Rfl: 3    metoprolol succinate (TOPROL-XL) 25 mg 24 hr tablet, Take 25 mg by mouth, Disp: , Rfl:     semaglutide, 0.25 or 0.5 mg/dose, (Ozempic, 0.25 or 0.5 MG/DOSE,) 2 mg/3 mL injection pen, Inject 0.75 mL (0.5 mg total) under the skin every 7 days (Patient not taking: Reported on 1/24/2024), Disp: 9 mL, Rfl: 1    sildenafil (VIAGRA) 50 MG tablet, Take 50 mg by mouth daily as needed, Disp: , Rfl:     spironolactone (ALDACTONE) 25 mg tablet, Take 25 mg by mouth daily, Disp: , Rfl:     valsartan (DIOVAN) 320 MG tablet, Take 1 tablet (320 mg total) by mouth daily, Disp: 90 tablet, Rfl: 3    warfarin (Coumadin) 5 mg tablet, Patient is establishing care and brought in med list. Takes Warfarin once daily., Disp: 90 tablet, Rfl: 1    Objective  Vitals:   Wt Readings from Last 3 Encounters:   02/28/24 107 kg (236 lb 12.8 oz)   02/21/24 108 kg (239 lb)   01/24/24 106 kg (233 lb)     Temp Readings from Last 3 Encounters:   02/28/24 97.8 °F (36.6 °C) (Temporal)   02/21/24 97.9 °F (36.6 °C) (Temporal)   01/24/24 98 °F (36.7 °C) (Temporal)     BP Readings from Last 3 Encounters:   02/28/24 116/74   02/21/24 120/60   01/24/24 101/59     Pulse Readings from Last 3 Encounters:   02/28/24 83   02/21/24 69   01/24/24 69       Labs:   Lab Results   Component Value Date/Time    HGBA1C 7.9 (A) 12/01/2023 12:52 PM    HGBA1C 6.9 (H) 07/20/2023 09:35 AM       Lab Results   Component Value Date/Time    BUN 18 11/21/2023 08:54 AM    CREATININE 1.06 11/21/2023 08:54 AM    EGFR 76 11/21/2023 08:54 AM       Lab Results   Component  Value Date/Time    CREATININEUR 251.0 07/20/2023 09:35 AM    LABMICR 15.5 07/20/2023 09:35 AM    MICROALBCRE 6 07/20/2023 09:35 AM       Lab Results   Component Value Date/Time    CHOLESTEROL 122 11/21/2023 08:54 AM    TRIG 122 11/21/2023 08:54 AM    HDL 40 11/21/2023 08:54 AM    LDLCALC 58 11/21/2023 08:54 AM       Eye Exam:   Lab Results   Component Value Date/Time    RIGHTDIABRET Mild (A) 03/05/2024 10:24 AM    RIGHTMACEDEM None 03/05/2024 10:24 AM    RIGHTOTHERRE None 03/05/2024 10:24 AM    LEFTDIABRET Mild (A) 03/05/2024 10:24 AM    LEFTMACEDEM None 03/05/2024 10:24 AM    LEFTOTHERRE None 03/05/2024 10:24 AM     Chente Lewis Pharmacist    -----------------------------------------------------------------------------------------------    Pharmacist Tracking Tool  Reason For Outreach: Embedded Pharmacist  Demographics:  Intervention Method: In Person  Type of Intervention: Follow-Up  Topics Addressed: Diabetes  Pharmacologic Interventions: Medication Initiation and Dose or Frequency Adjusted  Non-Pharmacologic Interventions: Care coordination, Chart update, Cost, Disease state education, Medication Monitoring, and Personal CGM  Time:  Direct Patient Care:  35  mins  Care Coordination:  10  mins  Recommendation Recipient: Patient/Caregiver  Outcome: Accepted

## 2024-03-08 ENCOUNTER — PATIENT OUTREACH (OUTPATIENT)
Dept: FAMILY MEDICINE CLINIC | Facility: CLINIC | Age: 59
End: 2024-03-08

## 2024-03-08 NOTE — PROGRESS NOTES
Medication Patient Assistance Program (MPAP) Specialist  received personal reminder notification regarding patient's application status for Ozempic through [facility] patient assistance program. MPAP specialist reviewed patient chart prior to outreach. [facility] with [facility] representative Darci Yip patient is approved for Ozempic through [facility] patient assistance program from 03/07/2024-12/31/2024. Patient may reapply in October since he is a medicare patient. [employer] rep stated patient's Ozempic had been processed. Patient should receive shipment of Ozempic in provider's office in 10-14 business days. MPAP specialist has placed a personal reminder. MPAP specialist has updated care Coordination note. MPAP specialist has updated medication tracking sheet. MPAP specialist has added herself to patient's care team.   MPAP specialist reached out to patient to explain patient assistance program. MPAP specialist explained that patient's Lantus and Ozempic will arrive at provider's office. MPAP specialist explained that patient will be called by clinical staff. MPAP specialist explained that patient's Lantus and Ozempic will be automatically refilled and shipped by the patient assistance programs. MPAP specialist encouraged patient to call with any questions regarding the patient assistance program.

## 2024-03-12 ENCOUNTER — OFFICE VISIT (OUTPATIENT)
Dept: FAMILY MEDICINE CLINIC | Facility: CLINIC | Age: 59
End: 2024-03-12

## 2024-03-12 PROCEDURE — NC001 PR NO CHARGE

## 2024-03-12 NOTE — PATIENT INSTRUCTIONS
Work on your coping strategies given to you on handout.    Please present for your previously scheduled appointment approximately 15 minutes prior to appointment time. If you are running late or are unable to attend your appointment, please call our [place] office at [phone] or our [place] office at [phone] if applicable to notify the office of your absence.    If you are in psychological crisis including not limited to suicidal/homicidal thoughts or thoughts of self-injury, do not hesitate to call/contact your County Crisis hotline (see below) or attend to the nearest emergency department.  [facility] Crisis: [phone]  [facility] Crisis: [phone]  [facility] Crisis: [phone]  [facility] Crisis: [phone]  [facility] Crisis: [phone]  [facility] Crisis: [phone]  [facility] Crisis: [phone]  National Suicide Prevention Hotline: 1-820.638.1048
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
## 2024-03-13 ENCOUNTER — TELEPHONE (OUTPATIENT)
Dept: FAMILY MEDICINE CLINIC | Facility: CLINIC | Age: 59
End: 2024-03-13

## 2024-03-13 ENCOUNTER — PATIENT OUTREACH (OUTPATIENT)
Dept: FAMILY MEDICINE CLINIC | Facility: CLINIC | Age: 59
End: 2024-03-13

## 2024-03-13 NOTE — PROGRESS NOTES
Medication Patient Assistance Program (MPAP) Specialist  received IB message from clinical staff. MPAP specialist received IB message from clinical staff regarding patient's shipment of Lantus from [employer] patient assistance program received at provider's office.  Clinical pharmacist included in IB message.

## 2024-03-14 ENCOUNTER — PATIENT OUTREACH (OUTPATIENT)
Dept: FAMILY MEDICINE CLINIC | Facility: CLINIC | Age: 59
End: 2024-03-14

## 2024-03-14 NOTE — PROGRESS NOTES
Medication Patient Assistance Program (MPAP) Specialist  received faxed from [employer] patient assistance program. MPAP specialist reviewed patient chart prior to outreach.  Per faxed letter from [employer] patient is approved for Ozempic through the [employer] patient assistance program. Patient's current enrollment will end on 12/31/2024. Per [employer] letter patient will receive 4 month supply in 10-14 business days. MPAP specialist has scanned letter into [employer]. MPAP specialist has placed personal reminder regarding patient's Ozempic shipment.

## 2024-03-18 ENCOUNTER — APPOINTMENT (OUTPATIENT)
Dept: LAB | Facility: CLINIC | Age: 59
End: 2024-03-18
Payer: COMMERCIAL

## 2024-03-18 DIAGNOSIS — I44.7 LBBB (LEFT BUNDLE BRANCH BLOCK): ICD-10-CM

## 2024-03-18 DIAGNOSIS — I42.8 NICM (NONISCHEMIC CARDIOMYOPATHY) (HCC): ICD-10-CM

## 2024-03-18 LAB
ALBUMIN SERPL BCP-MCNC: 4.1 G/DL (ref 3.5–5)
ALP SERPL-CCNC: 60 U/L (ref 34–104)
ALT SERPL W P-5'-P-CCNC: 26 U/L (ref 7–52)
ANION GAP SERPL CALCULATED.3IONS-SCNC: 8 MMOL/L (ref 4–13)
AST SERPL W P-5'-P-CCNC: 27 U/L (ref 13–39)
BASOPHILS # BLD AUTO: 0.08 THOUSANDS/ÂΜL (ref 0–0.1)
BASOPHILS NFR BLD AUTO: 1 % (ref 0–1)
BILIRUB SERPL-MCNC: 1.38 MG/DL (ref 0.2–1)
BUN SERPL-MCNC: 18 MG/DL (ref 5–25)
CALCIUM SERPL-MCNC: 9.1 MG/DL (ref 8.4–10.2)
CHLORIDE SERPL-SCNC: 102 MMOL/L (ref 96–108)
CO2 SERPL-SCNC: 30 MMOL/L (ref 21–32)
CREAT SERPL-MCNC: 1.1 MG/DL (ref 0.6–1.3)
EOSINOPHIL # BLD AUTO: 0.36 THOUSAND/ÂΜL (ref 0–0.61)
EOSINOPHIL NFR BLD AUTO: 6 % (ref 0–6)
ERYTHROCYTE [DISTWIDTH] IN BLOOD BY AUTOMATED COUNT: 12 % (ref 11.6–15.1)
EST. AVERAGE GLUCOSE BLD GHB EST-MCNC: 220 MG/DL
GFR SERPL CREATININE-BSD FRML MDRD: 73 ML/MIN/1.73SQ M
GLUCOSE P FAST SERPL-MCNC: 124 MG/DL (ref 65–99)
HBA1C MFR BLD: 9.3 %
HCT VFR BLD AUTO: 42.6 % (ref 36.5–49.3)
HGB BLD-MCNC: 14.2 G/DL (ref 12–17)
IMM GRANULOCYTES # BLD AUTO: 0.01 THOUSAND/UL (ref 0–0.2)
IMM GRANULOCYTES NFR BLD AUTO: 0 % (ref 0–2)
INR PPP: 2.39 (ref 0.84–1.19)
LYMPHOCYTES # BLD AUTO: 1.92 THOUSANDS/ÂΜL (ref 0.6–4.47)
LYMPHOCYTES NFR BLD AUTO: 34 % (ref 14–44)
MCH RBC QN AUTO: 31.4 PG (ref 26.8–34.3)
MCHC RBC AUTO-ENTMCNC: 33.3 G/DL (ref 31.4–37.4)
MCV RBC AUTO: 94 FL (ref 82–98)
MONOCYTES # BLD AUTO: 0.43 THOUSAND/ÂΜL (ref 0.17–1.22)
MONOCYTES NFR BLD AUTO: 8 % (ref 4–12)
NEUTROPHILS # BLD AUTO: 2.8 THOUSANDS/ÂΜL (ref 1.85–7.62)
NEUTS SEG NFR BLD AUTO: 51 % (ref 43–75)
NRBC BLD AUTO-RTO: 0 /100 WBCS
PLATELET # BLD AUTO: 258 THOUSANDS/UL (ref 149–390)
PMV BLD AUTO: 10.8 FL (ref 8.9–12.7)
POTASSIUM SERPL-SCNC: 3.9 MMOL/L (ref 3.5–5.3)
PROT SERPL-MCNC: 6.9 G/DL (ref 6.4–8.4)
PROTHROMBIN TIME: 25.6 SECONDS (ref 11.6–14.5)
RBC # BLD AUTO: 4.52 MILLION/UL (ref 3.88–5.62)
SODIUM SERPL-SCNC: 140 MMOL/L (ref 135–147)
WBC # BLD AUTO: 5.6 THOUSAND/UL (ref 4.31–10.16)

## 2024-03-18 PROCEDURE — 83036 HEMOGLOBIN GLYCOSYLATED A1C: CPT

## 2024-03-18 PROCEDURE — 85025 COMPLETE CBC W/AUTO DIFF WBC: CPT

## 2024-03-19 ENCOUNTER — OFFICE VISIT (OUTPATIENT)
Dept: FAMILY MEDICINE CLINIC | Facility: CLINIC | Age: 59
End: 2024-03-19

## 2024-03-19 PROCEDURE — NC001 PR NO CHARGE
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
## 2024-03-20 ENCOUNTER — TELEPHONE (OUTPATIENT)
Dept: CARDIOLOGY CLINIC | Facility: CLINIC | Age: 59
End: 2024-03-20

## 2024-03-20 ENCOUNTER — TELEPHONE (OUTPATIENT)
Dept: FAMILY MEDICINE CLINIC | Facility: CLINIC | Age: 59
End: 2024-03-20

## 2024-03-20 ENCOUNTER — PATIENT OUTREACH (OUTPATIENT)
Dept: FAMILY MEDICINE CLINIC | Facility: CLINIC | Age: 59
End: 2024-03-20

## 2024-03-20 NOTE — TELEPHONE ENCOUNTER
Pts medication arrived today. 4 boxes of ozempic.2boxes of 4mg/3ml and 2 boxes of .68/ml    Lot# for the 4mg/3ml- [id number] EXP 09/30/2026    Lot# for the .68/ml- [id number] EXP 09/30/2025        I CALLED THE PT AND INFORMED HIM.

## 2024-03-20 NOTE — TELEPHONE ENCOUNTER
----- Message from Nahun Rees MD sent at 3/19/2024  5:02 PM EDT -----  Please call the patient about normal kidney and CBC lab results.     Thank you.

## 2024-03-21 ENCOUNTER — ANESTHESIA EVENT (OUTPATIENT)
Dept: NON INVASIVE DIAGNOSTICS | Facility: HOSPITAL | Age: 59
End: 2024-03-21
Payer: COMMERCIAL

## 2024-03-21 ENCOUNTER — APPOINTMENT (OUTPATIENT)
Dept: RADIOLOGY | Facility: HOSPITAL | Age: 59
End: 2024-03-21
Payer: COMMERCIAL

## 2024-03-21 ENCOUNTER — HOSPITAL ENCOUNTER (OUTPATIENT)
Facility: HOSPITAL | Age: 59
Setting detail: OUTPATIENT SURGERY
Discharge: HOME | End: 2024-03-21
Attending: INTERNAL MEDICINE | Admitting: INTERNAL MEDICINE
Payer: COMMERCIAL

## 2024-03-21 ENCOUNTER — ANESTHESIA (OUTPATIENT)
Dept: NON INVASIVE DIAGNOSTICS | Facility: HOSPITAL | Age: 59
End: 2024-03-21
Payer: COMMERCIAL

## 2024-03-21 VITALS
RESPIRATION RATE: 16 BRPM | BODY MASS INDEX: 34.94 KG/M2 | SYSTOLIC BLOOD PRESSURE: 118 MMHG | OXYGEN SATURATION: 96 % | HEART RATE: 74 BPM | DIASTOLIC BLOOD PRESSURE: 79 MMHG | HEIGHT: 69 IN | WEIGHT: 235.89 LBS | TEMPERATURE: 98 F

## 2024-03-21 DIAGNOSIS — I50.20 HFREF (HEART FAILURE WITH REDUCED EJECTION FRACTION) (HCC): ICD-10-CM

## 2024-03-21 DIAGNOSIS — I42.8 NICM (NONISCHEMIC CARDIOMYOPATHY) (HCC): ICD-10-CM

## 2024-03-21 DIAGNOSIS — G89.18 POST-OP PAIN: Primary | ICD-10-CM

## 2024-03-21 DIAGNOSIS — I44.7 LBBB (LEFT BUNDLE BRANCH BLOCK): ICD-10-CM

## 2024-03-21 LAB
ANION GAP SERPL CALCULATED.3IONS-SCNC: 9 MMOL/L (ref 4–13)
ATRIAL RATE: 63 BPM
ATRIAL RATE: 72 BPM
BUN SERPL-MCNC: 19 MG/DL (ref 5–25)
CALCIUM SERPL-MCNC: 8.6 MG/DL (ref 8.4–10.2)
CHLORIDE SERPL-SCNC: 105 MMOL/L (ref 96–108)
CO2 SERPL-SCNC: 26 MMOL/L (ref 21–32)
CREAT SERPL-MCNC: 0.97 MG/DL (ref 0.6–1.3)
ERYTHROCYTE [DISTWIDTH] IN BLOOD BY AUTOMATED COUNT: 12.2 % (ref 11.6–15.1)
GFR SERPL CREATININE-BSD FRML MDRD: 85 ML/MIN/1.73SQ M
GLUCOSE P FAST SERPL-MCNC: 130 MG/DL (ref 65–99)
GLUCOSE SERPL-MCNC: 130 MG/DL (ref 65–140)
HCT VFR BLD AUTO: 38.9 % (ref 36.5–49.3)
HGB BLD-MCNC: 13.1 G/DL (ref 12–17)
INR PPP: 3.03 (ref 0.84–1.19)
MCH RBC QN AUTO: 31.6 PG (ref 26.8–34.3)
MCHC RBC AUTO-ENTMCNC: 33.7 G/DL (ref 31.4–37.4)
MCV RBC AUTO: 94 FL (ref 82–98)
P AXIS: 26 DEGREES
P AXIS: 28 DEGREES
PLATELET # BLD AUTO: 238 THOUSANDS/UL (ref 149–390)
PMV BLD AUTO: 10.5 FL (ref 8.9–12.7)
POTASSIUM SERPL-SCNC: 3.8 MMOL/L (ref 3.5–5.3)
PR INTERVAL: 156 MS
PR INTERVAL: 180 MS
PROTHROMBIN TIME: 30.7 SECONDS (ref 11.6–14.5)
QRS AXIS: -40 DEGREES
QRS AXIS: 12 DEGREES
QRSD INTERVAL: 130 MS
QRSD INTERVAL: 168 MS
QT INTERVAL: 444 MS
QT INTERVAL: 488 MS
QTC INTERVAL: 486 MS
QTC INTERVAL: 499 MS
RBC # BLD AUTO: 4.15 MILLION/UL (ref 3.88–5.62)
SODIUM SERPL-SCNC: 140 MMOL/L (ref 135–147)
T WAVE AXIS: 8 DEGREES
T WAVE AXIS: 99 DEGREES
VENTRICULAR RATE: 63 BPM
VENTRICULAR RATE: 72 BPM
WBC # BLD AUTO: 5.88 THOUSAND/UL (ref 4.31–10.16)

## 2024-03-21 PROCEDURE — 85027 COMPLETE CBC AUTOMATED: CPT | Performed by: PHYSICIAN ASSISTANT

## 2024-03-21 PROCEDURE — C1898 LEAD, PMKR, OTHER THAN TRANS: HCPCS | Performed by: INTERNAL MEDICINE

## 2024-03-21 PROCEDURE — C2621 PMKR, OTHER THAN SING/DUAL: HCPCS | Performed by: INTERNAL MEDICINE

## 2024-03-21 PROCEDURE — C1730 CATH, EP, 19 OR FEW ELECT: HCPCS | Performed by: INTERNAL MEDICINE

## 2024-03-21 PROCEDURE — C1892 INTRO/SHEATH,FIXED,PEEL-AWAY: HCPCS | Performed by: INTERNAL MEDICINE

## 2024-03-21 PROCEDURE — 33225 L VENTRIC PACING LEAD ADD-ON: CPT | Performed by: INTERNAL MEDICINE

## 2024-03-21 PROCEDURE — C1887 CATHETER, GUIDING: HCPCS | Performed by: INTERNAL MEDICINE

## 2024-03-21 PROCEDURE — C1769 GUIDE WIRE: HCPCS | Performed by: INTERNAL MEDICINE

## 2024-03-21 PROCEDURE — 33208 INSRT HEART PM ATRIAL & VENT: CPT | Performed by: INTERNAL MEDICINE

## 2024-03-21 PROCEDURE — C1900 LEAD, CORONARY VENOUS: HCPCS | Performed by: INTERNAL MEDICINE

## 2024-03-21 PROCEDURE — 93010 ELECTROCARDIOGRAM REPORT: CPT | Performed by: INTERNAL MEDICINE

## 2024-03-21 PROCEDURE — NC001 PR NO CHARGE: Performed by: PHYSICIAN ASSISTANT

## 2024-03-21 PROCEDURE — C1751 CATH, INF, PER/CENT/MIDLINE: HCPCS | Performed by: INTERNAL MEDICINE

## 2024-03-21 PROCEDURE — 80048 BASIC METABOLIC PNL TOTAL CA: CPT | Performed by: PHYSICIAN ASSISTANT

## 2024-03-21 PROCEDURE — 33249 INSJ/RPLCMT DEFIB W/LEAD(S): CPT | Performed by: INTERNAL MEDICINE

## 2024-03-21 PROCEDURE — 76937 US GUIDE VASCULAR ACCESS: CPT | Performed by: INTERNAL MEDICINE

## 2024-03-21 PROCEDURE — 93005 ELECTROCARDIOGRAM TRACING: CPT

## 2024-03-21 PROCEDURE — 71045 X-RAY EXAM CHEST 1 VIEW: CPT

## 2024-03-21 PROCEDURE — 85610 PROTHROMBIN TIME: CPT | Performed by: PHYSICIAN ASSISTANT

## 2024-03-21 DEVICE — LEAD 457453 MRI US BI RCMCRD MVC
Type: IMPLANTABLE DEVICE | Site: HEART | Status: FUNCTIONAL
Brand: CAPSURE SENSE MRI™ SURESCAN™

## 2024-03-21 DEVICE — CRTP W4TR02 SERENA QUAD CRTP MRI US
Type: IMPLANTABLE DEVICE | Site: CHEST  WALL | Status: FUNCTIONAL
Brand: SERENA™ QUAD CRT-P MRI SURESCAN™

## 2024-03-21 DEVICE — LEAD 459888 MRI S-TIP US
Type: IMPLANTABLE DEVICE | Site: HEART | Status: FUNCTIONAL
Brand: ATTAIN PERFORMA™ S MRI SURESCAN™

## 2024-03-21 DEVICE — LEAD 3830 US MKT/ 69CM MRI LBBAP
Type: IMPLANTABLE DEVICE | Site: HEART | Status: FUNCTIONAL
Brand: SELECTSECURE™ MRI SURESCAN™

## 2024-03-21 DEVICE — ENVELOPE CMRM6122 ABSORB MED MR
Type: IMPLANTABLE DEVICE | Site: CHEST  WALL | Status: FUNCTIONAL
Brand: TYRX™

## 2024-03-21 RX ORDER — LIDOCAINE HYDROCHLORIDE 10 MG/ML
INJECTION, SOLUTION EPIDURAL; INFILTRATION; INTRACAUDAL; PERINEURAL AS NEEDED
Status: DISCONTINUED | OUTPATIENT
Start: 2024-03-21 | End: 2024-03-21

## 2024-03-21 RX ORDER — OXYCODONE HYDROCHLORIDE AND ACETAMINOPHEN 5; 325 MG/1; MG/1
1 TABLET ORAL EVERY 6 HOURS PRN
Qty: 10 TABLET | Refills: 0 | Status: SHIPPED | OUTPATIENT
Start: 2024-03-21 | End: 2024-03-31

## 2024-03-21 RX ORDER — PROPOFOL 10 MG/ML
INJECTION, EMULSION INTRAVENOUS CONTINUOUS PRN
Status: DISCONTINUED | OUTPATIENT
Start: 2024-03-21 | End: 2024-03-21

## 2024-03-21 RX ORDER — ACETAMINOPHEN 325 MG/1
650 TABLET ORAL EVERY 4 HOURS PRN
Status: DISCONTINUED | OUTPATIENT
Start: 2024-03-21 | End: 2024-03-21 | Stop reason: HOSPADM

## 2024-03-21 RX ORDER — CEFAZOLIN SODIUM 2 G/50ML
2000 SOLUTION INTRAVENOUS ONCE
Status: COMPLETED | OUTPATIENT
Start: 2024-03-22 | End: 2024-03-21

## 2024-03-21 RX ORDER — MIDAZOLAM HYDROCHLORIDE 2 MG/2ML
INJECTION, SOLUTION INTRAMUSCULAR; INTRAVENOUS AS NEEDED
Status: DISCONTINUED | OUTPATIENT
Start: 2024-03-21 | End: 2024-03-21

## 2024-03-21 RX ORDER — GENTAMICIN SULFATE 40 MG/ML
INJECTION, SOLUTION INTRAMUSCULAR; INTRAVENOUS CODE/TRAUMA/SEDATION MEDICATION
Status: DISCONTINUED | OUTPATIENT
Start: 2024-03-21 | End: 2024-03-21 | Stop reason: HOSPADM

## 2024-03-21 RX ORDER — OXYCODONE HYDROCHLORIDE 5 MG/1
5 TABLET ORAL ONCE
Status: COMPLETED | OUTPATIENT
Start: 2024-03-21 | End: 2024-03-21

## 2024-03-21 RX ORDER — FENTANYL CITRATE 50 UG/ML
INJECTION, SOLUTION INTRAMUSCULAR; INTRAVENOUS AS NEEDED
Status: DISCONTINUED | OUTPATIENT
Start: 2024-03-21 | End: 2024-03-21

## 2024-03-21 RX ORDER — SODIUM CHLORIDE, SODIUM LACTATE, POTASSIUM CHLORIDE, CALCIUM CHLORIDE 600; 310; 30; 20 MG/100ML; MG/100ML; MG/100ML; MG/100ML
INJECTION, SOLUTION INTRAVENOUS CONTINUOUS PRN
Status: DISCONTINUED | OUTPATIENT
Start: 2024-03-21 | End: 2024-03-21

## 2024-03-21 RX ORDER — LIDOCAINE HYDROCHLORIDE 10 MG/ML
INJECTION, SOLUTION EPIDURAL; INFILTRATION; INTRACAUDAL; PERINEURAL CODE/TRAUMA/SEDATION MEDICATION
Status: DISCONTINUED | OUTPATIENT
Start: 2024-03-21 | End: 2024-03-21 | Stop reason: HOSPADM

## 2024-03-21 RX ADMIN — FENTANYL CITRATE 25 MCG: 50 INJECTION INTRAMUSCULAR; INTRAVENOUS at 08:23

## 2024-03-21 RX ADMIN — SODIUM CHLORIDE, SODIUM LACTATE, POTASSIUM CHLORIDE, AND CALCIUM CHLORIDE: .6; .31; .03; .02 INJECTION, SOLUTION INTRAVENOUS at 07:32

## 2024-03-21 RX ADMIN — FENTANYL CITRATE 25 MCG: 50 INJECTION INTRAMUSCULAR; INTRAVENOUS at 07:46

## 2024-03-21 RX ADMIN — OXYCODONE HYDROCHLORIDE 5 MG: 5 TABLET ORAL at 12:13

## 2024-03-21 RX ADMIN — MIDAZOLAM 2 MG: 1 INJECTION INTRAMUSCULAR; INTRAVENOUS at 07:43

## 2024-03-21 RX ADMIN — ACETAMINOPHEN 650 MG: 325 TABLET, FILM COATED ORAL at 10:37

## 2024-03-21 RX ADMIN — PROPOFOL 100 MCG/KG/MIN: 10 INJECTION, EMULSION INTRAVENOUS at 07:46

## 2024-03-21 RX ADMIN — FENTANYL CITRATE 25 MCG: 50 INJECTION INTRAMUSCULAR; INTRAVENOUS at 08:54

## 2024-03-21 RX ADMIN — CEFAZOLIN SODIUM 2000 MG: 2 SOLUTION INTRAVENOUS at 07:54

## 2024-03-21 RX ADMIN — LIDOCAINE HYDROCHLORIDE 50 MG: 10 INJECTION, SOLUTION EPIDURAL; INFILTRATION; INTRACAUDAL; PERINEURAL at 07:46

## 2024-03-21 RX ADMIN — FENTANYL CITRATE 25 MCG: 50 INJECTION INTRAMUSCULAR; INTRAVENOUS at 08:26

## 2024-03-21 NOTE — DISCHARGE INSTR - AVS FIRST PAGE
Pain medications have been sent in to your home [facility] pharmacy.     Please refer to post device implantation discharge instructions and restrictions below and your device booklet/temporary card.      Keep incision dry for one week. Do not use lotions/powders/creams on incision.     Leave outer bandage in place for 1 week - it is water proof, and as long as it is fully adhered to your skin you may shower with it.  If it appears as though the bandage is coming off and/or there is any communication to the area of device incision, please then keep the whole area dry for the remaining week.  After 1 week, please remove by pulling all edges away from the center of the bandage.    No overhead reaching/pushing/pulling/lifting greater than 5-10lbs with left arm for six weeks. Please call the office if you notice redness, swelling, bleeding, or drainage from incision or if you develop fevers    Cardiac Resynchronization Therapy (CRT)    If you have any questions, please call [phone] to speak with a nurse (8:30am-4pm, or [phone] after hours). For appointments, please call [phone].    WHAT YOU SHOULD KNOW:    Your device is a biventricular pacemaker, which delivers resynchronization therapy. Cardiac resynchronization therapy (CRT) is a procedure used to treat problems with how your heart contracts. CRT is also called biventricular pacing. Your heart has 2 upper chambers, called atria, and 2 lower chambers, called ventricles. Your heartbeat is synchronized when all areas of your heart contract together properly. When the areas of your heart do not contract as they should, your heart cannot pump enough blood and oxygen to your body. You may have trouble breathing, tire easily, and have swelling in your legs and feet. With a biventricular device, there is one wire in the right atria (in most cases), one wire in the right ventricle, and a third wire that goes through a vein and wraps around to your left ventricle.  The two ventricular wires work together to help your heart contract more synchronously and efficiently.               AFTER YOU LEAVE:      Medicines:   Heart medicine may be given to help your heart beat strongly and regularly. Ask your healthcare provider for more information about heart medicines.    Take your medicine as directed. Contact your healthcare provider if you think your medicine is not helping or if you have side effects. Tell him if you are allergic to any medicine. Keep a list of the medicines, vitamins, and herbs you take. Include the amounts, and when and why you take them. Bring the list or the pill bottles to follow-up visits. Carry your medicine list with you in case of an emergency.    Driving: you are able to drive 48 hours after device is implanted    Follow up with your healthcare provider as directed: You will need to return to have your pacemaker checked. You may also need an EKG, echocardiogram, or chest x-ray to check the leads in your heart, and your doctor will order these tests if necessary. Write down your questions so you remember to ask them during your visits.    Device safety: Some electrical devices may interfere with how your pacemaker works. Some examples are cell phones, security systems, power cables, and electric monitors. Ask your healthcare provider how long you can be near these devices, and which devices to avoid. Tell caregivers you have a pacemaker before you have a procedure or surgery.    Wound care: Care for your wound as directed. Keep incision dry for one week. Do not use lotions/powders/creams on incision. Leave outer bandage in place for 1 week - it is water proof, and as long as it is fully adhered to your skin you may shower with it.  If it appears as though the bandage is coming off and/or there is any communication to the area of device incision, please then keep the whole area dry for the remaining week.  After 1 week, please remove by pulling all edges  away from the center of the bandage. Leave underlying steri-strips, if there are any in place, they will either fall off on their own or will be removed at 2 week follow up appointment. No overhead reaching/pushing/pulling/lifting greater than 5-10lbs with left arm for six weeks. Please call the office if you notice redness, swelling, bleeding, or drainage from incision or if you develop fevers      Contact your healthcare provider if:   You have new or increased swelling in your legs or feet.   You feel more tired than usual.   You have trouble breathing during activity.    Your wound is red, warm, swollen, or draining pus.   You have a fever.   You have questions or concerns about your condition or care.    Seek care immediately or call 911 if:   You feel like your heart is fluttering or jumping.   You have any of the following signs of a heart attack:    Squeezing, pressure, fullness, or pain in your chest that lasts longer than a few minutes or returns   Discomfort or pain in your back, neck, jaw, stomach, or arm   Shortness of breath or breathing problems   A sudden cold sweat, lightheadedness, dizziness, or nausea, especially with chest pain or trouble breathing        © 2014 [employer]. Information is for End User's use only and may not be sold, redistributed or otherwise used for commercial purposes. All illustrations and images included in CareNotes® are the copyrighted property of [employer], [employer]. or [employer].  The above information is an  only. It is not intended as medical advice for individual conditions or treatments. Talk to your doctor, nurse or pharmacist before following any medical regimen to see if it is safe and effective for you.

## 2024-03-21 NOTE — Clinical Note
The PACER GENERATOR MUKUND QUAD CRT-P MRI BIV - [id number] device was inserted. The leads were placed into the connector and visually verified to be in correct position. Injury current obtained.

## 2024-03-21 NOTE — ANESTHESIA PREPROCEDURE EVALUATION
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 Wall thickness is mildly increased. There is mild concentric hypertrophy. The left ventricular ejection fraction is 45% by biplane measurement. Global longitudinal strain is reduced at -11.3%. There is mild global hypokinesis with specific regional wall abnormalities.  The septum is paradoxical. Diastolic function is normal.  Left atrial filling pressure is normal.    Pulmonic Valve: There is mild regurgitation.    Physical Exam    Airway    Mallampati score: II  TM Distance: >3 FB  Neck ROM: full     Dental   Comment: Missing Teeth, No notable dental hx     Cardiovascular      Pulmonary      Other Findings        Anesthesia Plan  ASA Score- 3     Anesthesia Type- IV sedation with anesthesia with ASA Monitors.         Additional Monitors:     Airway Plan:            Plan Factors-Exercise tolerance (METS): >4 METS.    Chart reviewed. EKG reviewed. Imaging results reviewed. Existing labs reviewed. Patient summary reviewed.    Patient is not a current smoker.              Induction- intravenous.    Postoperative Plan-     Informed Consent- Anesthetic plan and risks discussed with patient and spouse.  I personally reviewed this patient with the CRNA. Discussed and agreed on the Anesthesia Plan with the CRNA..

## 2024-03-21 NOTE — ANESTHESIA PROCEDURE NOTES
Anesthesia Notable Event    Date/Time: 3/21/2024 10:40 AM    Performed by: Karri Shin MD  Authorized by: Karri Shin MD

## 2024-03-21 NOTE — NURSING NOTE
Pt maintained on  monitor. Wife at bedside throughout. Ice to left shoulder. Medicated x 2 for pain, once with Acetaminophen and once with oxycodone as noted. Dressing CDI, sling applied. EKG and pCXRAY obtained. Lunch tray provided. Pts SO also had additional food for him. Tolerated. Report to Tina. [place] [facility].

## 2024-03-21 NOTE — LETTER
[facility] CARDIAC CATH LAB  [address]  [place] [address]  Dept: [phone]    March 21, 2024     Patient: Fadi Busby   YOB: 1965   Date of Visit: 3/21/2024       To Whom it May Concern:    Fadi Busby is currently under my professional care and recently underwent a procedure. He was seen in the hospital on 3/21/2024, and his wife was present during his hospital stay. Due to post-operative care and restrictions, she will need to take care of him in the more immediate post procedure setting. Please excuse her from work starting today, 3/21/2014, until Friday, 3/29/2024, with a plan to return on Saturday, 3/30/2024.     If you have any questions or concerns, please do not hesitate to contact our office - [phone].         Sincerely,          Heather Paniagua PA-C  [facility] - Cardiac Electrophysiology

## 2024-03-21 NOTE — H&P
H&P Exam - Cardiology   Fadi Busby 58 y.o. male MRN: [id number]  Unit/Bed#: BE CATH LAB ROOM Encounter: [id number]    Assessment/Plan     Assessment:  Nonischemic cardiomyopathy, with most recent LVEF 45% per echo 1/2024  Initially diagnosed 6/2023, with LVEF 25-30%  Per reports prior coronary CTA previously performed with score of 6 in the LAD, however the angiogram results are not available  Cardiac MRI 10/2023 showed evidence of prior myocarditis versus nonischemic cardiomyopathy with LVEF 30%  Maintained on optimal medical therapy with Toprol-XL, valsartan, and spironolactone  Chronic HFmEF  Baseline left bundle branch block with wide QRS  Prior CVAs with residual left-sided weakness, 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    He will require 4 hours of bedrest and postop chest x-ray.  If no issues would anticipate discharge home later today.      History of Present Illness   HPI:  Fadi Busby is a 58 y.o. year old male with nonischemic cardiomyopathy with most recent LVEF 45% per echo 1/2024, chronic HFmEF, baseline left bundle branch block, prior CVAs with residual left-sided weakness, insulin-dependent diabetes, hypertension, and prior DVT/PE.  He recently establish care with Dr. Calderon 10/2023, but previously had his cardiac care in [place].  Per reports, he was initially noted to have a cardiomyopathy 6/2023 at which time his LVEF was noted to be 25-30%.  He underwent a CT coronary angiogram, however all reports are not available for review.  He subsequently underwent a cardiac MRI 10/2023 which showed LVEF 30% with evidence of prior myocarditis versus nonischemic cardiomyopathy.  He has been 1 teens on also medical therapy with valsartan, Toprol-XL, and spironolactone.  Subsequent echocardiogram 1/2024 showed slightly improved LVEF 45%.  He was seen in EP consultation by   "Mangrolia given his reduced EF in the setting of left bundle branch block with wide QRS.  As his ejection fraction improved, he met criteria for biventricular pacemaker implantation and he presents today to undergo that procedure.  No significant changes over the recent past.      Review of Systems  ROS as noted above, otherwise 12 point review of systems was performed and is negative.       Historical Information   Past Medical History:   Diagnosis Date    Acute pain of right shoulder     Paronychia of right index finger 08/15/2023     Past Surgical History:   Procedure Laterality Date    HERNIA REPAIR      At 18 years old     Family History: History reviewed. No pertinent family history.    Social History   Social History     Substance and Sexual Activity   Alcohol Use Never     Social History     Substance and Sexual Activity   Drug Use Never     Social History     Tobacco Use   Smoking Status Never    Passive exposure: Never   Smokeless Tobacco Never         Meds/Allergies   all medications and allergies reviewed  Home Medications:   Medications Prior to Admission   Medication    acetaminophen (TYLENOL) 650 mg CR tablet    atorvastatin (LIPITOR) 40 mg tablet    Continuous Blood Gluc Sensor (FreeStyle Ana María 2 Sensor) MISC    Farxiga 10 MG tablet    furosemide (LASIX) 40 mg tablet    insulin aspart (NovoLOG FlexPen) 100 UNIT/ML injection pen    Insulin Pen Needle 32G X 4 MM MISC    Lantus SoloStar 100 units/mL SOPN    metoprolol succinate (TOPROL-XL) 25 mg 24 hr tablet    sildenafil (VIAGRA) 50 MG tablet    spironolactone (ALDACTONE) 25 mg tablet    valsartan (DIOVAN) 320 MG tablet    warfarin (Coumadin) 5 mg tablet       No Known Allergies    Objective   Vitals: Blood pressure 111/65, pulse 69, temperature 98.1 °F (36.7 °C), temperature source Tympanic, resp. rate 16, height 5' 9\" (1.753 m), weight 107 kg (235 lb 14.3 oz), SpO2 97%.  Orthostatic Blood Pressures      Flowsheet Row Most Recent Value   Blood " Pressure 111/65 filed at 03/21/2024 0726            No intake or output data in the 24 hours ending 03/21/24 0728    Invasive Devices       Peripheral Intravenous Line  Duration             Peripheral IV 03/21/24 Right Hand <1 day                    Physical Exam  GEN: NAD, alert and oriented x 3, well appearing  SKIN: dry without significant lesions or rashes  HEENT: NCAT, PERRL, EOMs intact  NECK: No JVD appreciated  CARDIOVASCULAR: RRR  LUNGS: No respiratory distress, good overall effort, no audible wheezing  ABDOMEN: Soft, nontender, nondistended  EXTREMITIES/VASCULAR: perfused without clubbing, cyanosis, or LE edema b/l  PSYCH: Normal mood and affect  NEURO: CN ll-Xll grossly intact      Lab Results: I have personally reviewed pertinent lab results.    Results from last 7 days   Lab Units 03/18/24  0958   WBC Thousand/uL 5.60   HEMOGLOBIN g/dL 14.2   HEMATOCRIT % 42.6   PLATELETS Thousands/uL 258     Results from last 7 days   Lab Units 03/18/24  0958   POTASSIUM mmol/L 3.9   CHLORIDE mmol/L 102   CO2 mmol/L 30   BUN mg/dL 18   CREATININE mg/dL 1.10   CALCIUM mg/dL 9.1     Results from last 7 days   Lab Units 03/18/24  0958   INR  2.39*             Imaging: I have personally reviewed pertinent reports.      ECHO: No results found for this or any previous visit.      Results for orders placed during the hospital encounter of 01/24/24    Echo complete w/ contrast if indicated    Interpretation Summary    Left Ventricle: Left ventricular cavity size is normal. Wall thickness is mildly increased. There is mild concentric hypertrophy. The left ventricular ejection fraction is 45% by biplane measurement. Global longitudinal strain is reduced at -11.3%. There is mild global hypokinesis with specific regional wall abnormalities.  The septum is paradoxical. Diastolic function is normal.  Left atrial filling pressure is normal.    Pulmonic Valve: There is mild regurgitation.    Strain was performed to quantify  interventricular dyssynchrony and evaluate components of myocardial function due to Cardiomyopathy . Results from the utilization of Strain Analysis are listed in the report below.        EKG: pending      Code Status: Level 1 - Full Code

## 2024-03-21 NOTE — ANESTHESIA POSTPROCEDURE EVALUATION
Post-Op Assessment Note    CV Status:  Stable  Pain Score: 0    Pain management: adequate       Mental Status:  Alert and awake   Hydration Status:  Stable   PONV Controlled:  None   Airway Patency:  Patent     Post Op Vitals Reviewed: Yes    No anethesia notable event occurred.    Staff: CRNA, Anesthesiologist               BP   100/54   Temp 97.4   Pulse 71   Resp 12   SpO2 95

## 2024-03-22 ENCOUNTER — APPOINTMENT (EMERGENCY)
Dept: RADIOLOGY | Facility: HOSPITAL | Age: 59
End: 2024-03-22
Payer: COMMERCIAL

## 2024-03-22 ENCOUNTER — HOSPITAL ENCOUNTER (EMERGENCY)
Facility: HOSPITAL | Age: 59
Discharge: HOME | End: 2024-03-22
Attending: EMERGENCY MEDICINE
Payer: COMMERCIAL

## 2024-03-22 VITALS
WEIGHT: 237.44 LBS | BODY MASS INDEX: 35.06 KG/M2 | HEART RATE: 87 BPM | DIASTOLIC BLOOD PRESSURE: 70 MMHG | TEMPERATURE: 98.3 F | RESPIRATION RATE: 18 BRPM | OXYGEN SATURATION: 97 % | SYSTOLIC BLOOD PRESSURE: 129 MMHG

## 2024-03-22 DIAGNOSIS — R07.9 CHEST PAIN WITH LOW RISK OF ACUTE CORONARY SYNDROME: Primary | ICD-10-CM

## 2024-03-22 LAB
2HR DELTA HS TROPONIN: 4 NG/L
ALBUMIN SERPL BCP-MCNC: 3.8 G/DL (ref 3.5–5)
ALP SERPL-CCNC: 64 U/L (ref 34–104)
ALT SERPL W P-5'-P-CCNC: 25 U/L (ref 7–52)
ANION GAP SERPL CALCULATED.3IONS-SCNC: 6 MMOL/L (ref 4–13)
APTT PPP: 40 SECONDS (ref 23–37)
AST SERPL W P-5'-P-CCNC: 21 U/L (ref 13–39)
BASOPHILS # BLD AUTO: 0.04 THOUSANDS/ÂΜL (ref 0–0.1)
BASOPHILS NFR BLD AUTO: 1 % (ref 0–1)
BILIRUB DIRECT SERPL-MCNC: 0.1 MG/DL (ref 0–0.2)
BILIRUB SERPL-MCNC: 0.78 MG/DL (ref 0.2–1)
BNP SERPL-MCNC: 29 PG/ML (ref 0–100)
BUN SERPL-MCNC: 17 MG/DL (ref 5–25)
CALCIUM SERPL-MCNC: 8.7 MG/DL (ref 8.4–10.2)
CARDIAC TROPONIN I PNL SERPL HS: 29 NG/L
CARDIAC TROPONIN I PNL SERPL HS: 33 NG/L
CHLORIDE SERPL-SCNC: 102 MMOL/L (ref 96–108)
CO2 SERPL-SCNC: 30 MMOL/L (ref 21–32)
CREAT SERPL-MCNC: 1.16 MG/DL (ref 0.6–1.3)
EOSINOPHIL # BLD AUTO: 0.33 THOUSAND/ÂΜL (ref 0–0.61)
EOSINOPHIL NFR BLD AUTO: 4 % (ref 0–6)
ERYTHROCYTE [DISTWIDTH] IN BLOOD BY AUTOMATED COUNT: 12.1 % (ref 11.6–15.1)
GFR SERPL CREATININE-BSD FRML MDRD: 68 ML/MIN/1.73SQ M
GLUCOSE SERPL-MCNC: 279 MG/DL (ref 65–140)
HCT VFR BLD AUTO: 37.8 % (ref 36.5–49.3)
HGB BLD-MCNC: 12.6 G/DL (ref 12–17)
IMM GRANULOCYTES # BLD AUTO: 0.03 THOUSAND/UL (ref 0–0.2)
IMM GRANULOCYTES NFR BLD AUTO: 0 % (ref 0–2)
INR PPP: 2.93 (ref 0.84–1.19)
LYMPHOCYTES # BLD AUTO: 1.89 THOUSANDS/ÂΜL (ref 0.6–4.47)
LYMPHOCYTES NFR BLD AUTO: 23 % (ref 14–44)
MCH RBC QN AUTO: 30.8 PG (ref 26.8–34.3)
MCHC RBC AUTO-ENTMCNC: 33.3 G/DL (ref 31.4–37.4)
MCV RBC AUTO: 92 FL (ref 82–98)
MONOCYTES # BLD AUTO: 0.6 THOUSAND/ÂΜL (ref 0.17–1.22)
MONOCYTES NFR BLD AUTO: 7 % (ref 4–12)
NEUTROPHILS # BLD AUTO: 5.45 THOUSANDS/ÂΜL (ref 1.85–7.62)
NEUTS SEG NFR BLD AUTO: 65 % (ref 43–75)
NRBC BLD AUTO-RTO: 0 /100 WBCS
PLATELET # BLD AUTO: 198 THOUSANDS/UL (ref 149–390)
PMV BLD AUTO: 10.6 FL (ref 8.9–12.7)
POTASSIUM SERPL-SCNC: 3.6 MMOL/L (ref 3.5–5.3)
PROT SERPL-MCNC: 6.5 G/DL (ref 6.4–8.4)
PROTHROMBIN TIME: 30.7 SECONDS (ref 11.6–14.5)
RBC # BLD AUTO: 4.09 MILLION/UL (ref 3.88–5.62)
SODIUM SERPL-SCNC: 138 MMOL/L (ref 135–147)
WBC # BLD AUTO: 8.34 THOUSAND/UL (ref 4.31–10.16)

## 2024-03-22 PROCEDURE — 71046 X-RAY EXAM CHEST 2 VIEWS: CPT

## 2024-03-22 PROCEDURE — 93005 ELECTROCARDIOGRAM TRACING: CPT

## 2024-03-22 PROCEDURE — 80048 BASIC METABOLIC PNL TOTAL CA: CPT | Performed by: EMERGENCY MEDICINE

## 2024-03-22 PROCEDURE — 96361 HYDRATE IV INFUSION ADD-ON: CPT

## 2024-03-22 PROCEDURE — 99285 EMERGENCY DEPT VISIT HI MDM: CPT | Performed by: EMERGENCY MEDICINE

## 2024-03-22 PROCEDURE — 84484 ASSAY OF TROPONIN QUANT: CPT | Performed by: EMERGENCY MEDICINE

## 2024-03-22 PROCEDURE — 85610 PROTHROMBIN TIME: CPT | Performed by: EMERGENCY MEDICINE

## 2024-03-22 PROCEDURE — 99285 EMERGENCY DEPT VISIT HI MDM: CPT

## 2024-03-22 PROCEDURE — 83880 ASSAY OF NATRIURETIC PEPTIDE: CPT | Performed by: EMERGENCY MEDICINE

## 2024-03-22 PROCEDURE — 96374 THER/PROPH/DIAG INJ IV PUSH: CPT

## 2024-03-22 PROCEDURE — 85730 THROMBOPLASTIN TIME PARTIAL: CPT | Performed by: EMERGENCY MEDICINE

## 2024-03-22 PROCEDURE — 85025 COMPLETE CBC W/AUTO DIFF WBC: CPT | Performed by: EMERGENCY MEDICINE

## 2024-03-22 PROCEDURE — 36415 COLL VENOUS BLD VENIPUNCTURE: CPT | Performed by: EMERGENCY MEDICINE

## 2024-03-22 PROCEDURE — 80076 HEPATIC FUNCTION PANEL: CPT | Performed by: EMERGENCY MEDICINE

## 2024-03-22 RX ORDER — HYDROMORPHONE HCL/PF 1 MG/ML
0.5 SYRINGE (ML) INJECTION ONCE
Status: COMPLETED | OUTPATIENT
Start: 2024-03-22 | End: 2024-03-22

## 2024-03-22 RX ADMIN — SODIUM CHLORIDE 500 ML: 0.9 INJECTION, SOLUTION INTRAVENOUS at 16:30

## 2024-03-22 RX ADMIN — HYDROMORPHONE HYDROCHLORIDE 0.5 MG: 1 INJECTION, SOLUTION INTRAMUSCULAR; INTRAVENOUS; SUBCUTANEOUS at 16:31

## 2024-03-22 NOTE — PROGRESS NOTES
Medication Patient Assistance Program (MPAP) Specialist  received IB message regarding patient's Ozempic. MPAP specialist received IB message from clinical staff regarding patient's shipment of Ozempic from [employer] patient assistance program received at provider's office.  Patient contacted to .  Clinical pharmacist included in IB message.

## 2024-03-22 NOTE — ED PROVIDER NOTES
History  Chief Complaint   Patient presents with    Chest Pain     Pt reports had pacemaker placed yesterday. Started with SOB and lightheaded since last night     59 YO male presents with chest pain and lightheadedness. Patient states this began last night, pain has been in the Left chest an radiating to the Left arm, it has been constant, he has had some shortness of breath associated with this. Patient denies similar in the past. He did have a pacemaker placed yesterday morning, states he was feeling well afterward but thinks the anesthetic may have worn off. He tried two doses of percocet without alleviation of his discomfort. He does note two episodes of lightheadedness. Pt denies F/C/N/V/D/C, no dysuria, burning on urination or blood in urine.       History provided by:  Patient   used: No        Prior to Admission Medications   Prescriptions Last Dose Informant Patient Reported? Taking?   Continuous Blood Gluc Sensor (FreeStyle Ana María 2 Sensor) MISC   No Yes   Sig: Take as directed   Farxiga 10 MG tablet   Yes Yes   Sig: Take 10 mg by mouth daily   Insulin Pen Needle 32G X 4 MM MISC   No Yes   Sig: Use up to four times daily with insulin e11.9   Lantus SoloStar 100 units/mL SOPN   No Yes   Sig: INJECT 0.35 ML (35 UNITS ) UNDER THE SKIN IN THE MORNING   acetaminophen (TYLENOL) 650 mg CR tablet  Self, Spouse/Significant Other No Yes   Sig: Take 1 tablet (650 mg total) by mouth every 8 (eight) hours as needed for mild pain   atorvastatin (LIPITOR) 40 mg tablet  Self, Spouse/Significant Other No Yes   Sig: Take 1 tablet (40 mg total) by mouth daily   furosemide (LASIX) 40 mg tablet  Self, Spouse/Significant Other No Yes   Sig: Take 1 tablet (40 mg total) by mouth in the morning   insulin aspart (NovoLOG FlexPen) 100 UNIT/ML injection pen   No Yes   Sig: Inject 10 units three times daily before meals   metoprolol succinate (TOPROL-XL) 25 mg 24 hr tablet  Self, Spouse/Significant Other Yes Yes    Sig: Take 25 mg by mouth   oxyCODONE-acetaminophen (Percocet) 5-325 mg per tablet   No Yes   Sig: Take 1 tablet by mouth every 6 (six) hours as needed for moderate pain for up to 10 days Max Daily Amount: 4 tablets   sildenafil (VIAGRA) 50 MG tablet  Self, Spouse/Significant Other Yes Yes   Sig: Take 50 mg by mouth daily as needed   spironolactone (ALDACTONE) 25 mg tablet   Yes Yes   Sig: Take 25 mg by mouth daily   valsartan (DIOVAN) 320 MG tablet  Self, Spouse/Significant Other No Yes   Sig: Take 1 tablet (320 mg total) by mouth daily   warfarin (Coumadin) 5 mg tablet  Self, Spouse/Significant Other No Yes   Sig: Patient is establishing care and brought in med list. Takes Warfarin once daily.      Facility-Administered Medications: None       Past Medical History:   Diagnosis Date    Acute pain of right shoulder     Paronychia of right index finger 08/15/2023       Past Surgical History:   Procedure Laterality Date    CARDIAC ELECTROPHYSIOLOGY PROCEDURE N/A 3/21/2024    Procedure: Cardiac biv pacer implant;  Surgeon: Nahun Rees MD;  Location: [facility];  Service: Cardiology    HERNIA REPAIR      At 18 years old       History reviewed. No pertinent family history.  I have reviewed and agree with the history as documented.    E-Cigarette/Vaping    E-Cigarette Use Never User      E-Cigarette/Vaping Substances    Nicotine No     THC No     CBD No     Flavoring No     Other No     Unknown No      Social History     Tobacco Use    Smoking status: Never     Passive exposure: Never    Smokeless tobacco: Never   Vaping Use    Vaping status: Never Used   Substance Use Topics    Alcohol use: Never    Drug use: Never       Review of Systems   Constitutional:  Negative for fever.   HENT:  Negative for dental problem.    Eyes:  Negative for visual disturbance.   Respiratory:  Positive for shortness of breath.    Cardiovascular:  Positive for chest pain.   Gastrointestinal:  Negative for abdominal pain, nausea  and vomiting.   Genitourinary:  Negative for dysuria and frequency.   Musculoskeletal:  Negative for neck pain and neck stiffness.   Skin:  Negative for rash.   Neurological:  Positive for light-headedness. Negative for dizziness and weakness.   Psychiatric/Behavioral:  Negative for agitation, behavioral problems and confusion.    All other systems reviewed and are negative.      Physical Exam  Physical Exam  Vitals and nursing note reviewed.   Constitutional:       Appearance: He is well-developed.   HENT:      Head: Normocephalic and atraumatic.   Eyes:      Extraocular Movements: Extraocular movements intact.   Cardiovascular:      Rate and Rhythm: Normal rate and regular rhythm.      Pulses: Normal pulses.      Heart sounds: Normal heart sounds.   Pulmonary:      Effort: Pulmonary effort is normal.      Breath sounds: Normal breath sounds.   Chest:      Chest wall: Tenderness (Tender to palpation over the Left chest wall.) present.   Abdominal:      General: There is no distension.   Musculoskeletal:         General: Normal range of motion.      Cervical back: Normal range of motion.   Skin:     Findings: No rash.   Neurological:      Mental Status: He is alert and oriented to person, place, and time.   Psychiatric:         Behavior: Behavior normal.         Vital Signs  ED Triage Vitals   Temperature Pulse Respirations Blood Pressure SpO2   03/22/24 1554 03/22/24 1554 03/22/24 1554 03/22/24 1554 03/22/24 1554   98.3 °F (36.8 °C) 98 18 140/70 99 %      Temp Source Heart Rate Source Patient Position - Orthostatic VS BP Location FiO2 (%)   03/22/24 1554 03/22/24 1554 03/22/24 1554 03/22/24 1554 --   Oral Monitor Lying Right arm       Pain Score       03/22/24 1631       8           Vitals:    03/22/24 1630 03/22/24 1700 03/22/24 1756 03/22/24 1900   BP: 135/71 125/76 125/76 129/70   Pulse: 91 86 86 87   Patient Position - Orthostatic VS: Lying Lying Lying Lying         Visual Acuity      ED  Medications  Medications   sodium chloride 0.9 % bolus 500 mL (0 mL Intravenous Stopped 3/22/24 1924)   HYDROmorphone (DILAUDID) injection 0.5 mg (0.5 mg Intravenous Given 3/22/24 1631)       Diagnostic Studies  Results Reviewed       Procedure Component Value Units Date/Time    HS Troponin I 2hr [[id number]]  (Normal) Collected: 03/22/24 1837    Lab Status: Final result Specimen: Blood from Arm, Right Updated: 03/22/24 1906     hs TnI 2hr 33 ng/L      Delta 2hr hsTnI 4 ng/L     HS Troponin 0hr (reflex protocol) [[id number]]  (Normal) Collected: 03/22/24 1629    Lab Status: Final result Specimen: Blood from Arm, Right Updated: 03/22/24 1704     hs TnI 0hr 29 ng/L     B-Type Natriuretic Peptide(BNP) [[id number]]  (Normal) Collected: 03/22/24 1629    Lab Status: Final result Specimen: Blood from Arm, Right Updated: 03/22/24 1702     BNP 29 pg/mL     Protime-INR [[id number]]  (Abnormal) Collected: 03/22/24 1629    Lab Status: Final result Specimen: Blood from Arm, Right Updated: 03/22/24 1701     Protime 30.7 seconds      INR 2.93    APTT [[id number]]  (Abnormal) Collected: 03/22/24 1629    Lab Status: Final result Specimen: Blood from Arm, Right Updated: 03/22/24 1701     PTT 40 seconds     Hepatic function panel [[id number]]  (Normal) Collected: 03/22/24 1629    Lab Status: Final result Specimen: Blood from Arm, Right Updated: 03/22/24 1657     Total Bilirubin 0.78 mg/dL      Bilirubin, Direct 0.10 mg/dL      Alkaline Phosphatase 64 U/L      AST 21 U/L      ALT 25 U/L      Total Protein 6.5 g/dL      Albumin 3.8 g/dL     Basic metabolic panel [[id number]]  (Abnormal) Collected: 03/22/24 1629    Lab Status: Final result Specimen: Blood from Arm, Right Updated: 03/22/24 1657     Sodium 138 mmol/L      Potassium 3.6 mmol/L      Chloride 102 mmol/L      CO2 30 mmol/L      ANION GAP 6 mmol/L      BUN 17 mg/dL      Creatinine 1.16 mg/dL      Glucose 279 mg/dL      Calcium 8.7 mg/dL      eGFR 68 ml/min/1.73sq m     Narrative:       National Kid[employer] Foundation guidelines for Chronic Kidney Disease (CKD):     Stage 1 with normal or high GFR (GFR > 90 mL/min/1.73 square meters)    Stage 2 Mild CKD (GFR = 60-89 mL/min/1.73 square meters)    Stage 3A Moderate CKD (GFR = 45-59 mL/min/1.73 square meters)    Stage 3B Moderate CKD (GFR = 30-44 mL/min/1.73 square meters)    Stage 4 Severe CKD (GFR = 15-29 mL/min/1.73 square meters)    Stage 5 End Stage CKD (GFR <15 mL/min/1.73 square meters)  Note: GFR calculation is accurate only with a steady state creatinine    CBC and differential [[id number]] Collected: 03/22/24 1629    Lab Status: Final result Specimen: Blood from Arm, Right Updated: 03/22/24 1641     WBC 8.34 Thousand/uL      RBC 4.09 Million/uL      Hemoglobin 12.6 g/dL      Hematocrit 37.8 %      MCV 92 fL      MCH 30.8 pg      MCHC 33.3 g/dL      RDW 12.1 %      MPV 10.6 fL      Platelets 198 Thousands/uL      nRBC 0 /100 WBCs      Neutrophils Relative 65 %      Immature Grans % 0 %      Lymphocytes Relative 23 %      Monocytes Relative 7 %      Eosinophils Relative 4 %      Basophils Relative 1 %      Neutrophils Absolute 5.45 Thousands/µL      Absolute Immature Grans 0.03 Thousand/uL      Absolute Lymphocytes 1.89 Thousands/µL      Absolute Monocytes 0.60 Thousand/µL      Eosinophils Absolute 0.33 Thousand/µL      Basophils Absolute 0.04 Thousands/µL                    XR chest 2 views    (Results Pending)              Procedures  ECG 12 Lead Documentation Only    Date/Time: 3/22/2024 4:36 PM    Performed by: Aubrey Bowers MD  Authorized by: Aubrey Bowers MD    ECG reviewed by me, the ED Provider: yes    Patient location:  ED  Interpretation:     Interpretation: abnormal    Rate:     ECG rate:  100    ECG rate assessment: normal    Rhythm:     Rhythm: paced    Pacing:     Type of pacing:  Ventricular  QRS:     QRS axis:  Normal    QRS intervals:  Normal  Conduction:     Conduction: normal    ST segments:     ST segments:   Normal  T waves:     T waves: normal             ED Course  ED Course as of 03/22/24 2241   Fri Mar 22, 2024   1637 ECG evaluated by myself, interpretation included in procedure section of note.    1725 Labs reassuring, reached out to cardiology for advice since patient only recently had procedure.   1726 hs TnI 0hr: 29  In indeterminate range, will check two hour level.      1805 Reviewed case with cardiology who has looked over ECG and CXR, labs; states these appear reassuring and could be from recent pacemaker placement.   1918 Delta 2hr hsTnI: 4  <5, no signs of myocardial injury.             HEART Risk Score      Flowsheet Row Most Recent Value   Heart Score Risk Calculator    History 0 Filed at: 03/22/2024 1918   ECG 1 Filed at: 03/22/2024 1918   Age 1 Filed at: 03/22/2024 1918   Risk Factors 2 Filed at: 03/22/2024 1918   Troponin 1 Filed at: 03/22/2024 1918   HEART Score 5 Filed at: 03/22/2024 1918                                        Medical Decision Making  1. Chest pain - Patient with pacemaker placed yesterday, pain began last night. Will order ECG and troponin to rule out acute MI, CBC for leukocytosis and anemia, metabolic panel for electrolyte abnormalities and dehydration, CXR for pacemaker placement. Will give analgesia.    Problems Addressed:  Chest pain with low risk of acute coronary syndrome: acute illness or injury    Amount and/or Complexity of Data Reviewed  Labs: ordered. Decision-making details documented in ED Course.  Radiology: ordered and independent interpretation performed. Decision-making details documented in ED Course.  ECG/medicine tests: ordered and independent interpretation performed. Decision-making details documented in ED Course.  Discussion of management or test interpretation with external provider(s): Discussed with cardiology.    Risk  Prescription drug management.             Disposition  Final diagnoses:   Chest pain with low risk of acute coronary syndrome     Time  reflects when diagnosis was documented in both MDM as applicable and the Disposition within this note       Time User Action Codes Description Comment    3/22/2024  7:19 PM Aubrey Bowers Add [R07.9] Chest pain with low risk of acute coronary syndrome           ED Disposition       ED Disposition   Discharge    Condition   Stable    Date/Time   Fri Mar 22, 2024 1918    Comment   Fadi Busby discharge to home/self care.                   Follow-up Information    None         Discharge Medication List as of 3/22/2024  7:20 PM        CONTINUE these medications which have NOT CHANGED    Details   acetaminophen (TYLENOL) 650 mg CR tablet Take 1 tablet (650 mg total) by mouth every 8 (eight) hours as needed for mild pain, Starting Wed 1/24/2024, Normal      atorvastatin (LIPITOR) 40 mg tablet Take 1 tablet (40 mg total) by mouth daily, Starting Sat 10/14/2023, Normal      Continuous Blood Gluc Sensor (FreeStyle Ana María 2 Sensor) MISC Take as directed, Normal      Farxiga 10 MG tablet Take 10 mg by mouth daily, Starting Thu 2/1/2024, Historical Med      furosemide (LASIX) 40 mg tablet Take 1 tablet (40 mg total) by mouth in the morning, Starting Thu 8/31/2023, Normal      insulin aspart (NovoLOG FlexPen) 100 UNIT/ML injection pen Inject 10 units three times daily before meals, Normal      Insulin Pen Needle 32G X 4 MM MISC Use up to four times daily with insulin e11.9, Normal      Lantus SoloStar 100 units/mL SOPN INJECT 0.35 ML (35 UNITS ) UNDER THE SKIN IN THE MORNING, Normal      metoprolol succinate (TOPROL-XL) 25 mg 24 hr tablet Take 25 mg by mouth, Starting Mon 9/25/2023, Historical Med      oxyCODONE-acetaminophen (Percocet) 5-325 mg per tablet Take 1 tablet by mouth every 6 (six) hours as needed for moderate pain for up to 10 days Max Daily Amount: 4 tablets, Starting Thu 3/21/2024, Until Sun 3/31/2024 at 2359, Normal      sildenafil (VIAGRA) 50 MG tablet Take 50 mg by mouth daily as needed, Starting Thu 7/6/2023,  Historical Med      spironolactone (ALDACTONE) 25 mg tablet Take 25 mg by mouth daily, Starting Mon 1/29/2024, Historical Med      valsartan (DIOVAN) 320 MG tablet Take 1 tablet (320 mg total) by mouth daily, Starting Wed 8/23/2023, Normal      warfarin (Coumadin) 5 mg tablet Patient is establishing care and brought in med list. Takes Warfarin once daily., Normal             No discharge procedures on file.    PDMP Review         Value Time User    PDMP Reviewed  Yes 3/21/2024  1:40 PM Heather Paniagua PA-C            ED Provider  Electronically Signed by             Aubrey Bowers MD  03/22/24 1571

## 2024-03-22 NOTE — DISCHARGE INSTRUCTIONS
Use acetaminophen for your pain, take the Percocet if the acetaminophen is not working.    Keep ice over the area.    Call the cardiologist on Monday if you continue to have pain.    Return to the ER if your chest pain changes in quality or location, or becomes associated with shortness of breath, lightheadedness, vomiting or sweating.

## 2024-03-23 LAB
ATRIAL RATE: 100 BPM
ATRIAL RATE: 84 BPM
P AXIS: 44 DEGREES
P AXIS: 50 DEGREES
PR INTERVAL: 156 MS
PR INTERVAL: 158 MS
QRS AXIS: -29 DEGREES
QRS AXIS: -34 DEGREES
QRSD INTERVAL: 126 MS
QRSD INTERVAL: 128 MS
QT INTERVAL: 390 MS
QT INTERVAL: 416 MS
QTC INTERVAL: 491 MS
QTC INTERVAL: 503 MS
T WAVE AXIS: 86 DEGREES
T WAVE AXIS: 98 DEGREES
VENTRICULAR RATE: 100 BPM
VENTRICULAR RATE: 84 BPM

## 2024-03-23 PROCEDURE — 93010 ELECTROCARDIOGRAM REPORT: CPT | Performed by: STUDENT IN AN ORGANIZED HEALTH CARE EDUCATION/TRAINING PROGRAM

## 2024-04-02 ENCOUNTER — TELEPHONE (OUTPATIENT)
Dept: FAMILY MEDICINE CLINIC | Facility: CLINIC | Age: 59
End: 2024-04-02

## 2024-04-02 LAB
LEFT EYE DIABETIC RETINOPATHY: POSITIVE
RIGHT EYE DIABETIC RETINOPATHY: POSITIVE

## 2024-04-03 ENCOUNTER — OFFICE VISIT (OUTPATIENT)
Dept: FAMILY MEDICINE CLINIC | Facility: CLINIC | Age: 59
End: 2024-04-03

## 2024-04-03 VITALS
DIASTOLIC BLOOD PRESSURE: 84 MMHG | HEART RATE: 96 BPM | WEIGHT: 239 LBS | SYSTOLIC BLOOD PRESSURE: 141 MMHG | OXYGEN SATURATION: 98 % | TEMPERATURE: 97 F | BODY MASS INDEX: 35.29 KG/M2

## 2024-04-03 DIAGNOSIS — E11.311 TYPE 2 DIABETES MELLITUS WITH RETINOPATHY AND MACULAR EDEMA, WITH LONG-TERM CURRENT USE OF INSULIN, UNSPECIFIED LATERALITY, UNSPECIFIED RETINOPATHY SEVERITY (HCC): Primary | ICD-10-CM

## 2024-04-03 DIAGNOSIS — D36.7 DERMOID CYST OF RIGHT UPPER EXTREMITY: ICD-10-CM

## 2024-04-03 DIAGNOSIS — Z79.4 TYPE 2 DIABETES MELLITUS WITH RETINOPATHY AND MACULAR EDEMA, WITH LONG-TERM CURRENT USE OF INSULIN, UNSPECIFIED LATERALITY, UNSPECIFIED RETINOPATHY SEVERITY (HCC): Primary | ICD-10-CM

## 2024-04-03 PROCEDURE — G2211 COMPLEX E/M VISIT ADD ON: HCPCS | Performed by: FAMILY MEDICINE

## 2024-04-03 PROCEDURE — 99214 OFFICE O/P EST MOD 30 MIN: CPT | Performed by: FAMILY MEDICINE

## 2024-04-03 NOTE — ASSESSMENT & PLAN NOTE
Lab Results   Component Value Date    HGBA1C 9.3 (H) 03/18/2024   Prior auth completed today for laron during visit.

## 2024-04-03 NOTE — PROGRESS NOTES
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        Diagnoses and all orders for this visit:    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, with long-term current use of insulin, unspecified laterality, unspecified retinopathy severity (HCC)    Dermoid cyst of right upper extremity  Comments:  will continue to monitor. Pt insructed to inform us of growth.          Subjective: ed follow up     Patient ID: Fadi Busby is a 58 y.o. male with a ho DM2, diabetic retinopathy, HTN, morbid obesity, nonischemic cardiomyopathy, LBBB, ho PE, ho carotid dissection, CVA    HPI  Pt with nonischemic cardiomyopathy with EF 45% who underwent a bi ventricular defibrillator implantation for primary prevention 3/21/24. He returned to the ED the next day for chest pain and lightheadedness. He was given percocet for post op pain. Labs were reassuring and cardiology reviewed the chart and agreed with prescription drug mgmt and discharge.   Pt is here for a laron sensor. He was told my the insurance that they hadn't received a response. He is on farxiga, ozempic, lantus 30 qam and aspart 10 tid with meals. The last A1c was 9.3 on 3/18/24.  He is also here for bumps on the arm that appeared about 2 days ago. There is no pruritus or pain. He is only able to see and feel the bumps but is not bothered by them.        The following portions of the patient's history were reviewed and updated as appropriate: allergies, current medications, past family history, past medical history, past social history, past surgical history, and problem list.    Review of Systems   Constitutional:  Negative for activity change, appetite change, fever and unexpected weight change.   HENT:  Negative for ear pain, postnasal drip and rhinorrhea.    Eyes:  Negative for photophobia and pain.    Respiratory:  Negative for cough, shortness of breath and wheezing.    Cardiovascular:  Negative for chest pain, palpitations and leg swelling.   Gastrointestinal:  Negative for abdominal pain, blood in stool, nausea and vomiting.   Endocrine: Negative for polydipsia and polyuria.   Genitourinary:  Negative for difficulty urinating, hematuria and urgency.   Musculoskeletal:  Negative for myalgias.   Skin:  Negative for rash.        Lesion of right forearm    Neurological:  Negative for dizziness.   Psychiatric/Behavioral:  Negative for confusion and sleep disturbance.          PHQ-2/9 Depression Screening         [unfilled]    Objective:      /84 (BP Location: Right arm, Patient Position: Sitting, Cuff Size: Standard)   Pulse 96   Temp (!) 97 °F (36.1 °C) (Temporal)   Wt 108 kg (239 lb)   SpO2 98%   BMI 35.29 kg/m²          Physical Exam  Constitutional:       Appearance: He is well-developed.   HENT:      Head: Normocephalic and atraumatic.      Right Ear: External ear normal.      Left Ear: External ear normal.      Mouth/Throat:      Pharynx: No oropharyngeal exudate.   Eyes:      Conjunctiva/sclera: Conjunctivae normal.      Pupils: Pupils are equal, round, and reactive to light.   Cardiovascular:      Rate and Rhythm: Normal rate and regular rhythm.      Heart sounds: Normal heart sounds. No murmur heard.     No friction rub. No gallop.   Pulmonary:      Effort: Pulmonary effort is normal. No respiratory distress.      Breath sounds: Normal breath sounds. No wheezing or rales.   Chest:      Chest wall: No tenderness.   Abdominal:      General: Bowel sounds are normal. There is no distension.      Palpations: Abdomen is soft. There is no mass.      Tenderness: There is no abdominal tenderness. There is no rebound.   Musculoskeletal:         General: Normal range of motion.      Cervical back: Normal range of motion and neck supple.   Skin:     General: Skin is warm and dry.      Findings: Lesion  "(1cm, round, mobile, well circumscribed harm lump of the right medial forearm, non tender) present.   Neurological:      Mental Status: He is alert and oriented to person, place, and time.                                 Cultures:         Invalid input(s): \"URIBILINOGEN\"                                      "

## 2024-04-04 ENCOUNTER — IN-CLINIC DEVICE VISIT (OUTPATIENT)
Dept: CARDIOLOGY CLINIC | Facility: CLINIC | Age: 59
End: 2024-04-04

## 2024-04-04 DIAGNOSIS — Z95.0 PRESENCE OF CARDIAC PACEMAKER: Primary | ICD-10-CM

## 2024-04-04 PROCEDURE — 99024 POSTOP FOLLOW-UP VISIT: CPT

## 2024-04-04 NOTE — PROGRESS NOTES
Results for orders placed or performed in visit on 04/04/24   Cardiac EP device report    Narrative    MDT BI-V PM/ ACTIVE SYSTEM IS MRI CONDITIONAL  DEVICE INTERROGATED IN THE [place] OFFICE. WOUND CHECK: INCISION CLEAN AND DRY WITH EDGES APPROXIMATED; WOUND CARE AND RESTRICTIONS REVIEWED WITH PATIENT. (PDF #2). BATTERY VOLTAGE ADEQUATE (11.9 YRS). AP: 7.1%. : 98.5% + VSRP: 1.4%. ALL  LEAD PARAMETERS WITHIN NORMAL LIMITS. NO SIGNIFICANT HIGH RATE EPISODES. NO PROGRAMMING CHANGES MADE TO DEVICE PARAMETERS. NORMAL DEVICE FUNCTION. CH

## 2024-04-09 ENCOUNTER — OFFICE VISIT (OUTPATIENT)
Dept: FAMILY MEDICINE CLINIC | Facility: CLINIC | Age: 59
End: 2024-04-09

## 2024-04-09 PROCEDURE — NC001 PR NO CHARGE

## 2024-04-09 NOTE — PROGRESS NOTES
PSYCHOTHERAPY        [facility]    Name and Date of Birth:  Fadi Busby 58 y.o. 1965  Date of Treatment Plan: April 9, 2024  Diagnosis/Diagnoses:  Unspecified impulsitivity/anger    Area/Areas of need: mood swings    Goal 1:  Learn to recognize anger  Patient discussed his different levels of anger from 1 up to 10. One being things he could ignore (trash around the house, etc.) up to 10 (fights that lead to physical altercations). He was encouraged to find ways to see what are things he is able to     Goal 2: Explore coping strategies  Patient currently only has utilized walking away as a coping strategy. He was encouraged to work on learning to recognize his anger and find ways he could try to diffuse his anger.     Goal 3: Identify if you are avoiding problems  Patient notes that he chooses to walk away to help with his anger. He was encouraged to consider if he was avoiding problems because he would continuously walk away and if this would lead to unsolved problems that would then make things worse later.     During the visit, CBT and mindfulness strategies were utilized. Patient has been working towards his goal. At the next visit, patient will have worked on 2 handouts (thermometer outline determining what sets off his anger from 1-10 as well as exploring coping strategies)     Progress Towards Goals: progressing    Start: 1:33 - 2:33    John Villegas, DO

## 2024-04-12 ENCOUNTER — TELEPHONE (OUTPATIENT)
Dept: FAMILY MEDICINE CLINIC | Facility: CLINIC | Age: 59
End: 2024-04-12

## 2024-04-12 NOTE — TELEPHONE ENCOUNTER
Hi Dr. Thornton, we received a fax from [facility] and pt needs a medical clearance form filled out. Pt has appt scheduled for 4/17/24 for dm f/u. Would you able to fill out form on that visit or would you like me to schedule another appt for him for another day to be filled out.

## 2024-04-15 ENCOUNTER — TELEPHONE (OUTPATIENT)
Dept: FAMILY MEDICINE CLINIC | Facility: CLINIC | Age: 59
End: 2024-04-15

## 2024-04-15 DIAGNOSIS — Z86.79 HISTORY OF CAROTID ARTERY DISSECTION: ICD-10-CM

## 2024-04-15 RX ORDER — WARFARIN SODIUM 5 MG/1
TABLET ORAL
Qty: 90 TABLET | Refills: 1 | Status: SHIPPED | OUTPATIENT
Start: 2024-04-15

## 2024-04-15 NOTE — TELEPHONE ENCOUNTER
PCP SIGNATURE NEEDED FOR SMILE CARE DENTAL FORM RECEIVED VIA FAX AND PLACED IN PCP FOLDER TO BE DELIVERED AT ASSIGNED TIMES.      MEDICAL CLEARANCE

## 2024-04-17 ENCOUNTER — OFFICE VISIT (OUTPATIENT)
Dept: FAMILY MEDICINE CLINIC | Facility: CLINIC | Age: 59
End: 2024-04-17

## 2024-04-17 VITALS
RESPIRATION RATE: 18 BRPM | HEART RATE: 94 BPM | OXYGEN SATURATION: 97 % | TEMPERATURE: 96.5 F | HEIGHT: 69 IN | WEIGHT: 235 LBS | BODY MASS INDEX: 34.8 KG/M2 | SYSTOLIC BLOOD PRESSURE: 100 MMHG | DIASTOLIC BLOOD PRESSURE: 68 MMHG

## 2024-04-17 DIAGNOSIS — I42.8 NONISCHEMIC CARDIOMYOPATHY (HCC): ICD-10-CM

## 2024-04-17 DIAGNOSIS — E11.311 TYPE 2 DIABETES MELLITUS WITH RETINOPATHY AND MACULAR EDEMA, UNSPECIFIED LATERALITY, UNSPECIFIED RETINOPATHY SEVERITY, UNSPECIFIED WHETHER LONG TERM INSULIN USE (HCC): Primary | ICD-10-CM

## 2024-04-17 DIAGNOSIS — Z23 ENCOUNTER FOR IMMUNIZATION: ICD-10-CM

## 2024-04-17 DIAGNOSIS — Z79.4 TYPE 2 DIABETES MELLITUS WITH RETINOPATHY AND MACULAR EDEMA, WITH LONG-TERM CURRENT USE OF INSULIN, UNSPECIFIED LATERALITY, UNSPECIFIED RETINOPATHY SEVERITY (HCC): Primary | ICD-10-CM

## 2024-04-17 DIAGNOSIS — Z86.79 HISTORY OF CAROTID ARTERY DISSECTION: ICD-10-CM

## 2024-04-17 DIAGNOSIS — E11.311 TYPE 2 DIABETES MELLITUS WITH RETINOPATHY AND MACULAR EDEMA, WITH LONG-TERM CURRENT USE OF INSULIN, UNSPECIFIED LATERALITY, UNSPECIFIED RETINOPATHY SEVERITY (HCC): Primary | ICD-10-CM

## 2024-04-17 DIAGNOSIS — I10 PRIMARY HYPERTENSION: ICD-10-CM

## 2024-04-17 RX ORDER — FUROSEMIDE 40 MG/1
40 TABLET ORAL DAILY
Qty: 90 TABLET | Refills: 3 | Status: SHIPPED | OUTPATIENT
Start: 2024-04-17

## 2024-04-17 RX ORDER — METOPROLOL SUCCINATE 25 MG/1
25 TABLET, EXTENDED RELEASE ORAL DAILY
Qty: 90 TABLET | Refills: 3 | Status: SHIPPED | OUTPATIENT
Start: 2024-04-17

## 2024-04-17 RX ORDER — ATORVASTATIN CALCIUM 40 MG/1
40 TABLET, FILM COATED ORAL DAILY
Qty: 90 TABLET | Refills: 3 | Status: SHIPPED | OUTPATIENT
Start: 2024-04-17

## 2024-04-17 RX ORDER — MULTIVIT WITH MINERALS/LUTEIN
1000 TABLET ORAL DAILY
COMMUNITY

## 2024-04-17 NOTE — PROGRESS NOTES
Name: Fadi Busby      : 1965      MRN: [id number]  Encounter Provider: Osmany Thornton MD  Encounter Date: 2024   Encounter department: [facility]    Assessment & Plan     1. 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  Lantus 35 units once daily   Ozempic 0.5 mg weekly   Novolog 5-10 units - varies based on blood sugar readings  Medications reconciled.  Patient admitted was placed on Farxiga but could not afford it.  Discussed dietary modifications and the importance of tight glycemic control  Discussed recent eye exam on 24 at [facility] for sight- Jalen diabetic retinopathy. No macular edema.  Follow up in 3 months or sooner if needed    Orders:  -     atorvastatin (LIPITOR) 40 mg tablet; Take 1 tablet (40 mg total) by mouth daily    2. Primary hypertension  Assessment & Plan:     BP Readings from Last 3 Encounters:   24 100/68   24 141/84   24 129/70   Meds: Furosemide 40 mg qd; Metoprolol Succinate 25 mg qd; Spironolactone 25 mg qd; Valsartan 320 mg qd.  Initial BP today was 89/56  S/p Bivalvular pacer implant on 3/21/2024  Scheduled for full device interrogation in the EP clinic  Followed by cardiology  Discussed ED precautions as well as close BP monitoring at home with symptom management.   Consider reducing Lasix dose. Will defer to cardiology.       3. History of carotid artery dissection  Comments:  Followed by cardiology  Orders:  -     furosemide (LASIX) 40 mg tablet; Take 1 tablet (40 mg total) by mouth in the morning  -     metoprolol succinate (TOPROL-XL) 25 mg 24 hr tablet; Take 1 tablet (25 mg total) by mouth daily    4. Encounter for  immunization  Comments:  Discussed the importance of the Zoster vaccine. Patient opted to receive vaccine at the pharmacy due to cost/inusrance coverage.           Melani Busby is a 58 y.o. male presenting today for diabetic management and medication reconciliation. Patient was seen today with clinical pharmacist. Patient reports that they are doing well and have been compliant with medications. Patient also requested form from his dentist to be completed and sent. Form completed and routed for fax. Patient does not need to be on antibiotics or hold oral anticoagulation for routine teeth cleaning. Problems stable unless otherwise mentioned.  No acute complaints.       Review of Systems   Constitutional:  Negative for fatigue and fever.   Respiratory:  Negative for shortness of breath.    Cardiovascular:  Negative for chest pain, palpitations and leg swelling.   Genitourinary: Negative.        Current Outpatient Medications on File Prior to Visit   Medication Sig    Ascorbic Acid (vitamin C) 1000 MG tablet Take 1,000 mg by mouth daily    cyanocobalamin (VITAMIN B-12) 2000 MCG tablet Take 2,000 mcg by mouth daily    insulin aspart (NovoLOG FlexPen) 100 UNIT/ML injection pen Inject 10 units three times daily before meals    Lantus SoloStar 100 units/mL SOPN INJECT 0.35 ML (35 UNITS ) UNDER THE SKIN IN THE MORNING    spironolactone (ALDACTONE) 25 mg tablet Take 25 mg by mouth daily    valsartan (DIOVAN) 320 MG tablet Take 1 tablet (320 mg total) by mouth daily    warfarin (COUMADIN) 5 mg tablet TAKE 1 TABLET BY MOUTH DAILY    acetaminophen (TYLENOL) 650 mg CR tablet Take 1 tablet (650 mg total) by mouth every 8 (eight) hours as needed for mild pain    Continuous Blood Gluc Sensor (FreeStyle Ana María 2 Sensor) MISC Take as directed    Insulin Pen Needle 32G X 4 MM MISC Use up to four times daily with insulin e11.9       Objective     /68 (BP Location: Right arm, Patient Position: Sitting)   Pulse  "94   Temp (!) 96.5 °F (35.8 °C) (Temporal)   Resp 18   Ht 5' 9\" (1.753 m)   Wt 107 kg (235 lb)   SpO2 97%   BMI 34.70 kg/m²     Physical Exam  Vitals reviewed.   Constitutional:       General: He is not in acute distress.     Appearance: Normal appearance. He is obese. He is not ill-appearing, toxic-appearing or diaphoretic.   Eyes:      General: No scleral icterus.     Extraocular Movements: Extraocular movements intact.   Cardiovascular:      Rate and Rhythm: Regular rhythm.      Heart sounds: Normal heart sounds.   Pulmonary:      Breath sounds: Normal breath sounds.   Musculoskeletal:      Right lower leg: No edema.      Left lower leg: No edema.   Skin:     General: Skin is warm.   Neurological:      General: No focal deficit present.      Mental Status: He is alert and oriented to person, place, and time.   Psychiatric:         Mood and Affect: Mood normal.         Behavior: Behavior normal.       Osmany Thornton MD    "

## 2024-04-17 NOTE — PROGRESS NOTES
[facility]  [address], [address]  [place] [id number] [phone]  Clinical Pharmacy Consultation    Saw patient in clinic today with PCP.    Encounter provider: Chente Lewis, Pharmacist    This patient was referred by PCP for pharmacy consultation. Thank you for the referral. Sending this note to PCP.    Assessment/Plan  1. 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   But greatly improved, last A1c was over 9%  Currently asymptomatic and adherent with treatment plan  Results of recent BG monitoring: See freestyle laron download below            Patient confirms he has successfully received Ozempic from the patient assistance program, he is tolerating well  Recommend continuing therapy at this time as patient is making significant progress    New DM regimen:   Lantus 35 units once daily   Ozempic 0.5 mg weekly   Novolog 5-10 units -admits to using really only as correction insulin  Of note, obtains Ozempic through patient assistance program  FUTURE: Titrate GLP-1.  Titrate insulin.  Based on laron data.  Consider SGLT2 inhibitor.  RTC in in 3 months or as requested by patient    Heart Failure  Long discussion on fluid.  Patient notes that he is taking 40 mg of furosemide.  He is borderline hypotensive today.  He will monitor his blood pressure closely, consider decreasing furosemide, or one of the other agents.  He has cardiology follow-up scheduled.    Continue evidence-based regimen, he is already on a beta-blocker, angiotensin renin blocker, mineralocorticoid receptor antagonist, etc.  Would consider adding SGLT2 inhibitor back in if patient can tolerate, pending cardiology decision      FULL MED REC COMPLETED TODAY WITH PCP   Medication list was updated and discussed with patient.   Discontinued medications: Multiple, discontinued things that were on med list the patient was not taking such as Viagra or Farxiga.  Patient would likely not be able to tolerate Viagra right now with such a low blood pressure   Clinically significant drug interactions identified: none   Side effects from medication(s) reported: Just borderline hypotensive, may need to reduce hypertension/heart failure agents    The patient communicates understanding of the treatment plan.    M*Modal Dragon software was used to dictate this note. It may contain errors with dictating incorrect words or incorrect spelling. Please contact the provider directly with any questions. Thank you for your understanding.    Melani Busby is a 58 y.o. male who presents in-person for follow-up.  Reason for visit: diabetes and heart failure .   No particular questions or concerns.  HPI     Social History     Tobacco Use   Smoking Status Never    Passive exposure: Never   Smokeless Tobacco Never        No Known Allergies       Current Outpatient Medications:     acetaminophen (TYLENOL) 650 mg CR tablet, Take 1 tablet (650 mg total) by mouth every 8 (eight) hours as needed for mild pain, Disp: 30 tablet, Rfl: 0    atorvastatin (LIPITOR) 40 mg tablet, Take 1 tablet (40 mg total) by mouth daily, Disp: 90 tablet, Rfl: 3    Continuous Blood Gluc Sensor (FreeStyle Ana María 2 Sensor) MISC, Take as directed, Disp: 6 each, Rfl: 1    Farxiga 10 MG tablet, Take 10 mg by mouth daily, Disp: , Rfl:     furosemide (LASIX) 40 mg tablet, Take 1 tablet (40 mg total) by mouth in the morning, Disp: 90 tablet, Rfl: 3     insulin aspart (NovoLOG FlexPen) 100 UNIT/ML injection pen, Inject 10 units three times daily before meals, Disp: 15 mL, Rfl: 1    Insulin Pen Needle 32G X 4 MM MISC, Use up to four times daily with insulin e11.9, Disp: 400 each, Rfl: 0    Lantus SoloStar 100 units/mL SOPN, INJECT 0.35 ML (35 UNITS ) UNDER THE SKIN IN THE MORNING, Disp: 15 mL, Rfl: 3    metoprolol succinate (TOPROL-XL) 25 mg 24 hr tablet, Take 25 mg by mouth, Disp: , Rfl:     sildenafil (VIAGRA) 50 MG tablet, Take 50 mg by mouth daily as needed, Disp: , Rfl:     spironolactone (ALDACTONE) 25 mg tablet, Take 25 mg by mouth daily, Disp: , Rfl:     valsartan (DIOVAN) 320 MG tablet, Take 1 tablet (320 mg total) by mouth daily, Disp: 90 tablet, Rfl: 3    warfarin (COUMADIN) 5 mg tablet, TAKE 1 TABLET BY MOUTH DAILY, Disp: 90 tablet, Rfl: 1    Objective  Vitals:   Wt Readings from Last 3 Encounters:   04/03/24 108 kg (239 lb)   03/22/24 108 kg (237 lb 7 oz)   03/21/24 107 kg (235 lb 14.3 oz)     Temp Readings from Last 3 Encounters:   04/03/24 (!) 97 °F (36.1 °C) (Temporal)   03/22/24 98.3 °F (36.8 °C) (Oral)   03/21/24 98 °F (36.7 °C)     BP Readings from Last 3 Encounters:   04/03/24 141/84   03/22/24 129/70   03/21/24 118/79     Pulse Readings from Last 3 Encounters:   04/03/24 96   03/22/24 87   03/21/24 74       Labs:   Lab Results   Component Value Date/Time    HGBA1C 9.3 (H) 03/18/2024 09:58 AM    HGBA1C 7.9 (A) 12/01/2023 12:52 PM    HGBA1C 6.9 (H) 07/20/2023 09:35 AM       Lab Results   Component Value Date/Time    BUN 17 03/22/2024 04:29 PM    BUN 19 03/21/2024 06:55 AM    BUN 18 03/18/2024 09:58 AM    CREATININE 1.16 03/22/2024 04:29 PM    CREATININE 0.97 03/21/2024 06:55 AM    CREATININE 1.10 03/18/2024 09:58 AM    EGFR 68 03/22/2024 04:29 PM    EGFR 85 03/21/2024 06:55 AM    EGFR 73 03/18/2024 09:58 AM       Lab Results   Component Value Date/Time    CREATININEUR 251.0 07/20/2023 09:35 AM    LABMICR 15.5 07/20/2023 09:35 AM    MICROALBCRE 6  07/20/2023 09:35 AM       Lab Results   Component Value Date/Time    CHOLESTEROL 122 11/21/2023 08:54 AM    TRIG 122 11/21/2023 08:54 AM    HDL 40 11/21/2023 08:54 AM    LDLCALC 58 11/21/2023 08:54 AM       Eye Exam:   Lab Results   Component Value Date/Time    RIGHTDIABRET Positive 04/02/2024 09:18 AM    RIGHTMACEDEM None 03/05/2024 10:24 AM    RIGHTOTHERRE None 03/05/2024 10:24 AM    LEFTDIABRET Positive 04/02/2024 09:18 AM    LEFTMACEDEM None 03/05/2024 10:24 AM    LEFTOTHERRE None 03/05/2024 10:24 AM     Chente Lewis Pharmacist    -----------------------------------------------------------------------------------------------    Pharmacist Tracking Tool  Reason For Outreach: Embedded Pharmacist  Demographics:  Intervention Method: In Person  Type of Intervention: Follow-Up  Topics Addressed: Diabetes and Heart Failure  Pharmacologic Interventions: Dose or Frequency Adjusted, Prevent or Manage MONI, and Med Rec  Non-Pharmacologic Interventions: Care coordination, Chart update, Disease state education, Home Monitoring, Medication Monitoring, and Personal CGM  Time:  Direct Patient Care:  30  mins  Care Coordination:  15  mins  Recommendation Recipient: Patient/Caregiver  Outcome: Accepted

## 2024-04-19 NOTE — TELEPHONE ENCOUNTER
FAXED ON 04/18/24 TO [facility] at [phone]. FAX CONFIRMATION RECEIVED.     Hospitalist Progress Note      Patient:  Ralph Gill    Unit/Bed:6K-18/018-A  YOB: 1953  MRN: [id number]   Acct: [de-identified]   PCP: Shivam Brown MD  Date of Admission: 4/11/2022    Assessment/Plan:    1. ALVIN on CKD stage III, worsening: unclear etiology but some volume depletion contributing in additional to diuretic use per Nephro who has been consulted. Renal US without acute abnormalities. Aggressive IVFs. Avoid nephrotoxic agents / contrast.   4/14: pending Cr tomorrow may have temporary dialysis catheter and RRT   4/15: temporary dialysis catheter and RRT, septic ATN contributing. 2. Hypomagnesemia: Mag noted at 1.5, order 2 g IV and repeat in am  3. Hyperkalemia, resolved: K noted at 5.3, on Lokelma. Continue with IVF. Nephrology following. Check daily BMP. 4. Dislodged L nephrostomy tube s/p nephrostogram: no indication of dislodgement on CT A/P, IR has been consulted for assessment. Previously dislodged and replaced multiple times, last being 3/14/22.   4/13: Patient to undergo nephrostogram today  4/14: appropriate placement, continue flushes, red/brownish blood in back  5. L staghorn calculus / bleeding L nephrostomy tube: Urology has been consulted. Repeat CT A/P from 4/12 shows L sided perinephric stranding and fluid collection adjacent to tube which is felt to likely be subcapsular hematoma. Continue with Q8 H&H checks. 4. Acute cystitis: chronic seaman catheter, UA shows few bacteria, moderate leukocyte esterase and 50-75 with clumps WBC. Urine culture returned growing nonfermenting gram-negative bacilli, enteric gram-negative bacilli, and gram-positive cocci. Pt denies any burning at the site of her catheter. Will discuss with Urology if treatment is indicated at this time. Afebrile, no leukocytosis. 4/14: urine gx returned growing Pseudomonas or [place], Klebsiella pneumoniae and Enterococcus faecalis group D.   Will consult ID for antibiotic recommendations/duration. 4/15: continue Cipro per ID  5. Acute on chronic normocytic anemia: Hg 7.5 on admission with baseline Hg 8.0s-9.0s. Bight red blood on nephrotomy tube. Repeated Hg 6.8. Transfuse 1 Unit RBC. Continue H& H q8hrs, transfusion if Hgb < 7  4/15: Hgb remains stable   5. Mild-moderate dysphagia: seen by SLP, underwent MBS. Recommended honey thick liquids, no straws, and soft and bite sized. 6. Metabolic acidosis: on sodium bicarbonate per Nephro, continue to monitor. Will correct with HD.   7. Elevated troponin: likely related to CKD, denies CP, EKG showed sinus tach with nonspecific ST/T wave change. 6. Chronic HFpEF without decompensation: TOBIAS 10/2021 EF 60% with trace TR. On Bumex 0.5 mg daily which is held due to ALVIN and dehydration  6. Essential HTN:slightly elevated, continue with home meds and monitor closely   7. Hx stage III decub ulcer s/p diverting colostomy 1/27/22: Wound care consulted and following  8. Anxiety: Continue home medications    Chief Complaint: Abnormal lab    Initial H and P:-    \"Kenzie Gee is a 70 y.o., , female presented to [facility] ED for abnormal lab from nursing home. Past medical history significant with chronic diastolic heart failure, obstructive staghorn calculus of the left kidney not a candidate for stone treatment which nephrostomy tube was placed, stage III decubitus ulcer, CKD stage III, general anxiety disorder. Visit patient awake alert and oriented to time person and place. Patient denies any chest pain, chest discomfort, abdominal pain, back pain, neck pain. Patient sister on the bedside report that patient had been having bright red blood in nephrostomy tube over last 3 days.   Today patient nursing home notify abnormal lab work which patient was transferred to [facility] ED.     Patient is chronic critical ill with PEG tube for nutritional, divers colostomy bag for stage III decubitus ulcer, nephrostomy tube drain bright red blood. Patient living in nursing home, denies alcohol use, tobacco use.     Patient was admitted and managed for ALVIN secondary to dehydration and possible obstruction.      ED work-up: Afebrile with BP elevate. BMP show hypokalemia with potassium 5.4, serum osmolarity elevate 322.1. EKG showed sinus tachycardia with PVC, St & T wave abnormality. Patient is asymptomatic for chest pain. CBC showed anemia with hemoglobin 7.5 which reduced compared to her baseline at 8 on 9. \"    4/13: pt doing okay, sitting up in bed with family at bedside. Denies fever/chills/CP/SOB. Still has dark blood noted in her nephrostomy bag. Denies any burning at the site of her catheter. 4/14: pt seen having returned from [facility] today and was noted to have episode of nausea/choking / coughing and a small amount of whitish emesis (barium). Pt denied abd pain and denied any SOB at that time. Subjective (past 24 hours):   4/15: pt lying in bed, no complaints. Still with blood in her nephrostomy bag. Denies SOB/cough. No fever/chills. No CP. A&Ox4. Past medical history, family history, social history and allergies reviewed again and is unchanged since admission. ROS (All review of systems completed. Pertinent positives noted.  Otherwise All other systems reviewed and negative.)     Medications:  Reviewed    Infusion Medications    dextrose      sodium bicarbonate infusion 100 mL/hr at 04/14/22 4697    sodium chloride Stopped (04/13/22 1648)    sodium chloride       Scheduled Medications    miconazole   Topical BID    ciprofloxacin  400 mg IntraVENous Q12H    [Held by provider] aspirin  81 mg Oral Daily    [Held by provider] bumetanide  0.5 mg Oral Daily    famotidine  20 mg PEG Tube Every Other Day    ferrous sulfate  325 mg Oral Every Other Day    FLUoxetine  40 mg Oral Daily    metoprolol tartrate  12.5 mg Oral BID    sodium chloride flush  5-40 mL IntraVENous 2 times per day     PRN Meds: ondansetron, glucagon (rDNA), dextrose, glucose, dextrose bolus (hypoglycemia) **OR** dextrose bolus (hypoglycemia), acetaminophen **OR** acetaminophen, melatonin, sodium chloride flush, sodium chloride, sodium chloride      Intake/Output Summary (Last 24 hours) at 4/15/2022 0754  Last data filed at 4/14/2022 2104  Gross per 24 hour   Intake 210 ml   Output 1315 ml   Net -1105 ml       Diet:  ADULT TUBE FEEDING; PEG; Renal Formula; Cyclic; 40; 3:30 PM; 0:04 AM; 30; Other (specify); 30 mls before and after cyclic feeding  ADULT DIET; Dysphagia - Soft and Bite Sized; Moderately Thick (Honey); No Drinking Straws    Exam:  /61   Pulse 80   Temp 98.5 °F (36.9 °C) (Oral)   Resp 16   Ht 4' 9\" (1.448 m)   Wt 155 lb 6.8 oz (70.5 kg)   SpO2 91%   BMI 33.63 kg/m²   General appearance: chronically ill appearing, no apparent distress, appears stated age and cooperative. HEENT: Pupils equal, round, and reactive to light. Conjunctivae/corneas clear. Neck: Supple, with full range of motion. No jugular venous distention. Trachea midline. Respiratory:  Normal respiratory effort. Clear to auscultation, bilaterally without Rales/Wheezes/Rhonchi. Cardiovascular: Regular rate and rhythm with normal S1/S2 without murmurs, rubs or gallops. Abdomen: Soft, non-tender, non-distended with normal bowel sounds. L side nephrostomy tube with blood in bag  Musculoskeletal: passive and active ROM x 4 extremities. Skin: Skin color, texture, turgor normal.  No rashes or lesions. Neurologic:  Neurovascularly intact without any focal sensory/motor deficits.  Cranial nerves: II-XII intact, grossly non-focal.  Psychiatric: Alert and oriented x4, thought content appropriate, normal insight  Capillary Refill: Brisk,< 3 seconds   Peripheral Pulses: +2 palpable, equal bilaterally     Labs:   Recent Labs     04/13/22  0337 04/13/22  1143 04/14/22  0353 04/14/22  0353 04/14/22  1111 04/14/22  1922 04/15/22  0344   WBC 10.8  --  9.5  --   --   --  9.8   HGB 8.4* < > 7.8*   < > 7.9* 7.4* 7.6*   HCT 28.1*   < > 26.1*   < > 25.4* 24.6* 25.9*     --  235  --   --   --  225    < > = values in this interval not displayed. Recent Labs     04/13/22  0337 04/14/22  0353 04/15/22  0344    140 138   K 5.3* 4.8 4.2    104 102   CO2 15* 17* 19*   * 150* 143*   CREATININE 5.2* 5.3* 5.0*   CALCIUM 8.8 8.3* 8.3*   PHOS 8.3* 8.0* 7.7*     Recent Labs     04/12/22  0832 04/14/22  0353 04/15/22  0344   AST 19 16 14   ALT 20 17 15   BILITOT <0.2* 0.2* <0.2*   ALKPHOS 75 74 73     No results for input(s): INR in the last 72 hours. No results for input(s): Michelet Espinoza in the last 72 hours. Microbiology:    Blood culture #1:   Lab Results   Component Value Date    BC No growth-preliminary No growth  01/21/2022       Blood culture #2:No results found for: Jarad Abdirizak    Organism:  Lab Results   Component Value Date    ORG Pseudomonas aeruginosa 04/12/2022    ORG Klebsiella pneumoniae 04/12/2022    ORG Enterococcus faecalis - (Group D) 04/12/2022         Lab Results   Component Value Date    LABGRAM  02/22/2022     Many segmented neutrophils observed. No epithelial cells observed. No bacteria seen. MRSA culture only:No results found for: [facility]    Urine culture:   Lab Results   Component Value Date    LABURIN No growth-preliminary  01/20/2022    LABURIN Daisy count: 1,000 CFU/mL  01/20/2022       Respiratory culture: No results found for: CULTRESP    Aerobic and Anaerobic :  Lab Results   Component Value Date    LABAERO  02/22/2022     No growth-preliminary Current antibiotic therapy ineffective in vitro for at least one of culture isolates. LABAERO light growth  02/22/2022    LABAERO  02/22/2022     very light growth Isolates of Methicillin Resistant Staphylococcus coagulase negative (MRSE) do NOT require CONTACT isolation.  Methicillin(Oxacillin)resistant strains of staphylococci (MRSA)or(MRSE)should be considered resistant to all classes of cephalosporins, penems and beta-lactams. Lab Results   Component Value Date    LABANAE  02/22/2022     No anaerobes isolated- preliminary No anaerobes isolated        Urinalysis:      Lab Results   Component Value Date    NITRU NEGATIVE 04/12/2022    WBCUA 50-75W/CLUMPS 04/12/2022    BACTERIA FEW 04/12/2022    RBCUA > 200 04/12/2022    BLOODU LARGE 04/12/2022    SPECGRAV 1.014 01/20/2022    GLUCOSEU NEGATIVE 04/12/2022       Radiology:  XR CHEST PORTABLE   Final Result   1. Left lower lung atelectasis/infiltrate. 2. Mild stable cardiomegaly. **This report has been created using voice recognition software. It may contain minor errors which are inherent in voice recognition technology. **      Final report electronically signed by Dr. Jeff Castro on 4/14/2022 11:54 AM      FL MODIFIED BARIUM SWALLOW W VIDEO   Final Result   Laryngeal penetration and aspiration with thin consistency. Laryngeal penetration with nectar thick. Recommendations available from speech therapy            **This report has been created using voice recognition software. It may contain minor errors which are inherent in voice recognition technology. **      Final report electronically signed by Dr. Pablito Da Silva on 4/14/2022 12:39 PM      IR GUIDED NEPHROSTOGRAM/URETEROGRAM EXISTING ACCESS   Final Result   The tip of the nephrostomy tube is coiled in the left renal collecting system. **This report has been created using voice recognition software. It may contain minor errors which are inherent in voice recognition technology. **         Final report electronically signed by Dr Iliana Brown on 4/14/2022 9:08 AM      CT ABDOMEN PELVIS WO CONTRAST Additional Contrast? None   Final Result   1. Small bilateral pleural effusions with adjacent atelectasis/infiltrate. 2. Left-sided perinephric stranding and fluid collection adjacent to a left-sided nephrostomy tube, likely a subcapsular hematoma.  Stable calcifications in the left kidney. 3. Cholelithiasis. 4. Sacral decubitus ulcer. Final report electronically signed by Dr. Sylvain Hargrove on 4/12/2022 5:00 PM      US RENAL COMPLETE   Final Result   1. Significantly limited exam.   2. Increased renal cortical echogenicity bilaterally, compatible with    medical renal disease. 3. A left-sided nephrostomy tube is partially visualized. 4. Multiple left-sided renal calculi. 5. Mild left-sided caliectasis. The left renal pelvis is nondilated. 6. Nonspecific perinephric or subcapsular fluid is noted adjacent to the    left kidney, measuring 5.3 x 2.1 x 1.4 cm. This document has been electronically signed by: Kerri Reza M.D. on    04/12/2022 02:30 AM        CT ABDOMEN PELVIS WO CONTRAST Additional Contrast? None    Result Date: 4/12/2022  PROCEDURE: CT ABDOMEN PELVIS WO CONTRAST CLINICAL INFORMATION: Left staghorn calculus TECHNIQUE: CT of the abdomen and pelvis was performed without use of intravenous contrast. Axial images as well as sagittal and coronal reconstructions were obtained. All CT scans at this facility use dose modulation, iterative reconstruction, and/or weight-based dosing when appropriate to reduce radiation dose to as low as reasonably achievable. COMPARISON: CT abdomen and pelvis 3/13/2022 FINDINGS: Lower thorax: There are small bilateral pleural effusions. There is adjacent lung consolidation are present. The heart is enlarged. Abdomen: Evaluation is limited due to absence of contrast. There is no free intraperitoneal air. A gastrostomy tube is in the stomach. A left mid abdominal ostomy is stable and contains loops of bowel. There is no bowel obstruction. Calcifications are present in the lumen of the gallbladder. A nephrostomy tube is now present in the left kidney. Fluid adjacent to the tube and kidney has noncontrast mean Hounsfield units of 12 (image 34). Subcapsular fluid measures up to 2.2 cm in width (image 38).  Perinephric stranding is again noted. Calcifications in the left kidney are stable. Left-sided hydronephrosis is not significantly changed. Hypoattenuating lesions in the kidneys may be cysts but are incompletely characterized on the current study. There  are calcified granulomas in the liver and spleen. Atherosclerotic calcifications are present in the abdominal aorta without evidence of aneurysm. There is no mesenteric or retroperitoneal lymphadenopathy. Degenerative changes are seen in the thoracolumbar spine without evidence of aggressive osseous lesions. Pelvis: There is a Padilla catheter in a nondistended urinary bladder, likely accounting for gas in the bladder. Platelets are present in the pelvis. There are calcifications in the uterus. There is no pelvic or inguinal lymphadenopathy. There is persistent subcutaneous tissue irregularity posterior to the sacrum. Degenerative changes are present in the pelvis without evidence of aggressive osseous lesions. 1. Small bilateral pleural effusions with adjacent atelectasis/infiltrate. 2. Left-sided perinephric stranding and fluid collection adjacent to a left-sided nephrostomy tube, likely a subcapsular hematoma. Stable calcifications in the left kidney. 3. Cholelithiasis. 4. Sacral decubitus ulcer. Final report electronically signed by Dr. Marissa Vera on 4/12/2022 5:00 PM    US RENAL COMPLETE    Result Date: 4/12/2022  RENAL ULTRASOUND COMPARISON: 1/3/2022. FINDINGS: Examination is significantly limited due to patient positioning and immobility. Increased renal cortical echogenicity is noted bilaterally, compatible with medical renal disease. Multiple shadowing calculi are noted within the left kidney. For example there is a 2.7 cm stone in the left kidney on image 41. Left-sided nephrostomy tube is partially visualized. Mild caliectasis is noted in the left kidney. Left renal pelvis is nondilated.  Nonspecific perinephric or subcapsular fluid is noted adjacent to the left kidney, measuring 5.3 x 2.1 x 1.4 cm on image 49. Urinary bladder contains a Padilla catheter. 1. Significantly limited exam. 2. Increased renal cortical echogenicity bilaterally, compatible with medical renal disease. 3. A left-sided nephrostomy tube is partially visualized. 4. Multiple left-sided renal calculi. 5. Mild left-sided caliectasis. The left renal pelvis is nondilated. 6. Nonspecific perinephric or subcapsular fluid is noted adjacent to the left kidney, measuring 5.3 x 2.1 x 1.4 cm.  This document has been electronically signed by: Maame Masters M.D. on 04/12/2022 02:30 AM      Electronically signed by Renetta Bean PA-C on 4/15/2022 at 7:54 AM

## 2024-04-19 NOTE — ASSESSMENT & PLAN NOTE
Lab Results   Component Value Date    HGBA1C 9.3 (H) 03/18/2024     Home glucose monitor- average daily blood glucose at 154 over 2 weeks.  Lantus 35 units once daily   Ozempic 0.5 mg weekly   Novolog 5-10 units - varies based on blood sugar readings  Medications reconciled.  Patient admitted was placed on Farxiga but could not afford it.  Discussed dietary modifications and the importance of tight glycemic control  Discussed recent eye exam on 4/2/24 at [facility] for sight- Jalen diabetic retinopathy. No macular edema.  Follow up in 3 months or sooner if needed

## 2024-04-19 NOTE — ASSESSMENT & PLAN NOTE
BP Readings from Last 3 Encounters:   04/17/24 100/68   04/03/24 141/84   03/22/24 129/70   Meds: Furosemide 40 mg qd; Metoprolol Succinate 25 mg qd; Spironolactone 25 mg qd; Valsartan 320 mg qd.  Initial BP today was 89/56  S/p Bivalvular pacer implant on 3/21/2024  Scheduled for full device interrogation in the EP clinic  Followed by cardiology  Discussed ED precautions as well as close BP monitoring at home with symptom management.   Consider reducing Lasix dose. Will defer to cardiology.

## 2024-04-22 LAB — HBA1C MFR BLD HPLC: 6.9 %

## 2024-04-23 ENCOUNTER — OFFICE VISIT (OUTPATIENT)
Dept: FAMILY MEDICINE CLINIC | Facility: CLINIC | Age: 59
End: 2024-04-23

## 2024-04-23 PROCEDURE — NC001 PR NO CHARGE

## 2024-04-23 NOTE — PROGRESS NOTES
"PSYCHOTHERAPY    Name and Date of Birth:  Fadi Busby 58 y.o. 1965  Date: April 23, 2024    Goal 1: Relationships difficulties  Patient discussed his recent relationship he previously said was going well but then notes he recently broke things off. He was struglging to get over some of the negative traits and behaviors that seemed to contniue to bother him. Worked on the discussion of what he wanted in a person, and if that partner was someone that was fitting the goals he had.     Goal 2: hesitation to commitment  Exlpored the idea of patient's past relationships and how he has had prior partners that have been people he thought were \"perfect,\" yet he broke off the relationship. Discussed how his upbringings of  parents could be playing a factor into repeating what occurred, and if there were hesitations to commitment despite positive relationships.      Progress Towards Goals: improving    On the visit, worked on psychodyanmic therapy and cognitive behavior therapy to work through ongoing symptoms as mentinoed above.     Start time: 1:30 End Time: 2:30    This note was not shared with the patient due to this is a psychotherapy note    John Villegas D.O.  "

## 2024-04-26 ENCOUNTER — PATIENT OUTREACH (OUTPATIENT)
Dept: FAMILY MEDICINE CLINIC | Facility: CLINIC | Age: 59
End: 2024-04-26

## 2024-05-03 ENCOUNTER — TELEPHONE (OUTPATIENT)
Dept: FAMILY MEDICINE CLINIC | Facility: CLINIC | Age: 59
End: 2024-05-03

## 2024-05-03 NOTE — TELEPHONE ENCOUNTER
PCP SIGNATURE NEEDED FOR Medical Clearance for dental treatment FORM RECEIVED VIA FAX AND PLACED IN PCP FOLDER TO BE DELIVERED AT ASSIGNED TIMES.     Cleaning ( deep)

## 2024-05-06 ENCOUNTER — PATIENT OUTREACH (OUTPATIENT)
Dept: FAMILY MEDICINE CLINIC | Facility: CLINIC | Age: 59
End: 2024-05-06

## 2024-05-06 DIAGNOSIS — Z71.89 ENCOUNTER FOR DIABETES EDUCATION: Primary | ICD-10-CM

## 2024-05-06 NOTE — LETTER
Date: 05/06/24    Dear Fadi Busby,   My name is Eleonora; I am a registered nurse care manager working with [facility].   I have not been able to reach you and would like to set a time that I can talk with you over the phone.  My work is to help patients that have complex medical conditions get the care they need. This includes patients who may have been in the hospital or emergency room.    Please call me with any questions you may have.    Sincerely,  Eleonora  [phone]  Outpatient Care Manager

## 2024-05-06 NOTE — PROGRESS NOTES
I called the patient but received voicemail.  Message was left asking for a return call.  I am mailing the 'unable to reach' letter via [employer] and regular mail and await response.  Referral placed to [facility] DM Educator.

## 2024-05-13 DIAGNOSIS — E11.311 TYPE 2 DIABETES MELLITUS WITH RETINOPATHY AND MACULAR EDEMA, WITH LONG-TERM CURRENT USE OF INSULIN, UNSPECIFIED LATERALITY, UNSPECIFIED RETINOPATHY SEVERITY (HCC): ICD-10-CM

## 2024-05-13 DIAGNOSIS — Z79.4 TYPE 2 DIABETES MELLITUS WITH RETINOPATHY AND MACULAR EDEMA, WITH LONG-TERM CURRENT USE OF INSULIN, UNSPECIFIED LATERALITY, UNSPECIFIED RETINOPATHY SEVERITY (HCC): ICD-10-CM

## 2024-05-13 RX ORDER — INSULIN ASPART 100 [IU]/ML
INJECTION, SOLUTION INTRAVENOUS; SUBCUTANEOUS
Qty: 15 ML | Refills: 1 | Status: SHIPPED | OUTPATIENT
Start: 2024-05-13

## 2024-05-14 ENCOUNTER — OFFICE VISIT (OUTPATIENT)
Dept: FAMILY MEDICINE CLINIC | Facility: CLINIC | Age: 59
End: 2024-05-14

## 2024-05-14 PROCEDURE — NC001 PR NO CHARGE

## 2024-05-14 NOTE — PROGRESS NOTES
"Behavioral Health Psychotherapy Progress Note    Psychotherapy Provided: Individual Psychotherapy     Goals addressed in session: Goal 1 - idea of success  Patient felt because of his stroke he has been limited and no long successful. He started the session believing he fell under 50% and ended his session believing he was at 95%.    Goal 2 - Family dynamic  Had difficulties with father growing up and resentment towards him. He has an encounter with his father that angered him but was able to walk away. He notes no positive things about his dad and after exlporation was able to note some things were positive.     Goal 3 - Anger issues  Few moments occurred where he was angry but was able to work on his composure. Looked back and felt he would have acted different and has been more helpful with his actions.    DATA:   During this session, this clinician used the following therapeutic modalities: Cognitive Behavioral Therapy and Dialectical Behavior Therapy    Substance Abuse was not addressed during this session.    ASSESSMENT:  Pt presents with a Euthymic/ normal mood.     his affect is Normal range and intensity, which is congruent, with his mood and the content of the session. The client has made progress on their goals.    Pt presents with a minimal risk of suicide, minimal risk of self-harm, and minimal risk of harm to others.    For any risk assessment that surpasses a \"low\" rating, a safety plan must be developed.    A safety plan was indicated: no    PLAN: Between sessions, will work on looking at the positives in his life and how his life is successful and not as negative as he though. At the next session, the therapist will use Cognitive Behavioral Therapy and Dialectical Behavior Therapy to address ongoing symptoms listed above.    Behavioral Health Treatment Plan and Discharge Planning: Pt is aware of and agrees to continue to work on their treatment plan. They have identified and are working toward their " discharge goals. yes    Visit start and stop times:  11:00-12:00     This note was not shared with the patient due to this is a psychotherapy note    John Villegas D.O.

## 2024-05-15 DIAGNOSIS — Z79.4 TYPE 2 DIABETES MELLITUS WITH RETINOPATHY AND MACULAR EDEMA, WITH LONG-TERM CURRENT USE OF INSULIN, UNSPECIFIED LATERALITY, UNSPECIFIED RETINOPATHY SEVERITY (HCC): Primary | ICD-10-CM

## 2024-05-15 DIAGNOSIS — E11.311 TYPE 2 DIABETES MELLITUS WITH RETINOPATHY AND MACULAR EDEMA, WITH LONG-TERM CURRENT USE OF INSULIN, UNSPECIFIED LATERALITY, UNSPECIFIED RETINOPATHY SEVERITY (HCC): Primary | ICD-10-CM

## 2024-05-22 ENCOUNTER — PATIENT OUTREACH (OUTPATIENT)
Dept: FAMILY MEDICINE CLINIC | Facility: CLINIC | Age: 59
End: 2024-05-22

## 2024-05-22 NOTE — PROGRESS NOTES
Pharm ref.    No response from the patient after sending the UTR letter.  Case is being closed.

## 2024-05-29 ENCOUNTER — OFFICE VISIT (OUTPATIENT)
Dept: FAMILY MEDICINE CLINIC | Facility: CLINIC | Age: 59
End: 2024-05-29

## 2024-05-29 VITALS
DIASTOLIC BLOOD PRESSURE: 80 MMHG | RESPIRATION RATE: 18 BRPM | HEIGHT: 69 IN | HEART RATE: 102 BPM | BODY MASS INDEX: 34.66 KG/M2 | TEMPERATURE: 98.7 F | WEIGHT: 234 LBS | OXYGEN SATURATION: 95 % | SYSTOLIC BLOOD PRESSURE: 116 MMHG

## 2024-05-29 DIAGNOSIS — I10 PRIMARY HYPERTENSION: ICD-10-CM

## 2024-05-29 DIAGNOSIS — Z23 ENCOUNTER FOR IMMUNIZATION: ICD-10-CM

## 2024-05-29 DIAGNOSIS — E11.3213 TYPE 2 DIABETES MELLITUS WITH BOTH EYES AFFECTED BY MILD NONPROLIFERATIVE RETINOPATHY AND MACULAR EDEMA, WITH LONG-TERM CURRENT USE OF INSULIN (HCC): Primary | ICD-10-CM

## 2024-05-29 DIAGNOSIS — Z79.4 TYPE 2 DIABETES MELLITUS WITH BOTH EYES AFFECTED BY MILD NONPROLIFERATIVE RETINOPATHY AND MACULAR EDEMA, WITH LONG-TERM CURRENT USE OF INSULIN (HCC): Primary | ICD-10-CM

## 2024-05-29 DIAGNOSIS — M65.341 TRIGGER RING FINGER OF RIGHT HAND: ICD-10-CM

## 2024-05-29 DIAGNOSIS — I42.8 NONISCHEMIC CARDIOMYOPATHY (HCC): ICD-10-CM

## 2024-05-29 PROCEDURE — 99213 OFFICE O/P EST LOW 20 MIN: CPT | Performed by: FAMILY MEDICINE

## 2024-05-29 PROCEDURE — 90471 IMMUNIZATION ADMIN: CPT

## 2024-05-29 PROCEDURE — 90750 HZV VACC RECOMBINANT IM: CPT

## 2024-05-29 RX ORDER — VALSARTAN 320 MG/1
320 TABLET ORAL DAILY
Qty: 90 TABLET | Refills: 3 | Status: SHIPPED | OUTPATIENT
Start: 2024-05-29

## 2024-05-29 RX ORDER — SPIRONOLACTONE 25 MG/1
25 TABLET ORAL DAILY
Qty: 60 TABLET | Refills: 5 | Status: SHIPPED | OUTPATIENT
Start: 2024-05-29

## 2024-05-29 NOTE — PROGRESS NOTES
"Ambulatory Visit  Name: Fadi Busby      : 1965      MRN: [id number]  Encounter Provider: sOmany Thornton MD  Encounter Date: 2024   Encounter department: [facility]    Assessment & Plan   1. Type 2 diabetes mellitus with both eyes affected by mild nonproliferative retinopathy and macular edema, with long-term current use of insulin (HCC)  Assessment & Plan:    Lab Results   Component Value Date    HGBA1C 9.3 (H) 2024     Home glucose monitoring as reported by patient in seldom in the 200's anymore  Meds: Lantus, Ozempic, Novolog    - Continue current meds at current doses; no dose adjustment  - No hypoglycemics episodes reported  - Will defer HgbA1c to 2024 at 6 months from previous  - Foot exam, kidney evaluation and labs in 2024  - Fu in 3 months  2. Primary hypertension  -     valsartan (DIOVAN) 320 MG tablet; Take 1 tablet (320 mg total) by mouth daily  3. Nonischemic cardiomyopathy (HCC)  -     spironolactone (ALDACTONE) 25 mg tablet; Take 1 tablet (25 mg total) by mouth daily  4. Trigger ring finger of right hand  Comments:  Referral to ortho per patient's request  Normal ROM; very mild tenderness at base of finger to palpation.  No swelling, dformity, erythema or skin changes.  Orders:  -     Ambulatory Referral to Orthopedic Surgery; Future  5. Encounter for immunization  Comments:  First dose shingles vaccine given.  Orders:  -     Zoster Vaccine Recombinant IM       History of Present Illness       Fadi Busby is a 58 y.o. male presenting today for diabetes management. Reports dentist does not feel it safe to get deep cleaning while on anticoagulant and will hold procedure. Patient believes that he will also need tooth extracted. I requested that another form be sent for the tooth extraction. Fadi also c/o his right ring finger \"locking\" at times keeping it in a flexed position. He had trigger finger before on his left " "hand which was successfully treated with steroids. Finger has good ROM in office today.     Patient reports that they are doing well/ have been compliant with medications.  Problems stable unless otherwise mentioned.  No acute complaints.         Review of Systems   Respiratory:  Negative for chest tightness.    Cardiovascular:  Negative for chest pain and palpitations.   Musculoskeletal:  Positive for arthralgias. Negative for joint swelling.        \"Locked right ring finger\"       Objective     /80 (BP Location: Right arm, Patient Position: Sitting, Cuff Size: Large)   Pulse 102   Temp 98.7 °F (37.1 °C) (Temporal)   Resp 18   Ht 5' 9\" (1.753 m)   Wt 106 kg (234 lb)   SpO2 95%   BMI 34.56 kg/m²     Physical Exam  Vitals reviewed.   Constitutional:       General: He is not in acute distress.     Appearance: He is obese. He is not ill-appearing.   HENT:      Mouth/Throat:      Mouth: Mucous membranes are moist.   Eyes:      General: No scleral icterus.     Extraocular Movements: Extraocular movements intact.   Cardiovascular:      Rate and Rhythm: Normal rate and regular rhythm.      Heart sounds: Normal heart sounds.   Pulmonary:      Effort: Pulmonary effort is normal. No respiratory distress.   Abdominal:      Palpations: Abdomen is soft.   Musculoskeletal:      Right hand: No deformity. Normal range of motion.      Left hand: No deformity. Normal range of motion.      Right lower leg: No edema.      Left lower leg: No edema.   Skin:     General: Skin is warm.   Neurological:      Mental Status: He is alert. Mental status is at baseline.      Motor: Weakness present.      Gait: Gait abnormal.      Comments: Left sided weakness hx CVA   Psychiatric:         Mood and Affect: Mood normal.         Behavior: Behavior normal.       "

## 2024-05-30 ENCOUNTER — TELEPHONE (OUTPATIENT)
Age: 59
End: 2024-05-30

## 2024-05-30 NOTE — ASSESSMENT & PLAN NOTE
Lab Results   Component Value Date    HGBA1C 9.3 (H) 03/18/2024     Home glucose monitoring as reported by patient in seldom in the 200's anymore  Meds: Lantus, Ozempic, Novolog    - Continue current meds at current doses; no dose adjustment  - No hypoglycemics episodes reported  - Will defer HgbA1c to August 2024 at 6 months from previous  - Foot exam, kidney evaluation and labs in August 2024  - Fu in 3 months

## 2024-05-30 NOTE — TELEPHONE ENCOUNTER
Patient is being referred to a orthopedics. Please schedule accordingly.    [facility], [place]   [phone]

## 2024-06-11 ENCOUNTER — OFFICE VISIT (OUTPATIENT)
Dept: FAMILY MEDICINE CLINIC | Facility: CLINIC | Age: 59
End: 2024-06-11

## 2024-06-11 PROCEDURE — NC001 PR NO CHARGE

## 2024-06-11 NOTE — PROGRESS NOTES
"Behavioral Health Psychotherapy Progress Note     Psychotherapy Provided: Individual Psychotherapy      Goals addressed in session: Goal 1 - Relationship Struggles  Patient recounts the things that have been going on in his relationship that have been making him debate if he would want to stay or not. Processed what he prioritized in a relationship and what he felt would be most helpful.    Goal 2 - Future Goals  Discussed his goal of wanting to live in other states and visiting other cities. He talked about feeling \"I'm okay with that\" if he could not accomplish these goals and that he is also \"okay\" with himself.     Goal 3 - Strain on Body  Discussed also feeling \"okay\" with his medical conditions and limitations and ensuring that his actions of allowing him to continue to enjoy life meant not putting additional strain or get into fights that could limit those opportunities.     DATA:   During this session, this clinician used the following therapeutic modalities: Cognitive Behavioral Therapy and Dialectical Behavior Therapy     Substance Abuse was not addressed during this session.     ASSESSMENT:  Pt presents with a Euthymic/ normal mood.     His affect is Normal range and intensity, which is congruent, with his mood and the content of the session. The client has made progress on their goals.     Pt presents with a minimal risk of suicide, minimal risk of self-harm, and minimal risk of harm to others.     For any risk assessment that surpasses a \"low\" rating, a safety plan must be developed.     A safety plan was indicated: no     PLAN: Between sessions, will decide what his future goals of therapy are and reflect where he has come since he first started. He will work with provider to decide if he wants future therapy or if he feels he is ready for discharge. At the next session, the therapist will use Cognitive Behavioral Therapy and Dialectical Behavior Therapy to address ongoing symptoms listed above.   "   Behavioral Health Treatment Plan and Discharge Planning: Pt is aware of and agrees to continue to work on their treatment plan. They have identified and are working toward their discharge goals. yes     Visit start and stop times:  11:00-12:00     This note was not shared with the patient due to this is a psychotherapy note     John Villegas D.O.

## 2024-06-13 ENCOUNTER — TELEPHONE (OUTPATIENT)
Dept: FAMILY MEDICINE CLINIC | Facility: CLINIC | Age: 59
End: 2024-06-13

## 2024-06-13 ENCOUNTER — PATIENT OUTREACH (OUTPATIENT)
Dept: FAMILY MEDICINE CLINIC | Facility: CLINIC | Age: 59
End: 2024-06-13

## 2024-06-13 NOTE — PROGRESS NOTES
MPAP specialist received In Basket ( IB) message from clinical coordinator regarding patient's shipment of Lantus from [employer] patient assistance program. Lantus shipment was received at clinician's office.  Patient contacted by clinical coordinator to .  Clinical pharmacist included in IB message.

## 2024-06-13 NOTE — TELEPHONE ENCOUNTER
Received in office on 06/13/24  Name of Medication:Lantus   Dosage: 100units/ml  Amount received: 2 boxes  Lot #: [id number]  Expiration Date: 07/31/2026  NDC#: 7275-9600-14    first attempt to contact patient. Pt informed and will be here tomorrow 06/14/2024 to  medication

## 2024-06-25 ENCOUNTER — PATIENT OUTREACH (OUTPATIENT)
Dept: FAMILY MEDICINE CLINIC | Facility: CLINIC | Age: 59
End: 2024-06-25

## 2024-06-25 ENCOUNTER — OFFICE VISIT (OUTPATIENT)
Dept: FAMILY MEDICINE CLINIC | Facility: CLINIC | Age: 59
End: 2024-06-25

## 2024-06-25 ENCOUNTER — TELEPHONE (OUTPATIENT)
Dept: FAMILY MEDICINE CLINIC | Facility: CLINIC | Age: 59
End: 2024-06-25

## 2024-06-25 ENCOUNTER — APPOINTMENT (OUTPATIENT)
Dept: LAB | Facility: CLINIC | Age: 59
End: 2024-06-25
Payer: COMMERCIAL

## 2024-06-25 PROCEDURE — NC001 PR NO CHARGE

## 2024-06-25 NOTE — PROGRESS NOTES
Medication Patient Assistance Program (MPAP) Specialist  received In basket message from clinical staff. MPAP specialist received In Basket ( IB) message from clinical staff regarding patient's shipment of Ozempic from [employer] patient assistance program. Ozempic shipment was received at clinician's office.  Patient contacted by clinical staff to .  Clinical Pharmacist included in IB message.

## 2024-06-25 NOTE — PROGRESS NOTES
"Behavioral Health Psychotherapy Progress Note     Psychotherapy Provided: Individual Psychotherapy      Goals addressed in session: Goal 1 - Romantic Relationships  Discussed how his recent relationship ended, but felt much more relieved from the stress. Reflected on what has been things he wants and enjoyed in past relationships, and the possible themes that have occurred with failed relationships.     Goal 2 - Reflection of growth  Discussed how he has grown over the months, and how he would respond to things previously compared to now. Reflected on how his perspective has changed and how he has grown.      Goal 3 - Future directions  Discussed his growth and if he would want to continue therapy or felt comfortable without therapy. Discussed what he would want to explore with future therapists.      DATA:   During this session, this clinician used the following therapeutic modalities: Cognitive Behavioral Therapy and Dialectical Behavior Therapy     Substance Abuse was not addressed during this session.     ASSESSMENT:  Pt presents with a Euthymic/ normal mood.     His affect is Normal range and intensity, which is congruent, with his mood and the content of the session. The client has made progress on their goals.     Pt presents with a minimal risk of suicide, minimal risk of self-harm, and minimal risk of harm to others.     For any risk assessment that surpasses a \"low\" rating, a safety plan must be developed.     A safety plan was indicated: no     PLAN: Between sessions, patient will continue to work and reflect on his growth. He will also decide what he wants to work on with his next therapist and what a therapist could provide that a family/friend could not. At the next session, the therapist will continue to address ongoing symptoms as listed above.     Behavioral Health Treatment Plan and Discharge Planning: Pt is aware of and agrees to continue to work on their treatment plan. They have identified and " are working toward their discharge goals. yes     Visit start and stop times:  11:00-12:00     This note was not shared with the patient due to this is a psychotherapy note     John Villegas D.O.

## 2024-06-25 NOTE — TELEPHONE ENCOUNTER
Patient medication is here     OZEMPIC 1x3ML Injection Solution     Lot: [id number]  Exp:11-  Quantity: 4    Pt is here in the office today

## 2024-06-25 NOTE — TELEPHONE ENCOUNTER
Saw pt briefly in clinic. Pt obtained ozempic 0.5 mg pens    Verified ozempic on to med list - updated    Pharmacist Tracking Tool  Reason For Outreach: Embedded Pharmacist  Demographics:  Intervention Method: Chart Review  Type of Intervention: Follow-Up  Topics Addressed: Diabetes  Pharmacologic Interventions: Med Rec  Non-Pharmacologic Interventions: Care coordination and Chart update  Time:  Direct Patient Care:  5  mins  Care Coordination:  5  mins  Recommendation Recipient: Patient/Caregiver  Outcome: Accepted     Chente Lewis, PharmD, BCACP  Ambulatory Care Clinical Pharmacist

## 2024-06-26 ENCOUNTER — OFFICE VISIT (OUTPATIENT)
Dept: OBGYN CLINIC | Facility: MEDICAL CENTER | Age: 59
End: 2024-06-26
Payer: COMMERCIAL

## 2024-06-26 VITALS
HEART RATE: 77 BPM | DIASTOLIC BLOOD PRESSURE: 86 MMHG | HEIGHT: 69 IN | BODY MASS INDEX: 35.4 KG/M2 | WEIGHT: 239 LBS | SYSTOLIC BLOOD PRESSURE: 133 MMHG

## 2024-06-26 DIAGNOSIS — M65.341 TRIGGER RING FINGER OF RIGHT HAND: Primary | ICD-10-CM

## 2024-06-26 PROCEDURE — 20550 NJX 1 TENDON SHEATH/LIGAMENT: CPT | Performed by: ORTHOPAEDIC SURGERY

## 2024-06-26 PROCEDURE — 99203 OFFICE O/P NEW LOW 30 MIN: CPT | Performed by: ORTHOPAEDIC SURGERY

## 2024-06-26 RX ORDER — BETAMETHASONE SODIUM PHOSPHATE AND BETAMETHASONE ACETATE 3; 3 MG/ML; MG/ML
6 INJECTION, SUSPENSION INTRA-ARTICULAR; INTRALESIONAL; INTRAMUSCULAR; SOFT TISSUE
Status: COMPLETED | OUTPATIENT
Start: 2024-06-26 | End: 2024-06-26

## 2024-06-26 RX ORDER — LIDOCAINE HYDROCHLORIDE 10 MG/ML
1 INJECTION, SOLUTION INFILTRATION; PERINEURAL
Status: COMPLETED | OUTPATIENT
Start: 2024-06-26 | End: 2024-06-26

## 2024-06-26 RX ADMIN — LIDOCAINE HYDROCHLORIDE 1 ML: 10 INJECTION, SOLUTION INFILTRATION; PERINEURAL at 09:00

## 2024-06-26 RX ADMIN — BETAMETHASONE SODIUM PHOSPHATE AND BETAMETHASONE ACETATE 6 MG: 3; 3 INJECTION, SUSPENSION INTRA-ARTICULAR; INTRALESIONAL; INTRAMUSCULAR; SOFT TISSUE at 09:00

## 2024-06-26 NOTE — PROGRESS NOTES
The HAND & UPPER EXTREMITY OFFICE VISIT   Referred By:  Osmany Thornton Md  [address]  [address]  [place] [address]      Chief Complaint:     Right ring trigger finger    History of Present Illness:   58 y.o., male presents with pain and locking of the right ring finger x2 months. He reports the finger will lock multiple times a day which sometimes requires his opposite hand to unlock. He also notes pain at the base of the finger. He reports a previous trigger finger in the right middle finger which resolved after 2 steroid injections. He reports he is diabetic and monitors his blood sugar frequently throughout the day.       ADLs: Community ambulator  Smoke: denies ETOH: denies   Drugs:  denies        Past Medical History:  Past Medical History:   Diagnosis Date    Acute pain of right shoulder     Paronychia of right index finger 08/15/2023     Past Surgical History:   Procedure Laterality Date    CARDIAC ELECTROPHYSIOLOGY PROCEDURE N/A 3/21/2024    Procedure: Cardiac biv pacer implant;  Surgeon: Nahun Rees MD;  Location: [facility];  Service: Cardiology    HERNIA REPAIR      At 18 years old     History reviewed. No pertinent family history.  Social History     Socioeconomic History    Marital status: Single     Spouse name: Not on file    Number of children: Not on file    Years of education: Not on file    Highest education level: Not on file   Occupational History    Not on file   Tobacco Use    Smoking status: Never     Passive exposure: Never    Smokeless tobacco: Never   Vaping Use    Vaping status: Never Used   Substance and Sexual Activity    Alcohol use: Never    Drug use: Never    Sexual activity: Not on file   Other Topics Concern    Not on file   Social History Narrative    Not on file     Social Determinants of Health     Financial Resource Strain: Low Risk  (5/29/2024)    Overall Financial Resource Strain (CARDIA)     Difficulty of Paying Living Expenses: Not hard at  "all   Food Insecurity: No Food Insecurity (5/29/2024)    Hunger Vital Sign     Worried About Running Out of Food in the Last Year: Never true     Ran Out of Food in the Last Year: Never true   Transportation Needs: No Transportation Needs (5/29/2024)    PRAPARE - Transportation     Lack of Transportation (Medical): No     Lack of Transportation (Non-Medical): No   Physical Activity: Not on file   Stress: Not on file   Social Connections: Not on file   Intimate Partner Violence: Not on file   Housing Stability: Low Risk  (5/29/2024)    Housing Stability Vital Sign     Unable to Pay for Housing in the Last Year: No     Number of Times Moved in the Last Year: 1     Homeless in the Last Year: No     Scheduled Meds:  Continuous Infusions:No current facility-administered medications for this visit.    PRN Meds:.  No Known Allergies        Physical Examination:    /86   Pulse 77   Ht 5' 9\" (1.753 m)   Wt 108 kg (239 lb)   BMI 35.29 kg/m²     Gen: A&Ox3, NAD  Cardiac: regular rate  Chest: non labored breathing  Abdomen: Non-distended      Right Upper Extremity:  Skin CDI  No obvious deformity of the shoulder, arm, elbow, forearm, wrist, hand  TTP ring finger A1 pulley with clicking on exam  Sensation intact to light touch in the axillary median, ulnar, and radial nerve distributions  Limited ring finger flexion/extension due to stiffness, limits ability to form tight composite fist  2+RP      Left Upper Extremity:  Skin CDI  No obvious deformity of the shoulder, arm, elbow, forearm, wrist, hand  Swelling Positive  Sensation intact to light touch in the axillary median, ulnar, and radial nerve distributions  2+RP      Studies:  4/22/24: Hgb A1c 6.9      Assessment and Plan:  1. Trigger ring finger of right hand  Ambulatory Referral to Orthopedic Surgery    Hand/upper extremity injection: R ring A1          58 y.o. male presents with signs and symptoms consistent with the above diagnosis.  We discussed the natural " history of this condition and its pathogenesis.  We discussed operative and nonoperative treatment options including splinting, CSI, and surgery if his symptoms fail to improve. He reports previous relief of his other trigger finger symptoms with CSI which he would like to try again. These risks of injection include but are not limited to: bleeding, infection, allergic reaction to agents, possible increase in pain, and/or elevation in blood sugar. He reports he will continue to monitor his blood glucose levels closely and will reach out to his PCP or go to the ED if his blood sugar becomes too high or he experiences symptoms associated with hyperglycemia. Patient understands and would like to proceed with the proposed procedure. Patient tolerated the procedure well without any complications. See procedure note for details. he may take NSAIDs/Tylenol or apply ice to the area as needed for post-injection discomfort. It is recommended he return to the office in 6 weeks, or sooner should symptoms worsen.    he expressed understanding of the plan and agreed. We encouraged them to contact our office with any questions or concerns.         Lino Rowland MD  Hand and Upper Extremity Surgery        *This note was dictated using Dragon voice recognition software. Please excuse any word substitutions or errors.*    Hand/upper extremity injection: R ring A1  Universal Protocol:  Consent: Verbal consent obtained.  Risks and benefits: risks, benefits and alternatives were discussed  Consent given by: patient  Patient understanding: patient states understanding of the procedure being performed  Patient consent: the patient's understanding of the procedure matches consent given  Site marked: the operative site was marked  Required items: required blood products, implants, devices, and special equipment available  Patient identity confirmed: verbally with patient  Supporting Documentation  Indications: pain   Procedure  Details  Condition:trigger finger Location: ring finger - R ring A1   Needle size: 25 G  Ultrasound guidance: no  Approach: volar  Medications administered: 1 mL lidocaine 1 %; 6 mg betamethasone acetate-betamethasone sodium phosphate 6 (3-3) mg/mL  Patient tolerance: patient tolerated the procedure well with no immediate complications  Dressing:  Sterile dressing applied

## 2024-07-02 ENCOUNTER — TELEPHONE (OUTPATIENT)
Dept: FAMILY MEDICINE CLINIC | Facility: CLINIC | Age: 59
End: 2024-07-02

## 2024-07-02 ENCOUNTER — OFFICE VISIT (OUTPATIENT)
Dept: FAMILY MEDICINE CLINIC | Facility: CLINIC | Age: 59
End: 2024-07-02

## 2024-07-02 VITALS
OXYGEN SATURATION: 95 % | DIASTOLIC BLOOD PRESSURE: 87 MMHG | RESPIRATION RATE: 16 BRPM | TEMPERATURE: 98 F | BODY MASS INDEX: 35.1 KG/M2 | HEIGHT: 69 IN | HEART RATE: 88 BPM | SYSTOLIC BLOOD PRESSURE: 137 MMHG | WEIGHT: 237 LBS

## 2024-07-02 DIAGNOSIS — L08.9 INFECTED SKIN LESION: Primary | ICD-10-CM

## 2024-07-02 DIAGNOSIS — E66.01 OBESITY, MORBID (HCC): ICD-10-CM

## 2024-07-02 DIAGNOSIS — Z79.4 TYPE 2 DIABETES MELLITUS WITH RETINOPATHY AND MACULAR EDEMA, WITH LONG-TERM CURRENT USE OF INSULIN, UNSPECIFIED LATERALITY, UNSPECIFIED RETINOPATHY SEVERITY (HCC): ICD-10-CM

## 2024-07-02 DIAGNOSIS — E11.311 TYPE 2 DIABETES MELLITUS WITH RETINOPATHY AND MACULAR EDEMA, WITH LONG-TERM CURRENT USE OF INSULIN, UNSPECIFIED LATERALITY, UNSPECIFIED RETINOPATHY SEVERITY (HCC): ICD-10-CM

## 2024-07-02 DIAGNOSIS — I10 PRIMARY HYPERTENSION: ICD-10-CM

## 2024-07-02 PROCEDURE — 99214 OFFICE O/P EST MOD 30 MIN: CPT | Performed by: STUDENT IN AN ORGANIZED HEALTH CARE EDUCATION/TRAINING PROGRAM

## 2024-07-02 PROCEDURE — G2211 COMPLEX E/M VISIT ADD ON: HCPCS | Performed by: STUDENT IN AN ORGANIZED HEALTH CARE EDUCATION/TRAINING PROGRAM

## 2024-07-02 NOTE — ASSESSMENT & PLAN NOTE
Well controlled HTN (137/87) on 7/2/202024  Medications to be reviewed due to episodes of 100/70 BP readings at home  Adviced patient to log time and BP reading at home twice a day in the AM and PM, and to bring along record for upcoming visit.

## 2024-07-02 NOTE — PROGRESS NOTES
Ambulatory Visit  Name: Fadi Busby      : 1965      MRN: [id number]  Encounter Provider: Sara Cordero MD  Encounter Date: 2024   Encounter department: [facility]    Assessment & Plan   1. Infected skin lesion  Assessment & Plan:    Mupirocin (BACTROBAN) 2 % ointment; Apply topically 3 (three) times a day for 7 days   Informed the patient to come in if he experiences any pain, change in rash color or fever.  Orders:  -     mupirocin (BACTROBAN) 2 % ointment; Apply topically 3 (three) times a day for 7 days  2. 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, with long-term current use of insulin, unspecified laterality, unspecified retinopathy severity (HCC)  Assessment & Plan:  Last HbA1c 6.9 (2024)  Well controlled diabetes  To follow up on HBA1c and CMP in due scheduled visits.  Refilled Continuous Glucose Sensor (FreeStyle Ana María 2 Sensor) MISC.  Orders:  -     Continuous Glucose Sensor (FreeStyle Ana María 2 Sensor) MISC; Inject 1 Units under the skin every 14 (fourteen) days Take as directed  3. Primary hypertension  Assessment & Plan:  Well controlled HTN (137/87) on   Medications to be reviewed due to episodes of 100/70 BP readings at home  Adviced patient to log time and BP reading at home twice a day in the AM and PM, and to bring along record for upcoming visit.         4. Obesity, morbid (HCC)  Assessment & Plan:  Advised patient to exercise (walking).  Advised patient on healthy diet choices.       History of Present Illness     57 y/o male with PMH of well controlled HTN, CVA and DM complicated with mild proliferative retinopathy presented with rash on the inner side of the right leg for the past 3 weeks. However sudden redness around the rash appeared 3 days ago. He denies any pain, discharge or preceding events such as insect bites or trauma to the area prior to the rash. Rash is pruritc at times, however denies actively  "scratching it. He used over the counter neosporin and peroxide but no improvement noted. No other lesions are present on the rest of the body.   He complains that his BP runs around 100/70 when he is on all HTN medications regularly at home. And is requesting a review of HTN medications.  He also requests for refill for continuous blood glucose sensor.              Review of Systems   Constitutional: Negative.    HENT: Negative.     Eyes: Negative.    Respiratory: Negative.     Cardiovascular:  Negative for chest pain, palpitations and leg swelling.   Gastrointestinal: Negative.    Genitourinary: Negative.    Skin:  Positive for rash (on inner sife of right leg).   Neurological: Negative.        Objective     /87 (BP Location: Left arm, Patient Position: Sitting, Cuff Size: Large)   Pulse 88   Temp 98 °F (36.7 °C) (Temporal)   Resp 16   Ht 5' 9\" (1.753 m)   Wt 108 kg (237 lb)   SpO2 95%   BMI 35.00 kg/m²     Physical Exam  Vitals (normal) reviewed.   Constitutional:       Appearance: He is obese.   Cardiovascular:      Rate and Rhythm: Normal rate.      Pulses: Normal pulses.      Heart sounds: Normal heart sounds. No murmur heard.  Pulmonary:      Breath sounds: Normal breath sounds.   Musculoskeletal:      Right lower leg: No edema.      Left lower leg: No edema.   Skin:     General: Skin is warm.      Capillary Refill: Capillary refill takes less than 2 seconds.      Findings: Rash present. Rash is macular.             Comments: 2 circular red rashes on inner side of right leg, measuring around 2cm each and about 1/2 inch apart. No discharge or tenderness noted over the rash.   Neurological:      Mental Status: He is alert.       Administrative Statements     "

## 2024-07-02 NOTE — ASSESSMENT & PLAN NOTE
Last HbA1c 6.9 (4/22/2024)  Well controlled diabetes  To follow up on HBA1c and CMP in due scheduled visits.  To follow up on foot examination due soon on 08/23/24.  Advised patient on importance of proper foot care in DM.

## 2024-07-02 NOTE — TELEPHONE ENCOUNTER
[facility] on [address] called that the prescriptions are not go through for the resident. Heri johnstonmit

## 2024-07-02 NOTE — ASSESSMENT & PLAN NOTE
Informed the patient to come in if he experiences any pain, change in rash color or fever associated with the rash.

## 2024-07-03 ENCOUNTER — HOSPITAL ENCOUNTER (EMERGENCY)
Facility: HOSPITAL | Age: 59
Discharge: HOME | End: 2024-07-03
Attending: EMERGENCY MEDICINE
Payer: COMMERCIAL

## 2024-07-03 VITALS
OXYGEN SATURATION: 96 % | DIASTOLIC BLOOD PRESSURE: 97 MMHG | TEMPERATURE: 99.3 F | BODY MASS INDEX: 34.45 KG/M2 | SYSTOLIC BLOOD PRESSURE: 152 MMHG | WEIGHT: 233.3 LBS | RESPIRATION RATE: 18 BRPM | HEART RATE: 116 BPM

## 2024-07-03 DIAGNOSIS — R21 RASH: Primary | ICD-10-CM

## 2024-07-03 DIAGNOSIS — B34.9 VIRAL SYNDROME: ICD-10-CM

## 2024-07-03 DIAGNOSIS — U07.1 COVID-19: ICD-10-CM

## 2024-07-03 DIAGNOSIS — E11.311 TYPE 2 DIABETES MELLITUS WITH RETINOPATHY AND MACULAR EDEMA, WITH LONG-TERM CURRENT USE OF INSULIN, UNSPECIFIED LATERALITY, UNSPECIFIED RETINOPATHY SEVERITY (HCC): ICD-10-CM

## 2024-07-03 DIAGNOSIS — Z79.4 TYPE 2 DIABETES MELLITUS WITH RETINOPATHY AND MACULAR EDEMA, WITH LONG-TERM CURRENT USE OF INSULIN, UNSPECIFIED LATERALITY, UNSPECIFIED RETINOPATHY SEVERITY (HCC): ICD-10-CM

## 2024-07-03 LAB — SARS-COV-2 RNA RESP QL NAA+PROBE: POSITIVE

## 2024-07-03 PROCEDURE — 87635 SARS-COV-2 COVID-19 AMP PRB: CPT | Performed by: EMERGENCY MEDICINE

## 2024-07-03 PROCEDURE — 99284 EMERGENCY DEPT VISIT MOD MDM: CPT | Performed by: EMERGENCY MEDICINE

## 2024-07-03 RX ORDER — BENZOCAINE/MENTHOL 6 MG-10 MG
LOZENGE MUCOUS MEMBRANE
Qty: 15 G | Refills: 0 | Status: SHIPPED | OUTPATIENT
Start: 2024-07-03

## 2024-07-03 RX ORDER — GINSENG 100 MG
1 CAPSULE ORAL 2 TIMES DAILY
Qty: 28 G | Refills: 0 | Status: SHIPPED | OUTPATIENT
Start: 2024-07-03

## 2024-07-04 NOTE — ED PROVIDER NOTES
History  Chief Complaint   Patient presents with    Fever     Pt having fever, runny nose, fatigue and diarrhea for two days. No n/v. Tylenol around an hour ago.      58-year-old male presenting to the emerged department with fever runny nose fatigue diarrhea.  Patient is worried about COVID.  Denies nausea vomiting.  Took Tylenol 1 hour before arrival.  No shortness of breath.  No chest pain.  Patient also would like a refill on his freestyle ana maría glucose monitor, does note that he saw PCP today but trainee did not put in orders appropriately so pharmacy was not able to dispense his medications and his glucose monitor.        Prior to Admission Medications   Prescriptions Last Dose Informant Patient Reported? Taking?   Ascorbic Acid (vitamin C) 1000 MG tablet   Yes No   Sig: Take 1,000 mg by mouth daily   Continuous Glucose  (FreeStyle Ana María 2 [place]) ALLIE   No No   Sig: Check blood sugars multiple times per day   Continuous Glucose Sensor (FreeStyle Ana María 2 Sensor) MISC   No No   Sig: Inject 1 Units under the skin every 14 (fourteen) days Take as directed   Continuous Glucose Sensor (FreeStyle Ana María 2 Sensor) MISC   No Yes   Sig: Inject 1 Units under the skin every 14 (fourteen) days Take as directed   Insulin Pen Needle 32G X 4 MM MISC   No No   Sig: Use up to four times daily with insulin e11.9   Lantus SoloStar 100 units/mL SOPN   No No   Sig: INJECT 0.35 ML (35 UNITS ) UNDER THE SKIN IN THE MORNING   acetaminophen (TYLENOL) 650 mg CR tablet  Self, Spouse/Significant Other No No   Sig: Take 1 tablet (650 mg total) by mouth every 8 (eight) hours as needed for mild pain   Patient not taking: Reported on 6/26/2024   atorvastatin (LIPITOR) 40 mg tablet   No No   Sig: Take 1 tablet (40 mg total) by mouth daily   cyanocobalamin (VITAMIN B-12) 2000 MCG tablet   Yes No   Sig: Take 2,000 mcg by mouth daily   furosemide (LASIX) 40 mg tablet   No No   Sig: Take 1 tablet (40 mg total) by mouth in the morning    insulin aspart (NovoLOG FlexPen) 100 UNIT/ML injection pen   No No   Sig: INJECT 10 UNITS 3 TIMES A DAY BEFORE MEALS   metoprolol succinate (TOPROL-XL) 25 mg 24 hr tablet   No No   Sig: Take 1 tablet (25 mg total) by mouth daily   mupirocin (BACTROBAN) 2 % ointment   No No   Sig: Apply topically 3 (three) times a day for 7 days   semaglutide, 0.25 or 0.5 mg/dose, (Ozempic, 0.25 or 0.5 MG/DOSE,) 2 mg/3 mL injection pen   Yes No   Sig: Inject 0.5 mg weekly   spironolactone (ALDACTONE) 25 mg tablet   No No   Sig: Take 1 tablet (25 mg total) by mouth daily   valsartan (DIOVAN) 320 MG tablet   No No   Sig: Take 1 tablet (320 mg total) by mouth daily   warfarin (COUMADIN) 5 mg tablet   No No   Sig: TAKE 1 TABLET BY MOUTH DAILY      Facility-Administered Medications: None       Past Medical History:   Diagnosis Date    Acute pain of right shoulder     Paronychia of right index finger 08/15/2023       Past Surgical History:   Procedure Laterality Date    CARDIAC ELECTROPHYSIOLOGY PROCEDURE N/A 3/21/2024    Procedure: Cardiac biv pacer implant;  Surgeon: Nahun Rees MD;  [facility];  Service: Cardiology    HERNIA REPAIR      At 18 years old       History reviewed. No pertinent family history.  I have reviewed and agree with the history as documented.    E-Cigarette/Vaping    E-Cigarette Use Never User      E-Cigarette/Vaping Substances    Nicotine No     THC No     CBD No     Flavoring No     Other No     Unknown No      Social History     Tobacco Use    Smoking status: Never     Passive exposure: Never    Smokeless tobacco: Never   Vaping Use    Vaping status: Never Used   Substance Use Topics    Alcohol use: Never    Drug use: Never       Review of Systems   Constitutional:  Negative for chills and fever.   HENT:  Positive for congestion and rhinorrhea. Negative for ear pain and sore throat.    Eyes:  Negative for pain, discharge and visual disturbance.   Respiratory:  Negative for cough and  shortness of breath.    Cardiovascular:  Negative for chest pain and palpitations.   Gastrointestinal:  Negative for abdominal pain, diarrhea, nausea and vomiting.   Endocrine: Negative for polydipsia and polyphagia.   Genitourinary:  Negative for dysuria, flank pain and hematuria.   Musculoskeletal:  Positive for myalgias. Negative for arthralgias and back pain.   Skin:  Negative for color change, pallor and rash.   Neurological:  Negative for seizures and syncope.   Psychiatric/Behavioral:  Negative for confusion.    All other systems reviewed and are negative.      Physical Exam  Physical Exam  Vitals and nursing note reviewed.   Constitutional:       General: He is not in acute distress.     Appearance: He is well-developed.   HENT:      Head: Normocephalic and atraumatic.      Nose: Congestion and rhinorrhea present.   Eyes:      Conjunctiva/sclera: Conjunctivae normal.   Cardiovascular:      Rate and Rhythm: Normal rate and regular rhythm.      Heart sounds: No murmur heard.  Pulmonary:      Effort: Pulmonary effort is normal. No respiratory distress.      Breath sounds: Normal breath sounds.   Abdominal:      Palpations: Abdomen is soft.      Tenderness: There is no abdominal tenderness.   Musculoskeletal:         General: No swelling.      Cervical back: Neck supple.   Skin:     General: Skin is warm and dry.      Capillary Refill: Capillary refill takes less than 2 seconds.   Neurological:      Mental Status: He is alert.   Psychiatric:         Mood and Affect: Mood normal.         Vital Signs  ED Triage Vitals [07/03/24 2133]   Temperature Pulse Respirations Blood Pressure SpO2   99.3 °F (37.4 °C) (!) 116 18 152/97 96 %      Temp Source Heart Rate Source Patient Position - Orthostatic VS BP Location FiO2 (%)   Oral Monitor Lying Left arm --      Pain Score       --           Vitals:    07/03/24 2133   BP: 152/97   Pulse: (!) 116   Patient Position - Orthostatic VS: Lying         Visual Acuity      ED  Medications  Medications - No data to display    Diagnostic Studies  Results Reviewed       Procedure Component Value Units Date/Time    COVID only [[id number]]  (Abnormal) Collected: 07/03/24 2255    Lab Status: Final result Specimen: Nares from Nose Updated: 07/03/24 2321     SARS-CoV-2 Positive    Narrative:      FOR PEDIATRIC PATIENTS - copy/paste COVID Guidelines URL to browser: [email or web address]    SARS-CoV-2 assay is a Nucleic Acid Amplification assay intended for the  qualitative detection of nucleic acid from SARS-CoV-2 in nasopharyngeal  swabs. Results are for the presumptive identification of SARS-CoV-2 RNA.    Positive results are indicative of infection with SARS-CoV-2, the virus  causing COVID-19, but do not rule out bacterial infection or co-infection  with other viruses. Laboratories within the [place] and its  territories are required to report all positive results to the appropriate  public health authorities. Negative results do not preclude SARS-CoV-2  infection and should not be used as the sole basis for treatment or other  patient management decisions. Negative results must be combined with  clinical observations, patient history, and epidemiological information.  This test has not been FDA cleared or approved.    This test has been authorized by FDA under an Emergency Use Authorization  (EUA). This test is only authorized for the duration of time the  declaration that circumstances exist justifying the authorization of the  emergency use of an in vitro diagnostic tests for detection of SARS-CoV-2  virus and/or diagnosis of COVID-19 infection under section 564(b)(1) of  the Act, 21 U.S.C. 360bbb-3(b)(1), unless the authorization is terminated  or revoked sooner. The test has been validated but independent review by FDA  and CLIA is pending.    Test performed using [employer]: This RT-PCR assay targets N2,  a region unique to  SARS-CoV-2. A conserved region in the E-gene was chosen  for pan-Sarbecovirus detection which includes SARS-CoV-2.    According to CMS-2020-01-R, this platform meets the definition of high-throughput technology.                   No orders to display              Procedures  Procedures         ED Course                               SBIRT 20yo+      Flowsheet Row Most Recent Value   Initial Alcohol Screen: US AUDIT-C     1. How often do you have a drink containing alcohol? 0 Filed at: 07/03/2024 2132   2. How many drinks containing alcohol do you have on a typical day you are drinking?  0 Filed at: 07/03/2024 2132   3a. Male UNDER 65: How often do you have five or more drinks on one occasion? 0 Filed at: 07/03/2024 2132   Audit-C Score 0 Filed at: 07/03/2024 2132   SHAN: How many times in the past year have you...    Used an illegal drug or used a prescription medication for non-medical reasons? Never Filed at: 07/03/2024 2132                      Medical Decision Making  49-year-old with URI symptoms found to be COVID-positive.  No hypoxia.  Well-appearing otherwise.    Amount and/or Complexity of Data Reviewed  Labs: ordered.    Risk  OTC drugs.  Prescription drug management.             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                   Follow-up Information       Follow up With Specialties Details Why Contact Info Additional Information    [address], [address] [place] [id number] [phone] [facility], [address] [place] [address], [place] [address], [place], [place], [address] [phone]            Discharge Medication List as of 7/3/2024 11:09 PM        START taking these medications    Details   bacitracin topical ointment 500 units/g topical ointment Apply 1 large application topically 2 (two) times a day, Starting Wed 7/3/2024, Normal      hydrocortisone 1 % cream Apply to affected area 2 times daily, Normal           CONTINUE these medications which have CHANGED    Details   Continuous Glucose Sensor (FreeStyle Ana María 2 Sensor) MISC Inject 1 Units under the skin every 14 (fourteen) days Take as directed, Starting Wed 7/3/2024, Until Tue 10/1/2024, Normal           CONTINUE these medications which have NOT CHANGED    Details   acetaminophen (TYLENOL) 650 mg CR tablet Take 1 tablet (650 mg total) by mouth every 8 (eight) hours as needed for mild pain, Starting Wed 1/24/2024, Normal      Ascorbic Acid (vitamin C) 1000 MG tablet Take 1,000 mg by mouth daily, Historical Med      atorvastatin (LIPITOR) 40 mg tablet Take 1 tablet (40 mg total) by mouth daily, Starting Wed 4/17/2024, Normal      Continuous Glucose  (FreeStyle Ana María 2 [place]) ALLIE Check blood sugars multiple times per day, Normal      cyanocobalamin (VITAMIN B-12) 2000 MCG tablet Take 2,000 mcg by mouth daily, Historical Med      furosemide (LASIX) 40 mg tablet Take 1 tablet (40 mg total) by mouth in the morning, Starting Wed 4/17/2024, Normal      insulin aspart (NovoLOG FlexPen) 100 UNIT/ML injection pen INJECT 10 UNITS 3 TIMES A DAY BEFORE MEALS, Normal      Insulin Pen Needle 32G X 4 MM MISC Use up to  four times daily with insulin e11.9, Normal      Lantus SoloStar 100 units/mL SOPN INJECT 0.35 ML (35 UNITS ) UNDER THE SKIN IN THE MORNING, Normal      metoprolol succinate (TOPROL-XL) 25 mg 24 hr tablet Take 1 tablet (25 mg total) by mouth daily, Starting Wed 4/17/2024, Normal      mupirocin (BACTROBAN) 2 % ointment Apply topically 3 (three) times a day for 7 days, Starting Tue 7/2/2024, Until Tue 7/9/2024, Print      semaglutide, 0.25 or 0.5 mg/dose, (Ozempic, 0.25 or 0.5 MG/DOSE,) 2 mg/3 mL injection pen Inject 0.5 mg weekly, Historical Med      spironolactone (ALDACTONE) 25 mg tablet Take 1 tablet (25 mg total) by mouth daily, Starting Wed 5/29/2024, Normal      valsartan (DIOVAN) 320 MG tablet Take 1 tablet (320 mg total) by mouth daily, Starting Wed 5/29/2024, Normal      warfarin (COUMADIN) 5 mg tablet TAKE 1 TABLET BY MOUTH DAILY, Normal             No discharge procedures on file.    PDMP Review         Value Time User    PDMP Reviewed  Yes 3/21/2024  1:40 PM Heather Paniagua PA-C            ED Provider  Electronically Signed by             Mari Muñoz MD  07/03/24 2908

## 2024-07-07 DIAGNOSIS — Z86.79 HISTORY OF CAROTID ARTERY DISSECTION: ICD-10-CM

## 2024-07-08 RX ORDER — WARFARIN SODIUM 5 MG/1
TABLET ORAL
Qty: 90 TABLET | Refills: 1 | Status: SHIPPED | OUTPATIENT
Start: 2024-07-08

## 2024-07-10 ENCOUNTER — IN-CLINIC DEVICE VISIT (OUTPATIENT)
Dept: CARDIOLOGY CLINIC | Facility: CLINIC | Age: 59
End: 2024-07-10
Payer: COMMERCIAL

## 2024-07-10 DIAGNOSIS — I47.20 VENTRICULAR TACHYCARDIA (HCC): ICD-10-CM

## 2024-07-10 DIAGNOSIS — I42.9 CARDIOMYOPATHY, UNSPECIFIED TYPE (HCC): Primary | ICD-10-CM

## 2024-07-10 PROCEDURE — 93284 PRGRMG EVAL IMPLANTABLE DFB: CPT

## 2024-07-10 NOTE — PROGRESS NOTES
Results for orders placed or performed in visit on 07/10/24   Cardiac EP device report    Narrative    MDT BI-V PM/ ACTIVE SYSTEM IS MRI CONDITIONAL  DEVICE INTERROGATED IN THE [facility] OFFICE. BATTERY VOLTAGE ADEQUATE (11.3 YRS). AP: 6.7%. : 98.6% + VSRP: 1.3%. ALL LEAD PARAMETERS WITHIN NORMAL LIMITS. 1 FAST A&V EPISODE W/ EGM SHOWING PAT @ 162 BPM, DURATION 44 SECS. 3 V-SENSE EPISODES W/ MARKERS SHOWING PAT W/ -162 BPM, MAX DURATION 57 SECS. PT TAKES WARFARIN. EF: 45% (ECHO 1/24/24). OPTI-VOL WITHIN NORMAL LIMITS. NO PROGRAMMING CHANGES MADE TO DEVICE PARAMETERS. NORMAL DEVICE FUNCTION. CH

## 2024-08-06 ENCOUNTER — OFFICE VISIT (OUTPATIENT)
Dept: OBGYN CLINIC | Facility: MEDICAL CENTER | Age: 59
End: 2024-08-06
Payer: COMMERCIAL

## 2024-08-06 VITALS
BODY MASS INDEX: 35.25 KG/M2 | WEIGHT: 238 LBS | HEART RATE: 81 BPM | DIASTOLIC BLOOD PRESSURE: 94 MMHG | SYSTOLIC BLOOD PRESSURE: 156 MMHG | HEIGHT: 69 IN

## 2024-08-06 DIAGNOSIS — M65.341 TRIGGER RING FINGER OF RIGHT HAND: Primary | ICD-10-CM

## 2024-08-06 PROCEDURE — 99213 OFFICE O/P EST LOW 20 MIN: CPT | Performed by: ORTHOPAEDIC SURGERY

## 2024-08-06 PROCEDURE — 20550 NJX 1 TENDON SHEATH/LIGAMENT: CPT | Performed by: ORTHOPAEDIC SURGERY

## 2024-08-06 RX ORDER — LIDOCAINE HYDROCHLORIDE 10 MG/ML
1 INJECTION, SOLUTION INFILTRATION; PERINEURAL
Status: COMPLETED | OUTPATIENT
Start: 2024-08-06 | End: 2024-08-06

## 2024-08-06 RX ORDER — BETAMETHASONE SODIUM PHOSPHATE AND BETAMETHASONE ACETATE 3; 3 MG/ML; MG/ML
6 INJECTION, SUSPENSION INTRA-ARTICULAR; INTRALESIONAL; INTRAMUSCULAR; SOFT TISSUE
Status: COMPLETED | OUTPATIENT
Start: 2024-08-06 | End: 2024-08-06

## 2024-08-06 RX ADMIN — BETAMETHASONE SODIUM PHOSPHATE AND BETAMETHASONE ACETATE 6 MG: 3; 3 INJECTION, SUSPENSION INTRA-ARTICULAR; INTRALESIONAL; INTRAMUSCULAR; SOFT TISSUE at 14:30

## 2024-08-06 RX ADMIN — LIDOCAINE HYDROCHLORIDE 1 ML: 10 INJECTION, SOLUTION INFILTRATION; PERINEURAL at 14:30

## 2024-08-06 NOTE — PROGRESS NOTES
HAND & UPPER EXTREMITY OFFICE VISIT   Referred By:  No referring provider defined for this encounter.      Chief Complaint:     Right ring trigger finger    Previous History:   Previously seen on 6/26/24. At that point CSI was administered for his trigger finger.     Interval History:  Since the last visit he reports improvement in his symptoms. His pain is improved, and the finger is not locking as frequently. He has also been wearing a trigger finger splint which he reports improves his symptoms in the morning.     Past Medical History:  Past Medical History:   Diagnosis Date    Acute pain of right shoulder     Paronychia of right index finger 08/15/2023     Past Surgical History:   Procedure Laterality Date    CARDIAC ELECTROPHYSIOLOGY PROCEDURE N/A 3/21/2024    Procedure: Cardiac biv pacer implant;  Surgeon: Nahun Rees MD;  [facility];  Service: Cardiology    HERNIA REPAIR      At 18 years old     History reviewed. No pertinent family history.  Social History     Socioeconomic History    Marital status: Single     Spouse name: Not on file    Number of children: Not on file    Years of education: Not on file    Highest education level: Not on file   Occupational History    Not on file   Tobacco Use    Smoking status: Never     Passive exposure: Never    Smokeless tobacco: Never   Vaping Use    Vaping status: Never Used   Substance and Sexual Activity    Alcohol use: Never    Drug use: Never    Sexual activity: Not on file   Other Topics Concern    Not on file   Social History Narrative    Not on file     Social Determinants of Health     Financial Resource Strain: Low Risk  (5/29/2024)    Overall Financial Resource Strain (CARDIA)     Difficulty of Paying Living Expenses: Not hard at all   Food Insecurity: No Food Insecurity (5/29/2024)    Hunger Vital Sign     Worried About Running Out of Food in the Last Year: Never true     Ran Out of Food in the Last Year: Never true   Transportation  "Needs: No Transportation Needs (5/29/2024)    PRAPARE - Transportation     Lack of Transportation (Medical): No     Lack of Transportation (Non-Medical): No   Physical Activity: Not on file   Stress: Not on file   Social Connections: Not on file   Intimate Partner Violence: Not on file   Housing Stability: Low Risk  (5/29/2024)    Housing Stability Vital Sign     Unable to Pay for Housing in the Last Year: No     Number of Times Moved in the Last Year: 1     Homeless in the Last Year: No     Scheduled Meds:  Continuous Infusions:No current facility-administered medications for this visit.    PRN Meds:.  No Known Allergies    Physical Examination:    /94   Pulse 81   Ht 5' 9\" (1.753 m)   Wt 108 kg (238 lb)   BMI 35.15 kg/m²     Gen: A&Ox3, NAD  Cardiac: regular rate  Chest: non labored breathing  Abdomen: Non-distended      Right Upper Extremity:  Skin CDI  No obvious deformity of the shoulder, arm, elbow, forearm, wrist, hand  Minimal swelling proximal ring finger  Non-tender ring finger A1 pulley. No active triggering on exam  Sensation intact to light touch in the axillary median, ulnar, and radial nerve distributions  Full composite fist, slightly limited flexion of the ring finger compared to other digits  2+RP        Studies:  No new imaging to review      Assessment and Plan:  1. Trigger ring finger of right hand            58 y.o. male presents in follow up for the above diagnosis. He reports his symptoms are improved following previous CSI, but he continues to have intermittent locking of the ring finger daily. We discussed treatment options going forward including nighttime splinting only, repeat CSI, or surgery if needed. He would like to try a second injection in the office today and continue nighttime splinting. Risks, benefits and alternative treatments were discussed with the patient. These risks of injection include but are not limited to: bleeding, infection, allergic reaction to agents, " possible increase in pain, and/or elevation in blood sugar. Patient understands and would like to proceed with the proposed procedure. Patient tolerated the procedure well without any complications. See procedure note for details. he may take NSAIDs/Tylenol or apply ice to the area as needed for post-injection discomfort. It is recommended he return to the office as needed if the problem fails to improve, worsens, or recurs.    he expressed understanding of the plan and agreed. We encouraged them to contact our office with any questions or concerns.       Lino Rowland MD  Hand and Upper Extremity Surgery      *This note was dictated using Dragon voice recognition software. Please excuse any word substitutions or errors.*     Hand/upper extremity injection: R ring A1  Universal Protocol:  Consent: Verbal consent obtained.  Risks and benefits: risks, benefits and alternatives were discussed  Consent given by: patient  Patient understanding: patient states understanding of the procedure being performed  Patient consent: the patient's understanding of the procedure matches consent given  Site marked: the operative site was marked  Required items: required blood products, implants, devices, and special equipment available  Patient identity confirmed: verbally with patient  Supporting Documentation  Indications: pain   Procedure Details  Condition:trigger finger Location: ring finger - R ring A1   Needle size: 25 G  Ultrasound guidance: no  Approach: volar  Medications administered: 1 mL lidocaine 1 %; 6 mg betamethasone acetate-betamethasone sodium phosphate 6 (3-3) mg/mL  Patient tolerance: patient tolerated the procedure well with no immediate complications  Dressing:  Sterile dressing applied

## 2024-08-22 ENCOUNTER — RA CDI HCC (OUTPATIENT)
Dept: OTHER | Facility: HOSPITAL | Age: 59
End: 2024-08-22

## 2024-08-22 NOTE — PROGRESS NOTES
HCC coding opportunities          Chart Reviewed number of suggestions sent to Provider: 2   I11.0  Z79.4    Patients Insurance     Medicare Insurance: [employer]

## 2024-08-28 ENCOUNTER — OFFICE VISIT (OUTPATIENT)
Dept: FAMILY MEDICINE CLINIC | Facility: CLINIC | Age: 59
End: 2024-08-28

## 2024-08-28 ENCOUNTER — APPOINTMENT (OUTPATIENT)
Dept: LAB | Facility: CLINIC | Age: 59
End: 2024-08-28
Payer: COMMERCIAL

## 2024-08-28 VITALS
DIASTOLIC BLOOD PRESSURE: 80 MMHG | HEIGHT: 69 IN | TEMPERATURE: 98.4 F | SYSTOLIC BLOOD PRESSURE: 114 MMHG | RESPIRATION RATE: 18 BRPM | OXYGEN SATURATION: 97 % | HEART RATE: 96 BPM | WEIGHT: 237 LBS | BODY MASS INDEX: 35.1 KG/M2

## 2024-08-28 DIAGNOSIS — E11.311 TYPE 2 DIABETES MELLITUS WITH RETINOPATHY AND MACULAR EDEMA, WITH LONG-TERM CURRENT USE OF INSULIN, UNSPECIFIED LATERALITY, UNSPECIFIED RETINOPATHY SEVERITY (HCC): ICD-10-CM

## 2024-08-28 DIAGNOSIS — Z79.4 TYPE 2 DIABETES MELLITUS WITH RETINOPATHY AND MACULAR EDEMA, WITH LONG-TERM CURRENT USE OF INSULIN, UNSPECIFIED LATERALITY, UNSPECIFIED RETINOPATHY SEVERITY (HCC): Primary | ICD-10-CM

## 2024-08-28 DIAGNOSIS — I42.8 NONISCHEMIC CARDIOMYOPATHY (HCC): ICD-10-CM

## 2024-08-28 DIAGNOSIS — E11.311 TYPE 2 DIABETES MELLITUS WITH RETINOPATHY AND MACULAR EDEMA, WITH LONG-TERM CURRENT USE OF INSULIN, UNSPECIFIED LATERALITY, UNSPECIFIED RETINOPATHY SEVERITY (HCC): Primary | ICD-10-CM

## 2024-08-28 DIAGNOSIS — Z79.4 TYPE 2 DIABETES MELLITUS WITH RETINOPATHY AND MACULAR EDEMA, WITH LONG-TERM CURRENT USE OF INSULIN, UNSPECIFIED LATERALITY, UNSPECIFIED RETINOPATHY SEVERITY (HCC): ICD-10-CM

## 2024-08-28 DIAGNOSIS — Z79.01 LONG TERM (CURRENT) USE OF ANTICOAGULANTS: ICD-10-CM

## 2024-08-28 LAB
CREAT UR-MCNC: 368.4 MG/DL
MICROALBUMIN UR-MCNC: 7.3 MG/L
MICROALBUMIN/CREAT 24H UR: 2 MG/G CREATININE (ref 0–30)
SL AMB POCT HEMOGLOBIN AIC: 6.8 (ref ?–6.5)

## 2024-08-28 PROCEDURE — 3061F NEG MICROALBUMINURIA REV: CPT | Performed by: FAMILY MEDICINE

## 2024-08-28 PROCEDURE — 82043 UR ALBUMIN QUANTITATIVE: CPT

## 2024-08-28 PROCEDURE — 83036 HEMOGLOBIN GLYCOSYLATED A1C: CPT | Performed by: FAMILY MEDICINE

## 2024-08-28 PROCEDURE — 82570 ASSAY OF URINE CREATININE: CPT

## 2024-08-28 PROCEDURE — 99213 OFFICE O/P EST LOW 20 MIN: CPT | Performed by: FAMILY MEDICINE

## 2024-08-28 NOTE — ASSESSMENT & PLAN NOTE
Discussed importance of continued use of lasix as prescribed  Encouraged daily weights and low sodium diet

## 2024-08-28 NOTE — ASSESSMENT & PLAN NOTE
Lab Results   Component Value Date    HGBA1C 6.8 (A) 08/28/2024    Previously 6.9; stable and controlled  No reports of s/sx of hypoglycemia  Current Regimen: Semaglutide 0.5 mg weekly, Lantus 35 units in AM, Novolog 10 units TID with meals  Foot exam done today    Plan:  - Discussed foot care. Podiatry referral provided  - Reviewed diabetic diet and hypoglycemic management  - Labs as ordered  - Fu in 3 months   Nikki Garcia, RN   Registered Nurse        Pt is to schedule an EGD w/dilation, MAC sedation at [facility]. Orders were entered into epic. Prep for Case/Episode of Care were initiated. Pt was given the written prep instructions which were explained to the pt.      Pt will expect a call from our GI  within 1-2 days and was instructed to call the office if she had not been contacted within that time frame to schedule the procedure.     Routed to GI scheduling pool to contact the pt and arrange a date/time for the procedure at one of the following facilities: [facility].     Pt takes coumadin and will need a lovenox bridge per Dr. Swan. Pt was reminded to contact her PCP re: how to handle her coumadin. This office visit note was also routed to PCP for further recommendations

## 2024-08-28 NOTE — PATIENT INSTRUCTIONS
If you would like to be added to the wait list for services within [facility], you will need to contact them directly at [facility] Psych[facility] - [phone]    Outpatient Mental Health Resources     [facility] Suicide Prevention Lifeline: Dial #507  If you prefer to text, you can reach the Crisis Text Line by texting “[place]” to [phone]    ¿Te encuentras en crisis? Envía un mensaje de texto con la palabra AYUDA al [phone] para comunicarte de manera gratuita con un Consejero de Crisis  Apoyo gratuito las 24 horas del día, los 7 días de la semana, al alcance de tu mano.    [facility] CRISIS for mental health emergencies and/or please go to your local Emergency Department Call [phone] if you or a loved one are in a mental health crisis.  You can Visit https://www.[facility].gov/Services/Mental-Health-In-[place]/Documents/Suicide_Prevention_Hotlines.pdf to find 24/7 crisis phone line for other [facility].    [facility] Mental Health  If you have NO insurance for outpatient Mental Health services you will need to have a liability appointment with [facility] to assess you to qualify for funding. [facility] does NOT provide funding for Medications.  Please call [phone] or [phone] to schedule an intake assessment    [facility]   ? Location 1: [facility] [place] [address] [phone]   ? Location 2: [facility] [address] [place] [address] [phone]        ? English only* Serves ages 4+          ? Accepts Medicare and some commercial plans    [facility]   ? [address] [place] [address] [phone]; [phone]  ? Bilingual English/Qatari Serves ages 5+   ? Accepts Medical Assistance     [facility]   ? [address] [place] [address] [phone]   ? Bilingual English/Qatari Serves ages 14+   ? Accepts Medical Assistance, (Not currently accepting Medicare), & Major Commercial Insurances     [facility]   ? [address]  [place] [address] [phone]        ? Bilingual English/Comoran Serves ages 6+   ? Accepts Medical Assistance & Commercial Insurance     [facility]   ? Previous [address] location is closed  ? [address], [address] [phone]   ? Bilingual English/Comoran Serves ages 3+   ? Accepts Medical Assistance & Some Commercial Insurance     [facility]   ? [address] [place] [address] [phone]   ? Bilingual English/Comoran Serves ages 5+   ? Accepts Medical Assistance     [facility]   ? [address] [place] [address] [phone]  ? Bilingual English/Comoran Serves ages 3+   ? Accepts Medical Assistance & Some Commercial Insurance     PREVENTIVE MEASURES   ? Location 1: [address] [place] [address] [phone]        ? Location 2: [address] [place] [address]   ? Bilingual English/Comoran Serves ages 18+  ? Accepts Medical Assistance    [facility], [facility]  ? [address], [place] [address] [phone]  ? Bilingual English/Comoran  ? Accepts Aetna, Cigna, Optum/[facility], [facility], [employer], Medicare, [employer]/[facility], [facility]. No Medical Assistance    [facility] ([phone])  Medicare/Medicare Advantage, Medical Assistance/HealthChoices, Commercial, and self-pay as payment.    [facility]         ? [address], at [facility], [place] [address]; [phone]         ? Bilingual English/Comoran Serves ages 6+         ? Accepts Medical Assistance     TEEN HELP LINE  ? 7-042-098-[phone]    CRIME VICTIMS [place]       ? [phone]       ? 24/7 [facility] Hotline    [facility]       ? [phone]       ? 24/7 [facility] Hotline    www.psychologytoday.[employer] is a resource to find psychotherapy providers, patients can filter therapist search list based on a number of criteria including language, specialty, gender, insurance, etc.  Individuals seeking will need to reach out to perspective providers through information in the directory. You are encouraged to contact multiple providers to given that many providers have a significant wait list for services as well as to find a provider is a good fit for you!    [facility] is an organization of families, friends and individuals whose lives have been affected by mental illness. Together, we advocate for better lives for those individuals who have a m)ental illness. Visit: [facility].org to learn more or to search for local support resources including qualified mental health providers.

## 2024-08-28 NOTE — PROGRESS NOTES
Ambulatory Visit  Name: Fadi Busby      : 1965      MRN: [id number]  Encounter Provider: Osmany Thornton MD  Encounter Date: 2024   Encounter department: [facility]    Assessment & Plan   1. 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 Podiatry referral provided  - Reviewed diabetic diet and hypoglycemic management  - Labs as ordered  - Fu in 3 months  Orders:  -     POCT hemoglobin A1c  -     Albumin / creatinine urine ratio; Future  -     Ambulatory referral to Podiatry; Future; Expected date: 2024  -     Basic metabolic panel; Future; Expected date: 2025  -     Lipid Panel with Direct LDL reflex; Future; Expected date: 2025  2. Long term (current) use of anticoagulants  Assessment & Plan:  Last INR on 2024 therapeutic  Will obtain another today and follow results.  3. Nonischemic cardiomyopathy (HCC)  Assessment & Plan:  Discussed importance of continued use of lasix as prescribed  Encouraged daily weights and low sodium diet      BMI Counseling: Body mass index is 35 kg/m². The BMI is above normal. Nutrition recommendations include moderation in carbohydrate intake and increasing intake of lean protein. Rationale for BMI follow-up plan is due to patient being overweight or obese.       History of Present Illness       Fadi Busby is a 58 y.o. male with pmhx of T2DM, former smoker and HTN presenting today for review of chronic conditions.  No acute complaints. Last ED visit was in 2024  for runny nose, fatigue and diarrhea at which time he was found to be COVID positive and conservatively managed. Last tobacco use was 3 months ago. Patient reports that they stopped taking lasix for a while due to constant urination and noticed that their breathing was becoming difficult then restarted the meds. Now feels better but constat visits to bathroom bothersome.  Problems stable unless otherwise mentioned.             Review of Systems   Constitutional:  Negative for chills and fever.   HENT:  Negative for ear pain and sore throat.    Eyes:  Negative for pain and visual disturbance.   Respiratory:  Negative for cough and shortness of breath.    Cardiovascular:  Negative for chest pain and palpitations.   Gastrointestinal:  Negative for abdominal pain and vomiting.   Genitourinary:  Negative for dysuria and hematuria.   Musculoskeletal:  Negative for arthralgias and back pain.   Skin:  Negative for color change and rash.   Neurological:  Negative for dizziness, seizures, syncope and headaches.   All other systems reviewed and are negative.    Medical History Reviewed by provider this encounter:  Tobacco  Allergies  Meds  Problems  Med Hx  Surg Hx  Fam Hx       Current Outpatient Medications on File Prior to Visit   Medication Sig Dispense Refill    acetaminophen (TYLENOL) 650 mg CR tablet Take 1 tablet (650 mg total) by mouth every 8 (eight) hours as needed for mild pain (Patient not taking: Reported on 6/26/2024) 30 tablet 0    Ascorbic Acid (vitamin C) 1000 MG tablet Take 1,000 mg by mouth daily      atorvastatin (LIPITOR) 40 mg tablet Take 1 tablet (40 mg total) by mouth daily 90 tablet 3    bacitracin topical ointment 500 units/g topical ointment Apply 1 large application topically 2 (two) times a day 28 g 0    Continuous Glucose  (FreeStyle Ana María 2 Start) ALLIE Check blood sugars multiple times per day 1 each 0    Continuous Glucose Sensor (FreeStyle Ana María 2 Sensor) MISC Inject 1 Units  "under the skin every 14 (fourteen) days Take as directed 6 each 0    cyanocobalamin (VITAMIN B-12) 2000 MCG tablet Take 2,000 mcg by mouth daily      furosemide (LASIX) 40 mg tablet Take 1 tablet (40 mg total) by mouth in the morning 90 tablet 3    hydrocortisone 1 % cream Apply to affected area 2 times daily 15 g 0    insulin aspart (NovoLOG FlexPen) 100 UNIT/ML injection pen INJECT 10 UNITS 3 TIMES A DAY BEFORE MEALS 15 mL 1    Insulin Pen Needle 32G X 4 MM MISC Use up to four times daily with insulin e11.9 400 each 0    Lantus SoloStar 100 units/mL SOPN INJECT 0.35 ML (35 UNITS ) UNDER THE SKIN IN THE MORNING 15 mL 3    metoprolol succinate (TOPROL-XL) 25 mg 24 hr tablet Take 1 tablet (25 mg total) by mouth daily 90 tablet 3    mupirocin (BACTROBAN) 2 % ointment Apply topically 3 (three) times a day for 7 days 30 g 0    semaglutide, 0.25 or 0.5 mg/dose, (Ozempic, 0.25 or 0.5 MG/DOSE,) 2 mg/3 mL injection pen Inject 0.5 mg weekly      spironolactone (ALDACTONE) 25 mg tablet Take 1 tablet (25 mg total) by mouth daily 60 tablet 5    valsartan (DIOVAN) 320 MG tablet Take 1 tablet (320 mg total) by mouth daily 90 tablet 3    warfarin (COUMADIN) 5 mg tablet take 1 tablet by mouth once daily 90 tablet 1     No current facility-administered medications on file prior to visit.      Objective     /80 (BP Location: Right arm, Patient Position: Sitting, Cuff Size: Large)   Pulse 96   Temp 98.4 °F (36.9 °C) (Temporal)   Resp 18   Ht 5' 9\" (1.753 m)   Wt 108 kg (237 lb)   SpO2 97%   BMI 35.00 kg/m²     Physical Exam  Vitals reviewed.   Constitutional:       General: He is not in acute distress.     Appearance: He is well-developed. He is obese. He is not ill-appearing.   HENT:      Head: Normocephalic and atraumatic.      Right Ear: External ear normal.      Left Ear: External ear normal.      Mouth/Throat:      Mouth: Mucous membranes are moist.   Eyes:      General: No scleral icterus.     Extraocular Movements: " Extraocular movements intact.      Conjunctiva/sclera: Conjunctivae normal.   Cardiovascular:      Rate and Rhythm: Normal rate and regular rhythm.      Pulses: Normal pulses. no weak pulses.           Dorsalis pedis pulses are 2+ on the right side and 2+ on the left side.        Posterior tibial pulses are 2+ on the right side and 2+ on the left side.      Heart sounds: Normal heart sounds.   Pulmonary:      Effort: Pulmonary effort is normal. No respiratory distress.      Breath sounds: Normal breath sounds.   Abdominal:      Palpations: Abdomen is soft.      Tenderness: There is no abdominal tenderness.   Musculoskeletal:         General: No swelling.      Cervical back: Neck supple.      Right lower leg: No edema.      Left lower leg: No edema.   Feet:      Right foot:      Skin integrity: No ulcer, skin breakdown, erythema, warmth, callus or dry skin.      Left foot:      Skin integrity: No ulcer, skin breakdown, erythema, warmth, callus or dry skin.   Skin:     General: Skin is warm and dry.      Capillary Refill: Capillary refill takes less than 2 seconds.   Neurological:      Mental Status: He is alert and oriented to person, place, and time.   Psychiatric:         Mood and Affect: Mood normal.       Diabetic Foot Exam    Patient's shoes and socks removed.    Right Foot/Ankle   Right Foot Inspection  Skin Exam: skin normal and skin intact. No dry skin, no warmth, no callus, no erythema, no maceration, no abnormal color, no pre-ulcer, no ulcer and no callus.     Sensory   Proprioception: intact  Monofilament testing: intact    Vascular  Capillary refills: < 3 seconds  The right DP pulse is 2+. The right PT pulse is 2+.     Left Foot/Ankle  Left Foot Inspection  Skin Exam: skin normal and skin intact. No dry skin, no warmth, no erythema, no maceration, normal color, no pre-ulcer, no ulcer and no callus.     Sensory   Proprioception: intact  Monofilament testing: intact    Vascular  Capillary refills: < 3  seconds  The left DP pulse is 2+. The left PT pulse is 2+.     Assign Risk Category  No deformity present  No loss of protective sensation  No weak pulses  Risk: 0

## 2024-09-05 ENCOUNTER — OFFICE VISIT (OUTPATIENT)
Dept: FAMILY MEDICINE CLINIC | Facility: CLINIC | Age: 59
End: 2024-09-05

## 2024-09-05 ENCOUNTER — APPOINTMENT (OUTPATIENT)
Dept: LAB | Facility: CLINIC | Age: 59
End: 2024-09-05
Payer: COMMERCIAL

## 2024-09-05 VITALS
BODY MASS INDEX: 35.7 KG/M2 | RESPIRATION RATE: 16 BRPM | OXYGEN SATURATION: 98 % | HEART RATE: 88 BPM | DIASTOLIC BLOOD PRESSURE: 70 MMHG | WEIGHT: 241 LBS | HEIGHT: 69 IN | TEMPERATURE: 96.8 F | SYSTOLIC BLOOD PRESSURE: 118 MMHG

## 2024-09-05 DIAGNOSIS — R21 SKIN RASH: ICD-10-CM

## 2024-09-05 DIAGNOSIS — D18.01 CHERRY ANGIOMA: ICD-10-CM

## 2024-09-05 DIAGNOSIS — R21 SKIN RASH: Primary | ICD-10-CM

## 2024-09-05 LAB
ALBUMIN SERPL BCG-MCNC: 3.8 G/DL (ref 3.5–5)
ALP SERPL-CCNC: 76 U/L (ref 34–104)
ALT SERPL W P-5'-P-CCNC: 27 U/L (ref 7–52)
ANION GAP SERPL CALCULATED.3IONS-SCNC: 9 MMOL/L (ref 4–13)
AST SERPL W P-5'-P-CCNC: 25 U/L (ref 13–39)
BASOPHILS # BLD AUTO: 0.08 THOUSANDS/ÂΜL (ref 0–0.1)
BASOPHILS NFR BLD AUTO: 2 % (ref 0–1)
BILIRUB SERPL-MCNC: 0.79 MG/DL (ref 0.2–1)
BUN SERPL-MCNC: 15 MG/DL (ref 5–25)
CALCIUM SERPL-MCNC: 8.6 MG/DL (ref 8.4–10.2)
CHLORIDE SERPL-SCNC: 104 MMOL/L (ref 96–108)
CO2 SERPL-SCNC: 27 MMOL/L (ref 21–32)
CREAT SERPL-MCNC: 1.13 MG/DL (ref 0.6–1.3)
EOSINOPHIL # BLD AUTO: 0.35 THOUSAND/ÂΜL (ref 0–0.61)
EOSINOPHIL NFR BLD AUTO: 7 % (ref 0–6)
ERYTHROCYTE [DISTWIDTH] IN BLOOD BY AUTOMATED COUNT: 12.2 % (ref 11.6–15.1)
GFR SERPL CREATININE-BSD FRML MDRD: 70 ML/MIN/1.73SQ M
GLUCOSE SERPL-MCNC: 115 MG/DL (ref 65–140)
HCT VFR BLD AUTO: 43.1 % (ref 36.5–49.3)
HGB BLD-MCNC: 14.4 G/DL (ref 12–17)
IMM GRANULOCYTES # BLD AUTO: 0.01 THOUSAND/UL (ref 0–0.2)
IMM GRANULOCYTES NFR BLD AUTO: 0 % (ref 0–2)
LYMPHOCYTES # BLD AUTO: 2.02 THOUSANDS/ÂΜL (ref 0.6–4.47)
LYMPHOCYTES NFR BLD AUTO: 38 % (ref 14–44)
MCH RBC QN AUTO: 32 PG (ref 26.8–34.3)
MCHC RBC AUTO-ENTMCNC: 33.4 G/DL (ref 31.4–37.4)
MCV RBC AUTO: 96 FL (ref 82–98)
MONOCYTES # BLD AUTO: 0.52 THOUSAND/ÂΜL (ref 0.17–1.22)
MONOCYTES NFR BLD AUTO: 10 % (ref 4–12)
NEUTROPHILS # BLD AUTO: 2.36 THOUSANDS/ÂΜL (ref 1.85–7.62)
NEUTS SEG NFR BLD AUTO: 43 % (ref 43–75)
NRBC BLD AUTO-RTO: 0 /100 WBCS
PLATELET # BLD AUTO: 221 THOUSANDS/UL (ref 149–390)
PMV BLD AUTO: 11.5 FL (ref 8.9–12.7)
POTASSIUM SERPL-SCNC: 4 MMOL/L (ref 3.5–5.3)
PROT SERPL-MCNC: 6.5 G/DL (ref 6.4–8.4)
RBC # BLD AUTO: 4.5 MILLION/UL (ref 3.88–5.62)
SODIUM SERPL-SCNC: 140 MMOL/L (ref 135–147)
WBC # BLD AUTO: 5.34 THOUSAND/UL (ref 4.31–10.16)

## 2024-09-05 PROCEDURE — G2211 COMPLEX E/M VISIT ADD ON: HCPCS | Performed by: FAMILY MEDICINE

## 2024-09-05 PROCEDURE — 99214 OFFICE O/P EST MOD 30 MIN: CPT | Performed by: FAMILY MEDICINE

## 2024-09-05 PROCEDURE — 85025 COMPLETE CBC W/AUTO DIFF WBC: CPT

## 2024-09-05 PROCEDURE — 3078F DIAST BP <80 MM HG: CPT | Performed by: FAMILY MEDICINE

## 2024-09-05 PROCEDURE — 3074F SYST BP LT 130 MM HG: CPT | Performed by: FAMILY MEDICINE

## 2024-09-05 PROCEDURE — 80053 COMPREHEN METABOLIC PANEL: CPT

## 2024-09-05 PROCEDURE — 36415 COLL VENOUS BLD VENIPUNCTURE: CPT

## 2024-09-05 NOTE — PROGRESS NOTES
"Ambulatory Visit  Name: Fadi Busby      : 1965      MRN: [id number]  Encounter Provider: Silvano Quinteros MD  Encounter Date: 2024   Encounter department: [facility]    Assessment & Plan   1. Skin rash  -     CBC and differential; Future  -     Comprehensive metabolic panel; Future  -     Ambulatory Referral to Dermatology; Future  2. Cherry angioma  Assessment & Plan:  Discussed with patient the benign nature of these lesions  He wants dermatology consultation  Referral placed per his request       History of Present Illness     58-year-old male with a history of hypertension, type 2 diabetes, pulmonary embolism, and nonischemic cardiomyopathy who presents today with his wife with concern for rash.  His wife states that she noticed a rash on his body that she has not noticed before.  She states that the rash appeared a couple of days ago.  The patient is unaware of this rash.  The rash does not bother him or cause him any discomfort.  He is not using any new skin care products.  He is not using any new medication.  He did travel recently outside of the country.  His wife is very concerned about these lesions.              Review of Systems   Constitutional:  Negative for chills, diaphoresis, fatigue and fever.   Respiratory:  Negative for shortness of breath and wheezing.    Cardiovascular:  Negative for chest pain.   Gastrointestinal:  Negative for abdominal pain, diarrhea, nausea and vomiting.   Genitourinary:  Negative for dysuria, hematuria and urgency.   Skin:  Positive for rash.   Neurological:  Negative for seizures, syncope and headaches.       Objective     /70 (BP Location: Right arm, Patient Position: Sitting, Cuff Size: Large)   Pulse 88   Temp (!) 96.8 °F (36 °C) (Temporal)   Resp 16   Ht 5' 9\" (1.753 m)   Wt 109 kg (241 lb)   SpO2 98%   BMI 35.59 kg/m²     Physical Exam  Vitals reviewed.   Constitutional:       General: He is " not in acute distress.     Appearance: Normal appearance. He is well-developed. He is not ill-appearing, toxic-appearing or diaphoretic.   HENT:      Head: Normocephalic and atraumatic.      Nose: Nose normal.      Mouth/Throat:      Mouth: Mucous membranes are moist.   Eyes:      General: No scleral icterus.        Right eye: No discharge.         Left eye: No discharge.      Extraocular Movements: Extraocular movements intact.      Conjunctiva/sclera: Conjunctivae normal.   Cardiovascular:      Rate and Rhythm: Normal rate and regular rhythm.      Heart sounds: Normal heart sounds. No murmur heard.     No friction rub. No gallop.   Pulmonary:      Effort: Pulmonary effort is normal. No respiratory distress.      Breath sounds: Normal breath sounds. No stridor. No wheezing or rhonchi.   Abdominal:      General: There is no distension.      Palpations: Abdomen is soft. There is no mass.      Tenderness: There is no abdominal tenderness.      Hernia: No hernia is present.   Musculoskeletal:         General: No swelling.      Cervical back: Neck supple.   Skin:     General: Skin is warm.      Capillary Refill: Capillary refill takes less than 2 seconds.      Findings: Rash present.      Comments: Scattered areas of multiple small dome-shaped bright red bumps on the skin in both torso, and upper and lower extremities   Neurological:      General: No focal deficit present.      Mental Status: He is alert.      Gait: Gait normal.   Psychiatric:         Mood and Affect: Mood normal.       Administrative Statements

## 2024-09-05 NOTE — ASSESSMENT & PLAN NOTE
Discussed with patient the benign nature of these lesions  He wants dermatology consultation  Referral placed per his request

## 2024-09-12 ENCOUNTER — PATIENT OUTREACH (OUTPATIENT)
Dept: FAMILY MEDICINE CLINIC | Facility: CLINIC | Age: 59
End: 2024-09-12

## 2024-09-12 NOTE — PROGRESS NOTES
Medication Patient Assistance Program (MPAP) Specialist  received letter from [employer] dated 09/02/2024. Per [employer] letter patient's Lantus should arrive at clinician's office within 10 business days of letter. MPAP specialist has placed a reminder to follow up with [employer] if Lantus is not received. MPAP specialist has scanned faxed letter into EPIC chart.

## 2024-09-16 ENCOUNTER — PATIENT OUTREACH (OUTPATIENT)
Dept: FAMILY MEDICINE CLINIC | Facility: CLINIC | Age: 59
End: 2024-09-16

## 2024-09-16 NOTE — PROGRESS NOTES
Medication Patient Assistance Program (MPAP) Specialist  received personal reminder notification regarding patient's Lantus shipment from [employer] patient assistance program. MPAP specialist reviewed patient chart prior to outreach. MPAP specialist reached out to [employer] to determine Lantus shipment status. MPAP specialist spoke with [employer] [employer] representative. Per [employer] rep Lantus is currently experiencing low stock. Patient's shipment has been requested and will ship once stock has been replenished. MPAP specialist thanked [employer] rep. MPAP specialist will follow up next week if Lantus is not received. MPAP specialist has placed a personal reminder.

## 2024-09-17 ENCOUNTER — PATIENT OUTREACH (OUTPATIENT)
Dept: FAMILY MEDICINE CLINIC | Facility: CLINIC | Age: 59
End: 2024-09-17

## 2024-09-17 ENCOUNTER — TELEPHONE (OUTPATIENT)
Dept: FAMILY MEDICINE CLINIC | Facility: CLINIC | Age: 59
End: 2024-09-17

## 2024-09-17 NOTE — PROGRESS NOTES
Medication Patient Assistance Program (MPAP) Specialist received In Basket ( IB) message from clinical staff regarding patient's shipment of Lantus from [employer] patient assistance program. Lantus shipment was received at clinician's office.  Patient contacted by clinical staff to .  Clinical pharmacist included in IB message.

## 2024-09-17 NOTE — TELEPHONE ENCOUNTER
Received in office on 09/17/24  Name of Medication: LANTUS  Dosage: 300IU 3ML   Amount received: 2BOXES  Lot #: [id number]  Expiration Date: 8/31/26  NDC#: 827960    Patient Informed,  patient said he would  the medication around 2pm

## 2024-09-19 ENCOUNTER — TELEPHONE (OUTPATIENT)
Dept: FAMILY MEDICINE CLINIC | Facility: CLINIC | Age: 59
End: 2024-09-19

## 2024-09-19 NOTE — TELEPHONE ENCOUNTER
MEDICAL CLEARANCE FOR DENTAL TREATMENT form received on 9/19/24  to be completed by PCP. Copy made and placed in PCP folder.    Forms to be delivered to PCP mailbox at assigned time.

## 2024-09-19 NOTE — TELEPHONE ENCOUNTER
Patient came in after seeing financial counselors and stated they need a referral placed for financial counselors and social workers  because he needs help with social security and help with housing.

## 2024-09-23 NOTE — TELEPHONE ENCOUNTER
Patient came into office requesting status of form. Informed pt of form process. Pt verbally understood.

## 2024-09-25 ENCOUNTER — TELEPHONE (OUTPATIENT)
Dept: FAMILY MEDICINE CLINIC | Facility: CLINIC | Age: 59
End: 2024-09-25

## 2024-09-25 ENCOUNTER — PATIENT OUTREACH (OUTPATIENT)
Dept: FAMILY MEDICINE CLINIC | Facility: CLINIC | Age: 59
End: 2024-09-25

## 2024-09-25 DIAGNOSIS — Z59.819 HOUSING INSECURITY: Primary | ICD-10-CM

## 2024-09-25 SDOH — ECONOMIC STABILITY - HOUSING INSECURITY: HOUSING INSTABILITY UNSPECIFIED: Z59.819

## 2024-09-25 NOTE — TELEPHONE ENCOUNTER
Good morning ,   Patient Fadi did contact office today in regards to Dental Clearance Form. Patient was made a ware form can take up to 10 business days to be completed.     Patient would like a phone call from you if possible in regards to form.    Please advise?

## 2024-09-25 NOTE — PROGRESS NOTES
Medication Patient Assistance Program (MPAP) Specialist received In Basket ( IB) message from Clinical coordinator regarding patient's shipment of Ozempic  from [employer] patient assistance program. Ozempic shipment was received at clinician's office.  Patient contacted by Clinical coordinator to .  Clinical pharmacist included in IB message.

## 2024-09-25 NOTE — TELEPHONE ENCOUNTER
Received in office on 09/25/24  Name of Medication: Ocempic  Dosage: 4mg/3ML  Amount received: 4boxes  Lot #: [id number]  Expiration Date: 04/30/2027  NDC#: 3499-6073-74    first attempt to contact patient. Pt informed and will be here to . Thank you

## 2024-09-30 ENCOUNTER — PATIENT OUTREACH (OUTPATIENT)
Dept: FAMILY MEDICINE CLINIC | Facility: CLINIC | Age: 59
End: 2024-09-30

## 2024-09-30 ENCOUNTER — EVALUATION (OUTPATIENT)
Facility: REHABILITATION | Age: 59
End: 2024-09-30
Payer: COMMERCIAL

## 2024-09-30 DIAGNOSIS — Z59.819 HOUSING INSECURITY: Primary | ICD-10-CM

## 2024-09-30 DIAGNOSIS — R20.9 ALTERATION OF SENSATION AS LATE EFFECT OF CEREBROVASCULAR ACCIDENT (CVA): Primary | ICD-10-CM

## 2024-09-30 DIAGNOSIS — I69.398 ALTERATION OF SENSATION AS LATE EFFECT OF CEREBROVASCULAR ACCIDENT (CVA): Primary | ICD-10-CM

## 2024-09-30 PROCEDURE — 97162 PT EVAL MOD COMPLEX 30 MIN: CPT | Performed by: PHYSICAL THERAPIST

## 2024-09-30 SDOH — ECONOMIC STABILITY - HOUSING INSECURITY: HOUSING INSTABILITY UNSPECIFIED: Z59.819

## 2024-09-30 NOTE — PROGRESS NOTES
PATY HANSON did receive a new referral regarding Patient requesting assistance with housing. After chart review PATY HANSON did place a call to Pt to assist as needed. PATY HANSON introduced herself, her role and explained reason for call.     Pt stated that he receives SSI but it's not enough to pay rent, and also he receives SNAP benefits. PATY HANSON encouraged Pt to go to [employer] to inquire if they have funding. Pt seems willing to go to the agency mentioned above. Pt expressed that he went to [employer] and was told that he needs to wait to be behind with rent in order for them to assist him. Also, Pt expressed that he would like to apply for housing. PATY HANSON informed Pt that she will place a referral to [facility] to assist him in applying for housing and [facility] will contact him once receives the referral. Pt is aware that once the housing application is submitted he will be in a wait list.     PATY HANSON inquired Pt if there is any other social needs that he needs help with. Pt declined any other social needs at this time. PATY HANSON explained Pt that he can reach out the PATY  for further support Monday through Friday within office hours. Pt seems understanding and thankful for the PATY  support.    PATY HANSON is remain available for further assistance as needed.  PATY HANSON will continue to follow-up.

## 2024-09-30 NOTE — PROGRESS NOTES
PT Evaluation     Today's date: 2024  Patient name: Fadi Busby  : 1965  MRN: [id number]  Referring provider: Kee Owen DO  Dx:   Encounter Diagnosis     ICD-10-CM    1. Alteration of sensation as late effect of cerebrovascular accident (CVA)  I69.398 Ambulatory Referral to Physical Therapy    R20.9                      Assessment    Assessment details: Fadi reports to physical therapy with chief complaint of left-sided weakness following multiple strokes most recently occurring in .  Results of evaluation today indicate impaired functional lower extremity strength, abnormal gait, impaired dynamic balance, and impaired functional endurance and speed.  He is at risk for falls per FGA.  These deficits are resulting in difficulty with performing higher level community mobility.  He would benefit from skilled PT to address these deficits and maximize his function.    He would benefit from skilled occupational therapy evaluation to evaluate his left upper extremity and ability to perform ADLs utilizing left hand.    Goals  STGs (4 weeks)  Pt will be independent with comprehensive HEP   Pt will demonstrate least 3-second improvement on 5 times sit to stand    LTG's (to be achieved by d/c)  Pt will be able to self manage sx's independently   Pt will demonstrate least 4-point improvement on FGA  Patient will demonstrate least 200 foot improvement on 6-minute walk test      Plan    Frequency: 2x week  Duration in weeks: 8  Plan of Care beginning date: 2024  Plan of Care expiration date: 2024  Treatment plan discussed with: patient and family  Plan details: Initiate plan of care.  Referral to Occupational Therapy for evaluation.        Subjective Evaluation    History of Present Illness  Mechanism of injury: Fadi comes to PT with hx of multiple CVA. His most recent occurring in . He still suffers from left sided hemiplegia.He states he did to rehab after his strokes, but admits he d/c  "his rehab early due to some personal issues and depression at the time.     He reports difficulty with grabbing things wit his left hand. He has difficulty reaching for any carrying things. He can't openining his fingers on the L hand His fiance states he sometimes looks off balance. With his walking he has to take his time and cannot run. Feels like his left leg \"doesn't want to listen\".     Currently not working. Not doing exercise other than walking through out the day.     Pt goals: get stronger. Use his L arm to participate more   Pain  No pain reported        Objective    Vitals:  -BP: 110/82        Balance Test Initial Eval      5x Sit to Stand: 17s no UE      TUG:       Gait Speed:        2 Minute Walk Test:       6 Minute Walk Test: 1100ft      Cook Balance Scale:       FGA:  17/30             ABC:  43%        Flowsheet Rows      Flowsheet Row Most Recent Value   Functional Gait Assessment    Gait Level Surface  1  [6.8s abnormal gait]   Change in GaiT Speed 1   Gait with horizontal head turns 1   Gait with vertical head turns 2   Gait and pivot tuen  3   Step over obstacle 2   Gait with narrow base of supprt 1   Gait with eyes closed 1  [9.8s]   Ambulating backwards 2  [mild slowed 13s]   Steps 3   Total score  17               Gait Assessment: decreased stance time on LLE. Some L knee instability through stance phase. Occasional L toe drag.          Precautions: HTN, type II DM       Manuals 9/30                                                                Neuro Re-Ed             Hurdles              Step ups              Resisted walking                                                                  Ther Ex             HIIT treadmill                                                                                                         Ther Activity                                       Gait Training                                       Modalities                                            "

## 2024-10-01 ENCOUNTER — OFFICE VISIT (OUTPATIENT)
Facility: REHABILITATION | Age: 59
End: 2024-10-01
Payer: COMMERCIAL

## 2024-10-01 ENCOUNTER — PATIENT OUTREACH (OUTPATIENT)
Dept: FAMILY MEDICINE CLINIC | Facility: CLINIC | Age: 59
End: 2024-10-01

## 2024-10-01 DIAGNOSIS — R20.9 ALTERATION OF SENSATION AS LATE EFFECT OF CEREBROVASCULAR ACCIDENT (CVA): Primary | ICD-10-CM

## 2024-10-01 DIAGNOSIS — I69.398 ALTERATION OF SENSATION AS LATE EFFECT OF CEREBROVASCULAR ACCIDENT (CVA): Primary | ICD-10-CM

## 2024-10-01 PROCEDURE — 97112 NEUROMUSCULAR REEDUCATION: CPT | Performed by: PHYSICAL THERAPIST

## 2024-10-01 PROCEDURE — 97110 THERAPEUTIC EXERCISES: CPT | Performed by: PHYSICAL THERAPIST

## 2024-10-01 NOTE — PROGRESS NOTES
Daily Note     Today's date: 10/1/2024  Patient name: Fadi Busby  : 1965  MRN: [id number]  Referring provider: Kee Owen DO  Dx:   Encounter Diagnosis     ICD-10-CM    1. Alteration of sensation as late effect of cerebrovascular accident (CVA)  I69.398     R20.9                      Subjective: Reports feeling well today.       Objective: See treatment diary below    BP: 110/70    Assessment: Tolerated treatment well.  Good tolerance to initiation of high intensity interval training with fatigue reported afterwards.  Can continue to progress this at next visit.  Demonstrates minor instability with resisted walking and increasing his gait speed over ground but able to self-correct.  Will benefit from continued PT.      Plan: Continue per plan of care progress as tolerated.     Precautions: HTN, type II DM     HEP: walking program outside or on treadmill     Manuals                                             Neuro Re-Ed         Hurdles          Step ups  8in   Fwd - 1min ea        Resisted walking  100ft x5 dragging 30#    No weight  100ft x2                          Incorporate LUE use                   Ther Ex         HIIT treadmill  Base: 1% 1.3mph    Fast: 1% 2.2-2.3mph    13min total    HR:  RPE: 6-7                                                                       Ther Activity                           Gait Training                           Modalities

## 2024-10-01 NOTE — PROGRESS NOTES
Outgoing Call:  10/1/2024    CMOC called Pt and discussed referral from Nathalie NEWMAN, informing Pt would like to apply for housing. CMOC introduced self/role and reason for call and Pt agreed to services. CMOC screening to be completed at next outreach. Pt agreed to meet this week and apply for housing.     Next outreach is scheduled for 10/3/2024 at 1:30 PM at [facility].

## 2024-10-03 ENCOUNTER — PATIENT OUTREACH (OUTPATIENT)
Dept: FAMILY MEDICINE CLINIC | Facility: CLINIC | Age: 59
End: 2024-10-03

## 2024-10-03 NOTE — PROGRESS NOTES
In Person:  10/3/2024    CMOC met with Pt at [facility] for scheduled appointment. CMOC completed a housing application on behalf of Pt with information provided by Pt. Pt selected 24 locations. CMOC did email application to [facility] for review. Pt is aware he will need to call [facility] directly to provide updates and seek status. Pt is aware this referral will be closed today.     No further outreach is scheduled.

## 2024-10-07 ENCOUNTER — OFFICE VISIT (OUTPATIENT)
Facility: REHABILITATION | Age: 59
End: 2024-10-07
Payer: COMMERCIAL

## 2024-10-07 DIAGNOSIS — R20.9 ALTERATION OF SENSATION AS LATE EFFECT OF CEREBROVASCULAR ACCIDENT (CVA): Primary | ICD-10-CM

## 2024-10-07 DIAGNOSIS — I69.398 ALTERATION OF SENSATION AS LATE EFFECT OF CEREBROVASCULAR ACCIDENT (CVA): Primary | ICD-10-CM

## 2024-10-07 PROCEDURE — 97110 THERAPEUTIC EXERCISES: CPT | Performed by: PHYSICAL THERAPIST

## 2024-10-07 PROCEDURE — 97112 NEUROMUSCULAR REEDUCATION: CPT | Performed by: PHYSICAL THERAPIST

## 2024-10-07 NOTE — PROGRESS NOTES
Daily Note     Today's date: 10/7/2024  Patient name: Fadi Busby  : 1965  MRN: [id number]  Referring provider: Kee Owen DO  Dx:   Encounter Diagnosis     ICD-10-CM    1. Alteration of sensation as late effect of cerebrovascular accident (CVA)  I69.398     R20.9                        Subjective: Feeling well. Was tired after last visit.       Objective: See treatment diary below    BP: 118/82    Assessment: Tolerated treatment well.  Small progression made in treadmill intensity today which pt tolerated well reporting some uneasiness with his balance. Verbal cues needed to increase step height on LLE to avoid scuffing. Challenged with grasping and manipulating cones to stack them and carrying objects with weight. Will benefit from continued PT.      Plan: Continue per plan of care progress as tolerated.     Precautions: HTN, type II DM     HEP: walking program outside or on treadmill     Manuals 9/30 10/7                                           Neuro Re-Ed         Hurdles   Low fwd while carrying various objects L hand. X7 laps       Step ups  8in   Fwd - 1min ea        Resisted walking  100ft x5 dragging 30#    No weight  100ft x2 50ft x6 dragging 30#    No weight  50ft x2                         Incorporate LUE use                   Ther Ex         HIIT treadmill  Base: 1% 1.3mph    Fast: 1% 2.2-2.3mph    13min total    HR:  RPE: 6-7 Base: 1% 1.4mph    Fast: 1% 2.3-2.4mph    13min total    HR: 91  RPE: 7                                                                      Ther Activity                           Gait Training                           Modalities

## 2024-10-10 ENCOUNTER — OFFICE VISIT (OUTPATIENT)
Facility: REHABILITATION | Age: 59
End: 2024-10-10
Payer: COMMERCIAL

## 2024-10-10 DIAGNOSIS — R20.9 ALTERATION OF SENSATION AS LATE EFFECT OF CEREBROVASCULAR ACCIDENT (CVA): Primary | ICD-10-CM

## 2024-10-10 DIAGNOSIS — I69.398 ALTERATION OF SENSATION AS LATE EFFECT OF CEREBROVASCULAR ACCIDENT (CVA): Primary | ICD-10-CM

## 2024-10-10 PROCEDURE — 97110 THERAPEUTIC EXERCISES: CPT | Performed by: PHYSICAL THERAPIST

## 2024-10-10 PROCEDURE — 97112 NEUROMUSCULAR REEDUCATION: CPT | Performed by: PHYSICAL THERAPIST

## 2024-10-10 PROCEDURE — 97116 GAIT TRAINING THERAPY: CPT | Performed by: PHYSICAL THERAPIST

## 2024-10-10 NOTE — PROGRESS NOTES
Daily Note     Today's date: 10/10/2024  Patient name: Fadi Busby  : 1965  MRN: [id number]  Referring provider: Kee Owen DO  Dx:   Encounter Diagnosis     ICD-10-CM    1. Alteration of sensation as late effect of cerebrovascular accident (CVA)  I69.398     R20.9                          Subjective: Feeling well. Is going to try the gym this  and try the treadmill and possibly the leg press machine.    Objective: See treatment diary below        Assessment: Tolerated treatment well.  Continued to make small progressions in treadmill intensity today to patient's comfort level which pt tolerated well. Utilized LUE with blaze pods today with some difficulty attenuating force to tap pods. Provided verbal and tactile cueing to utilize leg press machine safely to avoid injury to LLE secondary to quad weakness and knee recurvatum. Will benefit from continued PT.      Plan: Continue per plan of care progress as tolerated.     Precautions: HTN, type II DM     HEP: walking program outside or on treadmill     Manuals 9/30 10/7 10/10                                          Neuro Re-Ed         Hurdles   Low fwd while carrying various objects L hand. X7 laps       Step ups  8in   Fwd - 1min ea  8in holding water ball. 1min x2 ea       Resisted walking  100ft x5 dragging 30#    No weight  100ft x2 50ft x6 dragging 30#    No weight  50ft x2 50ft x8 dragging 30#    No weight  50ft x2      Blaze pods   On floor and waist height surface. LLE and LUE only. 1.5min x3                                  Ther Ex         HIIT treadmill  Base: 1% 1.3mph    Fast: 1% 2.2-2.3mph    13min total    HR:  RPE: 6-7 Base: 1% 1.4mph    Fast: 1% 2.3-2.4mph    13min total    HR: 91  RPE: 7 Base: 1% 1.4mph    Fast: 1% 2.3-2.5mph    13min total    HR: 113  RPE: 7      Leg press machine   50lbs recumbent, position 9/10. 3x10. Cues to not hyperextend knee                                                             Ther Activity                            Gait Training                           Modalities

## 2024-10-11 ENCOUNTER — TELEPHONE (OUTPATIENT)
Dept: FAMILY MEDICINE CLINIC | Facility: CLINIC | Age: 59
End: 2024-10-11

## 2024-10-11 NOTE — TELEPHONE ENCOUNTER
PCP SIGNATURE NEEDED FOR Clinical Services Team Pharmacy Services Department  FORM RECEIVED VIA FAX AND PLACED IN PCP FOLDER TO BE DELIVERED AT ASSIGNED TIMES.    Request for Additional information.

## 2024-10-14 ENCOUNTER — TELEPHONE (OUTPATIENT)
Dept: FAMILY MEDICINE CLINIC | Facility: CLINIC | Age: 59
End: 2024-10-14

## 2024-10-14 ENCOUNTER — OFFICE VISIT (OUTPATIENT)
Facility: REHABILITATION | Age: 59
End: 2024-10-14
Payer: COMMERCIAL

## 2024-10-14 DIAGNOSIS — I69.398 ALTERATION OF SENSATION AS LATE EFFECT OF CEREBROVASCULAR ACCIDENT (CVA): Primary | ICD-10-CM

## 2024-10-14 DIAGNOSIS — R20.9 ALTERATION OF SENSATION AS LATE EFFECT OF CEREBROVASCULAR ACCIDENT (CVA): Primary | ICD-10-CM

## 2024-10-14 PROCEDURE — 97110 THERAPEUTIC EXERCISES: CPT | Performed by: PHYSICAL THERAPIST

## 2024-10-14 PROCEDURE — 97116 GAIT TRAINING THERAPY: CPT | Performed by: PHYSICAL THERAPIST

## 2024-10-14 PROCEDURE — 97112 NEUROMUSCULAR REEDUCATION: CPT | Performed by: PHYSICAL THERAPIST

## 2024-10-14 NOTE — PROGRESS NOTES
Daily Note     Today's date: 10/14/2024  Patient name: Fadi Busby  : 1965  MRN: [id number]  Referring provider: Kee Owen DO  Dx:   Encounter Diagnosis     ICD-10-CM    1. Alteration of sensation as late effect of cerebrovascular accident (CVA)  I69.398     R20.9                 Start Time: 0900  Stop Time: 0955  Total time in clinic (min): 55 minutes    Subjective: Doing well today. Didn't get to the gym on  because he was in [place] and didn't get back until late.     Objective: See treatment diary below        Assessment: Tolerated treatment well. When fatigued demonstrating more L knee instability stepping down from 8in surface. Challenged with making quick turns while moving to blaze pods. Continued cueing needed for proper form and avoiding hyperextension of L knee on leg press machine, but was able to progress resistance today.  Will benefit from continued PT.      Plan: Continue per plan of care progress as tolerated.     Precautions: HTN, type II DM     HEP: walking program outside or on treadmill     Manuals 9/30 10/7 10/10 10/14                                         Neuro Re-Ed         Hurdles   Low fwd while carrying various objects L hand. X7 laps       Step ups  8in   Fwd - 1min ea  8in holding water ball. 1min x2 ea  8in holding water ball. 1min x1 ea      Resisted walking  100ft x5 dragging 30#    No weight  100ft x2 50ft x6 dragging 30#    No weight  50ft x2 50ft x8 dragging 30#    No weight  50ft x2 100ft x4 dragging 30#    No weight  50ft x2     Blaze pods   On floor and waist height surface. LLE and LUE only. 1.5min x3  On floor and waist height surface. LLE and LUE only. Stepping over foam beams 1.5min x3      Side stepping                            Ther Ex         HIIT treadmill  Base: 1% 1.3mph    Fast: 1% 2.2-2.3mph    13min total    HR:  RPE: 6-7 Base: 1% 1.4mph    Fast: 1% 2.3-2.4mph    13min total    HR: 91  RPE: 7 Base: 1% 1.4mph    Fast: 1% 2.3-2.5mph    13min  total    HR: 113  RPE: 7 Base: 1% 1.4mph    Fast: 1% 2.3-2.5mph    13min total    HR: 113  RPE: 7     Leg press machine   50lbs recumbent, position 9/10. 3x10. Cues to not hyperextend knee  65lbs  Recumbent position 9. 3x10 Cues to not hyperextend knee                                                            Ther Activity                           Gait Training                           Modalities

## 2024-10-14 NOTE — TELEPHONE ENCOUNTER
Dao afternoon Dr.Schoen,    Patient Fadi came into office 10/14/24 to cancel appointment for 10/15/24 due to patient going out of town. Patient would like to reschedule appointment for another Tuesday afternoon. Could you or Alexandra please assist in getting patient reschedule at your earliest convenience. Patient would like call with details of appointment information. Please advise?

## 2024-10-15 ENCOUNTER — REMOTE DEVICE CLINIC VISIT (OUTPATIENT)
Dept: CARDIOLOGY CLINIC | Facility: CLINIC | Age: 59
End: 2024-10-15
Payer: COMMERCIAL

## 2024-10-15 DIAGNOSIS — Z95.0 PRESENCE OF PERMANENT CARDIAC PACEMAKER: Primary | ICD-10-CM

## 2024-10-15 PROCEDURE — 93296 REM INTERROG EVL PM/IDS: CPT | Performed by: INTERNAL MEDICINE

## 2024-10-15 PROCEDURE — 93294 REM INTERROG EVL PM/LDLS PM: CPT | Performed by: INTERNAL MEDICINE

## 2024-10-15 NOTE — PROGRESS NOTES
Results for orders placed or performed in visit on 10/15/24   Cardiac EP device report    Narrative    MDT BI-V PM/ ACTIVE SYSTEM IS MRI CONDITIONAL  CARELINK TRANSMISSION: BATTERY VOLTAGE ADEQUATE. (11 YRS) AP 9%  98%. ALL AVAILABLE LEAD PARAMETERS WITHIN NORMAL LIMITS. NO SIGNIFICANT HIGH RATE EPISODES. VENTRICULAR SENSE EPISODES. OPTI-VOL WITHIN NORMAL LIMITS. NORMAL DEVICE FUNCTION.---GUZMAN

## 2024-10-16 ENCOUNTER — TELEPHONE (OUTPATIENT)
Dept: FAMILY MEDICINE CLINIC | Facility: CLINIC | Age: 59
End: 2024-10-16

## 2024-10-16 ENCOUNTER — PATIENT OUTREACH (OUTPATIENT)
Dept: FAMILY MEDICINE CLINIC | Facility: CLINIC | Age: 59
End: 2024-10-16

## 2024-10-16 RX ORDER — INSULIN DEGLUDEC 100 U/ML
INJECTION, SOLUTION SUBCUTANEOUS
COMMUNITY
Start: 2024-10-16

## 2024-10-16 NOTE — TELEPHONE ENCOUNTER
Time to renew patient assistant programs applications     Recommend switching lantus to tresiba then pt only needs to deal with jazmine nordisk    1:1 switch 35 units daily --- updated med list    Continue ozempic as is    Completely filled out forms with exception of signatures     Pharmacist Tracking Tool  Reason For Outreach: Embedded Pharmacist  Demographics:  Intervention Method: Chart Review  Type of Intervention: Follow-Up  Topics Addressed: Diabetes  Pharmacologic Interventions: Dose or Frequency Adjusted  Non-Pharmacologic Interventions: Care coordination  Time:  Direct Patient Care:  0  mins  Care Coordination:  20  mins  Recommendation Recipient: Patient/Caregiver  Outcome: Accepted       Chente Lewis, PharmD, BCACP  Ambulatory Care Clinical Pharmacist

## 2024-10-16 NOTE — PROGRESS NOTES
Medication Patient Assistance Program (MPAP) Specialist  received Personal reminder regarding patient's upcoming renewals for [employer] and [employer]i [employer] patient assistance programs. MPAP specialist reviewed patient chart prior to outreach.MPAP specialist successfully reached patient. MPAP specialist introduced herself and explained her role. MPAP specialist explained patient's renewals are due for patient assistance programs. Patient stated he will come today at 10:00 to complete applications. MPAP specialist thanked patient. MPAP specialist will meet with patient and complete patient's applications.    MPAP specialist met with patient. MPAP specialist explained application for Heri Nordisk and assisted patient with its completion./MPAP specialist assisted patient with [employer] application. MPAP specialist has given clinician portion to clinician to complete. MPAP specialist will complete application process once MPAP specialist receives clinician portion. MPAP specialist has placed personal reminder.

## 2024-10-17 ENCOUNTER — OFFICE VISIT (OUTPATIENT)
Facility: REHABILITATION | Age: 59
End: 2024-10-17
Payer: COMMERCIAL

## 2024-10-17 ENCOUNTER — PATIENT OUTREACH (OUTPATIENT)
Dept: FAMILY MEDICINE CLINIC | Facility: CLINIC | Age: 59
End: 2024-10-17

## 2024-10-17 DIAGNOSIS — R20.9 ALTERATION OF SENSATION AS LATE EFFECT OF CEREBROVASCULAR ACCIDENT (CVA): Primary | ICD-10-CM

## 2024-10-17 DIAGNOSIS — I69.398 ALTERATION OF SENSATION AS LATE EFFECT OF CEREBROVASCULAR ACCIDENT (CVA): Primary | ICD-10-CM

## 2024-10-17 PROCEDURE — 97112 NEUROMUSCULAR REEDUCATION: CPT | Performed by: PHYSICAL THERAPIST

## 2024-10-17 PROCEDURE — 97116 GAIT TRAINING THERAPY: CPT | Performed by: PHYSICAL THERAPIST

## 2024-10-17 NOTE — PROGRESS NOTES
Daily Note     Today's date: 10/17/2024  Patient name: Fadi Busby  : 1965  MRN: [id number]  Referring provider: Kee Owen DO  Dx:   Encounter Diagnosis     ICD-10-CM    1. Alteration of sensation as late effect of cerebrovascular accident (CVA)  I69.398     R20.9                              Subjective: Doing well today. Didn't go to the gym yet. Arrived 15min late today, but able to accommodate.     Objective: See treatment diary below        Assessment: Tolerated treatment well. Progressed to using 2# ankle weight which pt tolerated well. Challenged with negotiating hurdles with reciprocal pattern due to spasticity. Also challenged with utilizing LUE to manipulate cones due to limitations with pronation supination AROM.   Will benefit from continued PT.      Plan: Continue per plan of care progress as tolerated.     Precautions: HTN, type II DM     HEP: walking program outside or on treadmill     **used 2# L ankle weight   Manuals 9/30 10/7 10/10 10/14 10/17                                        Neuro Re-Ed         Hurdles   Low fwd while carrying various objects L hand. X7 laps   **Low fwd. While carrying & stacking cones. X10 laps     Step ups  8in   Fwd - 1min ea  8in holding water ball. 1min x2 ea  8in holding water ball. 1min x1 ea      Resisted walking  100ft x5 dragging 30#    No weight  100ft x2 50ft x6 dragging 30#    No weight  50ft x2 50ft x8 dragging 30#    No weight  50ft x2 100ft x4 dragging 30#    No weight  50ft x2 Fwd/bwd resistance from band. 20ft lap x3    Blaze pods   On floor and waist height surface. LLE and LUE only. 1.5min x3  On floor and waist height surface. LLE and LUE only. Stepping over foam beams 1.5min x3  Over 6in step. 1 pod on waist height surface. Using LUE and LLE. 1.5min x3     Side stepping      Resistance from band 20ft lap x2                       Ther Ex         HIIT treadmill  Base: 1% 1.3mph    Fast: 1% 2.2-2.3mph    13min total    HR:  RPE: 6-7 Base: 1%  1.4mph    Fast: 1% 2.3-2.4mph    13min total    HR: 91  RPE: 7 Base: 1% 1.4mph    Fast: 1% 2.3-2.5mph    13min total    HR: 113  RPE: 7 Base: 1% 1.4mph    Fast: 1% 2.3-2.5mph    13min total    HR: 113  RPE: 7 **Base: 1% 1.5mph    Fast: 1% 2.3-2.5mph    10min total    HR: 116  RPE: 7    Leg press machine   50lbs recumbent, position 9/10. 3x10. Cues to not hyperextend knee  65lbs  Recumbent position 9. 3x10 Cues to not hyperextend knee                                                            Ther Activity                           Gait Training                           Modalities

## 2024-10-17 NOTE — PROGRESS NOTES
Medication Patient Assistance Program (MPAP) Specialist  received personal reminder regarding clinician portion of application. MPAP specialist reviewed patient chart prior to outreach. MPAP specialist received clinician portion of [employer] patient assistance program for Tresiba and Ozempic. MPAP specialist reviewed and faxed completed application to [employer]. MPAP specialist will follow up with [employer] in 10-14 business days to determine application status. MPAP specialist has placed a personal reminder. MPAP specialist has scanned completed application into [employer].

## 2024-10-17 NOTE — TELEPHONE ENCOUNTER
Dao Morris,    Could you please assist in scheduling Fadi for a  appointment with Dr.Schoen on 11/19/24 at 2:20 pm? Please advise?

## 2024-10-21 ENCOUNTER — EVALUATION (OUTPATIENT)
Dept: OCCUPATIONAL THERAPY | Facility: REHABILITATION | Age: 59
End: 2024-10-21
Payer: COMMERCIAL

## 2024-10-21 ENCOUNTER — OFFICE VISIT (OUTPATIENT)
Facility: REHABILITATION | Age: 59
End: 2024-10-21
Payer: COMMERCIAL

## 2024-10-21 DIAGNOSIS — R20.9 ALTERATION OF SENSATION AS LATE EFFECT OF CEREBROVASCULAR ACCIDENT (CVA): Primary | ICD-10-CM

## 2024-10-21 DIAGNOSIS — I69.398 ALTERATION OF SENSATION AS LATE EFFECT OF CEREBROVASCULAR ACCIDENT (CVA): Primary | ICD-10-CM

## 2024-10-21 DIAGNOSIS — I63.9 CEREBROVASCULAR ACCIDENT (CVA), UNSPECIFIED MECHANISM (HCC): Primary | ICD-10-CM

## 2024-10-21 PROCEDURE — 97116 GAIT TRAINING THERAPY: CPT | Performed by: PHYSICAL THERAPIST

## 2024-10-21 PROCEDURE — 97166 OT EVAL MOD COMPLEX 45 MIN: CPT | Performed by: OCCUPATIONAL THERAPIST

## 2024-10-21 PROCEDURE — 97112 NEUROMUSCULAR REEDUCATION: CPT | Performed by: PHYSICAL THERAPIST

## 2024-10-21 NOTE — PROGRESS NOTES
OCCUPATIONAL THERAPY INITIAL EVALUATION:    10/21/2024  Fadi Busby  1965  [id number]  [place] KeeDO jackie   Diagnosis ICD-10-CM Associated Orders   1. Cerebrovascular accident (CVA), unspecified mechanism (HCC)  I63.9             Assessment    Assessment details: See skilled analysis for further details.         Skilled Analysis:  Pt is a 58 y.o. male referred to Occupational Therapy s/p Cerebrovascular accident (CVA), unspecified mechanism (HCC) [I63.9].   Pt participated in skilled OT evaluation and following formalized testing as well as clinical observation, Pt presents with the following areas of deficit:  poor automaticity of LUE motor functioning both proximally and distally, tendencies of drifting into shoulder ABD when performing elbow flexion with LUE, decreased B/L coordination 2* motor lags of LUE, decreased speed and accuracy of FMC/prehension/in-hand manipulation, impaired LUE proprioception, decreased muscle endurance 2* increased effort required when utilizing LUE, and decreased LUE AROM affecting abilities of retrieving and placing items in cabinets.  Pt will benefit from skilled Occupational Therapy services 2x/week for 12 weeks with focus on neuro re-ed, manual techniques, thera act, thera ex to improve on the above deficits, improve functional use of LUE, and enhance overall QOL.     Short Term Goals:  Pt will increase proprioception of LUE hand to target for improved functional reach vision occluded with ADL/IADL tasks  x 25% accuracy 4 weeks  Pt will demo with G carryover of Home Exercise Program to improve functional progression towards goals in Plan of care, increased LUE AROM and for improved functional use of LUE  x 25% 4 weeks  Pt will demo with G tolerance to supine, seated, and in stance exercise x 30 minutes with minimal rest breaks required for increased engagement in weekly exercise regimen and increased engagement in life roles 4 weeks  Pt will increase automaticity of LUE to  50% for improved grasp release of tabletop items for improved functional performance with salient tasks 4 weeks  Pt will increase LUE rate of manipulation for all FM tests x 15 seconds for improved functional performance/independence with salient tasks 4 weeks  Pt will increase LUE to refined assist with <10% cuing for tabletop tasks for improved functional performance of life roles and salient tasks 4 weeks  Pt will increase L  strength x 5 lbs, through the use of strengthening exercises and home program for improved abilities retrieving items from cabinet in home environment 4 weeks  Pt will demo with increased LUE shoulder FF and ABD AROM x 10* to improve his abilities to retrieve and place items in cabinets with increased PSFS level to 7/10 4 weeks      Long Term Goals:  Pt will increase proprioception of LUE hand to target for improved functional reach vision occluded with ADL/IADL tasks  x 50% accuracy  Pt will demo with G carryover of Home Exercise Program to improve functional progression towards goals in Plan of care, increased LUE AROM and for improved functional use of LUE  x 50%   Pt will demo with G tolerance to supine, seated, and in stance exercise x 45 minutes with minimal rest breaks required for increased engagement in weekly exercise regimen and increased engagement in life roles   Pt will increase automaticity of LUE to 75% for improved grasp release of tabletop items for improved functional performance with salient tasks  Pt will increase LUE rate of manipulation for all FM tests x 30 seconds for improved functional performance/independence with salient tasks   Pt will increase LUE to Mod I with 0% cuing for tabletop tasks for improved functional performance of life roles and salient tasks   Pt will increase L  strength x 8 lbs, through the use of strengthening exercises and home program for improved abilities retrieving items from cabinet in home environment   Pt will demo with  "increased LUE shoulder FF and ABD AROM x 15* to improve his abilities to retrieve and place items in cabinets with increased PSFS level to 8/10       Subjective Evaluation    Quality of life: good          SUBJECTIVE: \"My arm feels restricted.  I have a difficult time moving it.\"    PATIENT GOAL: \"Being able to pick things out of the cabinet.\"    Patient-Specific Functional Scale   Task is scored 0 (unable to perform activity) to 10 (ability to perform activity independently)    Activity Date: 10/21/24 Date:   Being able to pick things out of the cabinet 5/10        HISTORY OF PRESENT ILLNESS:     Pt is a 58 y.o. male who was referred to Occupational Therapy s/p  Cerebrovascular accident (CVA), unspecified mechanism (HCC) [I63.9].  As per chart review, hx of multiple CVA. His most recent occurring in . He still suffers from left sided hemiplegia. Pt with self-report of experiencing first stroke in 2006, second stroke was 2006, and last stroke was 2009.  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  Pt received TPA with first CVA with excellent recovery. Pt was hospitalized for one month on second CVA-- Pt participated in PT/OT.  Pt also received PT/OT after third stroke.      Pt with self-report of LUE weakness affecting functional use of LUE.  Pt also with self-report of noticing decreased AROM of LUE.      Pt lives in a fourth floor apartment independently.  Pt currently performing all ADLs/IADLs at Mod I status.  Pt currently managing both medication and finances independently. Pt is currently on long term disability since [id number].  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  Pt continues the  role without difficulties reported.      PMH:   Past Medical History:   Diagnosis Date    Acute pain of right shoulder     Paronychia of right index finger 08/15/2023         Pain Levels:     Restin    With " Activity:  0    Objective     Functional Assessment        Comments  See impairment section for further details.       Impairment Observations:        IE RUE IE LUE  Comments                 UPPER EXTREMITY FXN Intact Impaired  Pt is R hand dominant                           /Pinch Strength           Dynamometer       - Gross Grasp 85 lbs 73 lbs  abnormal   Pinch Meter        - PINCER 12 lbs 11 lbs  subnormal    - TRIPOD 14 lbs 6.5 lbs  abnormal    - LATERAL 26.5 lbs  22.5 lbs  subnormal               AROM (seated)        Elevation WFL Near full     Shoulder FF WFL  135*      Shoulder Ext WFL  60*      Shoulder Abd WFL  125*      Elbow Flex WFL  WFL      Elbow Ext WFL  WFL      Pronation WFL  WFL      Supination WFL  1/2 AROM      Wrist Flex WFL  50*      Wrist Ext WFL  50*      Digit Flex WFL  WFL      Digit Ex WFL  WFL      Composite Grasp WFL  WFL      Hook Grasp WFL  WFL      Opposition WFL  Impaired      Dysdiadochokinesia WFL Impaired     Subluxation  N/A  N/A       Shoulder FF 5/5 5/5     Shoulder Ext 5/5 5/5     Shoulder Abd 5/5 5/5     Shoulder ADd 5/5 5/5     Elbow Flex 5/5 5/5     Elbow Ext 5/5 5/5     Wrist Flex 5/5 5/5     Wrist Ext 5/5 5/5     SENSATION       Myofilaments (2.83 WNL) 2.83 2.83     Sharp Dull  Intact Intact     Proprioception Intact Impaired     Hot/Cold Temp Intact Intact            COORDINATION       9 Hole Peg Test 22.98 seconds 2 minutes and 12 seconds  abnormal   MODIFIED ANAMIKA SCALE (TONE)       No increase in muscle tone (0) 0 0     Slight Increase in muscle tone with catch and release or min resist at end range (1)       Slight Increase in muscle tone with catch and release, followed by min resistance through remainder of range (1+)       Increased muscle tone through full range, able to be moved easily (2)       Considerable increase in tone, difficult to move (3)       Rigid in Flexion/Extension (4)                                         OTHER PLANNED THERAPY  INTERVENTIONS:   Supine, seated, and in stance neuro re-ed  LUE AROM/AAROM  Bilateral integrative tasks  FMC/prehension/in-hand manipulation  Timed Trials  Manual tx  Hand to target  Seated functional reach: crossing midline  Supine place and hold  WBearing strategies   Closed chain activities  Open chain activities      INTERVENTION COMMENTS:  Diagnosis: Cerebrovascular accident (CVA), unspecified mechanism (HCC) [I63.9]  Precautions: L hemiplegia; PACE MAKER-- NO STIM; DM II   Insurance: Payor: [employer]  REP / Plan: [employer]UE MEDICARE PPO [facility] REP / Product Type: Medicare HMO /   1 of ______ visits, PN due 11/21/2024

## 2024-10-21 NOTE — PROGRESS NOTES
Daily Note     Today's date: 10/21/2024  Patient name: Fadi Busby  : 1965  MRN: [id number]  Referring provider: Kee Owen DO  Dx:   Encounter Diagnosis     ICD-10-CM    1. Alteration of sensation as late effect of cerebrovascular accident (CVA)  I69.398     R20.9                                Subjective: Reports feeling ok. Arrived late today.     Objective: See treatment diary below        Assessment: Tolerated treatment well. Shortened session today due to pt arriving late. Able to progress treadmill speed today and increase ankle weight with gait training which he tolerated well.   Will benefit from continued PT.      Plan: Continue per plan of care progress as tolerated.     Precautions: HTN, type II DM     HEP: walking program outside or on treadmill     **used 2# L ankle weight   Manuals 9/30 10/7 10/10 10/14 10/17 10/21                                       Neuro Re-Ed         Hurdles   Low fwd while carrying various objects L hand. X7 laps   **Low fwd. While carrying & stacking cones. X10 laps     Step ups  8in   Fwd - 1min ea  8in holding water ball. 1min x2 ea  8in holding water ball. 1min x1 ea   **8in fwd/bwd x20 ea ea    Resisted walking  100ft x5 dragging 30#    No weight  100ft x2 50ft x6 dragging 30#    No weight  50ft x2 50ft x8 dragging 30#    No weight  50ft x2 100ft x4 dragging 30#    No weight  50ft x2 Fwd/bwd resistance from band. 20ft lap x3    Blaze pods   On floor and waist height surface. LLE and LUE only. 1.5min x3  On floor and waist height surface. LLE and LUE only. Stepping over foam beams 1.5min x3  Over 6in step. 1 pod on waist height surface. Using LUE and LLE. 1.5min x3     Side stepping      Resistance from band 20ft lap x2                       Ther Ex         HIIT treadmill  Base: 1% 1.3mph    Fast: 1% 2.2-2.3mph    13min total    HR:  RPE: 6-7 Base: 1% 1.4mph    Fast: 1% 2.3-2.4mph    13min total    HR: 91  RPE: 7 Base: 1% 1.4mph    Fast: 1% 2.3-2.5mph    13min  total    HR: 113  RPE: 7 Base: 1% 1.4mph    Fast: 1% 2.3-2.5mph    13min total    HR: 113  RPE: 7 **Base: 1% 1.5mph    Fast: 1% 2.3-2.5mph    10min total    HR: 116  RPE: 7 **Base: 1% 1.5mph    Fast: 1% 2.3-2.6mph    10min total    HR: 116  RPE: 7   Leg press machine   50lbs recumbent, position 9/10. 3x10. Cues to not hyperextend knee  65lbs  Recumbent position 9. 3x10 Cues to not hyperextend knee                                                            Ther Activity                           Gait Training                           Modalities

## 2024-10-23 ENCOUNTER — OFFICE VISIT (OUTPATIENT)
Dept: OCCUPATIONAL THERAPY | Facility: REHABILITATION | Age: 59
End: 2024-10-23
Payer: COMMERCIAL

## 2024-10-23 DIAGNOSIS — I63.9 CEREBROVASCULAR ACCIDENT (CVA), UNSPECIFIED MECHANISM (HCC): Primary | ICD-10-CM

## 2024-10-23 PROCEDURE — 97112 NEUROMUSCULAR REEDUCATION: CPT

## 2024-10-23 NOTE — PROGRESS NOTES
"Daily Note     Today's date: 10/23/2024  Patient name: Fadi Busby  : 1965  MRN: [id number]  Referring provider: Kee Owen DO  Dx:   Encounter Diagnosis     ICD-10-CM    1. Cerebrovascular accident (CVA), unspecified mechanism (HCC)  I63.9           Start Time: 1110  Stop Time: 1150  Total time in clinic (min): 40 minutes      Subjective: \"It feels difficult to move my arm that way.\"      Objective: See treatment below    Neuromuscular Re-Education:  Use of Facilitation from Therapist, Visual Feedback with Mirror & Symmetry of Motion  Requiring Hand-over-Hand for Shoulder ER with UE Motion  Verbal Cueing for Postural Awareness     Scapular Elevation, Depression, Retraction & Circular (Up-Down-Back)  Shoulder Flexion to 90, Scaption to 90, Abduction to 90  Sidelying Shoulder ER    Flexor Tone Stretching, Weightbearing through Affected Side  Shoulder Abduction, Elbow Extension, Wrist Extension, Digit Extension      Assessment: Resting scapular asymmetry and difficulty with symmetry of motion-- activation initially through R side, followed by L. Shoulder internally rotates with UE motion, educated on sidelying shoulder ER to improve strength through this plane of motion. Educated throughout on UE ROM/strength re-training HEP, which patient verbalized/demonstrated good understanding of. Tolerated treatment well. Patient demonstrated fatigue post treatment and would benefit from continued OT      Plan: Continue per plan of care.  Progress treatment as tolerated.        INTERVENTION COMMENTS:  Diagnosis: Cerebrovascular accident (CVA), unspecified mechanism (HCC) [I63.9]  Precautions: L hemiplegia; PACE MAKER-- NO STIM; DM II   Insurance: Payor: [employer]  REP / Plan: COMM[employer] BLUE MEDICARE PPO [facility] REP / Product Type: Medicare HMO /   2 of __12____ visits, PN due 2024     "

## 2024-10-23 NOTE — TELEPHONE ENCOUNTER
FAXED ON 10/23/24 TO [facility] at [phone]. FAX CONFIRMATION RECEIVED.

## 2024-10-24 ENCOUNTER — OFFICE VISIT (OUTPATIENT)
Facility: REHABILITATION | Age: 59
End: 2024-10-24
Payer: COMMERCIAL

## 2024-10-24 ENCOUNTER — PATIENT OUTREACH (OUTPATIENT)
Dept: FAMILY MEDICINE CLINIC | Facility: CLINIC | Age: 59
End: 2024-10-24

## 2024-10-24 DIAGNOSIS — R20.9 ALTERATION OF SENSATION AS LATE EFFECT OF CEREBROVASCULAR ACCIDENT (CVA): Primary | ICD-10-CM

## 2024-10-24 DIAGNOSIS — I69.398 ALTERATION OF SENSATION AS LATE EFFECT OF CEREBROVASCULAR ACCIDENT (CVA): Primary | ICD-10-CM

## 2024-10-24 PROCEDURE — 97110 THERAPEUTIC EXERCISES: CPT | Performed by: PHYSICAL THERAPIST

## 2024-10-24 PROCEDURE — 97112 NEUROMUSCULAR REEDUCATION: CPT | Performed by: PHYSICAL THERAPIST

## 2024-10-24 NOTE — PROGRESS NOTES
Medication Patient Assistance Program (MPAP) Specialist  received faxed letter from [employer]. Per faxed letter patient 's application for Tresiba and Ozempic needs household size and income. MPAP specialist reached out to [employer] for clarification as this was provided on renewal application. MPAP specialist spoke with Corrie [employer] representative. Per [employer] patient's application was approved this morning from 01/01/2025-12/31/2025. MPAP specialist thanked Her[employer] rep.

## 2024-10-24 NOTE — PROGRESS NOTES
Daily Note     Today's date: 10/24/2024  Patient name: Fadi Busby  : 1965  MRN: [id number]  Referring provider: Kee Owen DO  Dx:   Encounter Diagnosis     ICD-10-CM    1. Alteration of sensation as late effect of cerebrovascular accident (CVA)  I69.398     R20.9                              Subjective: Reports feeling ok. Used the treadmill for about 30 minutes at his gym.     Objective: See treatment diary below        Assessment: Tolerated treatment well. Able to progress intensity of treadmill walking today with good tolerance. Mild instability with dynamic balance training and fast walking, but self recovers well.   Will benefit from continued PT.      Plan: Continue per plan of care progress as tolerated.     Precautions: HTN, type II DM     HEP: walking program outside or on treadmill     **used 2.5# L ankle weight   Manuals 10/24  10/10 10/14 10/17 10/21                                       Neuro Re-Ed         Hurdles      **Low fwd. While carrying & stacking cones. X10 laps     Step ups    8in holding water ball. 1min x2 ea  8in holding water ball. 1min x1 ea   **8in fwd/bwd x20 ea ea    Resisted walking  **50ft x 8 dragging 25#      50ft x8 dragging 30#    No weight  50ft x2 100ft x4 dragging 30#    No weight  50ft x2 Fwd/bwd resistance from band. 20ft lap x3    Blaze pods **Over low hurdles. 1 pod on waist height surface. 1.5min x3  On floor and waist height surface. LLE and LUE only. 1.5min x3  On floor and waist height surface. LLE and LUE only. Stepping over foam beams 1.5min x3  Over 6in step. 1 pod on waist height surface. Using LUE and LLE. 1.5min x3     Side stepping      Resistance from band 20ft lap x2     Ramp  **Fwd/bwd x12                 Ther Ex         HIIT treadmill  **Base: 2% 1.5mph    Fast: 2% 2.4-2.7mph    11min total      Base: 1% 1.4mph    Fast: 1% 2.3-2.5mph    13min total    HR: 113  RPE: 7 Base: 1% 1.4mph    Fast: 1% 2.3-2.5mph    13min total    HR: 113  RPE: 7  **Base: 1% 1.5mph    Fast: 1% 2.3-2.5mph    10min total    HR: 116  RPE: 7 **Base: 1% 1.5mph    Fast: 1% 2.3-2.6mph    10min total    HR: 116  RPE: 7   Leg press machine   50lbs recumbent, position 9/10. 3x10. Cues to not hyperextend knee  65lbs  Recumbent position 9. 3x10 Cues to not hyperextend knee                                                            Ther Activity                           Gait Training                           Modalities

## 2024-10-28 ENCOUNTER — OFFICE VISIT (OUTPATIENT)
Dept: OCCUPATIONAL THERAPY | Facility: REHABILITATION | Age: 59
End: 2024-10-28
Payer: COMMERCIAL

## 2024-10-28 ENCOUNTER — OFFICE VISIT (OUTPATIENT)
Facility: REHABILITATION | Age: 59
End: 2024-10-28
Payer: COMMERCIAL

## 2024-10-28 DIAGNOSIS — I69.398 ALTERATION OF SENSATION AS LATE EFFECT OF CEREBROVASCULAR ACCIDENT (CVA): Primary | ICD-10-CM

## 2024-10-28 DIAGNOSIS — I63.9 CEREBROVASCULAR ACCIDENT (CVA), UNSPECIFIED MECHANISM (HCC): Primary | ICD-10-CM

## 2024-10-28 DIAGNOSIS — R20.9 ALTERATION OF SENSATION AS LATE EFFECT OF CEREBROVASCULAR ACCIDENT (CVA): Primary | ICD-10-CM

## 2024-10-28 PROCEDURE — 97112 NEUROMUSCULAR REEDUCATION: CPT | Performed by: PHYSICAL THERAPIST

## 2024-10-28 PROCEDURE — 97112 NEUROMUSCULAR REEDUCATION: CPT | Performed by: OCCUPATIONAL THERAPIST

## 2024-10-28 NOTE — PROGRESS NOTES
"Daily Note     Today's date: 10/28/2024  Patient name: Fadi Busby  : 1965  MRN: [id number]  Referring provider: Kee Owen DO  Dx:   Encounter Diagnosis     ICD-10-CM    1. Alteration of sensation as late effect of cerebrovascular accident (CVA)  I69.398     R20.9                              Subjective: Received from OT.  No new complaints.  No changes since last visit.    Objective: See treatment diary below      Assessment: Added backward walking and side-stepping while on TM today to challenge dynamic balance/strength needed for gait over ground.  Pt had difficulty due to LLE weakness and fatigue.  Encouraged to reduce UE support while on TM to challenge activity, pt able to complete with occaional UE support due to sway.      Plan: Continue per plan of care progress as tolerated.     Precautions: HTN, type II DM     HEP: walking program outside or on treadmill     **used 2.5# L ankle weight   Manuals 10/24 10/28 10/10 10/14 10/17 10/21                                       Neuro Re-Ed         Hurdles      **Low fwd. While carrying & stacking cones. X10 laps     Step ups   4\" & foam  X10 ea 8in holding water ball. 1min x2 ea  8in holding water ball. 1min x1 ea   **8in fwd/bwd x20 ea ea    Resisted walking  **50ft x 8 dragging 25#      50ft x8 dragging 30#    No weight  50ft x2 100ft x4 dragging 30#    No weight  50ft x2 Fwd/bwd resistance from band. 20ft lap x3    Blaze pods **Over low hurdles. 1 pod on waist height surface. 1.5min x3  On floor and waist height surface. LLE and LUE only. 1.5min x3  On floor and waist height surface. LLE and LUE only. Stepping over foam beams 1.5min x3  Over 6in step. 1 pod on waist height surface. Using LUE and LLE. 1.5min x3     Side stepping   On TM .6mph 2x1min ea   Resistance from band 20ft lap x2     Ramp  **Fwd/bwd x12 SOLO with tidal ball fwd x5, side x3 ea                Ther Ex         HIIT treadmill  **Base: 2% 1.5mph    Fast: 2% 2.4-2.7mph    11min " total     SOLO total time x12min  Base 2% 1.5 mph  Fast 2.5- mph  1 min intervals    Bwd walking .8mph x5min Base: 1% 1.4mph    Fast: 1% 2.3-2.5mph    13min total    HR: 113  RPE: 7 Base: 1% 1.4mph    Fast: 1% 2.3-2.5mph    13min total    HR: 113  RPE: 7 **Base: 1% 1.5mph    Fast: 1% 2.3-2.5mph    10min total    HR: 116  RPE: 7 **Base: 1% 1.5mph    Fast: 1% 2.3-2.6mph    10min total    HR: 116  RPE: 7   Leg press machine   50lbs recumbent, position 9/10. 3x10. Cues to not hyperextend knee  65lbs  Recumbent position 9. 3x10 Cues to not hyperextend knee                                                            Ther Activity                           Gait Training                           Modalities

## 2024-10-28 NOTE — PROGRESS NOTES
"Daily Note     Today's date: 10/28/2024  Patient name: Fadi Busby  : 1965  MRN: [id number]  Referring provider: Kee Owen DO  Dx:   Encounter Diagnosis     ICD-10-CM    1. Cerebrovascular accident (CVA), unspecified mechanism (HCC)  I63.9                      Subjective: \"I am surprised how well my arm moved today.\"      Objective: See treatment description below    Neuro Re-ed: Pt began tx session performing neuro re-ed in side lying position for completion of 3 sets x 10 reps of scap retraction/protraction AAROM with UB ranger, followed by 3 sets x 10 reps of shoulder FF/extension AAROM with UB ranger focusing on improved shoulder alignment, increased LUE AAROM/AROM, increased LUE proximal strength/stability, and improved motor control.     Pt concluded tx session with performing ring retrieval and placement on metal rodney in PNF D2 flexion/extension movement patterns to increased LUE proprioception/kinesthetic awareness, increased LUE concentric/eccentric motor control, increased LUE proximal strength/stability, and improved LUE proximal/distal teaming.     Assessment: Tolerated treatment well. Patient would benefit from continued OT    Pt demo with excellent motor control with both scap retraction/protraction and shoulder FF/extension with abilities to complete 3 sets x 10 reps with minimal tactile cues required-- Pt demo with occasional tendencies of drifting into IR when moving in shoulder FF/extension movement planes.  Pt also demo with slight involuntary tremors when reaching in PNF D2 extension planes affecting hand to target and motor control.  Pt demo with Max difficulties performing supination AROM to place ring on metal rodney with increased time and visual attention required to do so.        Plan: Continue per plan of care.     NTERVENTION COMMENTS:  Diagnosis: Cerebrovascular accident (CVA), unspecified mechanism (HCC) [I63.9]  Precautions: L hemiplegia; PACE MAKER-- NO STIM; DM II "   Insurance: Payor: [employer] [facility] REP / Plan: COMM[employer] BLUE MEDICARE PPO [facility] REP / Product Type: Medicare HMO /   3 of __12____ visits, PN due 11/21/2024

## 2024-10-30 ENCOUNTER — PATIENT OUTREACH (OUTPATIENT)
Dept: FAMILY MEDICINE CLINIC | Facility: CLINIC | Age: 59
End: 2024-10-30

## 2024-10-30 ENCOUNTER — OFFICE VISIT (OUTPATIENT)
Dept: OCCUPATIONAL THERAPY | Facility: REHABILITATION | Age: 59
End: 2024-10-30
Payer: COMMERCIAL

## 2024-10-30 DIAGNOSIS — I63.9 CEREBROVASCULAR ACCIDENT (CVA), UNSPECIFIED MECHANISM (HCC): Primary | ICD-10-CM

## 2024-10-30 PROCEDURE — 97112 NEUROMUSCULAR REEDUCATION: CPT | Performed by: OCCUPATIONAL THERAPIST

## 2024-10-30 PROCEDURE — 97110 THERAPEUTIC EXERCISES: CPT | Performed by: OCCUPATIONAL THERAPIST

## 2024-10-30 NOTE — PROGRESS NOTES
Medication Patient Assistance Program (MPAP) Specialist  received Faxed letter from [employer] regarding patient's Application for patient assistance program for Tresiba and Ozempic. Per faxed letter patient is approved for Tresiba through 12/31/2025. MPAP specialist will follow up with [employer] regarding Ozempic approval. MPAP specialist has placed a personal reminder.

## 2024-10-30 NOTE — PROGRESS NOTES
"Daily Note     Today's date: 10/30/2024  Patient name: Fadi Busby  : 1965  MRN: [id number]  Referring provider: Kee Owen DO  Dx:   Encounter Diagnosis     ICD-10-CM    1. Cerebrovascular accident (CVA), unspecified mechanism (HCC)  I63.9                      Subjective: \"I can feel that when I place my L arm on the steering wheel is so much easier now.  It is smooth and without a struggle.\"      Objective: See treatment description below    Thera Ex: UBE x 5 minutes prograde and x 5 minutes retrograde in seated position bilaterally with 1.8 resistance level to improved LUE proximal strength, muscle endurance, and bilateral coordination.     Neuro Re-ed: Pt advanced to performing supine neuro re-ed for completion of 3 sets x 10 reps of chest press with #3 T-bar with scap retraction x 3 second holds focusing on increased LUE proximal strength/stability, improved LUE muscle endurance, improved concentric/eccentric motor control, and improved overall movement patterns.    Pt concluded tx session with completion of seated neuro re-ed with #2 DB for completion of 2 sets x 10 reps of shoulder ABDADD over bolster with supination/pronation demands to increased LUE proximal strength/endurance, increased LUE supination/pronation AROM, improved LUE proximal/distal teaming, and improved fluidity of LUE functional movements.    Assessment: Tolerated treatment well. Patient would benefit from continued OT    Pt demo with excellent activity tolerance to UBE demands with abilities to complete x 10 minutes with 1.8 resistance level without complaints of fatigue, G bilateral coordination, and excellent L tricep extension with functional movements in both prograde and retrograde movement patterns.  Pt demo with excellent concentric/eccentric motor control and bilateral coordination/symmetry with chest press without movement delay of LUE evident and with abilities to achieve full tricep extension of LUE each rep.  Pt " presented with significant challenges performing supination AROM with LUE 2* flexor tone inhibiting normal movements, though significant improved supination to 3/4 evident as activity persisted.      Plan: Continue per plan of care.     NTERVENTION COMMENTS:  Diagnosis: Cerebrovascular accident (CVA), unspecified mechanism (HCC) [I63.9]  Precautions: L hemiplegia; PACE MAKER-- NO STIM; DM II   Insurance: Payor: [employer]  REP / Plan: COMM[employer] BLUE MEDICARE PPO [facility] REP / Product Type: Medicare HMO /   4 of __12____ visits, PN due 11/21/2024

## 2024-10-31 ENCOUNTER — OFFICE VISIT (OUTPATIENT)
Facility: REHABILITATION | Age: 59
End: 2024-10-31
Payer: COMMERCIAL

## 2024-10-31 DIAGNOSIS — I69.398 ALTERATION OF SENSATION AS LATE EFFECT OF CEREBROVASCULAR ACCIDENT (CVA): Primary | ICD-10-CM

## 2024-10-31 DIAGNOSIS — R20.9 ALTERATION OF SENSATION AS LATE EFFECT OF CEREBROVASCULAR ACCIDENT (CVA): Primary | ICD-10-CM

## 2024-10-31 PROCEDURE — 97116 GAIT TRAINING THERAPY: CPT | Performed by: PHYSICAL THERAPIST

## 2024-10-31 PROCEDURE — 97112 NEUROMUSCULAR REEDUCATION: CPT | Performed by: PHYSICAL THERAPIST

## 2024-10-31 NOTE — PROGRESS NOTES
"Daily Note     Today's date: 10/31/2024  Patient name: Fadi Busby  : 1965  MRN: [id number]  Referring provider: Kee Owen DO  Dx:   Encounter Diagnosis     ICD-10-CM    1. Alteration of sensation as late effect of cerebrovascular accident (CVA)  I69.398     R20.9                            Subjective: Feeling well today. Did some treadmill waling at home. L leg was tired after last visit.      Objective: See treatment diary below      Assessment: Tolerated treatment well. Challenged with backwards walking on treadmill needing cues to increase step amplitude on LLE and limited by fatigue. Would benefit from continued PT.       Plan: Continue per plan of care progress as tolerated.     Precautions: HTN, type II DM     HEP: walking program outside or on treadmill     **used 2.5# L ankle weight   Manuals 10/24 10/28 10/31  10/17 10/21                                       Neuro Re-Ed         Hurdles      **Low fwd. While carrying & stacking cones. X10 laps     Step ups   4\" & foam  X10 ea    **8in fwd/bwd x20 ea ea    Resisted walking  **50ft x 8 dragging 25#      **50ft x 10 dragging 25-30#.     No weight 50ft x2   Fwd/bwd resistance from band. 20ft lap x3    Blaze pods **Over low hurdles. 1 pod on waist height surface. 1.5min x3    Over 6in step. 1 pod on waist height surface. Using LUE and LLE. 1.5min x3     Side stepping   On TM .6mph 2x1min ea   Resistance from band 20ft lap x2     Ramp  **Fwd/bwd x12 SOLO with tidal ball fwd x5, side x3 ea SOLO with tidal ball fwd x10, side x3 ea               Ther Ex         HIIT treadmill  **Base: 2% 1.5mph    Fast: 2% 2.4-2.7mph    11min total     SOLO total time x12min  Base 2% 1.5 mph  Fast 2.5- mph  1 min intervals    Bwd walking .8mph x5min **Base: 2% 1.5mph no UE    Fast: 2% 2.4-2.6mph no UE    11min total  -----    **Bwd walking 0.8mph x2min, x2min. 1 UE  **Base: 1% 1.5mph    Fast: 1% 2.3-2.5mph    10min total    HR: 116  RPE: 7 **Base: 1% 1.5mph    Fast: " 1% 2.3-2.6mph    10min total    HR: 116  RPE: 7   Leg press machine                                                               Ther Activity                           Gait Training                           Modalities

## 2024-11-04 ENCOUNTER — EVALUATION (OUTPATIENT)
Facility: REHABILITATION | Age: 59
End: 2024-11-04
Payer: COMMERCIAL

## 2024-11-04 ENCOUNTER — APPOINTMENT (OUTPATIENT)
Dept: OCCUPATIONAL THERAPY | Facility: REHABILITATION | Age: 59
End: 2024-11-04
Payer: COMMERCIAL

## 2024-11-04 ENCOUNTER — OFFICE VISIT (OUTPATIENT)
Dept: OCCUPATIONAL THERAPY | Facility: REHABILITATION | Age: 59
End: 2024-11-04
Payer: COMMERCIAL

## 2024-11-04 DIAGNOSIS — I63.9 CEREBROVASCULAR ACCIDENT (CVA), UNSPECIFIED MECHANISM (HCC): Primary | ICD-10-CM

## 2024-11-04 DIAGNOSIS — I69.398 ALTERATION OF SENSATION AS LATE EFFECT OF CEREBROVASCULAR ACCIDENT (CVA): Primary | ICD-10-CM

## 2024-11-04 DIAGNOSIS — R20.9 ALTERATION OF SENSATION AS LATE EFFECT OF CEREBROVASCULAR ACCIDENT (CVA): Primary | ICD-10-CM

## 2024-11-04 PROCEDURE — 97110 THERAPEUTIC EXERCISES: CPT | Performed by: OCCUPATIONAL THERAPIST

## 2024-11-04 PROCEDURE — 97112 NEUROMUSCULAR REEDUCATION: CPT | Performed by: PHYSICAL THERAPIST

## 2024-11-04 PROCEDURE — 97110 THERAPEUTIC EXERCISES: CPT | Performed by: PHYSICAL THERAPIST

## 2024-11-04 PROCEDURE — 97112 NEUROMUSCULAR REEDUCATION: CPT | Performed by: OCCUPATIONAL THERAPIST

## 2024-11-04 NOTE — PROGRESS NOTES
"Daily Note     Today's date: 2024  Patient name: Fadi Busby  : 1965  MRN: [id number]  Referring provider: Kee Owen DO  Dx:   Encounter Diagnosis     ICD-10-CM    1. Cerebrovascular accident (CVA), unspecified mechanism (HCC)  I63.9                      Subjective: \"I have to really focus on my arm so it doesn't wobble.\"      Objective: See treatment description below    Thera Ex: UBE x 5 minutes prograde and x 5 minutes retrograde in seated position bilaterally with 2.0 resistance level to improved LUE proximal strength, muscle endurance, and bilateral coordination.     Neuro Re-ed: Pt advanced to performing neuro re-ed in side lying position with UB ranger for completion of 3 sets x 10 reps of scap retraction/protraction and 3 sets x 10 reps of shoulder FF/extension to improve LUE proximal strength/stability, increased LUE AROM, and improved concentric/eccentric motor control.     Assessment: Tolerated treatment well. Patient would benefit from continued OT    Pt demo with excellent activity tolerance to UBE demands with abilities to complete x 10 minutes with 2.0 resistance level without complaints of fatigue, G bilateral coordination, and excellent L tricep extension with functional movements in both prograde and retrograde movement patterns.  Pt required increased visual attention to LUE throughout both scap retraction/protraction and shoulder FF/extension 2* max difficulties maintaining both concentric/eccentric motor control.  Pt demo with tendencies to move into IR and presented with slight ataxic-like involuntary movements with protraction movement patterns.  Pt demo with abilities to correct after verbal cues were provided.     Plan: Continue per plan of care.     NTERVENTION COMMENTS:  Diagnosis: Cerebrovascular accident (CVA), unspecified mechanism (HCC) [I63.9]  Precautions: L hemiplegia; PACE MAKER-- NO STIM; DM II   Insurance: Payor: [facility] BLUE [facility] REP / Plan: COMMUNITY " BLUE MEDICARE PPO  REP / Product Type: Medicare HMO /   5 of __12____ visits, PN due 11/21/2024

## 2024-11-04 NOTE — PROGRESS NOTES
Progress Update     Today's date: 2024  Patient name: Fadi Busby  : 1965  MRN: [id number]  Referring provider: Kee Owen DO  Dx:   Encounter Diagnosis     ICD-10-CM    1. Alteration of sensation as late effect of cerebrovascular accident (CVA)  I69.398     R20.9                            Subjective: Reports therapy has been helpful for him. He notes improvement in his walking being able to go faster safely and feeling more controlled. He feels stronger in his legs. He needs to work more outside of therapy on improving. He would like to be able to pick in L foot up better and be able to jog for a short distance. Would like to be able to move laterally a little faster. He would like to try some boxing, he used to like fighting when he was younger.       Objective: See treatment diary below    Vitals:  -BP: 102/70           Balance Test Initial Eval         5x Sit to Stand: 17s no UE  16.3s       TUG:           Gait Speed:            2 Minute Walk Test:           6 Minute Walk Test: 1100ft  1450ft       Cook Balance Scale:           FGA:                     ABC:  43%            Flowsheet Rows      Flowsheet Row Most Recent Value   Functional Gait Assessment    Gait Level Surface  2  [6.7s]   Change in GaiT Speed 2   Gait with horizontal head turns 2   Gait with vertical head turns 3   Gait and pivot tuen  3   Step over obstacle 2   Gait with narrow base of supprt 1   Gait with eyes closed 1  [10s]   Ambulating backwards 2  [9s]   Steps 3   Total score  21             Goals  STGs (4 weeks)  Pt will be independent with comprehensive HEP - progressing   Pt will demonstrate least 3-second improvement on 5 times sit to stand  - progressing      LTG's (to be achieved by d/c)  Pt will be able to self manage sx's independently  - progressing   Pt will demonstrate least 4-point improvement on FGA  - MET  Patient will demonstrate least 200 foot improvement on 6-minute walk test - MET    "    Assessment: Since initial evaluation, Fadi has demonstrated improvements in PT. He demonstrates significantly improved overground speed and endurance per 6MWT. Also demonstrates improved dynamic balance per FGA. Although improved, he does continue to demonstrate impaired gait, impaired functional LE strength, limited endurance per age matched norms, and is at risk for falls per FGA. This is resulting in difficulty with safe community mobility and achieving his goal of being able to jog for short distances. He would benefit from continued PT to further address these deficits and maximize his function.       Plan: Continue per plan of care progress as tolerated. 4 more weeks at 2x per week.      Precautions: HTN, type II DM     HEP: walking program outside or on treadmill     **used 2.5# L ankle weight   Manuals 10/24 10/28 10/31 11/4                                         Neuro Re-Ed    See testing      Hurdles          Step ups   4\" & foam  X10 ea       Resisted walking  **50ft x 8 dragging 25#      **50ft x 10 dragging 25-30#.     No weight 50ft x2       Blaze pods **Over low hurdles. 1 pod on waist height surface. 1.5min x3        Side stepping   On TM .6mph 2x1min ea       Ramp  **Fwd/bwd x12 SOLO with tidal ball fwd x5, side x3 ea SOLO with tidal ball fwd x10, side x3 ea               Ther Ex    See testing      HIIT treadmill  **Base: 2% 1.5mph    Fast: 2% 2.4-2.7mph    11min total     SOLO total time x12min  Base 2% 1.5 mph  Fast 2.5- mph  1 min intervals    Bwd walking .8mph x5min **Base: 2% 1.5mph no UE    Fast: 2% 2.4-2.6mph no UE    11min total  -----    **Bwd walking 0.8mph x2min, x2min. 1 UE **Base: 2% 1.5mph no UE    Fast: 2% 2.4-2.6mph no UE    8min total  -----       Leg press machine                                                               Ther Activity                           Gait Training                           Modalities                                "

## 2024-11-06 ENCOUNTER — PATIENT OUTREACH (OUTPATIENT)
Dept: FAMILY MEDICINE CLINIC | Facility: CLINIC | Age: 59
End: 2024-11-06

## 2024-11-06 ENCOUNTER — OFFICE VISIT (OUTPATIENT)
Facility: REHABILITATION | Age: 59
End: 2024-11-06
Payer: COMMERCIAL

## 2024-11-06 ENCOUNTER — OFFICE VISIT (OUTPATIENT)
Dept: OCCUPATIONAL THERAPY | Facility: REHABILITATION | Age: 59
End: 2024-11-06
Payer: COMMERCIAL

## 2024-11-06 DIAGNOSIS — I69.398 ALTERATION OF SENSATION AS LATE EFFECT OF CEREBROVASCULAR ACCIDENT (CVA): Primary | ICD-10-CM

## 2024-11-06 DIAGNOSIS — I63.9 CEREBROVASCULAR ACCIDENT (CVA), UNSPECIFIED MECHANISM (HCC): Primary | ICD-10-CM

## 2024-11-06 DIAGNOSIS — R20.9 ALTERATION OF SENSATION AS LATE EFFECT OF CEREBROVASCULAR ACCIDENT (CVA): Primary | ICD-10-CM

## 2024-11-06 PROCEDURE — 97112 NEUROMUSCULAR REEDUCATION: CPT | Performed by: PHYSICAL THERAPIST

## 2024-11-06 PROCEDURE — 97530 THERAPEUTIC ACTIVITIES: CPT | Performed by: OCCUPATIONAL THERAPIST

## 2024-11-06 PROCEDURE — 97112 NEUROMUSCULAR REEDUCATION: CPT | Performed by: OCCUPATIONAL THERAPIST

## 2024-11-06 PROCEDURE — 97110 THERAPEUTIC EXERCISES: CPT | Performed by: OCCUPATIONAL THERAPIST

## 2024-11-06 NOTE — PROGRESS NOTES
"Daily Note     Today's date: 2024  Patient name: Fadi Busby  : 1965  MRN: [id number]  Referring provider: Kee Owen DO  Dx:   Encounter Diagnosis     ICD-10-CM    1. Cerebrovascular accident (CVA), unspecified mechanism (HCC)  I63.9                      Subjective: \"I just cannot turn my hand anymore than this.\"      Objective: See treatment description below    Thera Ex: UBE x 5 minutes prograde and x 5 minutes retrograde in seated position bilaterally with 2.2 resistance level to improved LUE proximal strength, muscle endurance, and bilateral coordination.     Neuro Re-ed:  Pt advanced to completing supine neuro re-ed for completion of 2 sets x 10 reps of motor combination (elbow flexion leading into ceiling punch up with full tricep extension) with #2 DB challenging LUE concentric/eccentric motor control, LUE proximal strength/endurance, and improved tricep extension throughout plane to enhance functional reach abilities.     Thera Act: Pt advanced to performing ring retrieval and placement on metal rodney in shoulder ABD plane and with supination/pronation demands to improve LUE shoulder ABD and supination/pronation AROM, improved proximal/distal teaming in LUE, improved proximal strength/endurance/stability, improved overall motor control.     Assessment: Tolerated treatment well. Patient would benefit from continued OT    Pt demo with excellent activity tolerance to UBE demands with abilities to complete x 10 minutes with 2.2 resistance level without complaints of fatigue, G bilateral coordination, and excellent L tricep extension with functional movements in both prograde and retrograde movement patterns. Pt demo with improved muscle endurance on this date with abilities to complete 2 sets x 10 reps with significant less fatigue reported. Pt demo with significant improved tricep extension each rep, though demo with tendencies of elbow drifting into shoulder ABD with elbow flexion 2* flexor " tone inhibiting normal movement patterns.  Pt demo with improved shoulder ABD AROM with improved shoulder stability, though continues to present with max difficulties achieving supination AROM past neutral position 2* flexor tone.  Pt demo with slight improvements with massed practice.     Plan: Continue per plan of care.     NTERVENTION COMMENTS:  Diagnosis: Cerebrovascular accident (CVA), unspecified mechanism (HCC) [I63.9]  Precautions: L hemiplegia; PACE MAKER-- NO STIM; DM II   Insurance: Payor: [employer]  REP / Plan: COMMUNITY BLUE MEDICARE PPO [facility] REP / Product Type: Medicare HMO /   5 of __12____ visits, PN due 11/21/2024

## 2024-11-06 NOTE — PROGRESS NOTES
Medication Patient Assistance Program (MPAP) Specialist received personal reminder notification regarding patient's renewal application. MPAP specialist reviewed patient chart prior to outreach. MPAP specialist noted patient was approved for Tresiba from [employer] until 12/31/2025. Patient's approval did not include Ozempic. MPAP specialist reached out to [employer] patient assistance program for clarification. MPAP specialist spoke with Heri Almanzar. Per [facility] patient was approved for Tresiba. Heri rep reviewed patient's application. Per Heri [facility] she will update patient's approval to include Ozempic. MPAP specialist thanked Heri rep. MPAP specialist will remain available for future questions regarding patient assistance program.

## 2024-11-06 NOTE — PROGRESS NOTES
"Daily Note     Today's date: 2024  Patient name: Fadi Busby  : 1965  MRN: [id number]  Referring provider: Kee Owen DO  Dx:   Encounter Diagnosis     ICD-10-CM    1. Alteration of sensation as late effect of cerebrovascular accident (CVA)  I69.398     R20.9                            Subjective: No new complaints or changes.    Objective: See treatment diary below    Assessment: Able to increase ankle weight to 5# today with difficulty due to weakness but able to tolerate for full session.  Showing progress with reducing UE support while on TM today.      Plan:  Add dual tasking while on TM to challenge dynamic balance with gait.     Precautions: HTN, type II DM     HEP: walking program outside or on treadmill     Manuals 10/24 10/28 10/31 11/6 10/17 10/21                                       Neuro Re-Ed         Hurdles     High with tidal ball  No AW  2 laps ea fwd/side **Low fwd. While carrying & stacking cones. X10 laps     Step ups   4\" & foam  X10 ea  Folded mat 5# on L ankle 2x15 R & L with holds  **8in fwd/bwd x20 ea ea    Resisted walking  **50ft x 8 dragging 25#      **50ft x 10 dragging 25-30#.     No weight 50ft x2   Fwd/bwd resistance from band. 20ft lap x3    Blaze pods **Over low hurdles. 1 pod on waist height surface. 1.5min x3    Over 6in step. 1 pod on waist height surface. Using LUE and LLE. 1.5min x3     Side stepping   On TM .6mph 2x1min ea   Resistance from band 20ft lap x2     Ramp  **Fwd/bwd x12 SOLO with tidal ball fwd x5, side x3 ea SOLO with tidal ball fwd x10, side x3 ea               Ther Ex         HIIT treadmill  **Base: 2% 1.5mph    Fast: 2% 2.4-2.7mph    11min total     SOLO total time x12min  Base 2% 1.5 mph  Fast 2.5- mph  1 min intervals    Bwd walking .8mph x5min **Base: 2% 1.5mph no UE    Fast: 2% 2.4-2.6mph no UE    11min total  -----    **Bwd walking 0.8mph x2min, x2min. 1 UE SOLO  Limited UE  2.5mph x5min  Bwd .8mph x5min  Side .5mph x2min ea **Base: 1% " 1.5mph    Fast: 1% 2.3-2.5mph    10min total    HR: 116  RPE: 7 **Base: 1% 1.5mph    Fast: 1% 2.3-2.6mph    10min total    HR: 116  RPE: 7   Leg press machine                                                               Ther Activity                           Gait Training                           Modalities

## 2024-11-11 ENCOUNTER — OFFICE VISIT (OUTPATIENT)
Dept: OCCUPATIONAL THERAPY | Facility: REHABILITATION | Age: 59
End: 2024-11-11
Payer: COMMERCIAL

## 2024-11-11 ENCOUNTER — OFFICE VISIT (OUTPATIENT)
Facility: REHABILITATION | Age: 59
End: 2024-11-11
Payer: COMMERCIAL

## 2024-11-11 DIAGNOSIS — I63.9 CEREBROVASCULAR ACCIDENT (CVA), UNSPECIFIED MECHANISM (HCC): Primary | ICD-10-CM

## 2024-11-11 DIAGNOSIS — R20.9 ALTERATION OF SENSATION AS LATE EFFECT OF CEREBROVASCULAR ACCIDENT (CVA): Primary | ICD-10-CM

## 2024-11-11 DIAGNOSIS — I69.398 ALTERATION OF SENSATION AS LATE EFFECT OF CEREBROVASCULAR ACCIDENT (CVA): Primary | ICD-10-CM

## 2024-11-11 PROCEDURE — 97116 GAIT TRAINING THERAPY: CPT | Performed by: PHYSICAL THERAPIST

## 2024-11-11 PROCEDURE — 97112 NEUROMUSCULAR REEDUCATION: CPT

## 2024-11-11 PROCEDURE — 97112 NEUROMUSCULAR REEDUCATION: CPT | Performed by: PHYSICAL THERAPIST

## 2024-11-11 PROCEDURE — 97110 THERAPEUTIC EXERCISES: CPT

## 2024-11-11 NOTE — PROGRESS NOTES
"Daily Note     Today's date: 2024  Patient name: Fadi Busby  : 1965  MRN: [id number]  Referring provider: Kee Owen DO  Dx:   Encounter Diagnosis     ICD-10-CM    1. Alteration of sensation as late effect of cerebrovascular accident (CVA)  I69.398     R20.9                      Subjective: Was tired after last visit. Feeling well today.       Objective: See treatment diary below      Assessment: Tolerated treatment well.  Continues to be challenged with utilization of 5 pound ankle weight noting fatigue however demonstrating improved ability to clear left foot during swing phase.  Would benefit from continued PT      Plan: Continue per plan of care.      Precautions: HTN, type II DM     HEP: walking program outside or on treadmill     **5# L ankle   Manuals 10/24 10/28 10/31 11/6 11/11                                        Neuro Re-Ed         Hurdles     High with tidal ball  No AW  2 laps ea fwd/side     Step ups   4\" & foam  X10 ea  Folded mat 5# on L ankle 2x15 R & L with holds     Resisted walking  **50ft x 8 dragging 25#      **50ft x 10 dragging 25-30#.     No weight 50ft x2       Blaze pods **Over low hurdles. 1 pod on waist height surface. 1.5min x3    **Over low hurdles, folded mat, step stool. 1.5min x4    Side stepping   On TM .6mph 2x1min ea   Resisted band**  20ft lap x2     Ramp  **Fwd/bwd x12 SOLO with tidal ball fwd x5, side x3 ea SOLO with tidal ball fwd x10, side x3 ea               Ther Ex         HIIT treadmill  **Base: 2% 1.5mph    Fast: 2% 2.4-2.7mph    11min total     SOLO total time x12min  Base 2% 1.5 mph  Fast 2.5- mph  1 min intervals    Bwd walking .8mph x5min **Base: 2% 1.5mph no UE    Fast: 2% 2.4-2.6mph no UE    11min total  -----    **Bwd walking 0.8mph x2min, x2min. 1 UE SOLO  Limited UE  2.5mph x5min  Bwd .8mph x5min  Side .5mph x2min ea 5# ANKLE   **Base: 1% 1.6mph no UE    Fast: 1% 2.4-2.6mph no UE    11min total  -----      Leg press machine                "                                                Ther Activity                           Gait Training                           Modalities

## 2024-11-11 NOTE — PROGRESS NOTES
"Daily Note     Today's date: 2024  Patient name: Fadi Busby  : 1965  MRN: [id number]  Referring provider: Kee Owen DO  Dx:   Encounter Diagnosis     ICD-10-CM    1. Cerebrovascular accident (CVA), unspecified mechanism (HCC)  I63.9             Start Time: 0850  Stop Time: 0930  Total time in clinic (min): 40 minutes    Subjective: \"My right bicep is really hurting. Do you think it could be because I've been driving more? I try to use my left side too.\"      Objective: See treatment description below    Thera Ex: UBE x 5 minutes prograde and x 5 minutes retrograde in seated position bilaterally with 2.5 resistance level to improved LUE proximal strength, muscle endurance, and bilateral coordination.     Neuro Re-ed:  Seated B/L Hand Integration & Gross Motor (AAROM Horizontal Adduction/Abduction, Shoulder Flexion & Shoulder ER)    Automaticity, B/L Hand Integration, Force Discrimination, Gross Motor Control  Chest Pass with Ball Toss, Tennis Ball Bounce & Catch (B/L Hand & Unilateral Motor Control)    Gross Motor Grasp & Release Focus on Fluidity of Movement & Control, Balancing Tennis Ball on Top of Cone, Horizontal Abduction to Cross Body Shoulder Flexion, Tabletop Placing    In-Hand Manipulation, Grasp & Release, Forearm Supination/Pronation & Shoulder IR/ER Flip Cones      Assessment: Better automaticity and symmetry of motion through R/L sides with activity repetition in clinic today. Limited with shoulder rotation, more impaired in particular with ER. Discussed ways to utilize AAROM to self-stretch L shoulder through HEP. Tolerated treatment well. Patient would benefit from continued OT      Plan: Continue per plan of care.     NTERVENTION COMMENTS:  Diagnosis: Cerebrovascular accident (CVA), unspecified mechanism (HCC) [I63.9]  Precautions: L hemiplegia; PACE MAKER-- NO STIM; DM II   Insurance: Payor: [employer]  REP / Plan: COMMUNITY BLUE MEDICARE PPO [facility] REP / Product Type: " Medicare HMO /   7 of __12____ visits, PN due 11/21/2024

## 2024-11-13 ENCOUNTER — OFFICE VISIT (OUTPATIENT)
Facility: REHABILITATION | Age: 59
End: 2024-11-13
Payer: COMMERCIAL

## 2024-11-13 ENCOUNTER — OFFICE VISIT (OUTPATIENT)
Dept: OBGYN CLINIC | Facility: MEDICAL CENTER | Age: 59
End: 2024-11-13
Payer: COMMERCIAL

## 2024-11-13 ENCOUNTER — APPOINTMENT (OUTPATIENT)
Dept: OCCUPATIONAL THERAPY | Facility: REHABILITATION | Age: 59
End: 2024-11-13
Payer: COMMERCIAL

## 2024-11-13 VITALS
HEART RATE: 89 BPM | SYSTOLIC BLOOD PRESSURE: 93 MMHG | DIASTOLIC BLOOD PRESSURE: 65 MMHG | WEIGHT: 237 LBS | BODY MASS INDEX: 35.1 KG/M2 | HEIGHT: 69 IN

## 2024-11-13 DIAGNOSIS — R20.9 ALTERATION OF SENSATION AS LATE EFFECT OF CEREBROVASCULAR ACCIDENT (CVA): Primary | ICD-10-CM

## 2024-11-13 DIAGNOSIS — M65.341 TRIGGER RING FINGER OF RIGHT HAND: Primary | ICD-10-CM

## 2024-11-13 DIAGNOSIS — I69.398 ALTERATION OF SENSATION AS LATE EFFECT OF CEREBROVASCULAR ACCIDENT (CVA): Primary | ICD-10-CM

## 2024-11-13 PROCEDURE — 97116 GAIT TRAINING THERAPY: CPT | Performed by: PHYSICAL THERAPIST

## 2024-11-13 PROCEDURE — 20550 NJX 1 TENDON SHEATH/LIGAMENT: CPT | Performed by: SPECIALIST/TECHNOLOGIST

## 2024-11-13 PROCEDURE — 97112 NEUROMUSCULAR REEDUCATION: CPT | Performed by: PHYSICAL THERAPIST

## 2024-11-13 PROCEDURE — 99213 OFFICE O/P EST LOW 20 MIN: CPT | Performed by: ORTHOPAEDIC SURGERY

## 2024-11-13 RX ORDER — LIDOCAINE HYDROCHLORIDE 10 MG/ML
1 INJECTION, SOLUTION INFILTRATION; PERINEURAL
Status: COMPLETED | OUTPATIENT
Start: 2024-11-13 | End: 2024-11-13

## 2024-11-13 RX ORDER — AMMONIUM LACTATE 12 G/100G
LOTION TOPICAL
COMMUNITY
Start: 2024-11-06

## 2024-11-13 RX ORDER — CHLORHEXIDINE GLUCONATE ORAL RINSE 1.2 MG/ML
SOLUTION DENTAL
COMMUNITY
Start: 2024-10-22

## 2024-11-13 RX ORDER — BETAMETHASONE SODIUM PHOSPHATE AND BETAMETHASONE ACETATE 3; 3 MG/ML; MG/ML
6 INJECTION, SUSPENSION INTRA-ARTICULAR; INTRALESIONAL; INTRAMUSCULAR; SOFT TISSUE
Status: COMPLETED | OUTPATIENT
Start: 2024-11-13 | End: 2024-11-13

## 2024-11-13 RX ADMIN — LIDOCAINE HYDROCHLORIDE 1 ML: 10 INJECTION, SOLUTION INFILTRATION; PERINEURAL at 10:30

## 2024-11-13 RX ADMIN — BETAMETHASONE SODIUM PHOSPHATE AND BETAMETHASONE ACETATE 6 MG: 3; 3 INJECTION, SUSPENSION INTRA-ARTICULAR; INTRALESIONAL; INTRAMUSCULAR; SOFT TISSUE at 10:30

## 2024-11-13 NOTE — PROGRESS NOTES
HAND & UPPER EXTREMITY OFFICE VISIT   Referred By:  Referral Self  No address on file      Chief Complaint:     Right ring trigger finger    Previous History:   Previously CSI 6/26/24, 8/6/2024 with temporary relief    Interval History:  Since the last visit he reports approximately 50% improvement in his symptoms from CSI until gradual return over the past few weeks. He has also been wearing a trigger finger splint which he reports improves his symptoms in the morning.  He is asking if it is possible to do an additional injection.    Past Medical History:   Past Medical History:   Diagnosis Date    Acute pain of right shoulder     Paronychia of right index finger 08/15/2023     Past Surgical History:   Procedure Laterality Date    CARDIAC ELECTROPHYSIOLOGY PROCEDURE N/A 3/21/2024    Procedure: Cardiac biv pacer implant;  Surgeon: Nahun Rees MD;  Location: [facility];  Service: Cardiology    HERNIA REPAIR      At 18 years old     History reviewed. No pertinent family history.  Social History     Socioeconomic History    Marital status: Single     Spouse name: Not on file    Number of children: Not on file    Years of education: Not on file    Highest education level: Not on file   Occupational History    Not on file   Tobacco Use    Smoking status: Never     Passive exposure: Never    Smokeless tobacco: Never   Vaping Use    Vaping status: Never Used   Substance and Sexual Activity    Alcohol use: Never    Drug use: Never    Sexual activity: Not on file   Other Topics Concern    Not on file   Social History Narrative    Not on file     Social Drivers of Health     Financial Resource Strain: Low Risk  (5/29/2024)    Overall Financial Resource Strain (CARDIA)     Difficulty of Paying Living Expenses: Not hard at all   Food Insecurity: No Food Insecurity (5/29/2024)    Nursing - Inadequate Food Risk Classification     Worried About Running Out of Food in the Last Year: Never true     Ran Out of Food in  "the Last Year: Never true     Ran Out of Food in the Last Year: Not on file   Transportation Needs: No Transportation Needs (5/29/2024)    PRAPARE - Transportation     Lack of Transportation (Medical): No     Lack of Transportation (Non-Medical): No   Physical Activity: Not on file   Stress: Not on file   Social Connections: Not on file   Intimate Partner Violence: Not on file   Housing Stability: Low Risk  (5/29/2024)    Housing Stability Vital Sign     Unable to Pay for Housing in the Last Year: No     Number of Times Moved in the Last Year: 1     Homeless in the Last Year: No     Scheduled Meds:  Continuous Infusions:No current facility-administered medications for this visit.    PRN Meds:.  No Known Allergies    Physical Examination:    BP 93/65   Pulse 89   Ht 5' 9\" (1.753 m)   Wt 108 kg (237 lb)   BMI 35.00 kg/m²     Gen: A&Ox3, NAD  Cardiac: regular rate  Chest: non labored breathing  Abdomen: Non-distended      Right Upper Extremity:  Skin CDI  No obvious deformity of the shoulder, arm, elbow, forearm, wrist, hand  Minimal swelling proximal ring finger  tender ring finger A1 pulley. No active triggering on exam  Sensation intact to light touch in the axillary median, ulnar, and radial nerve distributions  Full composite fist, slightly limited flexion of the ring finger compared to other digits  2+RP        Studies:  No new imaging to review      Assessment and Plan:  1. Trigger ring finger of right hand  Hand/upper extremity injection: R ring A1            59 y.o. male presents in follow up for the above diagnosis. He reports his symptoms are improved following previous CSI, but he continues to have intermittent locking of the ring finger daily. We discussed treatment options going forward including nighttime splinting only, repeat CSI, or surgery if needed. We discussed the risk of a third cortisone injection including risk of tendon rupture, increased risk of poor wound healing, increased risk of " infection, especially in the setting of diabetes.  After shared decision making process, he would like to try a third injection in the office today and continue nighttime splinting. Risks, benefits and alternative treatments were discussed with the patient. These risks of injection include but are not limited to: bleeding, infection, allergic reaction to agents, possible increase in pain, and/or elevation in blood sugar. Patient understands and would like to proceed with the proposed procedure. Patient tolerated the procedure well without any complications. See procedure note for details. he may take NSAIDs/Tylenol or apply ice to the area as needed for post-injection discomfort. It is recommended he return to the office in 6 weeks, or sooner should symptoms worsen. Discussed that if this injection doesn't provide significant symptomatic relief, we would transition to surgical discussion and injections would no longer be indicated.     he expressed understanding of the plan and agreed. We encouraged them to contact our office with any questions or concerns.       Lino Rowland MD  Hand and Upper Extremity Surgery      *This note was dictated using Dragon voice recognition software. Please excuse any word substitutions or errors.*     Hand/upper extremity injection: R ring A1  Universal Protocol:  procedure performed by consultantConsent: Verbal consent obtained.  Risks and benefits: risks, benefits and alternatives were discussed  Consent given by: patient  Patient understanding: patient states understanding of the procedure being performed  Patient consent: the patient's understanding of the procedure matches consent given  Site marked: the operative site was marked  Required items: required blood products, implants, devices, and special equipment available  Patient identity confirmed: verbally with patient  Supporting Documentation  Indications: pain   Procedure Details  Condition:trigger finger Location: ring  finger - R ring A1   Needle size: 25 G  Ultrasound guidance: no  Approach: volar  Medications administered: 1 mL lidocaine 1 %; 6 mg betamethasone acetate-betamethasone sodium phosphate 6 (3-3) mg/mL  Patient tolerance: patient tolerated the procedure well with no immediate complications  Dressing:  Sterile dressing applied         Scribe Attestation      I,:  Radha Wolfe am acting as a scribe while in the presence of the attending physician.:       I,:  Lino Rowland MD personally performed the services described in this documentation    as scribed in my presence.:

## 2024-11-13 NOTE — PROGRESS NOTES
"Daily Note     Today's date: 2024  Patient name: Fadi Busby  : 1965  MRN: [id number]  Referring provider: Kee Owen DO  Dx:   Encounter Diagnosis     ICD-10-CM    1. Alteration of sensation as late effect of cerebrovascular accident (CVA)  I69.398     R20.9                        Subjective: Arrived 15min late. Feeling well today.       Objective: See treatment diary below      Assessment: Tolerated treatment well. Shortened session due to pt arriving late and needing to leave for another appointment. Able to progress treadmill speed today with good tolerance. Demonstrated some postural sway and instability with negotiating stairs with water ball carry, able to self correct. Would benefit from continued PT      Plan: Continue per plan of care.      Precautions: HTN, type II DM, DO NOT BILL FOR TA     HEP: walking program outside or on treadmill     **5# L ankle   Manuals 10/24 10/28 10/31 11/6 11/11 11/13                                       Neuro Re-Ed         Hurdles     High with tidal ball  No AW  2 laps ea fwd/side     Step ups   4\" & foam  X10 ea  Folded mat 5# on L ankle 2x15 R & L with holds  Over stair  + water ball. X15    Resisted walking  **50ft x 8 dragging 25#      **50ft x 10 dragging 25-30#.     No weight 50ft x2    **75ft x6 dragging 25#    No weight 75ft x2    Blaze pods **Over low hurdles. 1 pod on waist height surface. 1.5min x3    **Over low hurdles, folded mat, step stool. 1.5min x4    Side stepping   On TM .6mph 2x1min ea   Resisted band**  20ft lap x2     Ramp  **Fwd/bwd x12 SOLO with tidal ball fwd x5, side x3 ea SOLO with tidal ball fwd x10, side x3 ea               Ther Ex         HIIT treadmill  **Base: 2% 1.5mph    Fast: 2% 2.4-2.7mph    11min total     SOLO total time x12min  Base 2% 1.5 mph  Fast 2.5- mph  1 min intervals    Bwd walking .8mph x5min **Base: 2% 1.5mph no UE    Fast: 2% 2.4-2.6mph no UE    11min total  -----    **Bwd walking 0.8mph x2min, " x2min. 1 UE SOLO  Limited UE  2.5mph x5min  Bwd .8mph x5min  Side .5mph x2min ea 5# ANKLE   **Base: 1% 1.6mph no UE    Fast: 1% 2.4-2.6mph no UE    11min total  -----   5# ANKLE   **Base: 1% 1.6mph no UE    Fast: 1% 2.5-2.7mph no UE    12min total  -----   Leg press machine                                                               Ther Activity         DO NOT BILL FOR TA                  Gait Training                           Modalities

## 2024-11-14 ENCOUNTER — PATIENT OUTREACH (OUTPATIENT)
Dept: FAMILY MEDICINE CLINIC | Facility: CLINIC | Age: 59
End: 2024-11-14

## 2024-11-14 NOTE — PROGRESS NOTES
Medication Patient Assistance Program (MPAP) Specialist  received faxed letter from [employer] patient assistance program ./ Per letter dated 11/07/2024, Patient's Ozempic is being processed. Patient should receive Ozempic within 10-14 days of letter date. MPAP specialist has placed a personal reminder.

## 2024-11-18 ENCOUNTER — APPOINTMENT (OUTPATIENT)
Facility: REHABILITATION | Age: 59
End: 2024-11-18
Payer: COMMERCIAL

## 2024-11-18 ENCOUNTER — OFFICE VISIT (OUTPATIENT)
Dept: OCCUPATIONAL THERAPY | Facility: REHABILITATION | Age: 59
End: 2024-11-18
Payer: COMMERCIAL

## 2024-11-18 DIAGNOSIS — I63.9 CEREBROVASCULAR ACCIDENT (CVA), UNSPECIFIED MECHANISM (HCC): Primary | ICD-10-CM

## 2024-11-18 PROCEDURE — 97112 NEUROMUSCULAR REEDUCATION: CPT

## 2024-11-18 PROCEDURE — 97110 THERAPEUTIC EXERCISES: CPT

## 2024-11-18 NOTE — PROGRESS NOTES
"Daily Note     Today's date: 2024  Patient name: Fadi Busby  : 1965  MRN: [id number]  Referring provider: Kee Owen DO  Dx:   Encounter Diagnosis     ICD-10-CM    1. Cerebrovascular accident (CVA), unspecified mechanism (HCC)  I63.9               Start Time: 1345  Stop Time: 1430  Total time in clinic (min): 45 minutes    Subjective: \"My arm is wiped.\"      Objective: See treatment description below    Therapeutic Exercise:   UBE x 5 minutes prograde and x 5 minutes retrograde in seated position bilaterally with 3.0 resistance level to improved LUE proximal strength, muscle endurance, and bilateral coordination.       Neuromuscular Re-Education:  Proximal Strength Re-Training  Scapular Retraction, 2x15 black theratube  Reverse Fly, 2x15 green theratube  Shoulder ER, 2x15 green theratube    BITS Hand-Eye Coordination, Gross Motor, Overhead Reach Re-Training  24.14%, 2.15 RT, 21 Hits  41.79% 2.12 RT, 28 Hits    Dexterity & Motor Control Re-Training  Mini Jenga Set Up, 5x Drops Throughout        Assessment: Notable fatigue through UE post-session today. Most difficulty with digit isolation to execute overhead reach hand-to-target. Struggles to maintain shoulder externally rotated for normalized gross reach motor pattern; tends to internally rotate. With less thought and relying more on automaticity, better hand-eye coordination with better target accuracy. Tolerated treatment well. Patient would benefit from continued OT      Plan: Continue per plan of care.     NTERVENTION COMMENTS:  Diagnosis: Cerebrovascular accident (CVA), unspecified mechanism (HCC) [I63.9]  Precautions: L hemiplegia; PACE MAKER-- NO STIM; DM II   Insurance: Payor: [employer]  REP / Plan: [employer]UE MEDIC[employer] PPO [facility] REP / Product Type: Medicare HMO /   8 of __12____ visits, PN due 2024     "

## 2024-11-20 ENCOUNTER — OFFICE VISIT (OUTPATIENT)
Facility: REHABILITATION | Age: 59
End: 2024-11-20
Payer: COMMERCIAL

## 2024-11-20 ENCOUNTER — OFFICE VISIT (OUTPATIENT)
Dept: OCCUPATIONAL THERAPY | Facility: REHABILITATION | Age: 59
End: 2024-11-20
Payer: COMMERCIAL

## 2024-11-20 DIAGNOSIS — I69.398 ALTERATION OF SENSATION AS LATE EFFECT OF CEREBROVASCULAR ACCIDENT (CVA): Primary | ICD-10-CM

## 2024-11-20 DIAGNOSIS — R20.9 ALTERATION OF SENSATION AS LATE EFFECT OF CEREBROVASCULAR ACCIDENT (CVA): Primary | ICD-10-CM

## 2024-11-20 DIAGNOSIS — I63.9 CEREBROVASCULAR ACCIDENT (CVA), UNSPECIFIED MECHANISM (HCC): Primary | ICD-10-CM

## 2024-11-20 PROCEDURE — 97110 THERAPEUTIC EXERCISES: CPT | Performed by: PHYSICAL THERAPIST

## 2024-11-20 PROCEDURE — 97110 THERAPEUTIC EXERCISES: CPT | Performed by: OCCUPATIONAL THERAPIST

## 2024-11-20 PROCEDURE — 97112 NEUROMUSCULAR REEDUCATION: CPT | Performed by: OCCUPATIONAL THERAPIST

## 2024-11-20 PROCEDURE — 97530 THERAPEUTIC ACTIVITIES: CPT | Performed by: OCCUPATIONAL THERAPIST

## 2024-11-20 PROCEDURE — 97112 NEUROMUSCULAR REEDUCATION: CPT | Performed by: PHYSICAL THERAPIST

## 2024-11-20 NOTE — PROGRESS NOTES
"Daily Note     Today's date: 2024  Patient name: Fadi Busby  : 1965  MRN: [id number]  Referring provider: Kee Owen DO  Dx:   Encounter Diagnosis     ICD-10-CM    1. Cerebrovascular accident (CVA), unspecified mechanism (HCC)  I63.9                      Subjective: \"I can really feel that in my L shoulder.\"      Objective: See treatment description below    Thera Ex: UBE x 5 minutes prograde and x 5 minutes retrograde in seated position bilaterally with 3.1 resistance level to improved LUE proximal strength, muscle endurance, and bilateral coordination.     Neuro Re-ed:  Pt advanced to performing AAROM in shoulder FF/extension movement planes, and then advanced to performing in PNF D1/D2 flexion/extension movement patterns with UB ranger for 3 sets x 10 reps challenging LUE proximal strength/endurance, improved tricep extension, and improved concentric/eccentric motor control.    Thera Act: Pt concluded tx session with completion of tennis ball and golf ball retrieval for placement on top of each cone placed at eye level on table top to improve L shoulder proximal strength/stability, improved functional reach with increased tricep extension and less shoulder hiking, and improved volitional grasp/release automaticity.  Pt transitioned to stacking cones promoting increased supination with functional grasp patterns.    Assessment: Tolerated treatment well. Patient would benefit from continued OT    Pt demo with excellent activity tolerance to UBE demands with abilities to complete x 10 minutes with 3.1 resistance level without complaints of fatigue, G bilateral coordination, and excellent L tricep extension with functional movements in both prograde and retrograde movement patterns. Pt required manual assistance to achieve shoulder FF to 120* and required manual assistance to maintain eccentric motor control with tendencies of drifting into IR 2* flexor tone.  Pt demo with significant improved " volitional grasp/release automaticity without distal motor delays evident.  Pt required x 1 verbal cue to perform mass digit extension verses retrieving ball with 3-jaw andre grasp.  Pt demo with abilities to achieve supination AROM to neutral position when stacking cones on top of one another.     Plan: Continue per plan of care.     INTERVENTION COMMENTS:  Diagnosis: Cerebrovascular accident (CVA), unspecified mechanism (HCC) [I63.9]  Precautions: L hemiplegia; PACE MAKER-- NO STIM; DM II   Insurance: [facility]  REP / Plan: COMM[employer]ITY BLUE MEDICARE PPO [facility] REP / Product Type: Medicare HMO /   9 of __12____ visits, PN due 11/21/2024

## 2024-11-20 NOTE — PROGRESS NOTES
Daily Note     Today's date: 2024  Patient name: Fadi Busby  : 1965  MRN: [id number]  Referring provider: Kee Owen DO  Dx:   Encounter Diagnosis     ICD-10-CM    1. Alteration of sensation as late effect of cerebrovascular accident (CVA)  I69.398     R20.9                          Subjective: Feeling well. Recently moved so wasn't able to use the treadmill, but has large hallways in his new apartment building he can walk in.       Objective: See treatment diary below      Assessment: Tolerated treatment well. Small progressions made in treadmill speed with good tolerance. Incorporated jumpping today for increasing power and push off. Provided some education on home stretching at end of session today.   Would benefit from continued PT      Plan: Continue per plan of care.      Precautions: HTN, type II DM, DO NOT BILL FOR TA     HEP: walking program outside or on treadmill     **5# L ankle   Manuals 11/20  10/31 11/6 11/11 11/13                                       Neuro Re-Ed         Hurdles     High with tidal ball  No AW  2 laps ea fwd/side     Step ups     Folded mat 5# on L ankle 2x15 R & L with holds  Over stair  + water ball. X15    Resisted walking    **50ft x 10 dragging 25-30#.     No weight 50ft x2    **75ft x6 dragging 25#    No weight 75ft x2    Blaze pods **over stair . 1.5min x3     **Over low hurdles, folded mat, step stool. 1.5min x4    Side stepping      Resisted band**  20ft lap x2     Ramp  X12 fwd/bwd  SOLO with tidal ball fwd x10, side x3 ea      Hopping In place BL UE support. 2x10        Ther Ex         HIIT treadmill  5# ANKLE   **Base: 1% 1.6mph no UE    Fast: 1% 2.5-2.7mph no UE    12min total  **Base: 2% 1.5mph no UE    Fast: 2% 2.4-2.6mph no UE    11min total  -----    **Bwd walking 0.8mph x2min, x2min. 1 UE SOLO  Limited UE  2.5mph x5min  Bwd .8mph x5min  Side .5mph x2min ea 5# ANKLE   **Base: 1% 1.6mph no UE    Fast: 1% 2.4-2.6mph no UE    11min  total  -----   5# ANKLE   **Base: 1% 1.6mph no UE    Fast: 1% 2.5-2.7mph no UE    12min total  -----   Leg press machine                                                               Ther Activity         DO NOT BILL FOR TA                  Gait Training                           Modalities

## 2024-11-21 ENCOUNTER — PATIENT OUTREACH (OUTPATIENT)
Dept: FAMILY MEDICINE CLINIC | Facility: CLINIC | Age: 59
End: 2024-11-21

## 2024-11-21 NOTE — PROGRESS NOTES
Medication Patient Assistance Program (MPAP) Specialist  received personal reminder regarding patient's Ozempic shipment from [employer] patient assistance program. MPAP specialist reviewed patient chart prior to outreach. Patient last received Ozempic on 09/25/2024.MPAP specialist reached out to [employer] regarding patient's Ozempic shipment for new enrollment period. MPAP specialist successfully reached Michelle , [employer] representative. Per [employer] rep patient is approved until 12/31/2025. Patient's new enrollment will begin on 01/01/2025. Per [employer] rep, patient received a 4 month supply of Ozempic in September. Patient will receive his next shipment of Tresiba and Ozempic in January. MPAP specialist thanked [employer]. MPAP specialist has placed a personal reminder.

## 2024-11-24 ENCOUNTER — HOSPITAL ENCOUNTER (EMERGENCY)
Facility: HOSPITAL | Age: 59
Discharge: HOME | End: 2024-11-24
Attending: EMERGENCY MEDICINE
Payer: COMMERCIAL

## 2024-11-24 ENCOUNTER — APPOINTMENT (EMERGENCY)
Dept: CT IMAGING | Facility: HOSPITAL | Age: 59
End: 2024-11-24
Payer: COMMERCIAL

## 2024-11-24 VITALS
HEIGHT: 69 IN | SYSTOLIC BLOOD PRESSURE: 109 MMHG | HEART RATE: 79 BPM | DIASTOLIC BLOOD PRESSURE: 68 MMHG | WEIGHT: 240.3 LBS | RESPIRATION RATE: 17 BRPM | OXYGEN SATURATION: 96 % | TEMPERATURE: 97.4 F | BODY MASS INDEX: 35.59 KG/M2

## 2024-11-24 DIAGNOSIS — R74.8 ELEVATED LIPASE: ICD-10-CM

## 2024-11-24 DIAGNOSIS — R10.9 ABDOMINAL PAIN: Primary | ICD-10-CM

## 2024-11-24 DIAGNOSIS — R11.0 NAUSEA: ICD-10-CM

## 2024-11-24 LAB
ALBUMIN SERPL BCG-MCNC: 3.6 G/DL (ref 3.5–5)
ALP SERPL-CCNC: 59 U/L (ref 34–104)
ALT SERPL W P-5'-P-CCNC: 20 U/L (ref 7–52)
ANION GAP SERPL CALCULATED.3IONS-SCNC: 2 MMOL/L (ref 4–13)
AST SERPL W P-5'-P-CCNC: 24 U/L (ref 13–39)
BASOPHILS # BLD AUTO: 0.06 THOUSANDS/ΜL (ref 0–0.1)
BASOPHILS NFR BLD AUTO: 1 % (ref 0–1)
BILIRUB SERPL-MCNC: 1.27 MG/DL (ref 0.2–1)
BILIRUB UR QL STRIP: NEGATIVE
BUN SERPL-MCNC: 19 MG/DL (ref 5–25)
CALCIUM SERPL-MCNC: 8.7 MG/DL (ref 8.4–10.2)
CHLORIDE SERPL-SCNC: 103 MMOL/L (ref 96–108)
CLARITY UR: CLEAR
CO2 SERPL-SCNC: 31 MMOL/L (ref 21–32)
COLOR UR: YELLOW
CREAT SERPL-MCNC: 1.06 MG/DL (ref 0.6–1.3)
EOSINOPHIL # BLD AUTO: 0.28 THOUSAND/ΜL (ref 0–0.61)
EOSINOPHIL NFR BLD AUTO: 4 % (ref 0–6)
ERYTHROCYTE [DISTWIDTH] IN BLOOD BY AUTOMATED COUNT: 12.7 % (ref 11.6–15.1)
GFR SERPL CREATININE-BSD FRML MDRD: 75 ML/MIN/1.73SQ M
GLUCOSE SERPL-MCNC: 138 MG/DL (ref 65–140)
GLUCOSE UR STRIP-MCNC: NEGATIVE MG/DL
HCT VFR BLD AUTO: 37.9 % (ref 36.5–49.3)
HGB BLD-MCNC: 12.7 G/DL (ref 12–17)
HGB UR QL STRIP.AUTO: NEGATIVE
IMM GRANULOCYTES # BLD AUTO: 0.01 THOUSAND/UL (ref 0–0.2)
IMM GRANULOCYTES NFR BLD AUTO: 0 % (ref 0–2)
KETONES UR STRIP-MCNC: NEGATIVE MG/DL
LEUKOCYTE ESTERASE UR QL STRIP: NEGATIVE
LIPASE SERPL-CCNC: 165 U/L (ref 11–82)
LYMPHOCYTES # BLD AUTO: 1.97 THOUSANDS/ΜL (ref 0.6–4.47)
LYMPHOCYTES NFR BLD AUTO: 29 % (ref 14–44)
MCH RBC QN AUTO: 31.3 PG (ref 26.8–34.3)
MCHC RBC AUTO-ENTMCNC: 33.5 G/DL (ref 31.4–37.4)
MCV RBC AUTO: 93 FL (ref 82–98)
MONOCYTES # BLD AUTO: 0.46 THOUSAND/ΜL (ref 0.17–1.22)
MONOCYTES NFR BLD AUTO: 7 % (ref 4–12)
NEUTROPHILS # BLD AUTO: 3.92 THOUSANDS/ΜL (ref 1.85–7.62)
NEUTS SEG NFR BLD AUTO: 59 % (ref 43–75)
NITRITE UR QL STRIP: NEGATIVE
NRBC BLD AUTO-RTO: 0 /100 WBCS
PH UR STRIP.AUTO: 7 [PH] (ref 4.5–8)
PLATELET # BLD AUTO: 223 THOUSANDS/UL (ref 149–390)
PMV BLD AUTO: 10.1 FL (ref 8.9–12.7)
POTASSIUM SERPL-SCNC: 4.7 MMOL/L (ref 3.5–5.3)
PROT SERPL-MCNC: 6.5 G/DL (ref 6.4–8.4)
PROT UR STRIP-MCNC: NEGATIVE MG/DL
RBC # BLD AUTO: 4.06 MILLION/UL (ref 3.88–5.62)
SODIUM SERPL-SCNC: 136 MMOL/L (ref 135–147)
SP GR UR STRIP.AUTO: 1.01 (ref 1–1.03)
UROBILINOGEN UR QL STRIP.AUTO: 1 E.U./DL
WBC # BLD AUTO: 6.7 THOUSAND/UL (ref 4.31–10.16)

## 2024-11-24 PROCEDURE — 99284 EMERGENCY DEPT VISIT MOD MDM: CPT | Performed by: EMERGENCY MEDICINE

## 2024-11-24 PROCEDURE — 96360 HYDRATION IV INFUSION INIT: CPT

## 2024-11-24 PROCEDURE — 99285 EMERGENCY DEPT VISIT HI MDM: CPT

## 2024-11-24 PROCEDURE — 81003 URINALYSIS AUTO W/O SCOPE: CPT

## 2024-11-24 PROCEDURE — 83690 ASSAY OF LIPASE: CPT | Performed by: EMERGENCY MEDICINE

## 2024-11-24 PROCEDURE — 80053 COMPREHEN METABOLIC PANEL: CPT | Performed by: EMERGENCY MEDICINE

## 2024-11-24 PROCEDURE — 96361 HYDRATE IV INFUSION ADD-ON: CPT

## 2024-11-24 PROCEDURE — 74177 CT ABD & PELVIS W/CONTRAST: CPT

## 2024-11-24 PROCEDURE — 36415 COLL VENOUS BLD VENIPUNCTURE: CPT | Performed by: EMERGENCY MEDICINE

## 2024-11-24 PROCEDURE — 85025 COMPLETE CBC W/AUTO DIFF WBC: CPT | Performed by: EMERGENCY MEDICINE

## 2024-11-24 RX ORDER — ONDANSETRON 4 MG/1
4 TABLET, FILM COATED ORAL EVERY 6 HOURS
Qty: 12 TABLET | Refills: 0 | Status: SHIPPED | OUTPATIENT
Start: 2024-11-24

## 2024-11-24 RX ADMIN — SODIUM CHLORIDE 1000 ML: 0.9 INJECTION, SOLUTION INTRAVENOUS at 16:01

## 2024-11-24 RX ADMIN — IOHEXOL 100 ML: 350 INJECTION, SOLUTION INTRAVENOUS at 16:37

## 2024-11-24 NOTE — ED PROVIDER NOTES
ED Disposition       None          Assessment & Plan       Medical Decision Making  59-year-old male presents to the ED with a 2-day history of complaints of periumbilical pain and firm nodule around the umbilicus palpated by his wife.  She also states he had night sweats but no fever.  No vomiting diarrhea constipation.  No previous abdominal surgeries.  On exam he looks well in no distress.  He does have periumbilical tenderness without peritoneal signs on exam.  There is a small firm nonmobile mass approximately 1 cm just left of the umbilicus.  Question lymph node.  Doubt hernia.  Will obtain basic labs, CT abdomen pelvis.  Doubt acute surgical abdomen at this time.    Amount and/or Complexity of Data Reviewed  Labs: ordered. Decision-making details documented in ED Course.  Radiology: ordered.    Risk  Prescription drug management.        ED Course as of 11/27/24 1345   Sun Nov 24, 2024   1606 Blood Pressure: 114/62   1606 Temperature(!): 97.4 °F (36.3 °C)   1606 Temp Source: Oral   1606 Respirations: 16   1606 SpO2: 96 %   1653 LIPASE(!): 165   1653 Sodium: 136   1653 Potassium: 4.7   1653 Creatinine: 1.06   1654 GLUCOSE: 138   1654 AST: 24   1654 ALT: 20   1654 ALK PHOS: 59   1654 Total Bilirubin(!): 1.27   1654 WBC: 6.70   1654 Hemoglobin: 12.7   1654 Platelet Count: 223   1756 Color, UA: Yellow   1757 Leukocytes, UA: Negative   1757 Nitrite, UA: Negative   1757 Blood, UA: Negative       Medications   sodium chloride 0.9 % bolus 1,000 mL (1,000 mL Intravenous New Bag 11/24/24 1601)   iohexol (OMNIPAQUE) 350 MG/ML injection (MULTI-DOSE) 100 mL (100 mL Intravenous Given 11/24/24 1637)       ED Risk Strat Scores                           SBIRT 20yo+      Flowsheet Row Most Recent Value   Initial Alcohol Screen: US AUDIT-C     1. How often do you have a drink containing alcohol? 0 Filed at: 11/24/2024 1525   2. How many drinks containing alcohol do you have on a typical day you are drinking?  0 Filed at:  "11/24/2024 1525   3a. Male UNDER 65: How often do you have five or more drinks on one occasion? 0 Filed at: 11/24/2024 1525   Audit-C Score 0 Filed at: 11/24/2024 1525   SHAN: How many times in the past year have you...    Used an illegal drug or used a prescription medication for non-medical reasons? Never Filed at: 11/24/2024 1525                            History of Present Illness       Chief Complaint   Patient presents with   • Abdominal Pain     Patient reports abd discomfort and sharp with palpation to the left of the umbilicus. Reports nausea 2 days ago. (-) vomiting, fever, urinary sx's (+) chills/sweats        Past Medical History:   Diagnosis Date   • Acute pain of right shoulder 1/24/2024   • Paronychia of right index finger 08/15/2023      Past Surgical History:   Procedure Laterality Date   • CARDIAC ELECTROPHYSIOLOGY PROCEDURE N/A 3/21/2024    Procedure: Cardiac biv pacer implant;  Surgeon: Nahun Rees MD;  Location: [facility];  Service: Cardiology   • HERNIA REPAIR      At 18 years old      History reviewed. No pertinent family history.   Social History     Tobacco Use   • Smoking status: Never     Passive exposure: Never   • Smokeless tobacco: Never   Vaping Use   • Vaping status: Never Used   Substance Use Topics   • Alcohol use: Never   • Drug use: Never      E-Cigarette/Vaping   • E-Cigarette Use Never User       E-Cigarette/Vaping Substances   • Nicotine No    • THC No    • CBD No    • Flavoring No    • Other No    • Unknown No       I have reviewed and agree with the history as documented.     59-year-old male with history of hypertension, diabetes, CVA presents to the emergency department with a 2-day history of periumbilical pain and \"lump\" that patient's wife noticed.  She states when she pressed on this area he complained of sharp pain.  He is also been having night sweats.  No fever, weight loss.  No nausea, vomiting or diarrhea.  No constipation.  No black or bloody " stools.  No previous abdominal surgery      History provided by:  Patient, medical records and relative  History limited by: Poor historian.  Abdominal Pain  Pain location:  Periumbilical  Pain quality: sharp    Pain radiates to:  Does not radiate  Onset quality:  Gradual  Duration:  2 days  Timing:  Constant  Progression:  Unchanged  Chronicity:  New  Context: not alcohol use, not diet changes, not previous surgeries, not sick contacts and not trauma    Relieved by:  None tried  Worsened by:  Nothing  Ineffective treatments:  None tried  Associated symptoms: no anorexia, no belching, no chest pain, no chills, no constipation, no cough, no diarrhea, no dysuria, no fatigue, no fever, no flatus, no hematemesis, no hematochezia, no hematuria, no melena, no nausea and no vomiting    Risk factors: obesity    Risk factors: no alcohol abuse, no NSAID use and no recent hospitalization        Review of Systems   Constitutional: Negative.  Negative for activity change, appetite change, chills, diaphoresis, fatigue and fever.   HENT: Negative.     Eyes: Negative.    Respiratory: Negative.  Negative for cough.    Cardiovascular: Negative.  Negative for chest pain.   Gastrointestinal:  Positive for abdominal pain. Negative for abdominal distention, anorexia, blood in stool, constipation, diarrhea, flatus, hematemesis, hematochezia, melena, nausea and vomiting.   Genitourinary: Negative.  Negative for dysuria and hematuria.   Musculoskeletal:  Negative for neck pain.   Skin: Negative.    Allergic/Immunologic: Negative.    Neurological: Negative.  Negative for weakness, numbness and headaches.   Hematological: Negative.    Psychiatric/Behavioral: Negative.     All other systems reviewed and are negative.          Objective       ED Triage Vitals [11/24/24 1524]   Temperature Pulse Blood Pressure Respirations SpO2 Patient Position - Orthostatic VS   (!) 97.4 °F (36.3 °C) 81 114/62 16 96 % Sitting      Temp Source Heart Rate Source  BP Location FiO2 (%) Pain Score    Oral Monitor Right arm -- 10 - Worst Possible Pain      Vitals      Date and Time Temp Pulse SpO2 Resp BP Pain Score FACES Pain Rating User   11/24/24 1524 97.4 °F (36.3 °C) 81 96 % 16 114/62 10 - Worst Possible Pain -- DS            Physical Exam  Vitals and nursing note reviewed.   Constitutional:       General: He is awake. He is not in acute distress.     Appearance: Normal appearance. He is well-developed and overweight. He is not ill-appearing, toxic-appearing or diaphoretic.   HENT:      Head: Normocephalic and atraumatic.      Right Ear: External ear normal.      Left Ear: External ear normal.      Nose: Nose normal.      Mouth/Throat:      Mouth: Mucous membranes are moist.   Eyes:      General: No scleral icterus.     Conjunctiva/sclera: Conjunctivae normal.      Pupils: Pupils are equal, round, and reactive to light.   Neck:      Thyroid: No thyromegaly.      Vascular: No JVD.   Cardiovascular:      Rate and Rhythm: Normal rate and regular rhythm.      Heart sounds: Normal heart sounds. No murmur heard.     No gallop.   Pulmonary:      Effort: Pulmonary effort is normal. No tachypnea, accessory muscle usage or respiratory distress.      Breath sounds: Normal breath sounds. No stridor. No wheezing, rhonchi or rales.   Abdominal:      General: Bowel sounds are normal. There is no distension.      Palpations: Abdomen is soft. There is no mass.      Tenderness: There is abdominal tenderness in the periumbilical area. There is no guarding or rebound.      Hernia: No hernia is present.      Comments: Small approximately 1 cm firm nodule left of the umbilicus with tenderness.  Tenderness periumbilically   Skin:     General: Skin is warm and dry.      Coloration: Skin is not jaundiced or pale.      Findings: No bruising, erythema, lesion or rash.   Neurological:      General: No focal deficit present.      Mental Status: He is alert and oriented to person, place, and time.  Mental status is at baseline.      Motor: No weakness.      Deep Tendon Reflexes: Reflexes are normal and symmetric.   Psychiatric:         Behavior: Behavior is cooperative.         Results Reviewed       Procedure Component Value Units Date/Time    Comprehensive metabolic panel [[id number]]  (Abnormal) Collected: 11/24/24 1600    Lab Status: Final result Specimen: Blood from Arm, Right Updated: 11/24/24 1628     Sodium 136 mmol/L      Potassium 4.7 mmol/L      Chloride 103 mmol/L      CO2 31 mmol/L      ANION GAP 2 mmol/L      BUN 19 mg/dL      Creatinine 1.06 mg/dL      Glucose 138 mg/dL      Calcium 8.7 mg/dL      AST 24 U/L      ALT 20 U/L      Alkaline Phosphatase 59 U/L      Total Protein 6.5 g/dL      Albumin 3.6 g/dL      Total Bilirubin 1.27 mg/dL      eGFR 75 ml/min/1.73sq m     Narrative:      National Kidney Disease Foundation guidelines for Chronic Kidney Disease (CKD):   •  Stage 1 with normal or high GFR (GFR > 90 mL/min/1.73 square meters)  •  Stage 2 Mild CKD (GFR = 60-89 mL/min/1.73 square meters)  •  Stage 3A Moderate CKD (GFR = 45-59 mL/min/1.73 square meters)  •  Stage 3B Moderate CKD (GFR = 30-44 mL/min/1.73 square meters)  •  Stage 4 Severe CKD (GFR = 15-29 mL/min/1.73 square meters)  •  Stage 5 End Stage CKD (GFR <15 mL/min/1.73 square meters)  Note: GFR calculation is accurate only with a steady state creatinine    Lipase [[id number]]  (Abnormal) Collected: 11/24/24 1600    Lab Status: Final result Specimen: Blood from Arm, Right Updated: 11/24/24 1628     Lipase 165 u/L     CBC and differential [[id number]] Collected: 11/24/24 1600    Lab Status: Final result Specimen: Blood from Arm, Right Updated: 11/24/24 1613     WBC 6.70 Thousand/uL      RBC 4.06 Million/uL      Hemoglobin 12.7 g/dL      Hematocrit 37.9 %      MCV 93 fL      MCH 31.3 pg      MCHC 33.5 g/dL      RDW 12.7 %      MPV 10.1 fL      Platelets 223 Thousands/uL      nRBC 0 /100 WBCs      Segmented % 59 %      Immature Grans % 0  %      Lymphocytes % 29 %      Monocytes % 7 %      Eosinophils Relative 4 %      Basophils Relative 1 %      Absolute Neutrophils 3.92 Thousands/µL      Absolute Immature Grans 0.01 Thousand/uL      Absolute Lymphocytes 1.97 Thousands/µL      Absolute Monocytes 0.46 Thousand/µL      Eosinophils Absolute 0.28 Thousand/µL      Basophils Absolute 0.06 Thousands/µL             CT abdomen pelvis with contrast    (Results Pending)       Procedures    ED Medication and Procedure Management   Prior to Admission Medications   Prescriptions Last Dose Informant Patient Reported? Taking?   Ascorbic Acid (vitamin C) 1000 MG tablet   Yes Yes   Sig: Take 1,000 mg by mouth daily   Continuous Glucose  (FreeStyle Ana María 2 [place]) ALLIE   No No   Sig: Check blood sugars multiple times per day   Continuous Glucose Sensor (FreeStyle Ana María 2 Sensor) MISC   Yes No   Insulin Pen Needle 32G X 4 MM MISC   No No   Sig: Use up to four times daily with insulin e11.9   Lantus SoloStar 100 units/mL SOPN   No Yes   Sig: INJECT 0.35 ML (35 UNITS ) UNDER THE SKIN IN THE MORNING   acetaminophen (TYLENOL) 650 mg CR tablet Not Taking Self, Spouse/Significant Other No No   Sig: Take 1 tablet (650 mg total) by mouth every 8 (eight) hours as needed for mild pain   Patient not taking: Reported on 6/26/2024   ammonium lactate (LAC-HYDRIN) 12 % lotion   Yes Yes   atorvastatin (LIPITOR) 40 mg tablet   No Yes   Sig: Take 1 tablet (40 mg total) by mouth daily   bacitracin topical ointment 500 units/g topical ointment Not Taking  No No   Sig: Apply 1 large application topically 2 (two) times a day   Patient not taking: Reported on 11/24/2024   chlorhexidine (PERIDEX) 0.12 % solution   Yes Yes   Sig: RINSE WITH 1/2 OUNCE BY MOUTH FOR 30 SECONDS THEN SPIT OUT, AS DIRECTED   cyanocobalamin (VITAMIN B-12) 2000 MCG tablet   Yes Yes   Sig: Take 2,000 mcg by mouth daily   furosemide (LASIX) 40 mg tablet   No Yes   Sig: Take 1 tablet (40 mg total) by mouth in the  morning   hydrocortisone 1 % cream Not Taking  No No   Sig: Apply to affected area 2 times daily   Patient not taking: Reported on 11/24/2024   insulin aspart (NovoLOG FlexPen) 100 UNIT/ML injection pen   No Yes   Sig: INJECT 10 UNITS 3 TIMES A DAY BEFORE MEALS   insulin degludec (Tresiba FlexTouch) 100 units/mL injection pen   Yes Yes   Sig: Inject 35 units daily - obtains through patient assistance program   metoprolol succinate (TOPROL-XL) 25 mg 24 hr tablet   No Yes   Sig: Take 1 tablet (25 mg total) by mouth daily   mupirocin (BACTROBAN) 2 % ointment   No No   Sig: Apply topically 3 (three) times a day for 7 days   semaglutide, 0.25 or 0.5 mg/dose, (Ozempic, 0.25 or 0.5 MG/DOSE,) 2 mg/3 mL injection pen   Yes Yes   Sig: Inject 0.5 mg weekly   spironolactone (ALDACTONE) 25 mg tablet Not Taking  No No   Sig: Take 1 tablet (25 mg total) by mouth daily   Patient not taking: Reported on 11/24/2024   valsartan (DIOVAN) 320 MG tablet   No Yes   Sig: Take 1 tablet (320 mg total) by mouth daily   warfarin (COUMADIN) 5 mg tablet   No Yes   Sig: take 1 tablet by mouth once daily      Facility-Administered Medications: None     Patient's Medications   Discharge Prescriptions    No medications on file     No discharge procedures on file.  ED SEPSIS DOCUMENTATION            Brenda Hodges,   11/27/24 4122

## 2024-11-24 NOTE — ED CARE HANDOFF
Emergency Department Sign Out Note        Sign out and transfer of care from Dr Hodges. See Separate Emergency Department note.     The patient, Fadi Busby, was evaluated by the previous provider for abdominal pain.    Workup Completed:  Labs - no anemia, thrombocytopenia or leukocytosis.  No acute kidney injury or electrolyte abnormalities.  Bilirubin mildly elevated and lipase mildly elevated at 165.  UA clear    ED Course / Workup Pending (followup):  CT        Patient is a 59-year-old male with a history of CVA coming in today with abdominal pain with nausea without any vomiting or diarrhea.  They did endorse some night sweats.  Patient has received no medication here and pending CT results    7:02 PM    Patient was sleeping.  Patient resting in bed in no distress.  Introduced myself to wife and patient.  Patient has had no vomiting or diarrhea.  He states he has no pain or nausea now.    Long discussion with patient and family and reviewed labs.  He does have mildly elevated lipase without any evidence of pancreatitis.  Exam is benign with no abdominal pain or tenderness upon palpation with bowel sounds x 4.  He is nonperitoneal.  He is alert and oriented x 3.    Long discussion with patient and family unknown etiology of patient's symptoms at this time however stick to bland diet with strict return to ER instructions given.  He does not have any cough or fever as findings on CT more concerning for atelectasis.  Patient's urine is clear    Counseling: I had a detailed discussion with the patient and/or guardian regarding: the historical points, exam findings, and any diagnostic results supporting the discharge diagnosis, lab results, radiology results, discharge instructions reviewed with patient and/or family/caregiver and understanding was verbalized. Instructions given to return to the emergency department if symptoms worsen or persist, or if there are any questions or concerns that arise at home.      All imaging and/or lab testing discussed with patient, strict return to ED precautions discussed. Patient recommended to follow up promptly with appropriate outpatient provider. Patient and/or family members verbalizes understanding and agrees with plan. Patient and/or family members were given opportunity to ask questions, all questions were answered at this time. Patient is stable for discharge          CT abdomen pelvis with contrast   Final Result      No acute findings in the abdomen or pelvis.      Normal appendix visualized.      Moderate patchy atelectasis at the posterior right lung base. A small infiltrate cannot be excluded. Correlation with the patient's symptoms and laboratory studies is recommended.      Workstation performed: [id number]             Labs Reviewed   COMPREHENSIVE METABOLIC PANEL - Abnormal       Result Value Ref Range Status    Sodium 136  135 - 147 mmol/L Final    Potassium 4.7  3.5 - 5.3 mmol/L Final    Comment: Slightly Hemolyzed:Results may be affected.    Chloride 103  96 - 108 mmol/L Final    CO2 31  21 - 32 mmol/L Final    ANION GAP 2 (*) 4 - 13 mmol/L Final    BUN 19  5 - 25 mg/dL Final    Creatinine 1.06  0.60 - 1.30 mg/dL Final    Comment: Standardized to IDMS reference method    Glucose 138  65 - 140 mg/dL Final    Comment: If the patient is fasting, the ADA then defines impaired fasting glucose as > 100 mg/dL and diabetes as > or equal to 123 mg/dL.    Calcium 8.7  8.4 - 10.2 mg/dL Final    AST 24  13 - 39 U/L Final    Comment: Slightly Hemolyzed:Results may be affected.    ALT 20  7 - 52 U/L Final    Comment: Specimen collection should occur prior to Sulfasalazine administration due to the potential for falsely depressed results.     Alkaline Phosphatase 59  34 - 104 U/L Final    Total Protein 6.5  6.4 - 8.4 g/dL Final    Albumin 3.6  3.5 - 5.0 g/dL Final    Total Bilirubin 1.27 (*) 0.20 - 1.00 mg/dL Final    Comment: Use of this assay is not recommended for patients  undergoing treatment with eltrombopag due to the potential for falsely elevated results.  N-acetyl-p-benzoquinone imine (metabolite of Acetaminophen) will generate erroneously low results in samples for patients that have taken an overdose of Acetaminophen.    eGFR 75  ml/min/1.73sq m Final    Narrative:     National Kidney Disease Foundation guidelines for Chronic Kidney Disease (CKD):     Stage 1 with normal or high GFR (GFR > 90 mL/min/1.73 square meters)    Stage 2 Mild CKD (GFR = 60-89 mL/min/1.73 square meters)    Stage 3A Moderate CKD (GFR = 45-59 mL/min/1.73 square meters)    Stage 3B Moderate CKD (GFR = 30-44 mL/min/1.73 square meters)    Stage 4 Severe CKD (GFR = 15-29 mL/min/1.73 square meters)    Stage 5 End Stage CKD (GFR <15 mL/min/1.73 square meters)  Note: GFR calculation is accurate only with a steady state creatinine   LIPASE - Abnormal    Lipase 165 (*) 11 - 82 u/L Final   CBC AND DIFFERENTIAL    WBC 6.70  4.31 - 10.16 Thousand/uL Final    RBC 4.06  3.88 - 5.62 Million/uL Final    Hemoglobin 12.7  12.0 - 17.0 g/dL Final    Hematocrit 37.9  36.5 - 49.3 % Final    MCV 93  82 - 98 fL Final    MCH 31.3  26.8 - 34.3 pg Final    MCHC 33.5  31.4 - 37.4 g/dL Final    RDW 12.7  11.6 - 15.1 % Final    MPV 10.1  8.9 - 12.7 fL Final    Platelets 223  149 - 390 Thousands/uL Final    nRBC 0  /100 WBCs Final    Segmented % 59  43 - 75 % Final    Immature Grans % 0  0 - 2 % Final    Lymphocytes % 29  14 - 44 % Final    Monocytes % 7  4 - 12 % Final    Eosinophils Relative 4  0 - 6 % Final    Basophils Relative 1  0 - 1 % Final    Absolute Neutrophils 3.92  1.85 - 7.62 Thousands/µL Final    Absolute Immature Grans 0.01  0.00 - 0.20 Thousand/uL Final    Absolute Lymphocytes 1.97  0.60 - 4.47 Thousands/µL Final    Absolute Monocytes 0.46  0.17 - 1.22 Thousand/µL Final    Eosinophils Absolute 0.28  0.00 - 0.61 Thousand/µL Final    Basophils Absolute 0.06  0.00 - 0.10 Thousands/µL Final   URINE MACROSCOPIC, POC     Color, UA Yellow   Final    Clarity, UA Clear   Final    pH, UA 7.0  4.5 - 8.0 Final    Leukocytes, UA Negative  Negative Final    Nitrite, UA Negative  Negative Final    Protein, UA Negative  Negative mg/dl Final    Glucose, UA Negative  Negative mg/dl Final    Ketones, UA Negative  Negative mg/dl Final    Urobilinogen, UA 1.0  0.2, 1.0 E.U./dl E.U./dl Final    Bilirubin, UA Negative  Negative Final    Occult Blood, UA Negative  Negative Final    Specific Gravity, UA 1.010  1.003 - 1.030 Final    Narrative:     CLINITEK RESULT                            Procedures  MDM        Disposition  Final diagnoses:   None     ED Disposition       None          Follow-up Information    None       Patient's Medications   Discharge Prescriptions    No medications on file     No discharge procedures on file.       ED Provider  Electronically Signed by     Claudia Alas DO  11/24/24 9443

## 2024-11-25 ENCOUNTER — OFFICE VISIT (OUTPATIENT)
Facility: REHABILITATION | Age: 59
End: 2024-11-25
Payer: COMMERCIAL

## 2024-11-25 ENCOUNTER — OFFICE VISIT (OUTPATIENT)
Dept: OCCUPATIONAL THERAPY | Facility: REHABILITATION | Age: 59
End: 2024-11-25
Payer: COMMERCIAL

## 2024-11-25 DIAGNOSIS — R20.9 ALTERATION OF SENSATION AS LATE EFFECT OF CEREBROVASCULAR ACCIDENT (CVA): Primary | ICD-10-CM

## 2024-11-25 DIAGNOSIS — I69.398 ALTERATION OF SENSATION AS LATE EFFECT OF CEREBROVASCULAR ACCIDENT (CVA): Primary | ICD-10-CM

## 2024-11-25 DIAGNOSIS — I63.9 CEREBROVASCULAR ACCIDENT (CVA), UNSPECIFIED MECHANISM (HCC): Primary | ICD-10-CM

## 2024-11-25 PROCEDURE — 97110 THERAPEUTIC EXERCISES: CPT | Performed by: OCCUPATIONAL THERAPIST

## 2024-11-25 PROCEDURE — 97112 NEUROMUSCULAR REEDUCATION: CPT | Performed by: OCCUPATIONAL THERAPIST

## 2024-11-25 PROCEDURE — 97112 NEUROMUSCULAR REEDUCATION: CPT | Performed by: PHYSICAL THERAPIST

## 2024-11-25 PROCEDURE — 97116 GAIT TRAINING THERAPY: CPT | Performed by: PHYSICAL THERAPIST

## 2024-11-25 NOTE — PROGRESS NOTES
OCCUPATIONAL THERAPY PROGRESS NOTE #1:     11/25/2024  Fadi Busby  1965  [id number]  Brayden Kee    Diagnosis ICD-10-CM Associated Orders   1. Cerebrovascular accident (CVA), unspecified mechanism (HCC)  I63.9             Assessment    Assessment details: See skilled analysis for further details.         Skilled Analysis:  Pt is a 59 y.o. male referred to Occupational Therapy s/p Cerebrovascular accident (CVA), unspecified mechanism (HCC) [I63.9].   Pt participated in skilled OT evaluation and following formalized testing as well as clinical observation, Pt presents with the following areas of deficit:  poor automaticity of LUE motor functioning both proximally and distally, tendencies of drifting into shoulder ABD when performing elbow flexion with LUE, decreased B/L coordination 2* motor lags of LUE, decreased speed and accuracy of FMC/prehension/in-hand manipulation, impaired LUE proprioception, decreased muscle endurance 2* increased effort required when utilizing LUE, and decreased LUE AROM affecting abilities of retrieving and placing items in cabinets.  Pt will benefit from skilled Occupational Therapy services 2x/week for 12 weeks with focus on neuro re-ed, manual techniques, thera act, thera ex to improve on the above deficits, improve functional use of LUE, and enhance overall QOL.     Upon this date (11/25/2024, Pt participated in re-evaluation to further assess fxnl progression towards Occupational Therapy goals. Pt demo with G functional progression towards goals in POC evident by increased LUE shoulder extension and ABD AROM, improved speed and accuracy of FMC/prehension with abilities to complete 9-hole peg test x 14 seconds faster, increased L intrinsic strength, and improved overall muscle endurance of LUE with less fatigue levels when engaging in strengthening activities.  Pt with self-report of noticing the following improvements in home environment: Able to lift LUE and place it onto  steering wheel with greater ease, able to manipulate car controls without difficulties, and opening the refrigerator with LUE with improved speed and accuracy. Following formalized testing and clinical observation, Pt continues to present with the following limitation: L proximal weakness, flexor tone inhibiting LUE functional movements, impaired bilateral coordination, decreased LUE AROM in shoulder FF/supination movement patterns, impaired LUE proprioception with and without vision occluded, and decreased automaticity.  OTR recommending Pt to continue skilled OT services 2x/week to improve on the above deficits, improve functional use of RUE, and enhance overall QOL.         Short Term Goals:  Pt will increase proprioception of LUE hand to target for improved functional reach vision occluded with ADL/IADL tasks  x 25% accuracy 4 weeks-- PARTIALLY MET  Pt will demo with G carryover of Home Exercise Program to improve functional progression towards goals in Plan of care, increased LUE AROM and for improved functional use of LUE  x 25% 4 weeks-- PARTIALLY MET  Pt will demo with G tolerance to supine, seated, and in stance exercise x 30 minutes with minimal rest breaks required for increased engagement in weekly exercise regimen and increased engagement in life roles 4 weeks-- MET  Pt will increase automaticity of LUE to 50% for improved grasp release of tabletop items for improved functional performance with salient tasks 4 weeks-- PARTIALLY MET  Pt will increase LUE rate of manipulation for all FM tests x 15 seconds for improved functional performance/independence with salient tasks 4 weeks-- PARTIALLY MET  Pt will increase LUE to refined assist with <10% cuing for tabletop tasks for improved functional performance of life roles and salient tasks 4 weeks-- PARTIALLY MET  Pt will increase L  strength x 5 lbs, through the use of strengthening exercises and home program for improved abilities retrieving items from  "cabinet in home environment 4 weeks-- NOT MET  Pt will demo with increased LUE shoulder FF and ABD AROM x 10* to improve his abilities to retrieve and place items in cabinets with increased PSFS level to 7/10 4 weeks-- PARTIALLY MET      Long Term Goals:  Pt will increase proprioception of LUE hand to target for improved functional reach vision occluded with ADL/IADL tasks  x 50% accuracy-- NOT MET  Pt will demo with G carryover of Home Exercise Program to improve functional progression towards goals in Plan of care, increased LUE AROM and for improved functional use of LUE  x 50% -- NOT MET  Pt will demo with G tolerance to supine, seated, and in stance exercise x 45 minutes with minimal rest breaks required for increased engagement in weekly exercise regimen and increased engagement in life roles -- NOT MET  Pt will increase automaticity of LUE to 75% for improved grasp release of tabletop items for improved functional performance with salient tasks-- NOT MET  Pt will increase LUE rate of manipulation for all FM tests x 30 seconds for improved functional performance/independence with salient tasks -- NOT MET  Pt will increase LUE to Mod I with 0% cuing for tabletop tasks for improved functional performance of life roles and salient tasks -- NOT MET  Pt will increase L  strength x 8 lbs, through the use of strengthening exercises and home program for improved abilities retrieving items from cabinet in home environment -- NOT MET  Pt will demo with increased LUE shoulder FF and ABD AROM x 15* to improve his abilities to retrieve and place items in cabinets with increased PSFS level to 8/10 -- NOT MET      Subjective Evaluation    Quality of life: good          SUBJECTIVE: \"I feel like I could do more than I am.\"    PATIENT GOAL: \"Being able to pick things out of the cabinet.\"    Patient-Specific Functional Scale   Task is scored 0 (unable to perform activity) to 10 (ability to perform activity " independently)    Activity Date: 10/21/24 Date: 24   Being able to pick things out of the cabinet 5/10 5/10       HISTORY OF PRESENT ILLNESS:     Pt is a 59 y.o. male who was referred to Occupational Therapy s/p  Cerebrovascular accident (CVA), unspecified mechanism (HCC) [I63.9].  As per chart review, hx of multiple CVA. His most recent occurring in . He still suffers from left sided hemiplegia. Pt with self-report of experiencing first stroke in 2006, second stroke was 2006, and last stroke was 2009.  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  Pt received TPA with first CVA with excellent recovery. Pt was hospitalized for one month on second CVA-- Pt participated in PT/OT.  Pt also received PT/OT after third stroke.      Pt with self-report of LUE weakness affecting functional use of LUE.  Pt also with self-report of noticing decreased AROM of LUE.      Pt lives in a fourth floor apartment independently.  Pt currently performing all ADLs/IADLs at Mod I status.  Pt currently managing both medication and finances independently. Pt is currently on long term disability since [id number].  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  Pt continues the  role without difficulties reported.      PMH:   Past Medical History:   Diagnosis Date    Acute pain of right shoulder 2024    Paronychia of right index finger 08/15/2023         Pain Levels:     Restin    With Activity:  0    Objective     Functional Assessment        Comments  See impairment section for further details.       Impairment Observations:        IE RUE IE LUE  Comments                 UPPER EXTREMITY FXN Intact Impaired  Pt is R hand dominant                           /Pinch Strength           Dynamometer       - Gross Grasp 108 lbs 65 lbs  R: increased x 23 lbs.   L: decreased x 8 lbs.    Pinch Meter        - PINCER 15 lbs 17 lbs  R:  increased x 3 lbs.   L: increased x 6 lbs.     - TRIPOD 19 lbs 12 lbs  R: increased x 5 lbs.   L: increased x 5.5 lbs.     - LATERAL 27 lbs  27 lbs  R: increased x 0.5 lbs.   L: increased x 4.5 lbs.                AROM (seated)        Elevation WFL Near full     Shoulder FF WFL  135*   L: remained   Shoulder Ext WFL  70*   L: increased x 10*   Shoulder Abd WFL  135*   L: increased x 10*   Elbow Flex WFL  WFL      Elbow Ext WFL  WFL      Pronation WFL  WFL      Supination WFL  1/2 AROM      Wrist Flex WFL  70*   L: increased x 20*   Wrist Ext WFL  50*   L: maintained   Digit Flex WFL  WFL      Digit Ex WFL  WFL      Composite Grasp WFL  WFL      Hook Grasp WFL  WFL      Opposition WFL  Bradykinetic pace     Dysdiadochokinesia WFL Impaired     Subluxation  N/A  N/A       Shoulder FF 5/5 5/5     Shoulder Ext 5/5 5/5     Shoulder Abd 5/5 5/5     Shoulder ADd 5/5 5/5     Elbow Flex 5/5 5/5     Elbow Ext 5/5 5/5     Wrist Flex 5/5 5/5     Wrist Ext 5/5 5/5     SENSATION       Myofilaments (2.83 WNL) 2.83 2.83     Sharp Dull  Intact Intact     Proprioception Intact Impaired     Hot/Cold Temp Intact Intact            COORDINATION       9 Hole Peg Test 20.65 seconds 1 minute and 58 seconds  R: able to complete test x 2 seconds faster  L: able to complete test x 14 seconds faster   MODIFIED ANAMIKA SCALE (TONE)       No increase in muscle tone (0) 0 0     Slight Increase in muscle tone with catch and release or min resist at end range (1)       Slight Increase in muscle tone with catch and release, followed by min resistance through remainder of range (1+)       Increased muscle tone through full range, able to be moved easily (2)       Considerable increase in tone, difficult to move (3)       Rigid in Flexion/Extension (4)                                         OTHER PLANNED THERAPY INTERVENTIONS:   Supine, seated, and in stance neuro re-ed  LUE AROM/AAROM  Bilateral integrative tasks  FMC/prehension/in-hand  manipulation  Timed Trials  Manual tx  Hand to target  Seated functional reach: crossing midline  Supine place and hold  WBearing strategies   Closed chain activities  Open chain activities      INTERVENTION COMMENTS:  Diagnosis: Cerebrovascular accident (CVA), unspecified mechanism (HCC) [I63.9]  Precautions: L hemiplegia; PACE MAKER-- NO STIM; DM II   10 of 12 visits, PN due 12/25/2024

## 2024-11-25 NOTE — PROGRESS NOTES
Daily Note     Today's date: 2024  Patient name: Fadi Busby  : 1965  MRN: [id number]  Referring provider: Kee Owen DO  Dx:   Encounter Diagnosis     ICD-10-CM    1. Alteration of sensation as late effect of cerebrovascular accident (CVA)  I69.398     R20.9                          Subjective: Hit his left knee with a grocery store can yesterday and knee buckled.  No pain, but buckled a few times after that.      Objective: See treatment diary below      Assessment: Left knee buckled while on TM walking fwd today, stated was similar to what happened after can hit his knee.  Denies pain, just feels like he can't control his knee.  Complaints of some weakness in L knee during session, but no increases in pain.  Continues with difficulty with hurdles due to decreased foot clearance on L. Would benefit from continued PT      Plan: Continue per plan of care.      Precautions: HTN, type II DM, DO NOT BILL FOR TA     HEP: walking program outside or on treadmill     **5# L ankle   Manuals 11/20 11/25 10/31 11/6 11/11 11/13                                       Neuro Re-Ed         Hurdles   High AW, no ball fwd x2 laps  High with tidal ball  No AW  2 laps ea fwd/side     Step ups     Folded mat 5# on L ankle 2x15 R & L with holds  Over stair  + water ball. X15    Resisted walking    **50ft x 10 dragging 25-30#.     No weight 50ft x2    **75ft x6 dragging 25#    No weight 75ft x2    Blaze pods **over stair . 1.5min x3     **Over low hurdles, folded mat, step stool. 1.5min x4    Side stepping   On TM .6mph x2min ea   Resisted band**  20ft lap x2     Ramp  X12 fwd/bwd With tidal ball fwd/bwd x3, side x3 ea SOLO with tidal ball fwd x10, side x3 ea      Hopping In place BL UE support. 2x10        Ther Ex         HIIT treadmill  5# ANKLE   **Base: 1% 1.6mph no UE    Fast: 1% 2.5-2.7mph no UE    12min total 5# L ankle  Bwd 2mph  Fast 3mph  1min intervals  X6min total    Bwd  1mph x3min **Base: 2%  1.5mph no UE    Fast: 2% 2.4-2.6mph no UE    11min total  -----    **Bwd walking 0.8mph x2min, x2min. 1 UE SOLO  Limited UE  2.5mph x5min  Bwd .8mph x5min  Side .5mph x2min ea 5# ANKLE   **Base: 1% 1.6mph no UE    Fast: 1% 2.4-2.6mph no UE    11min total  -----   5# ANKLE   **Base: 1% 1.6mph no UE    Fast: 1% 2.5-2.7mph no UE    12min total  -----   Leg press machine                                                               Ther Activity         DO NOT BILL FOR TA                  Gait Training                           Modalities

## 2024-11-29 ENCOUNTER — APPOINTMENT (OUTPATIENT)
Facility: REHABILITATION | Age: 59
End: 2024-11-29
Payer: COMMERCIAL

## 2024-12-03 ENCOUNTER — OFFICE VISIT (OUTPATIENT)
Facility: REHABILITATION | Age: 59
End: 2024-12-03
Payer: COMMERCIAL

## 2024-12-03 ENCOUNTER — OFFICE VISIT (OUTPATIENT)
Dept: OCCUPATIONAL THERAPY | Facility: REHABILITATION | Age: 59
End: 2024-12-03
Payer: COMMERCIAL

## 2024-12-03 DIAGNOSIS — R20.9 ALTERATION OF SENSATION AS LATE EFFECT OF CEREBROVASCULAR ACCIDENT (CVA): Primary | ICD-10-CM

## 2024-12-03 DIAGNOSIS — I69.398 ALTERATION OF SENSATION AS LATE EFFECT OF CEREBROVASCULAR ACCIDENT (CVA): Primary | ICD-10-CM

## 2024-12-03 DIAGNOSIS — I63.9 CEREBROVASCULAR ACCIDENT (CVA), UNSPECIFIED MECHANISM (HCC): Primary | ICD-10-CM

## 2024-12-03 PROCEDURE — 97112 NEUROMUSCULAR REEDUCATION: CPT | Performed by: OCCUPATIONAL THERAPIST

## 2024-12-03 PROCEDURE — 97110 THERAPEUTIC EXERCISES: CPT | Performed by: PHYSICAL THERAPIST

## 2024-12-03 PROCEDURE — 97112 NEUROMUSCULAR REEDUCATION: CPT | Performed by: PHYSICAL THERAPIST

## 2024-12-03 PROCEDURE — 97110 THERAPEUTIC EXERCISES: CPT | Performed by: OCCUPATIONAL THERAPIST

## 2024-12-04 ENCOUNTER — TELEPHONE (OUTPATIENT)
Dept: FAMILY MEDICINE CLINIC | Facility: CLINIC | Age: 59
End: 2024-12-04

## 2024-12-04 NOTE — TELEPHONE ENCOUNTER
PCP SIGNATURE NEEDED FOR [facility]  FORM RECEIVED VIA FAX AND PLACED IN PCP FOLDER TO BE DELIVERED AT ASSIGNED TIMES.      Visit Note - October 2, 2024

## 2024-12-05 ENCOUNTER — APPOINTMENT (OUTPATIENT)
Dept: OCCUPATIONAL THERAPY | Facility: REHABILITATION | Age: 59
End: 2024-12-05
Payer: COMMERCIAL

## 2024-12-05 ENCOUNTER — APPOINTMENT (OUTPATIENT)
Facility: REHABILITATION | Age: 59
End: 2024-12-05
Payer: COMMERCIAL

## 2024-12-06 NOTE — TELEPHONE ENCOUNTER
Anderson Peryr.  I just wanted to let you know that I was in the office yesterday and did not see the form in my mailbox but I do also know that it sometimes takes a while to make it there.  Just wanted to let you know that.  Thanks

## 2024-12-09 ENCOUNTER — OFFICE VISIT (OUTPATIENT)
Facility: REHABILITATION | Age: 59
End: 2024-12-09
Payer: COMMERCIAL

## 2024-12-09 ENCOUNTER — OFFICE VISIT (OUTPATIENT)
Dept: OCCUPATIONAL THERAPY | Facility: REHABILITATION | Age: 59
End: 2024-12-09
Payer: COMMERCIAL

## 2024-12-09 DIAGNOSIS — I69.398 ALTERATION OF SENSATION AS LATE EFFECT OF CEREBROVASCULAR ACCIDENT (CVA): Primary | ICD-10-CM

## 2024-12-09 DIAGNOSIS — I63.9 CEREBROVASCULAR ACCIDENT (CVA), UNSPECIFIED MECHANISM (HCC): Primary | ICD-10-CM

## 2024-12-09 DIAGNOSIS — R20.9 ALTERATION OF SENSATION AS LATE EFFECT OF CEREBROVASCULAR ACCIDENT (CVA): Primary | ICD-10-CM

## 2024-12-09 PROCEDURE — 97112 NEUROMUSCULAR REEDUCATION: CPT | Performed by: PHYSICAL THERAPIST

## 2024-12-09 PROCEDURE — 97112 NEUROMUSCULAR REEDUCATION: CPT | Performed by: OCCUPATIONAL THERAPIST

## 2024-12-09 PROCEDURE — 97110 THERAPEUTIC EXERCISES: CPT | Performed by: OCCUPATIONAL THERAPIST

## 2024-12-09 PROCEDURE — 97110 THERAPEUTIC EXERCISES: CPT | Performed by: PHYSICAL THERAPIST

## 2024-12-09 PROCEDURE — 97530 THERAPEUTIC ACTIVITIES: CPT | Performed by: OCCUPATIONAL THERAPIST

## 2024-12-09 NOTE — PROGRESS NOTES
"Daily Note     Today's date: 2024  Patient name: Fadi Busby  : 1965  MRN: [id number]  Referring provider: Kee Owen DO  Dx:   Encounter Diagnosis     ICD-10-CM    1. Cerebrovascular accident (CVA), unspecified mechanism (HCC)  I63.9                      Subjective: \"I want to start doing push ups again.\"      Objective: See treatment description below    Thera Ex: UBE x 5 minutes prograde and x 5 minutes retrograde in seated position bilaterally with 3.3 resistance level to improved LUE proximal strength, muscle endurance, and bilateral coordination.     Neuro Re-ed:  Pt advanced to performing closed chain task of completing 3 modified prone push ups, followed by completing 1 sets 10 reps of ipsilateral UE/LE raises x 5 seconds challenging LUE proprioception, LUE proximal strength/stability, and increased L tricep activation to increase to performing push ups.     Thera Act: Pt concluded tx session with a bilateral integrative task of retrieving card with R hand for placement in resistive clothes pin with L hand sustaining pinch to various resistive clothes pins with supination AROM required challenging bilateral coordination, improved motor control, increased L FMC/prehension, and increased L proximal/distal teaming.     Assessment: Tolerated treatment well. Patient would benefit from continued OT    Pt demo with excellent activity tolerance to UBE demands with abilities to complete x 10 minutes with 3.2 resistance level without complaints of fatigue, G bilateral coordination, and excellent L tricep extension with functional movements in both prograde and retrograde movement patterns.  Pt with significant proximal weakness restricting engagement in prone push ups limiting completion to only x 3 reps prior to requesting to conclude.  Pt demo with significant improved activity tolerance to ipsilateral UE/LE raises in quadruped position with abilities to complete 1 set x 10 reps.  Pt demo with " slight stabilization assist to L tricep 2* elbow buckling with increased reps. Pt presented with significant increased tremor/ataxic like movements with attempts to reach forward 2* decreased muscle endurance and pushing through flexor tone inhibiting normal movement patterns.  Pt continues to present Max difficulties performing supination AROM 2* flexor tone.      Plan: Continue per plan of care.     Hand to target massed practice on BITS.      INTERVENTION COMMENTS:  Diagnosis: Cerebrovascular accident (CVA), unspecified mechanism (HCC) [I63.9]  Precautions: L hemiplegia; PACE MAKER-- NO STIM; DM II   12 of 12 visits, PN due 12/25/2024

## 2024-12-09 NOTE — PROGRESS NOTES
Progress Update     Today's date: 2024  Patient name: Fadi Busby  : 1965  MRN: [id number]  Referring provider: Kee Owen DO  Dx:   Encounter Diagnosis     ICD-10-CM    1. Alteration of sensation as late effect of cerebrovascular accident (CVA)  I69.398     R20.9                   Start Time: 09  Stop Time: 1030  Total time in clinic (min): 45 minutes    Subjective: Feels he has made progress over the past month. He feels the strength and motion in his leg is improved. Still sometimes has some buckling in his L knee occasionally. His goals is still to be able to jog a little if he needs to in an emergency or play with his grand kids. No falls or major balance issues outside of if his knee feels like it wants to buckle.       Objective: See treatment diary below    Vitals:  -BP: 130/70           Balance Test Initial Eval       5x Sit to Stand: 17s no UE  16.3s  12s no UE      TUG:           Gait Speed:            2 Minute Walk Test:           6 Minute Walk Test: 1100ft  1450ft  1400ft     Cook Balance Scale:           FGA:                   ABC:  43%               Goals  STGs (4 weeks)  Pt will be independent with comprehensive HEP - progressing   Pt will demonstrate least 3-second improvement on 5 times sit to stand  - MET      LTG's (to be achieved by d/c)  Pt will be able to self manage sx's independently  - progressing   Pt will demonstrate least 4-point improvement on FGA  - MET  Patient will demonstrate least 200 foot improvement on 6-minute walk test - MET  ADDED GOAL: pt will be able to jog safely for at least 50ft        Assessment: Since last progress update Fadi has continued to demonstrate improvements in physical therapy.  He demonstrates improved functional lower extremity strength per 5 times sit to stand.  Also demonstrates some improvements in dynamic balance per FGA.  Similar findings on 6-minute walk test today however this may have been affected  "by patient reporting some lung irritation today.  Although improved he does continue to demonstrate abnormal gait with left knee recurvatum, impaired dynamic balance, and impairments in higher level dynamic balance in the community.  These deficits are resulting in difficulty with safe community mobility and patient reaching his goal of being able to jog for short distances.  Patient also interested in performing boxing as an exercise routine which a trial was done of today and physical therapy which she tolerated well requiring cues for proper technique and safety.  He would benefit from continued skilled PT to further address these deficits and maximize his function.      Plan: Continue per plan of care.  Continue to 4-6 more weeks at 2 times per week.     Precautions: HTN, type II DM, DO NOT BILL FOR TA     HEP: walking program outside or on treadmill  Access Code: [email or web address]  URL: [email or web address]  Date: 12/03/2024  Prepared by: Ezequiel Iraheta    Exercises  - Jump in Place  - 1 x daily - 7 x weekly - 3 sets - 10 reps  - Running in place  - 1 x daily - 7 x weekly - 3 sets - 30 sec hold  - Standing Heel Raise with Support  - 1 x daily - 7 x weekly - 3 sets - 10 reps    **5# L ankle   Manuals 11/20 11/25 12/3 12/9                                         Neuro Re-Ed    See testing      Hurdles   High AW, no ball fwd x2 laps       Step ups          Resisted walking          Blaze pods **over stair . 1.5min x3   Fwd/bwd fast over 18ft. 1min x3       Side stepping   On TM .6mph x2min ea       Jogging place   BL UE support 30\"x3       Ramp  X12 fwd/bwd With tidal ball fwd/bwd x3, side x3 ea       Hopping In place BL UE support. 2x10  In place BL UE support. 2x10      Boxing     Punches and combos in standing 8min with cues      Ther Ex    See testing     HIIT treadmill  5# ANKLE   **Base: 1% 1.6mph no UE    Fast: 1% 2.5-2.7mph no UE    12min total 5# L ankle  Bwd 2mph  Fast 3mph  1min " intervals  X6min total    Bwd  1mph x3min **Base: 1% 1.6mph no UE    Fast: 1% 2.8-3.0mph no UE    11min total  ------    Bwd 1mph, x3min      Leg press machine                                                               Ther Activity         DO NOT BILL FOR TA                  Gait Training                           Modalities

## 2024-12-12 ENCOUNTER — OFFICE VISIT (OUTPATIENT)
Dept: OCCUPATIONAL THERAPY | Facility: REHABILITATION | Age: 59
End: 2024-12-12
Payer: COMMERCIAL

## 2024-12-12 ENCOUNTER — OFFICE VISIT (OUTPATIENT)
Facility: REHABILITATION | Age: 59
End: 2024-12-12
Payer: COMMERCIAL

## 2024-12-12 DIAGNOSIS — I69.398 ALTERATION OF SENSATION AS LATE EFFECT OF CEREBROVASCULAR ACCIDENT (CVA): Primary | ICD-10-CM

## 2024-12-12 DIAGNOSIS — I63.9 CEREBROVASCULAR ACCIDENT (CVA), UNSPECIFIED MECHANISM (HCC): Primary | ICD-10-CM

## 2024-12-12 DIAGNOSIS — R20.9 ALTERATION OF SENSATION AS LATE EFFECT OF CEREBROVASCULAR ACCIDENT (CVA): Primary | ICD-10-CM

## 2024-12-12 PROCEDURE — 97112 NEUROMUSCULAR REEDUCATION: CPT | Performed by: OCCUPATIONAL THERAPIST

## 2024-12-12 PROCEDURE — 97110 THERAPEUTIC EXERCISES: CPT | Performed by: PHYSICAL THERAPIST

## 2024-12-12 PROCEDURE — 97530 THERAPEUTIC ACTIVITIES: CPT | Performed by: OCCUPATIONAL THERAPIST

## 2024-12-12 PROCEDURE — 97110 THERAPEUTIC EXERCISES: CPT | Performed by: OCCUPATIONAL THERAPIST

## 2024-12-12 PROCEDURE — 97112 NEUROMUSCULAR REEDUCATION: CPT | Performed by: PHYSICAL THERAPIST

## 2024-12-12 NOTE — PROGRESS NOTES
"Daily Note     Today's date: 2024  Patient name: Fadi Busby  : 1965  MRN: [id number]  Referring provider: Kee Owen DO  Dx:   Encounter Diagnosis     ICD-10-CM    1. Alteration of sensation as late effect of cerebrovascular accident (CVA)  I69.398     R20.9                      Subjective: Stiff this morning.  No other changes or complaints.  Going to [place] next week for 10 days.      Objective: See treatment diary below  BP post TM: 140/70    Assessment: Tolerated treatment well, minimal recovery periods today.  Difficulty with hopping due to decreased confidence with AD and decreased strength of LLE.  With reps and cues improved ability to complete hop motion with LLE, however still requires use of RLE to assist due to decreased strength and power. Patient would benefit from continued PT      Plan: 1 more visit then new auth required.     Precautions: HTN, type II DM, DO NOT BILL FOR TA     Manuals 11/20 11/25 12/3 12/9 12/12                                        Neuro Re-Ed    See testing      Hurdles   High AW, no ball fwd x2 laps       Step ups      8\" & foam L 2x10 no AD    Resisted walking          Blaze pods **over stair . 1.5min x3   Fwd/bwd fast over 18ft. 1min x3       Side stepping   On TM .6mph x2min ea       Jogging place   BL UE support 30\"x3       Foam EC     Narrow MITCHELL Hts H/V 2x30s ea CGA    Ramp  X12 fwd/bwd With tidal ball fwd/bwd x3, side x3 ea   With uneven mat hodling tidal ball fwd x3 laps, side x2 laps ea    Hopping In place BL UE support. 2x10  In place BL UE support. 2x10  To standing target no AD 2x10    Boxing     Punches and combos in standing 8min with cues      Ther Ex    See testing     HIIT treadmill  5# ANKLE   **Base: 1% 1.6mph no UE    Fast: 1% 2.5-2.7mph no UE    12min total 5# L ankle  Bwd 2mph  Fast 3mph  1min intervals  X6min total    Bwd  1mph x3min **Base: 1% 1.6mph no UE    Fast: 1% 2.8-3.0mph no UE    11min total  ------    Bwd 1mph, x3min  " SOLO UE prn  Base 1.8mph  Speed 3 mph  1 min intervals  X10 min total    Bwd speed intervals  Base: .8mph  Speed: 1.3 mph  1 min intervals  X9min total    Leg press machine         Prone quad stretch     L by PT x3min, reviewed for HEP    Calf stretch off step     L 2x1min, reviewed for HEP                                        Ther Activity         DO NOT BILL FOR TA                  Gait Training                           Modalities

## 2024-12-12 NOTE — TELEPHONE ENCOUNTER
Melanie I got the form yesterday from my bin. I will drop it off this evening once I sign off the hospital service. My apologies for the delay.

## 2024-12-12 NOTE — PROGRESS NOTES
"Daily Note     Today's date: 2024  Patient name: Fadi Busby  : 1965  MRN: [id number]  Referring provider: Kee Owen DO  Dx:   Encounter Diagnosis     ICD-10-CM    1. Cerebrovascular accident (CVA), unspecified mechanism (HCC)  I63.9                      Subjective: \"I am really tired after that.\"      Objective: See treatment description below    Thera Ex: UBE x 5 minutes prograde and x 5 minutes retrograde in seated position bilaterally with 3.3 resistance level to improved LUE proximal strength, muscle endurance, and bilateral coordination.     Neuro Re-ed:  Pt advanced to supine neuro re-ed with demands of retrieving sticker from OTR with demands of placing in designated space on color by sticker paper placed at OH level challenging hand to target precision, improved LUE proximal strength/endurance, improved tricep extension with functional reach abilities, and improved L FMC/prehension.    Thera Act: Pt concluded tx session with performing x two 2-minute trials of hand to target tapping to circles placed in all movement planes on BITS screen with added #1 wrist weight and with added central fixation distraction challenging hand to target precision, proximal strength/endurance, and improved gross motor control.    Trial #1: 51.79% accuracy; 2:00 time to complete; 4.04 second reaction time; 29 accurate hits  Trial #2: 54.84% accuracy; 2:00 time to complete; 6.43 second reaction time; 17 accurate hits    Assessment: Tolerated treatment well. Patient would benefit from continued OT    Pt demo with excellent activity tolerance to UBE demands with abilities to complete x 10 minutes with 3.3 resistance level without complaints of fatigue, G bilateral coordination, and excellent L tricep extension with functional movements in both prograde and retrograde movement patterns.  Pt presents with ongoing ataxia with hand to target demands with dysmetria evident affecting precision and accuracy.  Pt demo " with frequent loss of pinch to sticker with requirement to utilizing R hand to readjust pinch 2* decreased FMC/prehension due to flexor tone.  Pt demo with improved tricep extension with functional reach abilities with less tendencies of drifting into shoulder ABD with shoulder FF AROM.  Pt demo with Max fatigue with hand to target 2-minute trials.  Pt demo with decreased speed of reaction time on second trial, though did improve overall accuracy.     Plan: Continue per plan of care.     Hand to target massed practice on BITS.      INTERVENTION COMMENTS:  Diagnosis: Cerebrovascular accident (CVA), unspecified mechanism (HCC) [I63.9]  Precautions: L hemiplegia; PACE MAKER-- NO STIM; DM II   1 of 6 visits, PN due 01/09/25

## 2024-12-13 NOTE — TELEPHONE ENCOUNTER
FAXED ON 12/13/24 TO [facility] at [phone]. FAX CONFIRMATION RECEIVED.  AT ASSIGNED TIMES.

## 2024-12-29 DIAGNOSIS — Z86.79 HISTORY OF CAROTID ARTERY DISSECTION: ICD-10-CM

## 2024-12-30 ENCOUNTER — OFFICE VISIT (OUTPATIENT)
Dept: OCCUPATIONAL THERAPY | Facility: REHABILITATION | Age: 59
End: 2024-12-30
Payer: COMMERCIAL

## 2024-12-30 ENCOUNTER — OFFICE VISIT (OUTPATIENT)
Facility: REHABILITATION | Age: 59
End: 2024-12-30
Payer: COMMERCIAL

## 2024-12-30 DIAGNOSIS — I69.398 ALTERATION OF SENSATION AS LATE EFFECT OF CEREBROVASCULAR ACCIDENT (CVA): Primary | ICD-10-CM

## 2024-12-30 DIAGNOSIS — R20.9 ALTERATION OF SENSATION AS LATE EFFECT OF CEREBROVASCULAR ACCIDENT (CVA): Primary | ICD-10-CM

## 2024-12-30 DIAGNOSIS — I63.9 CEREBROVASCULAR ACCIDENT (CVA), UNSPECIFIED MECHANISM (HCC): Primary | ICD-10-CM

## 2024-12-30 PROCEDURE — 97150 GROUP THERAPEUTIC PROCEDURES: CPT | Performed by: OCCUPATIONAL THERAPIST

## 2024-12-30 PROCEDURE — 97112 NEUROMUSCULAR REEDUCATION: CPT | Performed by: OCCUPATIONAL THERAPIST

## 2024-12-30 PROCEDURE — 97110 THERAPEUTIC EXERCISES: CPT | Performed by: PHYSICAL THERAPIST

## 2024-12-30 PROCEDURE — 97112 NEUROMUSCULAR REEDUCATION: CPT | Performed by: PHYSICAL THERAPIST

## 2024-12-30 NOTE — PROGRESS NOTES
"Daily Note     Today's date: 2024  Patient name: Fadi Busby  : 1965  MRN: [id number]  Referring provider: Kee Owen DO  Dx:   Encounter Diagnosis     ICD-10-CM    1. Cerebrovascular accident (CVA), unspecified mechanism (HCC)  I63.9                      Subjective: \"It is my goal to really focus on my health and exercise this new year.\"      Objective: See treatment description below    Neuro Re-ed:  Pt completed 3 sets x 10 reps of isolated elbow flexion/extension and shoulder FF to 90* with #2 DB in stance position bilaterally to challenge bilateral coordination/symmetry, improved concentric/eccentric  motor control, improved LUE proximal strength/endurance, and improved overall AROM.    Assessment: Tolerated treatment well. Patient would benefit from continued OT    Pt demo with Max difficulties with performing both elbow flexion and shoulder FF with G form and accuracy 2* flexor tone creating flexor synergy patterns initially, though improvement evident with massed practice.  Pt demo with G response to use of mirror for visual feedback to improved form, motor control, and bilateral coordination/symmetry. Pt demo with inabilities to perform shoulder FF >75* with forearm in neutral position.     Plan: Continue per plan of care.     Hand to target massed practice on BITS.      INTERVENTION COMMENTS:  Diagnosis: Cerebrovascular accident (CVA), unspecified mechanism (HCC) [I63.9]  Precautions: L hemiplegia; PACE MAKER-- NO STIM; DM II   2 of 6 visits, PN due 25    [id number]  [id number] 1:1  [id number] GRP     "

## 2024-12-30 NOTE — PROGRESS NOTES
Progress Update     Today's date: 2024  Patient name: Fadi Busby  : 1965  MRN: [id number]  Referring provider: Kee Owen DO  Dx:   Encounter Diagnosis     ICD-10-CM    1. Alteration of sensation as late effect of cerebrovascular accident (CVA)  I69.398     R20.9                      Subjective: Reports feeling about the same over the past month. With his usual household ambulation has been doing well. He still reports his goal is to be able to jog as much as possible and maximze his mobility with walking     Objective: See treatment diary below      Vitals:  -BP: 130/82           Balance Test Initial Eval     5x Sit to Stand: 17s no UE  16.3s  12s no UE   12s no UE    TUG:           Gait Speed:            2 Minute Walk Test:           6 Minute Walk Test: 1100ft  1450ft  1400ft  1400ft   Cook Balance Scale:           FGA:                 ABC:  43%               Goals  STGs (4 weeks)  Pt will be independent with comprehensive HEP - progressing   Pt will demonstrate least 3-second improvement on 5 times sit to stand  - MET      LTG's (to be achieved by d/c)  Pt will be able to self manage sx's independently  - progressing   Pt will demonstrate least 4-point improvement on FGA  - MET  Patient will demonstrate least 200 foot improvement on 6-minute walk test - MET  ADDED GOAL: pt will be able to jog safely for at least 50ft     Assessment: Fadi presents to PT for progress update following extended absence from PT. Results of testing today indicate similar findings with functional outcome measures compared to when last assessed in PT, which is expected given his time away. He has made considerable improvements since beginning PT and although improved he does continue to demonstrate deficits in his gait, LLE strength, and functional walking endurance/speed. These deficits are resulting in difficulty with community mobility and performing higher level mobility  "tasks such as jogging (per his goal). With attempting jogging or quicker movements we see difficulty with generating adequate propulsion from the LLE and advancing the LLE quickly enough through the gait cycle. He would benefit from continued skilled PT to further address these deficits and maximize his function.       Plan: Continue for 4 more weeks at 2x per week.      Precautions: HTN, type II DM, DO NOT BILL FOR TA     Manuals 11/20 11/25 12/3 12/9 12/12 12/30                                       Neuro Re-Ed    See testing   See testing   Hurdles   High AW, no ball fwd x2 laps       Step ups      8\" & foam L 2x10 no AD    Resisted walking          Blaze pods **over stair . 1.5min x3   Fwd/bwd fast over 18ft. 1min x3       Side stepping   On TM .6mph x2min ea       Jogging place   BL UE support 30\"x3       Foam EC     Narrow MITCHELL Hts H/V 2x30s ea CGA    Ramp  X12 fwd/bwd With tidal ball fwd/bwd x3, side x3 ea   With uneven mat hodling tidal ball fwd x3 laps, side x2 laps ea    Hopping In place BL UE support. 2x10  In place BL UE support. 2x10  To standing target no AD 2x10    Boxing     Punches and combos in standing 8min with cues   Combos in standing with cues.    Combos with walking fwd 20ft x2   Ther Ex    See testing  See testing    HIIT treadmill  5# ANKLE   **Base: 1% 1.6mph no UE    Fast: 1% 2.5-2.7mph no UE    12min total 5# L ankle  Bwd 2mph  Fast 3mph  1min intervals  X6min total    Bwd  1mph x3min **Base: 1% 1.6mph no UE    Fast: 1% 2.8-3.0mph no UE    11min total  ------    Bwd 1mph, x3min  SOLO UE prn  Base 1.8mph  Speed 3 mph  1 min intervals  X10 min total    Bwd speed intervals  Base: .8mph  Speed: 1.3 mph  1 min intervals  X9min total **Base: 1% 1.8mph single UE    Fast: 1% 2.8-3.0mph single UE    10min total     Leg press machine         Prone quad stretch     L by PT x3min, reviewed for HEP    Calf stretch off step     L 2x1min, reviewed for HEP                                      "   Ther Activity         DO NOT BILL FOR TA                  Gait Training                           Modalities

## 2025-01-01 RX ORDER — WARFARIN SODIUM 5 MG/1
5 TABLET ORAL DAILY
Qty: 90 TABLET | Refills: 1 | Status: SHIPPED | OUTPATIENT
Start: 2025-01-01 | End: 2025-01-09

## 2025-01-06 ENCOUNTER — APPOINTMENT (OUTPATIENT)
Dept: OCCUPATIONAL THERAPY | Facility: REHABILITATION | Age: 60
End: 2025-01-06
Payer: COMMERCIAL

## 2025-01-06 ENCOUNTER — APPOINTMENT (OUTPATIENT)
Facility: REHABILITATION | Age: 60
End: 2025-01-06
Payer: COMMERCIAL

## 2025-01-07 ENCOUNTER — TELEPHONE (OUTPATIENT)
Dept: FAMILY MEDICINE CLINIC | Facility: CLINIC | Age: 60
End: 2025-01-07

## 2025-01-07 ENCOUNTER — PATIENT OUTREACH (OUTPATIENT)
Dept: FAMILY MEDICINE CLINIC | Facility: CLINIC | Age: 60
End: 2025-01-07

## 2025-01-07 DIAGNOSIS — E11.311 TYPE 2 DIABETES MELLITUS WITH RETINOPATHY AND MACULAR EDEMA, WITH LONG-TERM CURRENT USE OF INSULIN, UNSPECIFIED LATERALITY, UNSPECIFIED RETINOPATHY SEVERITY (HCC): ICD-10-CM

## 2025-01-07 DIAGNOSIS — Z79.4 TYPE 2 DIABETES MELLITUS WITH RETINOPATHY AND MACULAR EDEMA, WITH LONG-TERM CURRENT USE OF INSULIN, UNSPECIFIED LATERALITY, UNSPECIFIED RETINOPATHY SEVERITY (HCC): ICD-10-CM

## 2025-01-07 NOTE — TELEPHONE ENCOUNTER
Pt needs insulin pen needles per Gillian MCGUIRE    Resent    Of note, he should switch lantus to tresiba when he runs out and it should be removed from med list    -------------------    Pharmacist Tracking Tool  Reason For Outreach: Embedded Pharmacist  Demographics:  Intervention Method: Chart Review  Type of Intervention: Follow-Up  Topics Addressed: Diabetes  Pharmacologic Interventions: N/A  Non-Pharmacologic Interventions: Care coordination and Chart update  Time:  Direct Patient Care:  0  mins  Care Coordination:  5  mins  Recommendation Recipient: Patient/Caregiver  Outcome: Accepted    Chente Lewis, PharmD, BCACP  Ambulatory Care Clinical Pharmacist

## 2025-01-07 NOTE — PROGRESS NOTES
Medication Patient Assistance Program (MPAP) Specialist  received voicemail from patient. Per Voicemail patient was requesting an update on  a call he received from [employer] [facility] [employer] saying his medication had been processed. MPAP specialist reviewed patient chart prior to outreach.   MPAP specialist reached out to [employer] representative Donna. Per jazmine rep patient's shipment for Ozempic and Tresiba was processed on 01/02/2025. Patient's shipment should arrive at clinician's office within 10-14 business days. MPAP specialist confirmed shipment should arrive by 01/22/2025. Per [employer] rep patient or MPAP specialist may reach out to [employer] [facility] if shipment is not received. MPAP specialist thanked [facility].  MPAP specialist reached out to patient with shipment update. Patient stated understanding. Per patient he does not have any insulin needles left. MPAP specialist explained his Ozempic should have come with needles. Patient stated it did, but he used the Ozempic needles on his other insulins , his Novolog and his Lantus. Patient stated he had made several calls to clinician's office and left 6 messages. Patient stated he has not had needles since Christmas. MPAP specialist stated she will reach out to Clinical Pharmacist and call patient with an update. Patient stated understanding.     MPAP specialist successfully reached clinical pharmacist. Per clinical pharmacist message he placed order for needles. MPAP specialist thanked clinical pharmacists.     MPAP specialist attempted to reach out to patient but was unsuccessful. MPAP specialist left voicemail stating needles had been refilled. MPAP specialist encouraged patient to reach out with further questions. MPAP specialist has placed a personal reminder regarding patient's [employer] [facility] medication shipment.

## 2025-01-08 ENCOUNTER — PATIENT OUTREACH (OUTPATIENT)
Dept: FAMILY MEDICINE CLINIC | Facility: CLINIC | Age: 60
End: 2025-01-08

## 2025-01-08 ENCOUNTER — APPOINTMENT (OUTPATIENT)
Dept: OCCUPATIONAL THERAPY | Facility: REHABILITATION | Age: 60
End: 2025-01-08
Payer: COMMERCIAL

## 2025-01-08 ENCOUNTER — APPOINTMENT (OUTPATIENT)
Facility: REHABILITATION | Age: 60
End: 2025-01-08
Payer: COMMERCIAL

## 2025-01-08 NOTE — PROGRESS NOTES
Medication Patient Assistance Program (MPAP) Specialist  received faxed letter from [employer] requesting clinician's license number and expiration date for application. MPAP specialist reviewed faxed application. MPAP specialist faxed requested information. MPAP specialist will follow up with [employer] if shipment is not received. MPAP specialist has placed a personal reminder.

## 2025-01-09 ENCOUNTER — TELEPHONE (OUTPATIENT)
Dept: FAMILY MEDICINE CLINIC | Facility: CLINIC | Age: 60
End: 2025-01-09

## 2025-01-09 ENCOUNTER — APPOINTMENT (OUTPATIENT)
Dept: LAB | Facility: CLINIC | Age: 60
End: 2025-01-09
Payer: COMMERCIAL

## 2025-01-09 ENCOUNTER — OFFICE VISIT (OUTPATIENT)
Dept: FAMILY MEDICINE CLINIC | Facility: CLINIC | Age: 60
End: 2025-01-09

## 2025-01-09 ENCOUNTER — PATIENT OUTREACH (OUTPATIENT)
Dept: FAMILY MEDICINE CLINIC | Facility: CLINIC | Age: 60
End: 2025-01-09

## 2025-01-09 VITALS
WEIGHT: 243 LBS | DIASTOLIC BLOOD PRESSURE: 70 MMHG | BODY MASS INDEX: 35.99 KG/M2 | HEART RATE: 90 BPM | RESPIRATION RATE: 16 BRPM | SYSTOLIC BLOOD PRESSURE: 110 MMHG | HEIGHT: 69 IN | OXYGEN SATURATION: 95 % | TEMPERATURE: 97.2 F

## 2025-01-09 DIAGNOSIS — Z79.01 LONG TERM (CURRENT) USE OF ANTICOAGULANTS: ICD-10-CM

## 2025-01-09 DIAGNOSIS — Z79.4 TYPE 2 DIABETES MELLITUS WITH BOTH EYES AFFECTED BY MILD NONPROLIFERATIVE RETINOPATHY AND MACULAR EDEMA, WITH LONG-TERM CURRENT USE OF INSULIN (HCC): ICD-10-CM

## 2025-01-09 DIAGNOSIS — L98.9 SKIN LESION: ICD-10-CM

## 2025-01-09 DIAGNOSIS — R20.9 ALTERATION OF SENSATION AS LATE EFFECT OF CEREBROVASCULAR ACCIDENT (CVA): ICD-10-CM

## 2025-01-09 DIAGNOSIS — Z79.4 TYPE 2 DIABETES MELLITUS WITH BOTH EYES AFFECTED BY MILD NONPROLIFERATIVE RETINOPATHY AND MACULAR EDEMA, WITH LONG-TERM CURRENT USE OF INSULIN (HCC): Primary | ICD-10-CM

## 2025-01-09 DIAGNOSIS — I42.8 NONISCHEMIC CARDIOMYOPATHY (HCC): ICD-10-CM

## 2025-01-09 DIAGNOSIS — E11.311 TYPE 2 DIABETES MELLITUS WITH RETINOPATHY AND MACULAR EDEMA, WITH LONG-TERM CURRENT USE OF INSULIN, UNSPECIFIED LATERALITY, UNSPECIFIED RETINOPATHY SEVERITY (HCC): ICD-10-CM

## 2025-01-09 DIAGNOSIS — Z86.79 HISTORY OF CAROTID ARTERY DISSECTION: ICD-10-CM

## 2025-01-09 DIAGNOSIS — Z79.4 TYPE 2 DIABETES MELLITUS WITH RETINOPATHY AND MACULAR EDEMA, WITH LONG-TERM CURRENT USE OF INSULIN, UNSPECIFIED LATERALITY, UNSPECIFIED RETINOPATHY SEVERITY (HCC): ICD-10-CM

## 2025-01-09 DIAGNOSIS — E11.3213 TYPE 2 DIABETES MELLITUS WITH BOTH EYES AFFECTED BY MILD NONPROLIFERATIVE RETINOPATHY AND MACULAR EDEMA, WITH LONG-TERM CURRENT USE OF INSULIN (HCC): Primary | ICD-10-CM

## 2025-01-09 DIAGNOSIS — E11.3213 TYPE 2 DIABETES MELLITUS WITH BOTH EYES AFFECTED BY MILD NONPROLIFERATIVE RETINOPATHY AND MACULAR EDEMA, WITH LONG-TERM CURRENT USE OF INSULIN (HCC): ICD-10-CM

## 2025-01-09 DIAGNOSIS — I69.398 ALTERATION OF SENSATION AS LATE EFFECT OF CEREBROVASCULAR ACCIDENT (CVA): ICD-10-CM

## 2025-01-09 PROBLEM — G89.18 POST-OP PAIN: Status: RESOLVED | Noted: 2024-03-21 | Resolved: 2025-01-09

## 2025-01-09 PROBLEM — Z20.2 TRICHOMONAS CONTACT, TREATED: Status: RESOLVED | Noted: 2024-01-24 | Resolved: 2025-01-09

## 2025-01-09 PROBLEM — L08.9 INFECTED SKIN LESION: Status: RESOLVED | Noted: 2024-07-02 | Resolved: 2025-01-09

## 2025-01-09 LAB
ANION GAP SERPL CALCULATED.3IONS-SCNC: 8 MMOL/L (ref 4–13)
BUN SERPL-MCNC: 28 MG/DL (ref 5–25)
CALCIUM SERPL-MCNC: 9.3 MG/DL (ref 8.4–10.2)
CHLORIDE SERPL-SCNC: 102 MMOL/L (ref 96–108)
CHOLEST SERPL-MCNC: 142 MG/DL (ref ?–200)
CO2 SERPL-SCNC: 30 MMOL/L (ref 21–32)
CREAT SERPL-MCNC: 1.27 MG/DL (ref 0.6–1.3)
GFR SERPL CREATININE-BSD FRML MDRD: 61 ML/MIN/1.73SQ M
GLUCOSE P FAST SERPL-MCNC: 128 MG/DL (ref 65–99)
HDLC SERPL-MCNC: 40 MG/DL
LDLC SERPL CALC-MCNC: 73 MG/DL (ref 0–100)
POTASSIUM SERPL-SCNC: 4.4 MMOL/L (ref 3.5–5.3)
SODIUM SERPL-SCNC: 140 MMOL/L (ref 135–147)
TRIGL SERPL-MCNC: 144 MG/DL (ref ?–150)

## 2025-01-09 PROCEDURE — 80048 BASIC METABOLIC PNL TOTAL CA: CPT

## 2025-01-09 PROCEDURE — 80061 LIPID PANEL: CPT

## 2025-01-09 RX ORDER — SENNOSIDES 8.6 MG
650 CAPSULE ORAL EVERY 8 HOURS PRN
Qty: 30 TABLET | Refills: 0 | Status: SHIPPED | OUTPATIENT
Start: 2025-01-09

## 2025-01-09 NOTE — ASSESSMENT & PLAN NOTE
Lab Results   Component Value Date    HGBA1C 6.8 (A) 08/28/2024     Hgb A1c today 6.6  Previously 6.8  Improving and stable  Has CGM  Has reduced portions and carb intake    Home meds: Novolog TID w/meals; Degludec 35 units daily; Semaglutide 0.5 mg weekly    Plan:  - No dose adjustment; continue home meds  - Continue statin daily  - Continue dietary modification  - RTC in 3 months    Orders:    Lipid Panel with Direct LDL reflex; Future

## 2025-01-09 NOTE — ASSESSMENT & PLAN NOTE
Had a discussion about possibly getting on a DOAC and patient still feels reluctant. Explained that this will require no blood work and is relatively easy schedule.   He is open to starting and will commence after INR results  He is to skip a day of AC if INR <2 and commence Apixaban thereafter  Understands and agrees  Orders:    Protime-INR; Future    apixaban (ELIQUIS) 5 mg; Take 1 tablet (5 mg total) by mouth 2 (two) times a day

## 2025-01-09 NOTE — PROGRESS NOTES
Name: Fadi Busby      : 1965      MRN: [id number]  Encounter Provider: Osmany Thornton MD  Encounter Date: 2025   Encounter department: [facility]  :  Assessment & Plan  Type 2 diabetes mellitus with both eyes affected by mild nonproliferative retinopathy and macular edema, with long-term current use of insulin (HCC)    Lab Results   Component Value Date    HGBA1C 6.8 (A) 2024     Hgb A1c today 6.6  Previously 6.8  Improving and stable  Has CGM  Has reduced portions and carb intake    Home meds: Novolog TID w/meals; Degludec 35 units daily; Semaglutide 0.5 mg weekly    Plan:  - No dose adjustment; continue home meds  - Continue statin daily  - Continue dietary modification  - RTC in 3 months    Orders:    Lipid Panel with Direct LDL reflex; Future    Skin lesion  Tender, firm, non fluctuant nodule on Left lower abdomen measuring about 2x1 cm with mild discoloration  Likely a fibroma or inflammatory nodule  Bothersome to patient    Plan:  Will refer to gen surg for evaluation for removal/biopsy  Orders:    Ambulatory Referral to General Surgery; Future    History of carotid artery dissection  Had a discussion about possibly getting on a DOAC and patient still feels reluctant. Explained that this will require no blood work and is relatively easy schedule.   He is open to starting and will commence after INR results  He is to skip a day of AC if INR <2 and commence Apixaban thereafter  Understands and agrees  Orders:    Protime-INR; Future    apixaban (ELIQUIS) 5 mg; Take 1 tablet (5 mg total) by mouth 2 (two) times a day    Nonischemic cardiomyopathy (HCC)    Orders:    Protime-INR; Future    apixaban (ELIQUIS) 5 mg; Take 1 tablet (5 mg total) by mouth 2 (two) times a day    Long term (current) use of anticoagulants    Orders:    apixaban (ELIQUIS) 5 mg; Take 1 tablet (5 mg total) by mouth 2 (two) times a day    Alteration of sensation as late  "effect of cerebrovascular accident (CVA)  See A/P hx CAD  Requesting refills for Tylenol for intermittent pain and stiffness  Currently in PT and OT  Orders:    acetaminophen (TYLENOL) 650 mg CR tablet; Take 1 tablet (650 mg total) by mouth every 8 (eight) hours as needed for mild pain         History of Present Illness       Fadi Busby is a 59 y.o. well-appearing male with pmhx of T2DM and CAD presenting today for the review of chronic conditions. Patient is accompanied by his significant other. Patient reports that they are doing well/ have been compliant with medications.  He reports constantly going to the bathroom due to taking his furosemide. His significant other also pointed out that she has noticed a lump on his left lower abdomen. She made him go to the ED for this and was told that it was from the insulin shots.  Problems stable unless otherwise mentioned.  No acute complaints.       Review of Systems   Constitutional:  Negative for chills and fever.   HENT:  Negative for ear pain and sore throat.    Eyes:  Negative for pain and visual disturbance.   Respiratory:  Negative for cough and shortness of breath.    Cardiovascular:  Negative for chest pain and palpitations.   Gastrointestinal:  Negative for abdominal pain and vomiting.   Genitourinary:  Negative for dysuria and hematuria.   Musculoskeletal:  Negative for arthralgias and back pain.   Skin:  Negative for color change and rash.   Neurological:  Positive for weakness. Negative for seizures and syncope.        Left sided residual weakness from hx stroke   All other systems reviewed and are negative.      Objective   /70 (BP Location: Right arm, Patient Position: Sitting, Cuff Size: Large)   Pulse 90   Temp (!) 97.2 °F (36.2 °C) (Temporal)   Resp 16   Ht 5' 9\" (1.753 m)   Wt 110 kg (243 lb)   SpO2 95%   BMI 35.88 kg/m²      Physical Exam  Vitals reviewed.   Constitutional:       General: He is not in acute distress.     Appearance: " He is well-groomed. He is obese. He is not ill-appearing, toxic-appearing or diaphoretic.   Eyes:      Extraocular Movements: Extraocular movements intact.   Cardiovascular:      Rate and Rhythm: Normal rate and regular rhythm.      Pulses: Normal pulses.      Heart sounds: Normal heart sounds.   Pulmonary:      Effort: Pulmonary effort is normal.      Breath sounds: Normal breath sounds.   Abdominal:      General: There is no distension.      Palpations: Abdomen is soft.      Tenderness: There is no guarding.      Comments: Lesion on the lower left abdomen that is non-purulent, dark, firm, and tender nodule measuring about 2.0 x 1cm     Musculoskeletal:      Right lower leg: No edema.      Left lower leg: No edema.   Skin:     General: Skin is warm and dry.      Findings: Lesion present.      Comments: Lesion on the lower left abdomen that is non-purulent, dark, firm, and tender nodule measuring about 2.0 x 1cm     Neurological:      Mental Status: He is alert. Mental status is at baseline.      Motor: Weakness present.   Psychiatric:         Mood and Affect: Mood normal.         Behavior: Behavior normal.

## 2025-01-09 NOTE — ASSESSMENT & PLAN NOTE
Lab Results   Component Value Date    HGBA1C 6.8 (A) 08/28/2024       Orders:    Lipid Panel with Direct LDL reflex; Future

## 2025-01-09 NOTE — ASSESSMENT & PLAN NOTE
See A/P hx CAD  Requesting refills for Tylenol for intermittent pain and stiffness  Currently in PT and OT  Orders:    acetaminophen (TYLENOL) 650 mg CR tablet; Take 1 tablet (650 mg total) by mouth every 8 (eight) hours as needed for mild pain

## 2025-01-09 NOTE — TELEPHONE ENCOUNTER
Patient was evaluated when admitted to the fourth floor. Patient has since
been transferred to the [facility]. Will need new orders for the patient when
medically appropriate. Thank you.
 
Lizette Fischer, OTR/L Reviewed warfarin to eliquis transition with pcp --INR should be less than 2 before transitioning to Eliquis    Discussed new Medicare drug cost and the fact that Eliquis will now be affordable on Medicare for 2025 (inflation reduction act has changed Medicare drug pricing implemented by the Biden administration)    Discussed duplication on med list of both Lantus and Tresiba, PCP will confirm that patient is only on 1 and delete the other    PCP will follow-up with any further questions    Pharmacist Tracking Tool  Reason For Outreach: Embedded Pharmacist  Demographics:  Intervention Method: Chart Review  Type of Intervention: Follow-Up  Topics Addressed: Anticoagulation and Diabetes  Pharmacologic Interventions: Medication Discontinuation and Medication Conversion  Non-Pharmacologic Interventions: Adherence addressed, Care coordination, Chart update, Medication Monitoring, and Medication/Device education  Time:  Direct Patient Care:  0  mins  Care Coordination:  10  mins  Recommendation Recipient: Provider  Outcome: Accepted    Chente Lewis, PharmD, BCACP  Ambulatory Care Clinical Pharmacist

## 2025-01-09 NOTE — ASSESSMENT & PLAN NOTE
Orders:    Protime-INR; Future    apixaban (ELIQUIS) 5 mg; Take 1 tablet (5 mg total) by mouth 2 (two) times a day

## 2025-01-09 NOTE — PROGRESS NOTES
Medication Patient Assistance Program (MPAP) Specialist  received voicemail from patient regarding patient assistance . Per Voicemail, patient needs to pay $400 to receive his prescription of Eliquis. MPAP specialist reviewed patient chart prior to outreach.   MPAP specialist reached out to clinician and Clinical pharmacist to discuss. MPAP specialist explained patient will need to meet Yaritza Muniz eligibility of 3% out of pocket of (gross) household income in pharmacy costs prior to applying for patient assistance program.    MPAP specialist reached patient. MPAP specialist attempted to discuss Eliquis Eligibility. MPAP specialist explained patient will need to pay 3% out of pocket before being eligible for patient assistance program. Patient stated understanding. MPAP specialist thanked patient. MPAP specialist has sent her note to clinician and clinical pharmacist.

## 2025-01-10 ENCOUNTER — ANTICOAG VISIT (OUTPATIENT)
Dept: FAMILY MEDICINE CLINIC | Facility: CLINIC | Age: 60
End: 2025-01-10

## 2025-01-10 ENCOUNTER — PATIENT OUTREACH (OUTPATIENT)
Dept: FAMILY MEDICINE CLINIC | Facility: CLINIC | Age: 60
End: 2025-01-10

## 2025-01-10 ENCOUNTER — TELEPHONE (OUTPATIENT)
Dept: FAMILY MEDICINE CLINIC | Facility: CLINIC | Age: 60
End: 2025-01-10

## 2025-01-10 DIAGNOSIS — Z86.711 HISTORY OF PULMONARY EMBOLISM: ICD-10-CM

## 2025-01-10 DIAGNOSIS — I42.8 NONISCHEMIC CARDIOMYOPATHY (HCC): ICD-10-CM

## 2025-01-10 DIAGNOSIS — Z79.01 LONG TERM (CURRENT) USE OF ANTICOAGULANTS: Primary | ICD-10-CM

## 2025-01-10 RX ORDER — WARFARIN SODIUM 5 MG/1
TABLET ORAL
Qty: 192.85 TABLET | Refills: 3 | Status: SHIPPED | OUTPATIENT
Start: 2025-01-10

## 2025-01-10 NOTE — PROGRESS NOTES
Medication Patient Assistance Program (MPAP) Specialist received In Basket message from clinician regarding patient's Eliquis medication. Per IB message patient has declined Eliquis and would like to continue warfarin due to costs. MPAP specialist thanked clinician. MPAP specialist is closing referral to Medication Patient Assistant  at this time. MPAP Specialist has referred patient to clinician and clinical staff for further medication questions regarding his warfarin.     MPAP specialist will remain available for questions regarding patient's current patient assistance programs.

## 2025-01-10 NOTE — TELEPHONE ENCOUNTER
Reached out to patient regarding starting Eliquis with the information that it will cost abut $400 out of pocket. He has decided to remain on warfarin for now.     Discussed INR result- within therapeutic range. He understands to continue the same dose- 5 mg Warfarin daily.

## 2025-01-13 ENCOUNTER — RESULTS FOLLOW-UP (OUTPATIENT)
Dept: FAMILY MEDICINE CLINIC | Facility: CLINIC | Age: 60
End: 2025-01-13

## 2025-01-13 ENCOUNTER — OFFICE VISIT (OUTPATIENT)
Facility: REHABILITATION | Age: 60
End: 2025-01-13
Payer: COMMERCIAL

## 2025-01-13 ENCOUNTER — OFFICE VISIT (OUTPATIENT)
Dept: OCCUPATIONAL THERAPY | Facility: REHABILITATION | Age: 60
End: 2025-01-13
Payer: COMMERCIAL

## 2025-01-13 DIAGNOSIS — I69.398 ALTERATION OF SENSATION AS LATE EFFECT OF CEREBROVASCULAR ACCIDENT (CVA): Primary | ICD-10-CM

## 2025-01-13 DIAGNOSIS — I63.9 CEREBROVASCULAR ACCIDENT (CVA), UNSPECIFIED MECHANISM (HCC): Primary | ICD-10-CM

## 2025-01-13 DIAGNOSIS — R20.9 ALTERATION OF SENSATION AS LATE EFFECT OF CEREBROVASCULAR ACCIDENT (CVA): Primary | ICD-10-CM

## 2025-01-13 PROCEDURE — 97112 NEUROMUSCULAR REEDUCATION: CPT | Performed by: PHYSICAL THERAPIST

## 2025-01-13 PROCEDURE — 97110 THERAPEUTIC EXERCISES: CPT | Performed by: OCCUPATIONAL THERAPIST

## 2025-01-13 PROCEDURE — 97112 NEUROMUSCULAR REEDUCATION: CPT | Performed by: OCCUPATIONAL THERAPIST

## 2025-01-13 PROCEDURE — 97110 THERAPEUTIC EXERCISES: CPT | Performed by: PHYSICAL THERAPIST

## 2025-01-13 NOTE — PROGRESS NOTES
"Daily Note     Today's date: 2025  Patient name: Fadi Busby  : 1965  MRN: [id number]  Referring provider: Kee Owen DO  Dx:   Encounter Diagnosis     ICD-10-CM    1. Cerebrovascular accident (CVA), unspecified mechanism (HCC)  I63.9                      Subjective: \"It is my goal to really focus on my health and exercise this new year.\"      Objective: See treatment description below    Thera Ex: UBE x 5 minutes prograde and x 5 minutes retrograde in seated position bilaterally with 3.5 resistance level to improved LUE proximal strength, muscle endurance, and bilateral coordination.     Neuro Re-ed:  Pt advanced to completing x 5 reps of modified push ups in quadruped position with recommendations to perform daily in home environment-- Pt's personal goal is to increased abilities performing push ups.     Pt concluded tx session with supine neuro re-ed with #5 t-bar with added #2 weight to R side of T-bar for completion of 3 sets x 15 reps of chest press and shoulder FF to 90* with slowed eccentric motor control focusing on improved concentric/eccentric motor control, improved LUE proximal strength/endurance, and improved B/L coordination/symmetry.    Assessment: Tolerated treatment well. Patient would benefit from continued OT    Pt demo with excellent activity tolerance to UBE demands with abilities to complete x 10 minutes with increased resistance to 3.5 level without complaints of fatigue reported-- Pt able to maintain consistent pacing throughout. Pt demo with abilities to complete x 3 modified push ups with max effort required 2* LUE proximal weakness.  Pt demo with G understanding on the importance of performing consistently in home environment to enhance overall functional progression.  Pt demo with significant improved concentric motor control with both chest presses and shoulder FF to 90*, though demo with difficulties maintaining eccentric motor control with ataxic-like movement " present which heightened with higher fatigue levels.     Plan: Continue per plan of care.     Hand to target massed practice on BITS.      INTERVENTION COMMENTS:  Diagnosis: Cerebrovascular accident (CVA), unspecified mechanism (HCC) [I63.9]  Precautions: L hemiplegia; PACE MAKER-- NO STIM; DM II   3 of 6 visits, PN due NEXT SESSION

## 2025-01-13 NOTE — PROGRESS NOTES
"Daily Note     Today's date: 2025  Patient name: Fadi Busby  : 1965  MRN: [id number]  Referring provider: Kee Owen DO  Dx:   Encounter Diagnosis     ICD-10-CM    1. Alteration of sensation as late effect of cerebrovascular accident (CVA)  I69.398     R20.9                        Subjective: Missed therapy due to his car being in the shop. He has been trying to do faster walking when in the halls and does squats at home.     Objective: See treatment diary below           Assessment: Tolerated treatment well. Able to tolerate small progression in treadmill speed well. Challenged with obtaining a jog/run due to inadequate push off from LLE and slowed speed advancing through swing. Some L knee instability with hopping, but able to tolerate. Would benefit from continued PT.       Plan: Continue for 4 more weeks at 2x per week.      Precautions: HTN, type II DM, DO NOT BILL FOR TA     HEP:   Access Code: [email or web address]  URL: [email or web address]  Date: 2025  Prepared by: Ezequiel Iraheta    Exercises  - Tandem Stance  - 1 x daily - 7 x weekly - 3 sets - 30 seconds hold  - Single Leg Stance  - 1 x daily - 7 x weekly - 3 sets - 30 seconds  hold  - Heel Raises with Counter Support  - 1 x daily - 7 x weekly - 3 sets - 10 reps  - Squat  - 1 x daily - 7 x weekly - 3 sets - 10 reps  - Jump in Place  - 1 x daily - 7 x weekly - 2 sets - 10 reps  - Running in place  - 1 x daily - 7 x weekly - 2 sets - 30 seconds  hold  Manuals 1/13  12/3 12/9 12/12 12/30                                       Neuro Re-Ed    See testing   See testing   Hurdles          Step ups      8\" & foam L 2x10 no AD    Resisted walking          Blaze pods Shuttle runs over 20 feet.  X 5.  Facets walking speed or jogging as able.  Fwd/bwd fast over 18ft. 1min x3       Side stepping          Jogging place BL UE support 30\"x3   BL UE support 30\"x3       Foam EC Tandem stance 30\" x 2    Narrow MITCHELL Hts H/V 2x30s ea CGA    Ramp      " With uneven mat hodling tidal ball fwd x3 laps, side x2 laps ea    Hopping In place BL UE support. 2x10      In place BL UE support. 2x10  To standing target no AD 2x10    Boxing     Punches and combos in standing 8min with cues   Combos in standing with cues.    Combos with walking fwd 20ft x2   Ther Ex    See testing  See testing    HIIT treadmill  **Base: 1% 1.6mph no UE    Fast: 1% 2.8-3.1mph no UE    12 min total  ------    **Base: 1% 1.6mph no UE    Fast: 1% 2.8-3.0mph no UE    11min total  ------    Bwd 1mph, x3min  SOLO UE prn  Base 1.8mph  Speed 3 mph  1 min intervals  X10 min total    Bwd speed intervals  Base: .8mph  Speed: 1.3 mph  1 min intervals  X9min total **Base: 1% 1.8mph single UE    Fast: 1% 2.8-3.0mph single UE    10min total     Leg press machine         Prone quad stretch     L by PT x3min, reviewed for HEP    Calf stretch off step     L 2x1min, reviewed for HEP                                        Ther Activity         DO NOT BILL FOR TA                  Gait Training                           Modalities

## 2025-01-14 ENCOUNTER — REMOTE DEVICE CLINIC VISIT (OUTPATIENT)
Dept: CARDIOLOGY CLINIC | Facility: CLINIC | Age: 60
End: 2025-01-14
Payer: COMMERCIAL

## 2025-01-14 DIAGNOSIS — Z95.0 CARDIAC PACEMAKER IN SITU: Primary | ICD-10-CM

## 2025-01-14 PROCEDURE — 93294 REM INTERROG EVL PM/LDLS PM: CPT | Performed by: STUDENT IN AN ORGANIZED HEALTH CARE EDUCATION/TRAINING PROGRAM

## 2025-01-14 PROCEDURE — 93296 REM INTERROG EVL PM/IDS: CPT | Performed by: STUDENT IN AN ORGANIZED HEALTH CARE EDUCATION/TRAINING PROGRAM

## 2025-01-14 NOTE — PROGRESS NOTES
Results for orders placed or performed in visit on 01/14/25   Cardiac EP device report    Narrative    MDT BI-V PM/ ACTIVE SYSTEM IS MRI CONDITIONAL  CARELINK TRANSMISSION: BATTERY VOLTAGE ADEQUATE (10.9 YRS). AP-6%, BVP>99% (TOTAL -98.7%+VSR PACE-1.2%). ALL AVAILABLE LEAD PARAMETERS WITHIN NORMAL LIMITS. NO SIGNIFICANT HIGH RATE EPISODES. 1 VENT SENSING EPISODE. OPTI-VOL WITHIN NORMAL LIMITS. NORMAL DEVICE FUNCTION. GV

## 2025-01-15 ENCOUNTER — OFFICE VISIT (OUTPATIENT)
Facility: REHABILITATION | Age: 60
End: 2025-01-15
Payer: COMMERCIAL

## 2025-01-15 ENCOUNTER — EVALUATION (OUTPATIENT)
Dept: OCCUPATIONAL THERAPY | Facility: REHABILITATION | Age: 60
End: 2025-01-15
Payer: COMMERCIAL

## 2025-01-15 ENCOUNTER — RESULTS FOLLOW-UP (OUTPATIENT)
Dept: CARDIOLOGY CLINIC | Facility: CLINIC | Age: 60
End: 2025-01-15

## 2025-01-15 DIAGNOSIS — I63.9 CEREBROVASCULAR ACCIDENT (CVA), UNSPECIFIED MECHANISM (HCC): Primary | ICD-10-CM

## 2025-01-15 DIAGNOSIS — I69.398 ALTERATION OF SENSATION AS LATE EFFECT OF CEREBROVASCULAR ACCIDENT (CVA): Primary | ICD-10-CM

## 2025-01-15 DIAGNOSIS — R20.9 ALTERATION OF SENSATION AS LATE EFFECT OF CEREBROVASCULAR ACCIDENT (CVA): Primary | ICD-10-CM

## 2025-01-15 PROCEDURE — 97112 NEUROMUSCULAR REEDUCATION: CPT | Performed by: OCCUPATIONAL THERAPIST

## 2025-01-15 PROCEDURE — 97112 NEUROMUSCULAR REEDUCATION: CPT

## 2025-01-15 PROCEDURE — 97110 THERAPEUTIC EXERCISES: CPT

## 2025-01-15 NOTE — PROGRESS NOTES
"Daily Note     Today's date: 1/15/2025  Patient name: Fadi Busby  : 1965  MRN: [id number]  Referring provider: Kee Owen DO  Dx:   Encounter Diagnosis     ICD-10-CM    1. Alteration of sensation as late effect of cerebrovascular accident (CVA)  I69.398     R20.9           Start Time: 0900  Stop Time: 0945  Total time in clinic (min): 45 minutes    Subjective: Fadi has no new complaints today. Denies any issues after last PT session.       Objective: See treatment diary below      Assessment: Fadi Tolerated treatment well with appropriate muscle fatigue experienced with ex's. He is making steady gains towards goals. Fatigues quickly with increased cardiovascular exertion, but recovers quickly with fewer seated rest breaks. Required vc's t/o session for proper positioning with ex's. He would benefit from continued PT and compliance with HEP.        Plan: Continue per plan of care.      Precautions: HTN, type II DM, DO NOT BILL FOR TA     HEP:   Access Code: [email or web address]  URL: [email or web address]  Date: 2025  Prepared by: Ezequiel Iraheta    Exercises  - Tandem Stance  - 1 x daily - 7 x weekly - 3 sets - 30 seconds hold  - Single Leg Stance  - 1 x daily - 7 x weekly - 3 sets - 30 seconds  hold  - Heel Raises with Counter Support  - 1 x daily - 7 x weekly - 3 sets - 10 reps  - Squat  - 1 x daily - 7 x weekly - 3 sets - 10 reps  - Jump in Place  - 1 x daily - 7 x weekly - 2 sets - 10 reps  - Running in place  - 1 x daily - 7 x weekly - 2 sets - 30 seconds  hold  Manuals 1/13 1/15 12/3 12/9 12/12 12/30                                            Neuro Re-Ed    See testing   See testing    Hurdles           Step ups      8\" & foam L 2x10 no AD     Resisted walking           Blaze pods Shuttle runs over 20 feet.  X 5.  Facets walking speed or jogging as able. Shuttle runs over 20 feet.  X 5.  Facets walking speed or jogging as able. Fastest time 18 sec  Fwd/bwd fast over 18ft. 1min x3      " "  Side stepping           Jogging place BL UE support 30\"x3  BL UE support 30\"x2 BL UE support 30\"x3        Foam EC Tandem stance 30\" x 2 Tandem stance 30\" x 2   Narrow MITCHELL Hts H/V 2x30s ea CGA     Ramp      With uneven mat hodling tidal ball fwd x3 laps, side x2 laps ea     Hopping In place BL UE support. 2x10     In place BL UE support. 2x10 In place BL UE support. 2x10  To standing target no AD 2x10     Boxing     Punches and combos in standing 8min with cues   Combos in standing with cues.    Combos with walking fwd 20ft x2    Ther Ex    See testing  See testing     HIIT treadmill  **Base: 1% 1.6mph no UE    Fast: 1% 2.8-3.1mph no UE    13 min total  ------   **Base: 1% 1.6mph no UE    Fast: 1% 2.8-3.1mph no UE - 2 min     12 min total 5# ankle wt L ankle  **Base: 1% 1.6mph no UE    Fast: 1% 2.8-3.0mph no UE    11min total  ------    Bwd 1mph, x3min  SOLO UE prn  Base 1.8mph  Speed 3 mph  1 min intervals  X10 min total    Bwd speed intervals  Base: .8mph  Speed: 1.3 mph  1 min intervals  X9min total **Base: 1% 1.8mph single UE    Fast: 1% 2.8-3.0mph single UE    10min total      Leg press machine          Prone quad stretch     L by PT x3min, reviewed for HEP     Calf stretch off step     L 2x1min, reviewed for HEP                                             Ther Activity          DO NOT BILL FOR TA                    Gait Training                              Modalities                                         "

## 2025-01-15 NOTE — PROGRESS NOTES
OCCUPATIONAL THERAPY PROGRESS NOTE #2:     01/15/2025  Fadi Busby  1965  [id number]  OdetteKee malhotra DO   Diagnosis ICD-10-CM Associated Orders   1. Cerebrovascular accident (CVA), unspecified mechanism (HCC)  I63.9             Assessment    Assessment details: See skilled analysis for further details.         Skilled Analysis:  Pt is a 59 y.o. male referred to Occupational Therapy s/p Cerebrovascular accident (CVA), unspecified mechanism (HCC) [I63.9].   Pt participated in skilled OT evaluation and following formalized testing as well as clinical observation, Pt presents with the following areas of deficit:  poor automaticity of LUE motor functioning both proximally and distally, tendencies of drifting into shoulder ABD when performing elbow flexion with LUE, decreased B/L coordination 2* motor lags of LUE, decreased speed and accuracy of FMC/prehension/in-hand manipulation, impaired LUE proprioception, decreased muscle endurance 2* increased effort required when utilizing LUE, and decreased LUE AROM affecting abilities of retrieving and placing items in cabinets.  Pt will benefit from skilled Occupational Therapy services 2x/week for 12 weeks with focus on neuro re-ed, manual techniques, thera act, thera ex to improve on the above deficits, improve functional use of LUE, and enhance overall QOL.     Upon this date (01/15/2025), Pt participated in re-evaluation to further assess fxnl progression towards Occupational Therapy goals. Pt demo with G functional progression towards goals in POC evident by increased LUE shoulder ABD AROM, improved concentric motor control, improved speed and accuracy of FMC/prehension with abilities to complete 9-hole peg test x 4 seconds faster, slight increased L intrinsic strength, improved overall muscle endurance of LUE with less fatigue levels when engaging in strengthening activities, and improved speed of digit coordination/isolation.  Pt with self-report of noticing the  following improvements in home environment: Able to lift LUE and place it onto steering wheel with greater ease, able to manipulate car controls without difficulties, and opening the refrigerator with LUE with improved speed and accuracy. Pt with attempts to utilizing LUE more frequently when engaged in normal routines.  Following formalized testing and clinical observation, Pt continues to present with the following limitation: L distal weakness, flexor tone inhibiting LUE functional movements, impaired LUE proximal/distal teaming, impaired bilateral coordination, decreased LUE AROM in shoulder FF/supination movement patterns, impaired LUE proprioception with vision occluded, and decreased automaticity with visual feedback required to assist with completing movement patterns.  OTR recommending Pt to continue skilled OT services 2x/week for 4-6 more weeks to improve on the above deficits, improve functional use of RUE, and enhance overall QOL.         Short Term Goals:  Pt will increase proprioception of LUE hand to target for improved functional reach vision occluded with ADL/IADL tasks  x 25% accuracy 4 weeks-- PARTIALLY MET  Pt will demo with G carryover of Home Exercise Program to improve functional progression towards goals in Plan of care, increased LUE AROM and for improved functional use of LUE  x 25% 4 weeks--  MET  Pt will demo with G tolerance to supine, seated, and in stance exercise x 30 minutes with minimal rest breaks required for increased engagement in weekly exercise regimen and increased engagement in life roles 4 weeks-- MET  Pt will increase automaticity of LUE to 50% for improved grasp release of tabletop items for improved functional performance with salient tasks 4 weeks--  MET  Pt will increase LUE rate of manipulation for all FM tests x 15 seconds for improved functional performance/independence with salient tasks 4 weeks--  MET  Pt will increase LUE to refined assist with <10% cuing for  tabletop tasks for improved functional performance of life roles and salient tasks 4 weeks-- PARTIALLY MET  Pt will increase L  strength x 5 lbs, through the use of strengthening exercises and home program for improved abilities retrieving items from cabinet in home environment 4 weeks-- NOT MET  Pt will demo with increased LUE shoulder FF and ABD AROM x 10* to improve his abilities to retrieve and place items in cabinets with increased PSFS level to 7/10 4 weeks-- PARTIALLY MET      Long Term Goals:  Pt will increase proprioception of LUE hand to target for improved functional reach vision occluded with ADL/IADL tasks  x 50% accuracy-- NOT MET  Pt will demo with G carryover of Home Exercise Program to improve functional progression towards goals in Plan of care, increased LUE AROM and for improved functional use of LUE  x 50% -- PARTIALLY MET  Pt will demo with G tolerance to supine, seated, and in stance exercise x 45 minutes with minimal rest breaks required for increased engagement in weekly exercise regimen and increased engagement in life roles -- PARTIALLY MET  Pt will increase automaticity of LUE to 75% for improved grasp release of tabletop items for improved functional performance with salient tasks-- PARTIALLY MET  Pt will increase LUE rate of manipulation for all FM tests x 30 seconds for improved functional performance/independence with salient tasks -- MET  Pt will increase LUE to Mod I with 0% cuing for tabletop tasks for improved functional performance of life roles and salient tasks -- NOT MET  Pt will increase L  strength x 8 lbs, through the use of strengthening exercises and home program for improved abilities retrieving items from cabinet in home environment -- NOT MET  Pt will demo with increased LUE shoulder FF and ABD AROM x 15* to improve his abilities to retrieve and place items in cabinets with increased PSFS level to 8/10 -- PARTIALLY MET      Subjective Evaluation    Quality of  "life: good          SUBJECTIVE: \"It could do a lot better.\"     PATIENT GOAL: \"Being able to pick things out of the cabinet.\"    Patient-Specific Functional Scale   Task is scored 0 (unable to perform activity) to 10 (ability to perform activity independently)    Activity Date: 10/21/24 Date: 01/15/25   Being able to pick things out of the cabinet 5/10 5/10       HISTORY OF PRESENT ILLNESS:     Pt is a 59 y.o. male who was referred to Occupational Therapy s/p  Cerebrovascular accident (CVA), unspecified mechanism (HCC) [I63.9].  As per chart review, hx of multiple CVA. His most recent occurring in . He still suffers from left sided hemiplegia. Pt with self-report of experiencing first stroke in 2006, second stroke was 2006, and last stroke was 2009.  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  Pt received TPA with first CVA with excellent recovery. Pt was hospitalized for one month on second CVA-- Pt participated in PT/OT.  Pt also received PT/OT after third stroke.      Pt with self-report of LUE weakness affecting functional use of LUE.  Pt also with self-report of noticing decreased AROM of LUE.      Pt lives in a fourth floor apartment independently.  Pt currently performing all ADLs/IADLs at Mod I status.  Pt currently managing both medication and finances independently. Pt is currently on long term disability since [id number].  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  Pt continues the  role without difficulties reported.      PMH:   Past Medical History:   Diagnosis Date    Acute pain of right shoulder 2024    Infected skin lesion 2024    Paronychia of right index finger 08/15/2023    Post-op pain 2024    Trichomonas contact, treated 2024         Pain Levels:     Restin    With Activity:  0    Objective     Functional Assessment        Comments  See impairment section for " further details.       Impairment Observations:        IE RUE IE LUE  Comments                 UPPER EXTREMITY FXN Intact Impaired  Pt is R hand dominant                           /Pinch Strength           Dynamometer       - Gross Grasp 108 lbs 66 lbs  R: maintained  L: increased x 1 lbs.    Pinch Meter        - PINCER 24 lbs 17 lbs  R: increased x 9 lbs.   L: maintained    - TRIPOD 19 lbs 10 lbs  R: maintained  L: decreased x 2 lbs.      - LATERAL 27 lbs  21 lbs  R: maintained   L: decreased x 6 lbs.                AROM (seated)        Elevation WFL Near full     Shoulder FF WFL  135*   L: maintained   Shoulder Ext WFL  70*   L: maintained   Shoulder Abd WFL  175*   L: increased x 40*   Elbow Flex WFL  WFL      Elbow Ext WFL  WFL      Pronation WFL  WFL      Supination WFL  1/2 AROM      Wrist Flex WFL  70*   L: increased x 20*   Wrist Ext WFL  50*   L: maintained   Digit Flex WFL  WFL      Digit Ex WFL  WFL      Composite Grasp WFL  WFL      Hook Grasp WFL  WFL      Opposition WFL  Significant improved speed and accuracy evident     Dysdiadochokinesia WFL Impaired     Subluxation  N/A  N/A       Shoulder FF 5/5 5/5     Shoulder Ext 5/5 5/5     Shoulder Abd 5/5 5/5     Shoulder ADd 5/5 5/5     Elbow Flex 5/5 5/5     Elbow Ext 5/5 5/5     Wrist Flex 5/5 5/5     Wrist Ext 5/5 5/5     SENSATION       Myofilaments (2.83 WNL) 2.83 2.83     Sharp Dull  Intact Intact     Proprioception Intact Impaired  Dysmetria still evident with vision occluded   Hot/Cold Temp Intact Intact            COORDINATION       9 Hole Peg Test 20.65 seconds 1 minute and 54 seconds  R: maintained  L: able to complete test x 4 seconds faster   MODIFIED ANAMIKA SCALE (TONE)       No increase in muscle tone (0) 0 0     Slight Increase in muscle tone with catch and release or min resist at end range (1)       Slight Increase in muscle tone with catch and release, followed by min resistance through remainder of range (1+)       Increased muscle  tone through full range, able to be moved easily (2)       Considerable increase in tone, difficult to move (3)       Rigid in Flexion/Extension (4)                                         OTHER PLANNED THERAPY INTERVENTIONS:   Supine, seated, and in stance neuro re-ed  LUE AROM/AAROM  Bilateral integrative tasks  FMC/prehension/in-hand manipulation  Timed Trials  Manual tx  Hand to target  Seated functional reach: crossing midline  Supine place and hold  WBearing strategies   Closed chain activities  Open chain activities      INTERVENTION COMMENTS:  Diagnosis: Cerebrovascular accident (CVA), unspecified mechanism (HCC) [I63.9]  Precautions: L hemiplegia; PACE MAKER-- NO STIM; DM II   4 of 6 visits, PN due 02/15/2025

## 2025-01-17 ENCOUNTER — PATIENT OUTREACH (OUTPATIENT)
Dept: FAMILY MEDICINE CLINIC | Facility: CLINIC | Age: 60
End: 2025-01-17

## 2025-01-17 NOTE — PROGRESS NOTES
PATY HANSON did receive an IB message from [facility] Grigsby stating that she met with Pt at [facility] for scheduled appointment, completed a housing application on behalf of Pt with information provided by Pt. Pt selected 24 locations. [facility] did email application to [facility] for review. Pt is aware he will need to call [facility] directly to provide updates and seek status. Pt is aware this referral will be closed.   After chart review PATY HANSON is closing this referral today and is remain available for further assistance as needed.

## 2025-01-20 ENCOUNTER — APPOINTMENT (OUTPATIENT)
Facility: REHABILITATION | Age: 60
End: 2025-01-20
Payer: COMMERCIAL

## 2025-01-20 ENCOUNTER — OFFICE VISIT (OUTPATIENT)
Dept: OCCUPATIONAL THERAPY | Facility: REHABILITATION | Age: 60
End: 2025-01-20
Payer: COMMERCIAL

## 2025-01-20 ENCOUNTER — OFFICE VISIT (OUTPATIENT)
Facility: REHABILITATION | Age: 60
End: 2025-01-20
Payer: COMMERCIAL

## 2025-01-20 DIAGNOSIS — I69.398 ALTERATION OF SENSATION AS LATE EFFECT OF CEREBROVASCULAR ACCIDENT (CVA): Primary | ICD-10-CM

## 2025-01-20 DIAGNOSIS — R20.9 ALTERATION OF SENSATION AS LATE EFFECT OF CEREBROVASCULAR ACCIDENT (CVA): Primary | ICD-10-CM

## 2025-01-20 DIAGNOSIS — I63.9 CEREBROVASCULAR ACCIDENT (CVA), UNSPECIFIED MECHANISM (HCC): Primary | ICD-10-CM

## 2025-01-20 PROCEDURE — 97112 NEUROMUSCULAR REEDUCATION: CPT | Performed by: PHYSICAL THERAPIST

## 2025-01-20 PROCEDURE — 97112 NEUROMUSCULAR REEDUCATION: CPT | Performed by: OCCUPATIONAL THERAPIST

## 2025-01-20 PROCEDURE — 97116 GAIT TRAINING THERAPY: CPT | Performed by: PHYSICAL THERAPIST

## 2025-01-20 PROCEDURE — 97110 THERAPEUTIC EXERCISES: CPT | Performed by: OCCUPATIONAL THERAPIST

## 2025-01-20 NOTE — PROGRESS NOTES
"Daily Note     Today's date: 2025  Patient name: Fadi Busby  : 1965  MRN: [id number]  Referring provider: Kee Owen DO  Dx:   Encounter Diagnosis     ICD-10-CM    1. Alteration of sensation as late effect of cerebrovascular accident (CVA)  I69.398     R20.9                        Subjective: Reports feeling well. No changes since last visit.       Objective: See treatment diary below      Assessment: Fadi Tolerated treatment well. Challenged with achieving running gait due to impaired propulsion on LLE and impaired speed advancing the limb. Cues needed for maintaining stable stance with boxing punches to optimize balance and stability.  He would benefit from continued PT.       Plan: Continue per plan of care.      Precautions: HTN, type II DM, DO NOT BILL FOR TA     HEP:   Access Code: [email or web address]  URL: [email or web address]  Date: 2025  Prepared by: Ezequiel Iraheta    Exercises  - Tandem Stance  - 1 x daily - 7 x weekly - 3 sets - 30 seconds hold  - Single Leg Stance  - 1 x daily - 7 x weekly - 3 sets - 30 seconds  hold  - Heel Raises with Counter Support  - 1 x daily - 7 x weekly - 3 sets - 10 reps  - Squat  - 1 x daily - 7 x weekly - 3 sets - 10 reps  - Jump in Place  - 1 x daily - 7 x weekly - 2 sets - 10 reps  - Running in place  - 1 x daily - 7 x weekly - 2 sets - 30 seconds  hold  Manuals 1/13 1/15 1/20                                          Neuro Re-Ed         Hurdles          Step ups          Resisted walking          Blaze pods Shuttle runs over 20 feet.  X 5.  Facets walking speed or jogging as able. Shuttle runs over 20 feet.  X 5.  Fastest walking speed or jogging as able. Fastest time 18 sec  Fwd bwd over 12ft fastest walking speed or jogging as able. 1min x2    Side stepping over 12ft 1min x2      Side stepping          Jogging place BL UE support 30\"x3  BL UE support 30\"x2       Foam EC Tandem stance 30\" x 2 Tandem stance 30\" x 2       Ramp    Combos with fwd " walking 20ft lap x 6       Hopping In place BL UE support. 2x10     In place BL UE support. 2x10 In place BL UE support. 2x10      Boxing          Ther Ex         HIIT treadmill  **Base: 1% 1.6mph no UE    Fast: 1% 2.8-3.1mph no UE    13 min total  ------   **Base: 1% 1.6mph no UE    Fast: 1% 2.8-3.1mph no UE - 2 min     12 min total 5# ankle wt L ankle  **Base: 1% 1.6mph no UE    Fast: 1% 2.8-3.1mph no UE - 2 min     12 min total 5# ankle wt L ankle   ----  Bwd 1mph 3min single UE      Leg press machine         Prone quad stretch         Calf stretch off step                                             Ther Activity         DO NOT BILL FOR TA                  Gait Training                           Modalities

## 2025-01-20 NOTE — PROGRESS NOTES
"Daily Note     Today's date: 2025  Patient name: Fadi Busby  : 1965  MRN: [id number]  Referring provider: Kee Owen DO  Dx:   Encounter Diagnosis     ICD-10-CM    1. Cerebrovascular accident (CVA), unspecified mechanism (HCC)  I63.9                      Subjective: \"It is my goal to really focus on my health and exercise this new year.\"      Objective: See treatment description below    Thera Ex: UBE x 5 minutes prograde and x 5 minutes retrograde in seated position bilaterally with 3.5 resistance level to improved LUE proximal strength, muscle endurance, and bilateral coordination.     Neuro Re-ed:  Pt advanced to performing in stance neuro re-ed with #1 DB for completion of 3 sets x 10 reps of shoulder FF and shoulder ABD to 90*, followed by completing 3 sets x 10 reps of scap retraction/protraction with dark green theraband to improved LUE proximal/posterior strength/endurance/stability, improved concentric/eccentric motor control, and improved B/L coordination/symmetry.      Pt concluded tx session with bilateral integrative task of tying and untying knots in dark green theraband to improved B/L coordination/bimanual dexterity, improved L FMC/prehension/digit coordination, and improved L intrinsic/distal strength and endurance.     Assessment: Tolerated treatment well. Patient would benefit from continued OT    Pt demo with excellent activity tolerance to UBE demands with abilities to complete x 10 minutes with increased resistance to 3.6 level without complaints of fatigue reported-- Pt able to maintain consistent pacing throughout. Pt demo with significant improved bilateral coordination/symmetry when performing neuro re-ed in front of mirror for visual feedback improving overall awareness and motor control/speed.  Pt demo with slight difficulties maintaining full tricep extension with increased fatigue levels resulting in increased time required to achieve tricep extension throughout " movement planes. Pt demo with excellent speed and accuracy of tying and untying knots with G incorporation of L hand-- Pt did not display overcompensation of R hand throughout.    Plan: Continue per plan of care.     Hand to target massed practice on BITS.      INTERVENTION COMMENTS:  Diagnosis: Cerebrovascular accident (CVA), unspecified mechanism (HCC) [I63.9]  Precautions: L hemiplegia; PACE MAKER-- NO STIM; DM II   5 of 6 visits, PN due 02/15/2025

## 2025-01-22 ENCOUNTER — APPOINTMENT (OUTPATIENT)
Dept: OCCUPATIONAL THERAPY | Facility: REHABILITATION | Age: 60
End: 2025-01-22
Payer: COMMERCIAL

## 2025-01-22 ENCOUNTER — OFFICE VISIT (OUTPATIENT)
Facility: REHABILITATION | Age: 60
End: 2025-01-22
Payer: COMMERCIAL

## 2025-01-22 DIAGNOSIS — R20.9 ALTERATION OF SENSATION AS LATE EFFECT OF CEREBROVASCULAR ACCIDENT (CVA): Primary | ICD-10-CM

## 2025-01-22 DIAGNOSIS — I69.398 ALTERATION OF SENSATION AS LATE EFFECT OF CEREBROVASCULAR ACCIDENT (CVA): Primary | ICD-10-CM

## 2025-01-22 PROCEDURE — 97110 THERAPEUTIC EXERCISES: CPT | Performed by: PHYSICAL THERAPIST

## 2025-01-22 PROCEDURE — 97112 NEUROMUSCULAR REEDUCATION: CPT | Performed by: PHYSICAL THERAPIST

## 2025-01-22 NOTE — PROGRESS NOTES
Daily Note     Today's date: 2025  Patient name: Fadi Busby  : 1965  MRN: [id number]  Referring provider: Kee Owen DO  Dx:   Encounter Diagnosis     ICD-10-CM    1. Alteration of sensation as late effect of cerebrovascular accident (CVA)  I69.398     R20.9                          Subjective: Reports feeling well. No changes since last visit.       Objective: See treatment diary below    BP: 110/70  HR: 78    Assessment: Tolerated treatment well. Progressed treadmill speed which pt was able to tolerate indicating improved speed and strength, but reporting LLE fatigue and tightness following. More challenged with fast walking/jogging to blaze pods due to fatigue. Requires verbal and visual cueing for awareness of L wrist position with punches as his spastic tone places him in more of a flexed wrist position and internally rotated. Advised caution with punch force and attention to form to avoid injury. He would benefit from continued PT.       Plan: Continue per plan of care.  Utilize mitts for boxing to avoid injury of L wrist.      Precautions: HTN, type II DM, DO NOT BILL FOR TA     HEP:   Access Code: [email or web address]  URL: [email or web address]  Date: 2025  Prepared by: Ezequiel Iraheta    Exercises  - Tandem Stance  - 1 x daily - 7 x weekly - 3 sets - 30 seconds hold  - Single Leg Stance  - 1 x daily - 7 x weekly - 3 sets - 30 seconds  hold  - Heel Raises with Counter Support  - 1 x daily - 7 x weekly - 3 sets - 10 reps  - Squat  - 1 x daily - 7 x weekly - 3 sets - 10 reps  - Jump in Place  - 1 x daily - 7 x weekly - 2 sets - 10 reps  - Running in place  - 1 x daily - 7 x weekly - 2 sets - 30 seconds  hold  Manuals 1/13 1/15 1/20 1/22                                         Neuro Re-Ed         Hurdles          Step ups          Resisted walking          Blaze pods Shuttle runs over 20 feet.  X 5.  Facets walking speed or jogging as able. Shuttle runs over 20 feet.  X 5.  Fastest  "walking speed or jogging as able. Fastest time 18 sec  Fwd bwd over 12ft fastest walking speed or jogging as able. 1min x2    Side stepping over 12ft 1min x2 Fwd bwd over 14ft fastest walking speed or jogging as able. 1min x2         Side stepping          Jogging place BL UE support 30\"x3  BL UE support 30\"x2       Foam EC Tandem stance 30\" x 2 Tandem stance 30\" x 2       Ramp          Hopping In place BL UE support. 2x10     In place BL UE support. 2x10 In place BL UE support. 2x10 In place BL UE support. 2x10     Boxing    Combos with fwd walking 20ft lap x 6  Combos to heavy bag with dodging. 5min  Use mits     Ther Ex         HIIT treadmill  **Base: 1% 1.6mph no UE    Fast: 1% 2.8-3.1mph no UE    13 min total  ------   **Base: 1% 1.6mph no UE    Fast: 1% 2.8-3.1mph no UE - 2 min     12 min total 5# ankle wt L ankle  **Base: 1% 1.6mph no UE    Fast: 1% 2.8-3.1mph no UE - 2 min     12 min total 5# ankle wt L ankle   ----  Bwd 1mph 3min single UE **Base: 1% 1.6mph no UE    Fast: 1% 3.0-3.2mph no UE - 2 min     12 min total 5# ankle wt L ankle   ----  Bwd 1.2mph 3min single UE     Leg press machine         Prone quad stretch         Calf stretch off step                                             Ther Activity         DO NOT BILL FOR TA                  Gait Training                           Modalities                                      "

## 2025-01-23 ENCOUNTER — PATIENT OUTREACH (OUTPATIENT)
Dept: FAMILY MEDICINE CLINIC | Facility: CLINIC | Age: 60
End: 2025-01-23

## 2025-01-23 NOTE — PROGRESS NOTES
Medication Patient Assistance Program (MPAP) Specialist  received personal reminder regarding patient's Ozempic and Tresiba shipment from [employer]. MPAP specialist reviewed patient chart prior to outreach. MPAP specialist reached out to [employer] representative Nghia. Per [employer] rep today is 14 th day. Patient's shipment is still showing as processed. Per [employer] rep if patient does not receive his shipment. This week patient may request a voucher for 30 day supply. MPAP specialist thanked [employer] rep. MPAP specialist will follow up if patient's shipment does not arrive. MPAP specialist has placed personal reminder.

## 2025-01-27 ENCOUNTER — APPOINTMENT (OUTPATIENT)
Dept: OCCUPATIONAL THERAPY | Facility: REHABILITATION | Age: 60
End: 2025-01-27
Payer: COMMERCIAL

## 2025-01-27 ENCOUNTER — APPOINTMENT (OUTPATIENT)
Facility: REHABILITATION | Age: 60
End: 2025-01-27
Payer: COMMERCIAL

## 2025-01-29 ENCOUNTER — OFFICE VISIT (OUTPATIENT)
Facility: REHABILITATION | Age: 60
End: 2025-01-29
Payer: COMMERCIAL

## 2025-01-29 ENCOUNTER — OFFICE VISIT (OUTPATIENT)
Dept: OCCUPATIONAL THERAPY | Facility: REHABILITATION | Age: 60
End: 2025-01-29
Payer: COMMERCIAL

## 2025-01-29 DIAGNOSIS — I69.398 ALTERATION OF SENSATION AS LATE EFFECT OF CEREBROVASCULAR ACCIDENT (CVA): Primary | ICD-10-CM

## 2025-01-29 DIAGNOSIS — I63.9 CEREBROVASCULAR ACCIDENT (CVA), UNSPECIFIED MECHANISM (HCC): Primary | ICD-10-CM

## 2025-01-29 DIAGNOSIS — R20.9 ALTERATION OF SENSATION AS LATE EFFECT OF CEREBROVASCULAR ACCIDENT (CVA): Primary | ICD-10-CM

## 2025-01-29 PROCEDURE — 97530 THERAPEUTIC ACTIVITIES: CPT | Performed by: OCCUPATIONAL THERAPIST

## 2025-01-29 PROCEDURE — 97116 GAIT TRAINING THERAPY: CPT | Performed by: PHYSICAL THERAPIST

## 2025-01-29 PROCEDURE — 97112 NEUROMUSCULAR REEDUCATION: CPT | Performed by: OCCUPATIONAL THERAPIST

## 2025-01-29 PROCEDURE — 97112 NEUROMUSCULAR REEDUCATION: CPT | Performed by: PHYSICAL THERAPIST

## 2025-01-29 PROCEDURE — 97110 THERAPEUTIC EXERCISES: CPT | Performed by: OCCUPATIONAL THERAPIST

## 2025-01-29 NOTE — PROGRESS NOTES
Daily Note     Today's date: 2025  Patient name: Fadi Busby  : 1965  MRN: [id number]  Referring provider: Kee Owen DO  Dx:   Encounter Diagnosis     ICD-10-CM    1. Alteration of sensation as late effect of cerebrovascular accident (CVA)  I69.398     R20.9                            Subjective: Reports feeling well. No changes since last visit.       Objective: See treatment diary below        Assessment: Tolerated treatment well. Able to progress treadmill speed today with increased fatigue.  More time spent on goal of jogging/running today. Challenged with achieving needed push off and step speed on LLE due to weakness and spastic tone. Updated HEP today. He would benefit from continued PT.       Plan: Continue per plan of care.  Utilize mitts for boxing to avoid injury of L wrist.      Precautions: HTN, type II DM, DO NOT BILL FOR TA     HEP:   Access Code: [email or web address]  URL: [email or web address]  Date: 2025  Prepared by: Ezequiel Iraheta    Exercises  - Tandem Stance  - 1 x daily - 7 x weekly - 3 sets - 30 seconds hold  - Single Leg Stance  - 1 x daily - 7 x weekly - 3 sets - 30 seconds  hold  - Heel Raises with Counter Support  - 1 x daily - 7 x weekly - 3 sets - 10 reps  - Squat  - 1 x daily - 7 x weekly - 3 sets - 10 reps  - Jump in Place  - 1 x daily - 7 x weekly - 2 sets - 10 reps  - Running in place  - 1 x daily - 7 x weekly - 2 sets - 30 seconds  hold  Manuals 1/13 1/15 1/20 1/22 1/29                                        Neuro Re-Ed         Hurdles          Step ups          Resisted walking      100ft x3 dragging 20#  Timed     Blaze pods Shuttle runs over 20 feet.  X 5.  Facets walking speed or jogging as able. Shuttle runs over 20 feet.  X 5.  Fastest walking speed or jogging as able. Fastest time 18 sec  Fwd bwd over 12ft fastest walking speed or jogging as able. 1min x2    Side stepping over 12ft 1min x2 Fwd bwd over 14ft fastest walking speed or jogging as able.  "1min x2         Side stepping          Jogging place BL UE support 30\"x3  BL UE support 30\"x2   BL UE support 30\"    Foam EC Tandem stance 30\" x 2 Tandem stance 30\" x 2       Ramp          Hopping In place BL UE support. 2x10     In place BL UE support. 2x10 In place BL UE support. 2x10 In place BL UE support. 2x10 In place BL UE support. 2x10    Boxing    Combos with fwd walking 20ft lap x 6  Combos to heavy bag with dodging. 5min      Ther Ex         HIIT treadmill  **Base: 1% 1.6mph no UE    Fast: 1% 2.8-3.1mph no UE    13 min total  ------   **Base: 1% 1.6mph no UE    Fast: 1% 2.8-3.1mph no UE - 2 min     12 min total 5# ankle wt L ankle  **Base: 1% 1.6mph no UE    Fast: 1% 2.8-3.1mph no UE - 2 min     12 min total 5# ankle wt L ankle   ----  Bwd 1mph 3min single UE **Base: 1% 1.6mph no UE    Fast: 1% 3.0-3.2mph no UE - 2 min     12 min total 5# ankle wt L ankle   ----  Bwd 1.2mph 3min single UE Intervals 1min on/off  **Base: 1% 1.6mph. No UE    Fast: 1% 3.0-3.2mph single UE    10min total  -----  Overground jogging in SOLO. 100ft x5    Leg press machine         Prone quad stretch         Calf stretch off step         Single Leg heel raise     X10 L    Lunges      In //bars x2 laps                       Ther Activity         DO NOT BILL FOR TA                  Gait Training                           Modalities                                      "

## 2025-01-29 NOTE — PROGRESS NOTES
"Daily Note     Today's date: 2025  Patient name: Fadi Busby  : 1965  MRN: [id number]  Referring provider: Kee Owen DO  Dx:   Encounter Diagnosis     ICD-10-CM    1. Cerebrovascular accident (CVA), unspecified mechanism (HCC)  I63.9                      Subjective: \"The hardest part was moving the block in my L hand.\"      Objective: See treatment description below    Thera Ex: UBE x 5 minutes prograde and x 5 minutes retrograde in seated position bilaterally with 3.6 resistance level to improved LUE proximal strength, muscle endurance, and bilateral coordination.     Neuro Re-ed:  Pt advanced to performing shoulder extension for magnetic block retrieval and shoulder FF for placement of magnetic block on magnetic board at OH level with #1 wrist weight donned after performing in-hand manipulation demands of palm to finger translation beginning with LUE to challenge in-hand manipulation speed and accuracy, improved proximal/distal teaming, increased proximal strength/endurance, and improved digit coordination.     Thera Act: Pt concluded tx session with performance of functional oppositional pinch patterns with music glove with Easy song selection with demands of pinching in sync with music to challenge L digit coordination/isolation, improved distal motor functioning, and improved mixed prehension skills.     Trial #1: thumb to D2 with 45% accuracy; thumb to D3 with 65% accuracy; thumb to D4 with 35% accuracy; 30/63 accurate hits    Assessment: Tolerated treatment well. Patient would benefit from continued OT    Pt demo with excellent activity tolerance to UBE demands with abilities to complete x 10 minutes with increased resistance to 3.6 level without complaints of fatigue reported-- Pt able to maintain consistent pacing throughout. Pt demo with initial Max difficulties with translating block from palm to fingers in L hand with significant increased time required and with frequent droppage " evident 2* poor digit coordination.  Pt presented with significant improved speed and accuracy of in-hand manipulation as activity persisted with less time required and with significant less droppage presented.  Pt demo with G form of functional reach patterns with less tendencies of initiating reach patterns proximally after verbal cues were provided.  Pt presented with significant challenges alternating between sequential oppositional patterns with delayed pinches evident affecting synchrony with music.    Plan: Continue per plan of care.     Hand to target massed practice on BITS.      INTERVENTION COMMENTS:  Diagnosis: Cerebrovascular accident (CVA), unspecified mechanism (HCC) [I63.9]  Precautions: L hemiplegia; PACE MAKER-- NO STIM; DM II   6 of 6 visits, PN due 02/15/2025

## 2025-01-30 ENCOUNTER — PATIENT OUTREACH (OUTPATIENT)
Dept: FAMILY MEDICINE CLINIC | Facility: CLINIC | Age: 60
End: 2025-01-30

## 2025-01-30 NOTE — PROGRESS NOTES
Medication Patient Assistance Program (MPAP) Specialist  received personal reminder regarding patient's shipment of Tresiba and Ozempic from [employer] [facility] patient assistance program. MPAP specialist reviewed patient chart prior to outreach. [facility] successfully reached out to [facility] representative, Manolo. Per [facility], patient is approved for patient assistance until 12/31/2025. Per [facility], patient's medication is in processing. Per [employer] rep, [employer] is having longer shipping times. Patient will be called when medication is shipped. Patient will be given a tracking number and shipment should arrive within 1-2 days of shipping date. At this time patient's next shipment will be processed on 03/24/2025. [facility] does not have an ETA on when shipment will be.[employer] rep provided MPAP specialist a voucher for Tresiba and Ozempic through email. If patient would like to opt out of voucher they may call [phone]. If patient does not  Tresiba and Ozempic with voucher it will not change supply given through patient assistance program. If either medication is received with voucher patient's next refill will be decreased by the 30 day dose from voucher. [facility] thanked [facility].   [facility] specialist successfully reached patient. Per patient he has 2 pens of Lantus and 1 1/2 pens of Ozempic. Patient does not need voucher at this time. [facility] encouraged patient to call if Lantus or Ozempic run low and he hasn't received his Tresiba and Ozempic from patient assistance program. Patient stated he will. [facility] thanked patient . [facility] has placed a personal reminder regarding patient's Tresiba and Ozempic shipments.

## 2025-02-03 ENCOUNTER — APPOINTMENT (OUTPATIENT)
Dept: OCCUPATIONAL THERAPY | Facility: REHABILITATION | Age: 60
End: 2025-02-03
Payer: COMMERCIAL

## 2025-02-03 ENCOUNTER — EVALUATION (OUTPATIENT)
Facility: REHABILITATION | Age: 60
End: 2025-02-03
Payer: COMMERCIAL

## 2025-02-03 DIAGNOSIS — I69.398 ALTERATION OF SENSATION AS LATE EFFECT OF CEREBROVASCULAR ACCIDENT (CVA): Primary | ICD-10-CM

## 2025-02-03 DIAGNOSIS — R20.9 ALTERATION OF SENSATION AS LATE EFFECT OF CEREBROVASCULAR ACCIDENT (CVA): Primary | ICD-10-CM

## 2025-02-03 PROCEDURE — 97112 NEUROMUSCULAR REEDUCATION: CPT | Performed by: PHYSICAL THERAPIST

## 2025-02-03 NOTE — PROGRESS NOTES
Progress update    Today's date: 2/3/2025  Patient name: Fadi Busby  : 1965  MRN: [id number]  Referring provider: Kee Owen DO  Dx:   Encounter Diagnosis     ICD-10-CM    1. Alteration of sensation as late effect of cerebrovascular accident (CVA)  I69.398     R20.9                              Subjective: Feeling well today. He feels over the past month he is feeling stronger going up and and down the stairs and feels he has more strength with the L leg. Feels some improvements towards his go of being able to run, but still cannot do it.   Pt goals: be able run, be able to move quickly on his R leg, feel steadier on R leg.       Objective: See treatment diary below    -BP: 118/72           Balance Test Initial Eval  11/4  12/9  12/30 2/3   5x Sit to Stand: 17s no UE  16.3s  12s no UE   12s no UE  12s no UE     10s on 2nd trial   TUG:            Gait Speed:             2 Minute Walk Test:            6 Minute Walk Test: 1100ft  1450ft  1400ft  1400ft 1430ft   Cook Balance Scale:            FGA:                  ABC:  43%             Gait Analysis: able to achieve heel to toe pattern. Flexed knee posture throw swing phase. Mild recurvatum.      Goals  STGs (4 weeks)  Pt will be independent with comprehensive HEP - progressing   Pt will demonstrate least 3-second improvement on 5 times sit to stand  - MET      LTG's (to be achieved by d/c)  Pt will be able to self manage sx's independently  - progressing   Pt will demonstrate least 4-point improvement on FGA  - MET  Patient will demonstrate least 200 foot improvement on 6-minute walk test - MET  ADDED GOAL: pt will be able to jog safely for at least 50ft     Assessment: Since last progress update Fadi has made some improvements with his gait quality and progress towards his goal of being able to jog/run during PT sessions.  Results of functional outcome measures today demonstrate fairly stable findings from prior progress  "update.  At this time he would benefit from further skilled PT to further work on speed and agility movements with left lower extremity, explosive power, and working towards his goal of being able to run.  Reviewed home exercise program and strongly emphasized compliance in order to make further progress.  Discussed with patient that if unable to make further progress with outcome measures or significant gains towards his goals we will discharge at next progress update.      Plan: Continue per plan of care.  Utilize mitts for boxing to avoid injury of L wrist.  Continue for 4-6 more weeks at 2 times per week     Precautions: HTN, type II DM, DO NOT BILL FOR TA     HEP:   Access Code: [email or web address]  URL: [email or web address]  Date: 01/13/2025  Prepared by: Ezequiel Iraheta    Exercises  - Tandem Stance  - 1 x daily - 7 x weekly - 3 sets - 30 seconds hold  - Single Leg Stance  - 1 x daily - 7 x weekly - 3 sets - 30 seconds  hold  - Heel Raises with Counter Support  - 1 x daily - 7 x weekly - 3 sets - 10 reps  - Squat  - 1 x daily - 7 x weekly - 3 sets - 10 reps  - Jump in Place  - 1 x daily - 7 x weekly - 2 sets - 10 reps  - Running in place  - 1 x daily - 7 x weekly - 2 sets - 30 seconds  hold  Manuals 1/13 1/15 1/20 1/22 1/29                                        Neuro Re-Ed         Hurdles          Step ups          Resisted walking      100ft x3 dragging 20#  Timed     Blaze pods Shuttle runs over 20 feet.  X 5.  Facets walking speed or jogging as able. Shuttle runs over 20 feet.  X 5.  Fastest walking speed or jogging as able. Fastest time 18 sec  Fwd bwd over 12ft fastest walking speed or jogging as able. 1min x2    Side stepping over 12ft 1min x2 Fwd bwd over 14ft fastest walking speed or jogging as able. 1min x2         Side stepping          Jogging place BL UE support 30\"x3  BL UE support 30\"x2   BL UE support 30\"    Foam EC Tandem stance 30\" x 2 Tandem stance 30\" x 2       Ramp          Hopping In " place BL UE support. 2x10     In place BL UE support. 2x10 In place BL UE support. 2x10 In place BL UE support. 2x10 In place BL UE support. 2x10    Boxing    Combos with fwd walking 20ft lap x 6  Combos to heavy bag with dodging. 5min      Ther Ex         HIIT treadmill  **Base: 1% 1.6mph no UE    Fast: 1% 2.8-3.1mph no UE    13 min total  ------   **Base: 1% 1.6mph no UE    Fast: 1% 2.8-3.1mph no UE - 2 min     12 min total 5# ankle wt L ankle  **Base: 1% 1.6mph no UE    Fast: 1% 2.8-3.1mph no UE - 2 min     12 min total 5# ankle wt L ankle   ----  Bwd 1mph 3min single UE **Base: 1% 1.6mph no UE    Fast: 1% 3.0-3.2mph no UE - 2 min     12 min total 5# ankle wt L ankle   ----  Bwd 1.2mph 3min single UE Intervals 1min on/off  **Base: 1% 1.6mph. No UE    Fast: 1% 3.0-3.2mph single UE    10min total  -----  Overground jogging in SOLO. 100ft x5    Leg press machine         Prone quad stretch         Calf stretch off step         Single Leg heel raise     X10 L    Lunges      In //bars x2 laps                       Ther Activity         DO NOT BILL FOR TA                  Gait Training                           Modalities

## 2025-02-05 ENCOUNTER — CONSULT (OUTPATIENT)
Dept: SURGERY | Facility: CLINIC | Age: 60
End: 2025-02-05
Payer: COMMERCIAL

## 2025-02-05 ENCOUNTER — APPOINTMENT (OUTPATIENT)
Facility: REHABILITATION | Age: 60
End: 2025-02-05
Payer: COMMERCIAL

## 2025-02-05 ENCOUNTER — APPOINTMENT (OUTPATIENT)
Dept: OCCUPATIONAL THERAPY | Facility: REHABILITATION | Age: 60
End: 2025-02-05
Payer: COMMERCIAL

## 2025-02-05 VITALS
HEIGHT: 69 IN | WEIGHT: 249 LBS | BODY MASS INDEX: 36.88 KG/M2 | SYSTOLIC BLOOD PRESSURE: 116 MMHG | DIASTOLIC BLOOD PRESSURE: 74 MMHG | TEMPERATURE: 96.9 F | RESPIRATION RATE: 16 BRPM | HEART RATE: 93 BPM | OXYGEN SATURATION: 97 %

## 2025-02-05 DIAGNOSIS — E11.311 TYPE 2 DIABETES MELLITUS WITH RETINOPATHY AND MACULAR EDEMA, WITH LONG-TERM CURRENT USE OF INSULIN, UNSPECIFIED LATERALITY, UNSPECIFIED RETINOPATHY SEVERITY (HCC): ICD-10-CM

## 2025-02-05 DIAGNOSIS — Z79.01 LONG TERM (CURRENT) USE OF ANTICOAGULANTS: ICD-10-CM

## 2025-02-05 DIAGNOSIS — Z86.79 HISTORY OF CAROTID ARTERY DISSECTION: ICD-10-CM

## 2025-02-05 DIAGNOSIS — I42.8 NONISCHEMIC CARDIOMYOPATHY (HCC): ICD-10-CM

## 2025-02-05 DIAGNOSIS — Z79.4 TYPE 2 DIABETES MELLITUS WITH RETINOPATHY AND MACULAR EDEMA, WITH LONG-TERM CURRENT USE OF INSULIN, UNSPECIFIED LATERALITY, UNSPECIFIED RETINOPATHY SEVERITY (HCC): ICD-10-CM

## 2025-02-05 DIAGNOSIS — R22.2 MASS OF SKIN OF ABDOMEN: Primary | ICD-10-CM

## 2025-02-05 PROCEDURE — 99203 OFFICE O/P NEW LOW 30 MIN: CPT

## 2025-02-05 NOTE — ASSESSMENT & PLAN NOTE
-Currently on warfarin and been on since 2009  -Has a cardiology 1 yr appt set up for 03/14/2025

## 2025-02-05 NOTE — PROGRESS NOTES
Name: Fadi Busby      : 1965      MRN: [id number]  Encounter Provider: MARIE Valdivia  Encounter Date: 2025   [facility]  :  Assessment & Plan  Mass of skin of abdomen  Patient has a palpable tender hard mass on the left mid abdomen about 2cm x 1.5cm. Reviewed CT scan from 2024 with no note of mass on impression. Could see on images a visible nodule/cyst on left abdomen above the umbilicus line. This could be cyst/nodule vs lipoma vs. small hematoma with his noted skin discoloration and being on an anticoagulant. This could be also be related to his injections from his diabetes medications. Will ultrasound to determine cyst vs nodule vs. lipoma. This has been bothering him for some time and continues to grow. Is unsure still if he would like to removed or not but patient would like to meet surgeon to discuss surgery. Will schedule with Dr. Davison to review ultrasound and surgical plan.     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, with long-term current use of insulin, unspecified laterality, unspecified retinopathy severity (HCC)  -Currently taking Novolog, Tresiba and Ozempic injections    Lab Results   Component Value Date    HGBA1C 6.8 (A) 2024   A1c at 2025 Family [facility] visit: 6.6       Nonischemic cardiomyopathy (HCC)  -Currently on warfarin and been on since   -Has a cardiology 1 yr appt set up for 2025  -Has pacemaker that was placed in 2024       History of carotid artery dissection  -Currently on warfarin and been on since   -Has a cardiology 1 yr appt set up for 2025       Long term (current) use of anticoagulants  -Currently on warfarin and been on since   -Has a cardiology 1 yr appt set up for 2025         PREOP CLEARANCE: Medical, cardiac, and anticoagulant recommendations      History of Present Illness  "  ERIC  Fadi Busby is a 59 y.o. male who presents for evaluation of left mid abdominal skin mass. Went to his PCP to be evaluated and was sent here. He first noticed this about 6 months ago. States it has grown since then and continues to. Notices it more when he sits up but can still feel it when he is lying down. Has discoloration. No drainage or fluctuance. Tender and causes him discomfort.          Review of Systems   Constitutional:  Negative for chills and fever.   Eyes:  Negative for pain and visual disturbance.   Respiratory:  Negative for cough and shortness of breath.    Cardiovascular:  Negative for chest pain and palpitations.   Gastrointestinal:  Negative for abdominal pain and vomiting.   Genitourinary:  Negative for dysuria and hematuria.   Musculoskeletal:  Negative for arthralgias and back pain.   Skin:  Negative for color change and rash.   Neurological:  Negative for seizures and syncope.   All other systems reviewed and are negative.         Objective   /74 (BP Location: Right arm, Patient Position: Sitting, Cuff Size: Large)   Pulse 93   Temp (!) 96.9 °F (36.1 °C) (Tympanic)   Resp 16   Ht 5' 9\" (1.753 m)   Wt 113 kg (249 lb)   SpO2 97%   BMI 36.77 kg/m²      Physical Exam  Vitals and nursing note reviewed.   Constitutional:       General: He is not in acute distress.     Appearance: He is well-developed.   HENT:      Head: Normocephalic and atraumatic.   Eyes:      Conjunctiva/sclera: Conjunctivae normal.   Cardiovascular:      Rate and Rhythm: Normal rate and regular rhythm.      Heart sounds: No murmur heard.  Pulmonary:      Effort: Pulmonary effort is normal. No respiratory distress.      Breath sounds: Normal breath sounds.   Abdominal:      Palpations: Abdomen is soft.      Tenderness: There is abdominal tenderness.          Comments: Left mid abdomen slightly above umbillicus line.Palpable hard mass roughly 2cm x 1.5cm with some skin discoloration. Tender to touch.  "   Musculoskeletal:         General: No swelling.      Cervical back: Neck supple.   Skin:     General: Skin is warm and dry.      Capillary Refill: Capillary refill takes less than 2 seconds.   Neurological:      Mental Status: He is alert and oriented to person, place, and time.   Psychiatric:         Mood and Affect: Mood normal.

## 2025-02-05 NOTE — ASSESSMENT & PLAN NOTE
-Currently taking Novolog, Tresiba and Ozempic injections    Lab Results   Component Value Date    HGBA1C 6.8 (A) 08/28/2024   A1c at 01/09/2025 Family [facility] visit: 6.6

## 2025-02-05 NOTE — ASSESSMENT & PLAN NOTE
-Currently on warfarin and been on since 2009  -Has a cardiology 1 yr appt set up for 03/14/2025  -Has pacemaker that was placed in March 2024

## 2025-02-10 ENCOUNTER — OFFICE VISIT (OUTPATIENT)
Facility: REHABILITATION | Age: 60
End: 2025-02-10
Payer: COMMERCIAL

## 2025-02-10 ENCOUNTER — OFFICE VISIT (OUTPATIENT)
Dept: OCCUPATIONAL THERAPY | Facility: REHABILITATION | Age: 60
End: 2025-02-10
Payer: COMMERCIAL

## 2025-02-10 DIAGNOSIS — R20.9 ALTERATION OF SENSATION AS LATE EFFECT OF CEREBROVASCULAR ACCIDENT (CVA): Primary | ICD-10-CM

## 2025-02-10 DIAGNOSIS — I63.9 CEREBROVASCULAR ACCIDENT (CVA), UNSPECIFIED MECHANISM (HCC): Primary | ICD-10-CM

## 2025-02-10 DIAGNOSIS — I69.398 ALTERATION OF SENSATION AS LATE EFFECT OF CEREBROVASCULAR ACCIDENT (CVA): Primary | ICD-10-CM

## 2025-02-10 PROCEDURE — 97112 NEUROMUSCULAR REEDUCATION: CPT | Performed by: OCCUPATIONAL THERAPIST

## 2025-02-10 PROCEDURE — 97110 THERAPEUTIC EXERCISES: CPT | Performed by: PHYSICAL THERAPIST

## 2025-02-10 PROCEDURE — 97110 THERAPEUTIC EXERCISES: CPT | Performed by: OCCUPATIONAL THERAPIST

## 2025-02-10 PROCEDURE — 97112 NEUROMUSCULAR REEDUCATION: CPT | Performed by: PHYSICAL THERAPIST

## 2025-02-10 NOTE — PROGRESS NOTES
"Daily Note     Today's date: 2/10/2025  Patient name: Fadi uBsby  : 1965  MRN: [id number]  Referring provider: Kee Owen DO  Dx:   Encounter Diagnosis     ICD-10-CM    1. Cerebrovascular accident (CVA), unspecified mechanism (HCC)  I63.9                      Subjective: \"I have been working and I want to work even harder.\"       Objective: See treatment description below    Thera Ex: UBE x 5 minutes prograde and x 5 minutes retrograde in seated position unilaterally with LUE only with 1.5 resistance level to improved LUE proximal strength, muscle endurance, and bilateral coordination.     Neuro Re-ed:  Pt advanced to performing seated neuro re-ed with #2 DB for completion of 3 sets x 1 reps of BUE motor combination of shoulder ABD leading into horizontal ADD to improved LUE proximal strength/endurance, increased LUE concentric/eccentric motor control, improved B/L coordination/symmetry, and improved overall LUE AROM.    Pt concluded tx session with seated motor combination of elbow flexion leading into seated punch with full tricep extension unilaterally with LUE only and with added resistance from yellow theraband to improved tricep extension with functional reach, improved LUE proximal strength/endurance, and improved motor control.     Assessment: Tolerated treatment well. Patient would benefit from continued OT    Pt demo with G activity tolerance to UBE demands with abilities to complete x 10 minutes unilaterally with LUE only with 1.5 resistance level without complaints of fatigue reported-- Pt able to maintain consistent pacing throughout. Pt demo with significant improved bilateral coordination symmetry with seated motor combination with less tendencies of performing movement with LUE motor lag.  Pt demo with tendencies of compensating with trunk tilt with increased fatigue levels, though was able to self-correct with mirror provided for enhanced visual feedback.  Pt demo with excellent motor " combination/motor control with seated punches with abilities to achieve full tricep extension each rep. Pt able to regulate abnormal muscle tone improving overall movement patterns on this date.    Plan: Continue per plan of care.     Hand to target massed practice on BITS.      INTERVENTION COMMENTS:  Diagnosis: Cerebrovascular accident (CVA), unspecified mechanism (HCC) [I63.9]  Precautions: L hemiplegia; PACE MAKER-- NO STIM; DM II   1 of 13 visits, PN due 02/15/2025

## 2025-02-10 NOTE — PROGRESS NOTES
Daily Note     Today's date: 2/10/2025  Patient name: Fadi Busby  : 1965  MRN: [id number]  Referring provider: Kee Owen DO  Dx:   Encounter Diagnosis     ICD-10-CM    1. Alteration of sensation as late effect of cerebrovascular accident (CVA)  I69.398     R20.9                                Subjective: Feeling well today no changes from last visit.         Objective: See treatment diary below      Assessment: Tolerated treatment well. With added agility drills demonstrated difficulty with push off on LLE and maintaining speed. Progressed to jumping forward today which pt demonstrated fairly symmetrical push off but fatigued following. At times demonstrates L toe catching with increased speeds, but able to self correct balance. Would benefit from continued PT.       Plan: Continue per plan of care.  Utilize mitts for boxing to avoid injury of L wrist.  Continue for 4-6 more weeks at 2 times per week     Precautions: HTN, type II DM, DO NOT BILL FOR TA     HEP:   Access Code: [email or web address]  URL: [email or web address]  Date: 2025  Prepared by: Ezequiel Iraheta    Exercises  - Tandem Stance  - 1 x daily - 7 x weekly - 3 sets - 30 seconds hold  - Single Leg Stance  - 1 x daily - 7 x weekly - 3 sets - 30 seconds  hold  - Heel Raises with Counter Support  - 1 x daily - 7 x weekly - 3 sets - 10 reps  - Squat  - 1 x daily - 7 x weekly - 3 sets - 10 reps  - Jump in Place  - 1 x daily - 7 x weekly - 2 sets - 10 reps  - Running in place  - 1 x daily - 7 x weekly - 2 sets - 30 seconds  hold  Manuals 1/13 1/15 1/20 1/22 1/29 2/10                                       Neuro Re-Ed         Hurdles          Step ups          Resisted walking      100ft x3 dragging 20#  Timed  50ft x4 dragging 20#  Timed     50ft x 4 jogging    Blaze pods Shuttle runs over 20 feet.  X 5.  Facets walking speed or jogging as able. Shuttle runs over 20 feet.  X 5.  Fastest walking speed or jogging as able. Fastest time 18 sec  " Fwd bwd over 12ft fastest walking speed or jogging as able. 1min x2    Side stepping over 12ft 1min x2 Fwd bwd over 14ft fastest walking speed or jogging as able. 1min x2      Fwd bwd over 14ft fastest walking speed or jogging as able. 1min x3   Side stepping          Jogging place BL UE support 30\"x3  BL UE support 30\"x2   BL UE support 30\"    Foam EC Tandem stance 30\" x 2 Tandem stance 30\" x 2       Ramp          Hopping In place BL UE support. 2x10     In place BL UE support. 2x10 In place BL UE support. 2x10 In place BL UE support. 2x10 In place BL UE support. 2x10 Fwd no UE support. 20ft x2   Boxing    Combos with fwd walking 20ft lap x 6  Combos to heavy bag with dodging. 5min      Agility drills       Quick steps over line   Fwd - 30\" x2  Lat - 30\" x2    Ther Ex         HIIT treadmill  **Base: 1% 1.6mph no UE    Fast: 1% 2.8-3.1mph no UE    13 min total  ------   **Base: 1% 1.6mph no UE    Fast: 1% 2.8-3.1mph no UE - 2 min     12 min total 5# ankle wt L ankle  **Base: 1% 1.6mph no UE    Fast: 1% 2.8-3.1mph no UE - 2 min     12 min total 5# ankle wt L ankle   ----  Bwd 1mph 3min single UE **Base: 1% 1.6mph no UE    Fast: 1% 3.0-3.2mph no UE - 2 min     12 min total 5# ankle wt L ankle   ----  Bwd 1.2mph 3min single UE Intervals 1min on/off  **Base: 1% 1.6mph. No UE    **Fast: 1% 3.0-3.2mph single UE    10min total  -----  Overground jogging in SOLO. 100ft x5 Intervals 1min on/off  **Base: 1% 1.7mph. No UE    **Fast: 1% 3.0-3.2mph single UE    10min total   Leg press machine         Prone quad stretch         Calf stretch off step         Single Leg heel raise     X10 L    Lunges      In //bars x2 laps                       Ther Activity         DO NOT BILL FOR TA                  Gait Training                           Modalities                                      "

## 2025-02-11 ENCOUNTER — HOSPITAL ENCOUNTER (OUTPATIENT)
Dept: ULTRASOUND IMAGING | Facility: HOSPITAL | Age: 60
Discharge: HOME | End: 2025-02-11
Payer: COMMERCIAL

## 2025-02-11 DIAGNOSIS — R22.2 MASS OF SKIN OF ABDOMEN: ICD-10-CM

## 2025-02-11 PROCEDURE — 76705 ECHO EXAM OF ABDOMEN: CPT

## 2025-02-12 ENCOUNTER — APPOINTMENT (OUTPATIENT)
Dept: OCCUPATIONAL THERAPY | Facility: REHABILITATION | Age: 60
End: 2025-02-12
Payer: COMMERCIAL

## 2025-02-12 ENCOUNTER — APPOINTMENT (OUTPATIENT)
Facility: REHABILITATION | Age: 60
End: 2025-02-12
Payer: COMMERCIAL

## 2025-02-13 ENCOUNTER — OFFICE VISIT (OUTPATIENT)
Dept: FAMILY MEDICINE CLINIC | Facility: CLINIC | Age: 60
End: 2025-02-13

## 2025-02-13 ENCOUNTER — APPOINTMENT (OUTPATIENT)
Dept: LAB | Facility: CLINIC | Age: 60
End: 2025-02-13
Payer: COMMERCIAL

## 2025-02-13 VITALS
RESPIRATION RATE: 14 BRPM | OXYGEN SATURATION: 95 % | DIASTOLIC BLOOD PRESSURE: 70 MMHG | TEMPERATURE: 97.9 F | WEIGHT: 244.8 LBS | BODY MASS INDEX: 36.26 KG/M2 | SYSTOLIC BLOOD PRESSURE: 110 MMHG | HEART RATE: 90 BPM | HEIGHT: 69 IN

## 2025-02-13 DIAGNOSIS — Z86.73 HISTORY OF CVA (CEREBROVASCULAR ACCIDENT): ICD-10-CM

## 2025-02-13 DIAGNOSIS — Z86.711 HISTORY OF PULMONARY EMBOLISM: ICD-10-CM

## 2025-02-13 DIAGNOSIS — Z79.01 LONG TERM (CURRENT) USE OF ANTICOAGULANTS: ICD-10-CM

## 2025-02-13 DIAGNOSIS — Z23 ENCOUNTER FOR IMMUNIZATION: ICD-10-CM

## 2025-02-13 DIAGNOSIS — M62.838 MUSCLE SPASM OF SHOULDER REGION: ICD-10-CM
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
## 2025-02-13 DIAGNOSIS — M54.2 NECK PAIN, BILATERAL: Primary | ICD-10-CM

## 2025-02-13 LAB
INR PPP: 1.14 (ref 0.85–1.19)
PROTHROMBIN TIME: 14.9 SECONDS (ref 12.3–15)

## 2025-02-13 PROCEDURE — G2211 COMPLEX E/M VISIT ADD ON: HCPCS | Performed by: FAMILY MEDICINE

## 2025-02-13 PROCEDURE — 99213 OFFICE O/P EST LOW 20 MIN: CPT | Performed by: FAMILY MEDICINE

## 2025-02-13 PROCEDURE — 36415 COLL VENOUS BLD VENIPUNCTURE: CPT

## 2025-02-13 PROCEDURE — 85610 PROTHROMBIN TIME: CPT

## 2025-02-13 PROCEDURE — 90750 HZV VACC RECOMBINANT IM: CPT

## 2025-02-13 PROCEDURE — 90471 IMMUNIZATION ADMIN: CPT
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
## 2025-02-16 ENCOUNTER — ANTICOAG VISIT (OUTPATIENT)
Dept: FAMILY MEDICINE CLINIC | Facility: CLINIC | Age: 60
End: 2025-02-16

## 2025-02-16 ENCOUNTER — RESULTS FOLLOW-UP (OUTPATIENT)
Dept: FAMILY MEDICINE CLINIC | Facility: CLINIC | Age: 60
End: 2025-02-16

## 2025-02-16 DIAGNOSIS — Z79.01 LONG TERM (CURRENT) USE OF ANTICOAGULANTS: Primary | ICD-10-CM

## 2025-02-16 DIAGNOSIS — Z86.711 HISTORY OF PULMONARY EMBOLISM: ICD-10-CM

## 2025-02-16 NOTE — PROGRESS NOTES
INR- 1.14. Subtherapeutic  Called patient to discuss results.  Patient had skipped about 3 doses in anticipation of getting his tooth pulled out due to tooth ache.  Patient was instructed to resume daily dosing of 5 mg of Coumadin and repeat INR on 2/20/2025 (1 week from previous INR).  Patient verbalized understanding.  Will follow-up.

## 2025-02-16 NOTE — TELEPHONE ENCOUNTER
Telephone call to patient to discuss INR result. Patient stated that he had missed about 3 doses of his coumadin due to anticipation that he may have a dental procedure. He had a tooth ache for which he went to urgent care and decided to hold coumadin just in case he needed to pull it out. HE was instructed not to skip any more doses and repeat INR in 4 days (1 week from previous INR). He verbalized understanding.     ----- Message from Graciela BREWER sent at 2/14/2025  7:40 AM EST -----    ----- Message -----  From: Lab, Background User  Sent: 2/13/2025   7:53 PM EST  To: [facility]

## 2025-02-17 ENCOUNTER — APPOINTMENT (OUTPATIENT)
Dept: OCCUPATIONAL THERAPY | Facility: REHABILITATION | Age: 60
End: 2025-02-17
Payer: COMMERCIAL

## 2025-02-17 ENCOUNTER — APPOINTMENT (OUTPATIENT)
Facility: REHABILITATION | Age: 60
End: 2025-02-17
Payer: COMMERCIAL

## 2025-02-18 ENCOUNTER — RESULTS FOLLOW-UP (OUTPATIENT)
Dept: SURGERY | Facility: CLINIC | Age: 60
End: 2025-02-18

## 2025-02-19 ENCOUNTER — PATIENT OUTREACH (OUTPATIENT)
Dept: FAMILY MEDICINE CLINIC | Facility: CLINIC | Age: 60
End: 2025-02-19

## 2025-02-19 ENCOUNTER — APPOINTMENT (OUTPATIENT)
Facility: REHABILITATION | Age: 60
End: 2025-02-19
Payer: COMMERCIAL

## 2025-02-19 ENCOUNTER — APPOINTMENT (OUTPATIENT)
Dept: OCCUPATIONAL THERAPY | Facility: REHABILITATION | Age: 60
End: 2025-02-19
Payer: COMMERCIAL

## 2025-02-19 NOTE — PROGRESS NOTES
Medication Patient Assistance Program (MPAP) Specialist  received voicemail from patient requesting return call.MPAP specialist reviewed patient chart prior to outreach.  [facility]. Per patient he has not received his medication from [employer] patient assistance program. MPAP specialist explained patient will be notified once his Toujeo and Ozempic are available for . Patient stated understanding. MPAP specialist explained patient may request a voucher for 30 day supple. Patient stated he will check his supply and call MPAP specialist back. MPAP specialist will follow up with [employer] to determine shipment status and discuss voucher. MPAP specialist has placed a personal reminder.

## 2025-02-20 ENCOUNTER — TELEPHONE (OUTPATIENT)
Dept: FAMILY MEDICINE CLINIC | Facility: CLINIC | Age: 60
End: 2025-02-20

## 2025-02-20 ENCOUNTER — PATIENT OUTREACH (OUTPATIENT)
Dept: FAMILY MEDICINE CLINIC | Facility: CLINIC | Age: 60
End: 2025-02-20

## 2025-02-20 NOTE — TELEPHONE ENCOUNTER
Received in office on 02/20/25  Name of Medication: Ozempic  Dosage: 2mg/3ml  Amount received: 4 boxes  Lot #: [id number]  Expiration Date: 09/30/2027  NDC#: 1659-2057-88    first attempt to contact patient. PT INFORMED         Received in office on 02/20/25  Name of Medication: TRESIBA  Dosage: 100UNITS/ML  Amount received: 3 BOXES  Lot #: [id number]  Expiration Date: 05/31/2027  NDC#: 0169-2660-15    first attempt to contact patient. PT INFORMED AND WILL BE HERE TODAY TO

## 2025-02-20 NOTE — PROGRESS NOTES
Medication Patient Assistance Program (MPAP) Specialist  received personal reminder regarding patient's shipments of Tresiba and Ozempic through [employer] patient assistance program. MPAP specialist reviewed patient chart prior to outreach. MPAP specialist successfully reached [employer] patient assistance program representative, Mary. Per [employer] rep patient's Ozempic and Tresiba shipped yesterday 02/19/2025. Patient's shipment is scheduled to arrive at clinician's office around 10:30 am 02/20/2025, today. MPAP specialist has reached out to practice coordinator. MPAP specialist will follow up with patient and [employer] Nordisk if Tresiba and Ozempic are not received. MPAP specialist has placed a personal reminder.    MPAP specialist received call from patient. Patient stated he was unclear of Tresiba dosage. Patient was previously receiving Lantus through patient assistance program. MPAP specialist reached out to clinician. MPAP specialist requested call to patient to discuss his Tresiba. Clinician will follow up with patient. MPAP specialist will remain available for patient assistance program questions.

## 2025-02-20 NOTE — TELEPHONE ENCOUNTER
Patient picked up the following medications:    Name of Medication: Ozempic  Dosage: 2mg/3ml  Amount received: 4 boxes  Lot #: [id number]  Expiration Date: 09/30/2027  NDC#: 9817-7728-05     Name of Medication: TRESIBA  Dosage: 100UNITS/ML  Amount received: 3 BOXES  Lot #: [id number]  Expiration Date: 05/31/2027  NDC#: 0169-2660-15    Picked up on 02/20/2025 @ 1245pm

## 2025-02-21 ENCOUNTER — TELEPHONE (OUTPATIENT)
Dept: FAMILY MEDICINE CLINIC | Facility: CLINIC | Age: 60
End: 2025-02-21

## 2025-02-21 ENCOUNTER — PATIENT OUTREACH (OUTPATIENT)
Dept: FAMILY MEDICINE CLINIC | Facility: CLINIC | Age: 60
End: 2025-02-21

## 2025-02-21 NOTE — TELEPHONE ENCOUNTER
Patient came in stating during last office visit PCP told patient pcp will put in lab orders in. There are no lab orders in and patient states patient does not know what he needs lab orders for. Please Advise.

## 2025-02-21 NOTE — PROGRESS NOTES
Medication Patient Assistance Program (MPAP) Specialist received In Basket ( IB) message from Practice coordinator regarding patient's shipment of Tresiba and Ozempic from [employer] patient assistance program. Tresiba and Ozempic shipment was received at clinician's office.  Patient contacted by practice coordinator.  Clinical Pharmacist included in IB message.

## 2025-02-23 NOTE — TELEPHONE ENCOUNTER
An order was placed for PT-INR and he has done it and result was already discussed with him. Nothing to do for now.

## 2025-02-24 ENCOUNTER — APPOINTMENT (OUTPATIENT)
Facility: REHABILITATION | Age: 60
End: 2025-02-24
Payer: COMMERCIAL

## 2025-02-24 ENCOUNTER — APPOINTMENT (OUTPATIENT)
Dept: OCCUPATIONAL THERAPY | Facility: REHABILITATION | Age: 60
End: 2025-02-24
Payer: COMMERCIAL

## 2025-02-25 ENCOUNTER — OFFICE VISIT (OUTPATIENT)
Dept: SURGERY | Facility: CLINIC | Age: 60
End: 2025-02-25
Payer: COMMERCIAL

## 2025-02-25 VITALS
HEART RATE: 75 BPM | BODY MASS INDEX: 36.38 KG/M2 | DIASTOLIC BLOOD PRESSURE: 77 MMHG | WEIGHT: 245.6 LBS | RESPIRATION RATE: 16 BRPM | OXYGEN SATURATION: 98 % | HEIGHT: 69 IN | TEMPERATURE: 98.2 F | SYSTOLIC BLOOD PRESSURE: 110 MMHG

## 2025-02-25 DIAGNOSIS — E66.01 OBESITY, MORBID (HCC): ICD-10-CM

## 2025-02-25 DIAGNOSIS — E11.311: ICD-10-CM

## 2025-02-25 DIAGNOSIS — E89.811 POSTPROCEDURAL HEMORRHAGE OF AN ENDOCRINE SYSTEM ORGAN OR STRUCTURE FOLLOWING OTHER PROCEDURE: ICD-10-CM

## 2025-02-25 DIAGNOSIS — L92.8 OTHER GRANULOMATOUS DISORDERS OF THE SKIN AND SUBCUTANEOUS TISSUE: ICD-10-CM

## 2025-02-25 DIAGNOSIS — Z86.711 HISTORY OF PULMONARY EMBOLISM: ICD-10-CM

## 2025-02-25 DIAGNOSIS — R19.04 ABDOMINAL WALL MASS OF LEFT LOWER QUADRANT: Primary | ICD-10-CM

## 2025-02-25 DIAGNOSIS — Z79.01 LONG TERM (CURRENT) USE OF ANTICOAGULANTS: ICD-10-CM

## 2025-02-25 PROCEDURE — 99212 OFFICE O/P EST SF 10 MIN: CPT | Performed by: SURGERY

## 2025-02-25 RX ORDER — SODIUM CHLORIDE, SODIUM LACTATE, POTASSIUM CHLORIDE, CALCIUM CHLORIDE 600; 310; 30; 20 MG/100ML; MG/100ML; MG/100ML; MG/100ML
125 INJECTION, SOLUTION INTRAVENOUS CONTINUOUS
OUTPATIENT
Start: 2025-04-21

## 2025-02-25 NOTE — ASSESSMENT & PLAN NOTE
We will plan for excision under local anesthesia and sedation at [facility].  The risks benefits alternative explained to him is agreeable to proceed.  He will need to hold his anticoagulation for 5 days previous  Orders:    Case request operating room: EXCISION  BIOPSY LESION/MASS ABDOMINAL WALL LEFT LOWER QUADRANT; Standing    APTT; Future    Protime-INR; Future    CBC and differential; Future    Basic metabolic panel; Future    EKG 12 lead; Future

## 2025-02-25 NOTE — PROGRESS NOTES
Name: Fadi Busby      : 1965      MRN: [id number]  Encounter Provider: Ezequiel Davison MD  Encounter Date: 2025   Encounter department: [facility] [place]  :  Assessment & Plan  Abdominal wall mass of left lower quadrant  We will plan for excision under local anesthesia and sedation at [facility].  The risks benefits alternative explained to him is agreeable to proceed.  He will need to hold his anticoagulation for 5 days previous  Orders:    Case request operating room: EXCISION  BIOPSY LESION/MASS ABDOMINAL WALL LEFT LOWER QUADRANT; Standing    APTT; Future    Protime-INR; Future    CBC and differential; Future    Basic metabolic panel; Future    EKG 12 lead; Future    Other granulomatous disorders of the skin and subcutaneous tissue    Orders:    Case request operating room: EXCISION  BIOPSY LESION/MASS ABDOMINAL WALL LEFT LOWER QUADRANT; Standing    Postprocedural hemorrhage of an endocrine system organ or structure following other procedure    Orders:    APTT; Future    Protime-INR; Future    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         History of pulmonary embolism         Obesity, morbid (HCC)             History of Present Illness   HPI  Fadi Busby is a 59 y.o. male who presents for evaluation of abdominal wall mass.  He had an ultrasound of the area.  He is here for plans for excision.  History obtained from: patient    Review of Systems   Constitutional:  Negative for appetite change, chills and fever.   HENT:  Negative for congestion and ear pain.    Eyes:  Negative for discharge and itching.   Respiratory:  Negative for chest tightness and shortness of breath.    Cardiovascular:  Negative for chest pain and palpitations.    Gastrointestinal:  Negative for abdominal distention and abdominal pain.   Musculoskeletal:  Positive for back pain and gait problem.   Skin:  Negative for color change and rash.   Psychiatric/Behavioral:  Negative for agitation and confusion.      Past Medical History   Past Medical History:   Diagnosis Date    Acute pain of right shoulder 01/24/2024    Infected skin lesion 07/02/2024    Paronychia of right index finger 08/15/2023    Post-op pain 03/21/2024    Trichomonas contact, treated 01/24/2024     Past Surgical History:   Procedure Laterality Date    CARDIAC ELECTROPHYSIOLOGY PROCEDURE N/A 3/21/2024    Procedure: Cardiac biv pacer implant;  Surgeon: Nahun Rees MD;  Location: [facility];  Service: Cardiology    HERNIA REPAIR      At 18 years old     History reviewed. No pertinent family history.   reports that he has never smoked. He has never been exposed to tobacco smoke. He has never used smokeless tobacco. He reports that he does not drink alcohol and does not use drugs.  Current Outpatient Medications   Medication Instructions    acetaminophen (TYLENOL) 650 mg, Oral, Every 8 hours PRN    ammonium lactate (LAC-HYDRIN) 12 % lotion     atorvastatin (LIPITOR) 40 mg, Oral, Daily    bacitracin topical ointment 500 units/g topical ointment 1 large application, Topical, 2 times daily    chlorhexidine (PERIDEX) 0.12 % solution RINSE WITH 1/2 OUNCE BY MOUTH FOR 30 SECONDS THEN SPIT OUT, AS DIRECTED    Continuous Glucose  (FreeStyle Ana María 2 [place]) ALLIE Check blood sugars multiple times per day    Continuous Glucose Sensor (FreeStyle Ana María 2 Sensor) MISC     cyanocobalamin (VITAMIN B-12) 2,000 mcg, Daily    furosemide (LASIX) 40 mg, Oral, Daily    hydrocortisone 1 % cream Apply to affected area 2 times daily    insulin aspart (NovoLOG FlexPen) 100 UNIT/ML injection pen INJECT 10 UNITS 3 TIMES A DAY BEFORE MEALS    insulin degludec (Tresiba FlexTouch) 100 units/mL injection pen Inject 35  units daily - obtains through patient assistance program    Insulin Pen Needle 32G X 4 MM MISC Use up to four times daily with insulin e11.9    metoprolol succinate (TOPROL-XL) 25 mg, Oral, Daily    mupirocin (BACTROBAN) 2 % ointment Topical, 3 times daily    ondansetron (ZOFRAN) 4 mg, Oral, Every 6 hours    semaglutide, 0.25 or 0.5 mg/dose, (Ozempic, 0.25 or 0.5 MG/DOSE,) 2 mg/3 mL injection pen Inject 0.5 mg weekly    spironolactone (ALDACTONE) 25 mg, Oral, Daily    valsartan (DIOVAN) 320 mg, Oral, Daily    vitamin C 1,000 mg, Daily    warfarin (COUMADIN) 5 mg tablet Take one tablet daily   No Known Allergies   Current Outpatient Medications on File Prior to Visit   Medication Sig Dispense Refill    acetaminophen (TYLENOL) 650 mg CR tablet Take 1 tablet (650 mg total) by mouth every 8 (eight) hours as needed for mild pain 30 tablet 0    ammonium lactate (LAC-HYDRIN) 12 % lotion       Ascorbic Acid (vitamin C) 1000 MG tablet Take 1,000 mg by mouth daily      atorvastatin (LIPITOR) 40 mg tablet Take 1 tablet (40 mg total) by mouth daily 90 tablet 3    Continuous Glucose  (FreeStyle Ana María 2 [employer]) ALLIE Check blood sugars multiple times per day 1 each 0    Continuous Glucose Sensor (FreeStyle Ana María 2 Sensor) MISC       cyanocobalamin (VITAMIN B-12) 2000 MCG tablet Take 2,000 mcg by mouth daily      furosemide (LASIX) 40 mg tablet Take 1 tablet (40 mg total) by mouth in the morning 90 tablet 3    insulin aspart (NovoLOG FlexPen) 100 UNIT/ML injection pen INJECT 10 UNITS 3 TIMES A DAY BEFORE MEALS 15 mL 1    insulin degludec (Tresiba FlexTouch) 100 units/mL injection pen Inject 35 units daily - obtains through patient assistance program      Insulin Pen Needle 32G X 4 MM MISC Use up to four times daily with insulin e11.9 400 each 3    metoprolol succinate (TOPROL-XL) 25 mg 24 hr tablet Take 1 tablet (25 mg total) by mouth daily 90 tablet 3    semaglutide, 0.25 or 0.5 mg/dose, (Ozempic, 0.25 or 0.5 MG/DOSE,) 2  "mg/3 mL injection pen Inject 0.5 mg weekly      spironolactone (ALDACTONE) 25 mg tablet Take 1 tablet (25 mg total) by mouth daily 60 tablet 5    valsartan (DIOVAN) 320 MG tablet Take 1 tablet (320 mg total) by mouth daily 90 tablet 3    warfarin (COUMADIN) 5 mg tablet Take one tablet daily 192.85 tablet 3    bacitracin topical ointment 500 units/g topical ointment Apply 1 large application topically 2 (two) times a day (Patient not taking: Reported on 11/24/2024) 28 g 0    chlorhexidine (PERIDEX) 0.12 % solution RINSE WITH 1/2 OUNCE BY MOUTH FOR 30 SECONDS THEN SPIT OUT, AS DIRECTED (Patient not taking: Reported on 2/5/2025)      hydrocortisone 1 % cream Apply to affected area 2 times daily (Patient not taking: Reported on 11/24/2024) 15 g 0    mupirocin (BACTROBAN) 2 % ointment Apply topically 3 (three) times a day for 7 days (Patient not taking: Reported on 2/5/2025) 30 g 0    ondansetron (ZOFRAN) 4 mg tablet Take 1 tablet (4 mg total) by mouth every 6 (six) hours (Patient not taking: Reported on 2/5/2025) 12 tablet 0     No current facility-administered medications on file prior to visit.         Objective   /77 (BP Location: Right arm, Patient Position: Sitting, Cuff Size: Standard)   Pulse 75   Temp 98.2 °F (36.8 °C) (Temporal)   Resp 16   Ht 5' 9\" (1.753 m)   Wt 111 kg (245 lb 9.6 oz)   SpO2 98%   BMI 36.27 kg/m²      Physical Exam  Vitals and nursing note reviewed.   Constitutional:       General: He is not in acute distress.     Appearance: He is well-developed. He is not diaphoretic.   HENT:      Head: Normocephalic and atraumatic.   Eyes:      Pupils: Pupils are equal, round, and reactive to light.   Cardiovascular:      Rate and Rhythm: Normal rate and regular rhythm.   Pulmonary:      Effort: Pulmonary effort is normal. No respiratory distress.   Abdominal:      General: Bowel sounds are normal.      Palpations: Abdomen is soft.      Comments: Left lower quadrant in the subcutaneous tissue " there is a firm nodule about 2 cm   Musculoskeletal:      Cervical back: Normal range of motion and neck supple.   Skin:     General: Skin is warm and dry.   Neurological:      Mental Status: He is alert and oriented to person, place, and time.   Psychiatric:         Behavior: Behavior normal.

## 2025-02-26 ENCOUNTER — TELEPHONE (OUTPATIENT)
Dept: FAMILY MEDICINE CLINIC | Facility: CLINIC | Age: 60
End: 2025-02-26

## 2025-02-26 ENCOUNTER — APPOINTMENT (OUTPATIENT)
Dept: OCCUPATIONAL THERAPY | Facility: REHABILITATION | Age: 60
End: 2025-02-26
Payer: COMMERCIAL

## 2025-02-26 ENCOUNTER — APPOINTMENT (OUTPATIENT)
Facility: REHABILITATION | Age: 60
End: 2025-02-26
Payer: COMMERCIAL

## 2025-02-26 NOTE — TELEPHONE ENCOUNTER
PCP SIGNATURE NEEDED FOR [facility]  FORM RECEIVED VIA FAX AND PLACED IN PCP FOLDER TO BE DELIVERED AT ASSIGNED TIMES.    Clearance for surgery 4/21/25

## 2025-03-03 ENCOUNTER — TELEPHONE (OUTPATIENT)
Dept: FAMILY MEDICINE CLINIC | Facility: CLINIC | Age: 60
End: 2025-03-03

## 2025-03-03 DIAGNOSIS — Z79.01 LONG TERM (CURRENT) USE OF ANTICOAGULANTS: Primary | ICD-10-CM

## 2025-03-03 NOTE — TELEPHONE ENCOUNTER
Telephone call to patient to discuss the decision to hold anticoagulation for 5 days prior to his abdominal mass excision scheduled for April 21 st 2025- April 16 th through the 20 th. He is to have INR rechecked after his subtherapeutic level on 2/12/25 as soon as possible.     Reviewed with patient the risk of clot formation and provided strict ED precautions. Patient understands he is to resume AC after his surgery. He demonstrated understanding and agrees.     Completed form from Dr Davison's office confirming this and will have this faxed over.

## 2025-03-03 NOTE — TELEPHONE ENCOUNTER
Discussed upcoming procedure and whether or not to bridge warfarin with enoxaparin    Patient chadvasc of 6 and joint decision made that risks to bleeding outweigh benefits of holding warfarin for 5 days      Decision made not to bridge --- important to discuss benefits vs risks discussion with patient    Pcp confirms she will call     Pharmacist Tracking Tool  Reason For Outreach: Embedded Pharmacist  Demographics:  Intervention Method: Chart Review  Type of Intervention: Follow-Up  Topics Addressed: Anticoagulation  Pharmacologic Interventions: Dose or Frequency Adjusted  Non-Pharmacologic Interventions: Care coordination  Time:  Direct Patient Care:  0  mins  Care Coordination:  10  mins  Recommendation Recipient: Patient/Caregiver  Outcome: Accepted      Chente Lewis, PharmD, BCACP  Ambulatory Care Clinical Pharmacist

## 2025-03-04 ENCOUNTER — APPOINTMENT (OUTPATIENT)
Dept: LAB | Facility: CLINIC | Age: 60
End: 2025-03-04
Payer: COMMERCIAL

## 2025-03-04 DIAGNOSIS — Z79.01 LONG TERM (CURRENT) USE OF ANTICOAGULANTS: ICD-10-CM

## 2025-03-04 LAB
INR PPP: 2.77 (ref 0.85–1.19)
PROTHROMBIN TIME: 29.1 SECONDS (ref 12.3–15)

## 2025-03-04 PROCEDURE — 85610 PROTHROMBIN TIME: CPT

## 2025-03-04 PROCEDURE — 36415 COLL VENOUS BLD VENIPUNCTURE: CPT

## 2025-03-04 NOTE — PROGRESS NOTES
"Daily Note     Today's date: 12/3/2024  Patient name: Fadi Busby  : 1965  MRN: [id number]  Referring provider: Kee Owen DO  Dx:   Encounter Diagnosis     ICD-10-CM    1. Cerebrovascular accident (CVA), unspecified mechanism (HCC)  I63.9                      Subjective: \"I will need to go home and take a nap.  You guys worked me really hard today.\"      Objective: See treatment description below    Thera Ex: UBE x 5 minutes prograde and x 5 minutes retrograde in seated position bilaterally with 3.2 resistance level to improved LUE proximal strength, muscle endurance, and bilateral coordination.     Neuro Re-ed:  Pt advanced to performing supine neuro re-ed with #2 DB for completion of 3 sets x 10 reps of motor combination of elbow flexion leading into ceiling punch ups with slowed eccentric motor control to beginning position challenging LUE proximal strength/stability, increased LUE muscle endurance, improved concentric/eccentric motor control, and improved isolated elbow flexion without drifting into shoulder ABD plane.     Pt concluded tx session with performing 3 sets x 10 reps of supination AROM with #3 DB to improved supination AROM with grasp/prehension patterns.       Assessment: Tolerated treatment well. Patient would benefit from continued OT    Pt demo with excellent activity tolerance to UBE demands with abilities to complete x 10 minutes with 3.2 resistance level without complaints of fatigue, G bilateral coordination, and excellent L tricep extension with functional movements in both prograde and retrograde movement patterns. Pt demo with significant improved activity tolerance to supine neuro re-ed with abilities to complete 3 sets x 10 reps with minimal rest breaks required throughout.  Pt also presented with significant improved abilities performing isolated elbow flexion without tendencies of elbow drifting into shoulder ABD plane and with significant improved motor control " Seen yesterday in clinic with history of 4 days of fever (beginning to come down with Tmax 99 yesterday) and URI sx and diarrhea and light pink blanching rash.  Please check how patient doing today; make sure no new fevers.   throughout. Pt demo with abilities to manage flexor tone improving overall motor control. Pt demo with Max difficulties achieving supination AROM past 1/2 range 2* flexor tone inhibiting functional movements.     Plan: Continue per plan of care.       INTERVENTION COMMENTS:  Diagnosis: Cerebrovascular accident (CVA), unspecified mechanism (HCC) [I63.9]  Precautions: L hemiplegia; PACE MAKER-- NO STIM; DM II   11 of 12 visits, PN due 12/25/2024

## 2025-03-06 ENCOUNTER — ANTICOAG VISIT (OUTPATIENT)
Dept: FAMILY MEDICINE CLINIC | Facility: CLINIC | Age: 60
End: 2025-03-06

## 2025-03-06 ENCOUNTER — RESULTS FOLLOW-UP (OUTPATIENT)
Dept: FAMILY MEDICINE CLINIC | Facility: CLINIC | Age: 60
End: 2025-03-06

## 2025-03-06 DIAGNOSIS — Z79.01 LONG TERM (CURRENT) USE OF ANTICOAGULANTS: Primary | ICD-10-CM

## 2025-03-06 DIAGNOSIS — Z86.711 HISTORY OF PULMONARY EMBOLISM: ICD-10-CM

## 2025-03-06 DIAGNOSIS — Z86.73 HISTORY OF CVA (CEREBROVASCULAR ACCIDENT): ICD-10-CM

## 2025-03-06 NOTE — PROGRESS NOTES
Telephone call to patient to discuss recent INR of 2.77- therapeutic with INR goal 2-3 for hx of PE and CVA. He is to continue current regimen of Warfarin 5 mg daily. He verbalized understanding. He is to repeat PT-INR in a month.

## 2025-03-12 ENCOUNTER — TELEPHONE (OUTPATIENT)
Dept: NEUROLOGY | Facility: CLINIC | Age: 60
End: 2025-03-12

## 2025-03-12 NOTE — TELEPHONE ENCOUNTER
Called patient to confirm 3/18 at 2:00 PM appointment w/ Claribel Burton. Patient confirmed he would be coming for his appointment. Reminded patient to arrive 15 minutes early.

## 2025-03-14 ENCOUNTER — OFFICE VISIT (OUTPATIENT)
Dept: CARDIOLOGY CLINIC | Facility: CLINIC | Age: 60
End: 2025-03-14
Payer: COMMERCIAL

## 2025-03-14 VITALS
WEIGHT: 245.2 LBS | HEIGHT: 69 IN | BODY MASS INDEX: 36.32 KG/M2 | DIASTOLIC BLOOD PRESSURE: 66 MMHG | SYSTOLIC BLOOD PRESSURE: 124 MMHG | HEART RATE: 77 BPM

## 2025-03-14 DIAGNOSIS — I42.9 CARDIOMYOPATHY, UNSPECIFIED TYPE (HCC): ICD-10-CM

## 2025-03-14 DIAGNOSIS — I50.22 CHRONIC SYSTOLIC CONGESTIVE HEART FAILURE (HCC): ICD-10-CM

## 2025-03-14 DIAGNOSIS — Z01.810 PRE-OPERATIVE CARDIOVASCULAR EXAMINATION: Primary | ICD-10-CM

## 2025-03-14 DIAGNOSIS — Z95.0 PRESENCE OF PERMANENT CARDIAC PACEMAKER: ICD-10-CM

## 2025-03-14 PROCEDURE — 93000 ELECTROCARDIOGRAM COMPLETE: CPT

## 2025-03-14 PROCEDURE — 99214 OFFICE O/P EST MOD 30 MIN: CPT

## 2025-03-14 RX ORDER — CYCLOBENZAPRINE HCL 10 MG
TABLET ORAL
COMMUNITY
Start: 2025-03-12

## 2025-03-14 NOTE — LETTER
To who it may concern,    Fadi Kehinde has a Medtronic Sonya™ Quad CRT-P MRI [id number] Bi- Ventricular Pacemaker that is MRI conditional.          Dayton Calderon DO, FACC, FASNC  Attending Physician  Cardiovascular Disease
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
## 2025-03-18 ENCOUNTER — CONSULT (OUTPATIENT)
Dept: NEUROLOGY | Facility: CLINIC | Age: 60
End: 2025-03-18
Payer: COMMERCIAL

## 2025-03-18 VITALS
HEART RATE: 84 BPM | BODY MASS INDEX: 36.39 KG/M2 | SYSTOLIC BLOOD PRESSURE: 108 MMHG | WEIGHT: 246.4 LBS | DIASTOLIC BLOOD PRESSURE: 60 MMHG | TEMPERATURE: 98.6 F

## 2025-03-18 DIAGNOSIS — Z86.73 HISTORY OF STROKE: Primary | ICD-10-CM

## 2025-03-18 DIAGNOSIS — R93.0 ABNORMAL HEAD CT: ICD-10-CM

## 2025-03-18 DIAGNOSIS — Z86.73 HISTORY OF CVA (CEREBROVASCULAR ACCIDENT): ICD-10-CM

## 2025-03-18 DIAGNOSIS — Z86.79 HISTORY OF CAROTID ARTERY DISSECTION: ICD-10-CM

## 2025-03-18 PROCEDURE — 99205 OFFICE O/P NEW HI 60 MIN: CPT | Performed by: PHYSICIAN ASSISTANT
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
## 2025-03-19 ENCOUNTER — HOSPITAL ENCOUNTER (OUTPATIENT)
Dept: RADIOLOGY | Facility: HOSPITAL | Age: 60
Discharge: HOME | End: 2025-03-19

## 2025-03-19 DIAGNOSIS — Z95.0 MRI SAFE CARDIAC PACEMAKER IN SITU: ICD-10-CM

## 2025-03-19 NOTE — QUICK NOTE
Investigation Report    Device investigated:   Pacemaker - Medtronic [id number] Sonya Quad CRT-P.   Atrial Lead - MEDTRONIC 4574-53   Right Ventricular Lead - Medtronic [id number]   Left Ventricular Lead - MEDTRONIC [id number].                Method investigated   imaging report  vendor contacted     [employer]   website used   MRI Verify  [email or web address]    Time spent investigating in minutes: 15+    Investigation findings: conditional    Risk vs Benefit performed.  If so, list physician and outcome: N/A

## 2025-03-29 DIAGNOSIS — Z86.79 HISTORY OF CAROTID ARTERY DISSECTION: ICD-10-CM

## 2025-03-29 DIAGNOSIS — E11.311 TYPE 2 DIABETES MELLITUS WITH RETINOPATHY AND MACULAR EDEMA, UNSPECIFIED LATERALITY, UNSPECIFIED RETINOPATHY SEVERITY, UNSPECIFIED WHETHER LONG TERM INSULIN USE (HCC): ICD-10-CM

## 2025-03-31 RX ORDER — ATORVASTATIN CALCIUM 40 MG/1
40 TABLET, FILM COATED ORAL DAILY
Qty: 90 TABLET | Refills: 3 | Status: SHIPPED | OUTPATIENT
Start: 2025-03-31

## 2025-03-31 RX ORDER — METOPROLOL SUCCINATE 25 MG/1
25 TABLET, EXTENDED RELEASE ORAL DAILY
Qty: 90 TABLET | Refills: 3 | Status: SHIPPED | OUTPATIENT
Start: 2025-03-31

## 2025-03-31 RX ORDER — FUROSEMIDE 40 MG/1
40 TABLET ORAL EVERY MORNING
Qty: 90 TABLET | Refills: 3 | Status: SHIPPED | OUTPATIENT
Start: 2025-03-31

## 2025-04-03 NOTE — QUICK NOTE
Investigation Report    Device investigated: Medtronic Pacemaker            Method investigated (surgical report, imaging report, vendor contacted, website used etc): MRI Verify, Epic    Time spent investigating in minutes: 15 minutes    Investigation findings: MR Conditional    Risk vs Benefit performed.  If so, list physician and outcome: n/a

## 2025-04-07 ENCOUNTER — APPOINTMENT (OUTPATIENT)
Dept: LAB | Facility: CLINIC | Age: 60
End: 2025-04-07
Payer: COMMERCIAL

## 2025-04-07 DIAGNOSIS — Z86.711 HISTORY OF PULMONARY EMBOLISM: ICD-10-CM

## 2025-04-07 DIAGNOSIS — E89.811 POSTPROCEDURAL HEMORRHAGE OF AN ENDOCRINE SYSTEM ORGAN OR STRUCTURE FOLLOWING OTHER PROCEDURE: ICD-10-CM

## 2025-04-07 DIAGNOSIS — R19.04 ABDOMINAL WALL MASS OF LEFT LOWER QUADRANT: ICD-10-CM

## 2025-04-07 DIAGNOSIS — Z79.01 LONG TERM (CURRENT) USE OF ANTICOAGULANTS: ICD-10-CM

## 2025-04-07 LAB
ANION GAP SERPL CALCULATED.3IONS-SCNC: 8 MMOL/L (ref 4–13)
APTT PPP: 41 SECONDS (ref 23–34)
BASOPHILS # BLD AUTO: 0.06 THOUSANDS/ÂΜL (ref 0–0.1)
BASOPHILS NFR BLD AUTO: 1 % (ref 0–1)
BUN SERPL-MCNC: 18 MG/DL (ref 5–25)
CALCIUM SERPL-MCNC: 8.9 MG/DL (ref 8.4–10.2)
CHLORIDE SERPL-SCNC: 101 MMOL/L (ref 96–108)
CO2 SERPL-SCNC: 31 MMOL/L (ref 21–32)
CREAT SERPL-MCNC: 1.41 MG/DL (ref 0.6–1.3)
EOSINOPHIL # BLD AUTO: 0.31 THOUSAND/ÂΜL (ref 0–0.61)
EOSINOPHIL NFR BLD AUTO: 6 % (ref 0–6)
ERYTHROCYTE [DISTWIDTH] IN BLOOD BY AUTOMATED COUNT: 12.6 % (ref 11.6–15.1)
GFR SERPL CREATININE-BSD FRML MDRD: 53 ML/MIN/1.73SQ M
GLUCOSE P FAST SERPL-MCNC: 122 MG/DL (ref 65–99)
HCT VFR BLD AUTO: 39.8 % (ref 36.5–49.3)
HGB BLD-MCNC: 13.4 G/DL (ref 12–17)
IMM GRANULOCYTES # BLD AUTO: 0.01 THOUSAND/UL (ref 0–0.2)
IMM GRANULOCYTES NFR BLD AUTO: 0 % (ref 0–2)
INR PPP: 3.41 (ref 0.85–1.19)
LYMPHOCYTES # BLD AUTO: 2.01 THOUSANDS/ÂΜL (ref 0.6–4.47)
LYMPHOCYTES NFR BLD AUTO: 36 % (ref 14–44)
MCH RBC QN AUTO: 31.5 PG (ref 26.8–34.3)
MCHC RBC AUTO-ENTMCNC: 33.7 G/DL (ref 31.4–37.4)
MCV RBC AUTO: 93 FL (ref 82–98)
MONOCYTES # BLD AUTO: 0.55 THOUSAND/ÂΜL (ref 0.17–1.22)
MONOCYTES NFR BLD AUTO: 10 % (ref 4–12)
NEUTROPHILS # BLD AUTO: 2.63 THOUSANDS/ÂΜL (ref 1.85–7.62)
NEUTS SEG NFR BLD AUTO: 47 % (ref 43–75)
NRBC BLD AUTO-RTO: 0 /100 WBCS
PLATELET # BLD AUTO: 239 THOUSANDS/UL (ref 149–390)
PMV BLD AUTO: 10.3 FL (ref 8.9–12.7)
POTASSIUM SERPL-SCNC: 4.1 MMOL/L (ref 3.5–5.3)
PROTHROMBIN TIME: 33.9 SECONDS (ref 12.3–15)
RBC # BLD AUTO: 4.26 MILLION/UL (ref 3.88–5.62)
SODIUM SERPL-SCNC: 140 MMOL/L (ref 135–147)
WBC # BLD AUTO: 5.57 THOUSAND/UL (ref 4.31–10.16)

## 2025-04-07 PROCEDURE — 85025 COMPLETE CBC W/AUTO DIFF WBC: CPT

## 2025-04-07 PROCEDURE — 80048 BASIC METABOLIC PNL TOTAL CA: CPT

## 2025-04-07 PROCEDURE — 85610 PROTHROMBIN TIME: CPT

## 2025-04-07 PROCEDURE — 85730 THROMBOPLASTIN TIME PARTIAL: CPT

## 2025-04-07 PROCEDURE — 36415 COLL VENOUS BLD VENIPUNCTURE: CPT

## 2025-04-08 ENCOUNTER — RESULTS FOLLOW-UP (OUTPATIENT)
Dept: NEUROLOGY | Facility: CLINIC | Age: 60
End: 2025-04-08

## 2025-04-08 ENCOUNTER — ANTICOAG VISIT (OUTPATIENT)
Dept: FAMILY MEDICINE CLINIC | Facility: CLINIC | Age: 60
End: 2025-04-08

## 2025-04-08 ENCOUNTER — HOSPITAL ENCOUNTER (OUTPATIENT)
Dept: RADIOLOGY | Facility: HOSPITAL | Age: 60
Discharge: HOME | End: 2025-04-08
Payer: COMMERCIAL

## 2025-04-08 ENCOUNTER — TELEPHONE (OUTPATIENT)
Dept: FAMILY MEDICINE CLINIC | Facility: CLINIC | Age: 60
End: 2025-04-08

## 2025-04-08 DIAGNOSIS — Z79.01 LONG TERM (CURRENT) USE OF ANTICOAGULANTS: Primary | ICD-10-CM

## 2025-04-08 DIAGNOSIS — Z79.4 TYPE 2 DIABETES MELLITUS WITH RETINOPATHY AND MACULAR EDEMA, WITH LONG-TERM CURRENT USE OF INSULIN, UNSPECIFIED LATERALITY, UNSPECIFIED RETINOPATHY SEVERITY (HCC): Primary | ICD-10-CM

## 2025-04-08 DIAGNOSIS — E11.311 TYPE 2 DIABETES MELLITUS WITH RETINOPATHY AND MACULAR EDEMA, WITH LONG-TERM CURRENT USE OF INSULIN, UNSPECIFIED LATERALITY, UNSPECIFIED RETINOPATHY SEVERITY (HCC): ICD-10-CM

## 2025-04-08 DIAGNOSIS — J00 ACUTE RHINITIS: ICD-10-CM

## 2025-04-08 DIAGNOSIS — E11.311 TYPE 2 DIABETES MELLITUS WITH RETINOPATHY AND MACULAR EDEMA, WITH LONG-TERM CURRENT USE OF INSULIN, UNSPECIFIED LATERALITY, UNSPECIFIED RETINOPATHY SEVERITY (HCC): Primary | ICD-10-CM

## 2025-04-08 DIAGNOSIS — Z79.4 TYPE 2 DIABETES MELLITUS WITH RETINOPATHY AND MACULAR EDEMA, WITH LONG-TERM CURRENT USE OF INSULIN, UNSPECIFIED LATERALITY, UNSPECIFIED RETINOPATHY SEVERITY (HCC): ICD-10-CM

## 2025-04-08 DIAGNOSIS — Z86.73 HISTORY OF CVA (CEREBROVASCULAR ACCIDENT): ICD-10-CM

## 2025-04-08 DIAGNOSIS — Z86.711 HISTORY OF PULMONARY EMBOLISM: ICD-10-CM

## 2025-04-08 PROCEDURE — 70551 MRI BRAIN STEM W/O DYE: CPT

## 2025-04-08 PROCEDURE — 76014 MR SFTY IMPLT&/FB ASMT STF 1: CPT

## 2025-04-08 PROCEDURE — 76018 MR SAFETY IMPLANT ELEC PREPJ: CPT

## 2025-04-08 RX ORDER — FLUTICASONE PROPIONATE 50 MCG
1 SPRAY, SUSPENSION (ML) NASAL DAILY
Qty: 15.8 ML | Refills: 1 | Status: SHIPPED | OUTPATIENT
Start: 2025-04-08

## 2025-04-08 NOTE — NURSING NOTE
Device interrogation for MRI.  Normal device function prior to MRI.  Leads and device meet all requirements per policy for MRI.  Device programmed DOO 85bpm per Cardiology for MRI.  Patient has no complaints.  Vital signs monitored throughout by CURRY Fletcher RN.  Normal device function post MRI.  Device reprogrammed to prior settings per Cardiology.

## 2025-04-08 NOTE — NURSING NOTE
Medtronic device interrogation for MRI done by ZOEY Irene, [facility] clinical specialist. Device set to MRI safe mode @85 bpm    MRI brain without contrast complete. Pt tolerated well.    VSS throughout scan. Pt alert and oriented on room air. No complaints or visible signs of distress.    Device reprogrammed to prior settings per Cardiology by ZOEY Irene RN.

## 2025-04-08 NOTE — PROGRESS NOTES
Supra-therapeutic INR at 3.41. Called patient to discuss result. Instructed to take half a dose today and resume daily dose tomorrow. He is to hold doses from 16 th through 20 th prior to his surgery on April 21 st. Will recheck INR after surgery- May 6th 2025. Patient agrees.

## 2025-04-10 ENCOUNTER — ANESTHESIA EVENT (OUTPATIENT)
Dept: PERIOP | Facility: HOSPITAL | Age: 60
End: 2025-04-10
Payer: COMMERCIAL

## 2025-04-10 NOTE — PRE-PROCEDURE INSTRUCTIONS
Pre-Surgery Instructions:   Medication Instructions    acetaminophen (TYLENOL) 650 mg CR tablet Uses PRN- OK to take day of surgery    atorvastatin (LIPITOR) 40 mg tablet Take day of surgery.    Continuous Glucose Sensor (FreeStyle Ana María 2 Sensor) MISC Bring extra supplies    fluticasone (FLONASE) 50 mcg/act nasal spray Uses PRN- OK to take day of surgery    furosemide (LASIX) 40 mg tablet Hold day of surgery.    insulin aspart (NovoLOG FlexPen) 100 UNIT/ML injection pen Hold day of surgery.    insulin degludec (Tresiba FlexTouch) 100 units/mL injection pen Take day of surgery.    metoprolol succinate (TOPROL-XL) 25 mg 24 hr tablet Take day of surgery.    semaglutide, 0.25 or 0.5 mg/dose, (Ozempic, 0.25 or 0.5 MG/DOSE,) 2 mg/3 mL injection pen Weekly on Sunday, skip 4/20dose 7 day hold prior to sx    spironolactone (ALDACTONE) 25 mg tablet Hold day of surgery.    valsartan (DIOVAN) 320 MG tablet Hold day of surgery.    warfarin (COUMADIN) 5 mg tablet Instructions provided by MD 5 day hold per PCP note 4/8   Medication instructions for day of surgery reviewed. Please take all instructed medications with only a sip of water.  Reviewed med instructions with pt and meghann Cha.      You will receive a call one business day prior to surgery with an arrival time and hospital directions. If your surgery is scheduled on a Monday, the hospital will be calling you on the Friday prior to your surgery. If you have not heard from anyone by 8pm, please call the hospital supervisor through the hospital  at [phone]. ([place] [phone] or [place] [phone]).    Do not eat or drink anything after midnight the night before your surgery, including candy, mints, lifesavers, or chewing gum. Do not drink alcohol 24hrs before your surgery. Try not to smoke at least 24hrs before your surgery.       Follow the pre surgery showering instructions as listed in the “My Surgical Experience Booklet” or otherwise provided by  your surgeon's office. Do not use a blade to shave the surgical area 1 week before surgery. It is okay to use a clean electric clippers up to 24 hours before surgery. Do not apply any lotions, creams, including makeup, cologne, deodorant, or perfumes after showering on the day of your surgery. Do not use dry shampoo, hair spray, hair gel, or any type of hair products.     No contact lenses, eye make-up, or artificial eyelashes. Remove nail polish, including gel polish, and any artificial, gel, or acrylic nails if possible. Remove all jewelry including rings and body piercing jewelry.     Wear causal clothing that is easy to take on and off. Consider your type of surgery.    Keep any valuables, jewelry, piercings at home. Please bring any specially ordered equipment (sling, braces) if indicated.    Arrange for a responsible person to drive you to and from the hospital on the day of your surgery. Please confirm the visitor policy for the day of your procedure when you receive your phone call with an arrival time.     Call the surgeon's office with any new illnesses, exposures, or additional questions prior to surgery.    Please reference your “My Surgical Experience Booklet” for additional information to prepare for your upcoming surgery.

## 2025-04-14 ENCOUNTER — CONSULT (OUTPATIENT)
Dept: FAMILY MEDICINE CLINIC | Facility: CLINIC | Age: 60
End: 2025-04-14

## 2025-04-14 VITALS
HEIGHT: 69 IN | WEIGHT: 247.8 LBS | BODY MASS INDEX: 36.7 KG/M2 | RESPIRATION RATE: 16 BRPM | SYSTOLIC BLOOD PRESSURE: 120 MMHG | HEART RATE: 80 BPM | OXYGEN SATURATION: 95 % | TEMPERATURE: 97.4 F | DIASTOLIC BLOOD PRESSURE: 70 MMHG

## 2025-04-14 DIAGNOSIS — E11.311 TYPE 2 DIABETES MELLITUS WITH RETINOPATHY AND MACULAR EDEMA, WITH LONG-TERM CURRENT USE OF INSULIN, UNSPECIFIED LATERALITY, UNSPECIFIED RETINOPATHY SEVERITY (HCC): Primary | ICD-10-CM

## 2025-04-14 DIAGNOSIS — I10 PRIMARY HYPERTENSION: ICD-10-CM

## 2025-04-14 DIAGNOSIS — Z79.4 TYPE 2 DIABETES MELLITUS WITH RETINOPATHY AND MACULAR EDEMA, WITH LONG-TERM CURRENT USE OF INSULIN, UNSPECIFIED LATERALITY, UNSPECIFIED RETINOPATHY SEVERITY (HCC): Primary | ICD-10-CM

## 2025-04-14 DIAGNOSIS — Z01.818 PREOP EXAMINATION: ICD-10-CM

## 2025-04-14 LAB — SL AMB POCT HEMOGLOBIN AIC: 7 (ref ?–6.5)

## 2025-04-14 PROCEDURE — 83036 HEMOGLOBIN GLYCOSYLATED A1C: CPT | Performed by: FAMILY MEDICINE

## 2025-04-14 PROCEDURE — 99214 OFFICE O/P EST MOD 30 MIN: CPT | Performed by: FAMILY MEDICINE

## 2025-04-14 NOTE — ASSESSMENT & PLAN NOTE
Lab Results   Component Value Date    HGBA1C 7.0 (A) 04/14/2025   See below  Orders:    POCT hemoglobin A1c

## 2025-04-14 NOTE — ASSESSMENT & PLAN NOTE
Reports low blood pressure readings at home  BP in office today 120/70  Soft blood pressures per chart review   Currently optimized on Lasix 40 mg daily, metoprolol succinate 24 mg daily, spironolactone 25 mg daily, valsartan 320 mg daily  Continue current regimen and continue to follow-up with cardio.

## 2025-04-14 NOTE — LETTER
2025     Ezequiel Davison MD  [facility] [address] [address]    Patient: Fadi Busby   YOB: 1965   Date of Visit: 2025       Dear Dr. Ezequiel Davison MD:    Thank you for referring Fadi Busby to me for evaluation. Below are my notes for this consultation.    If you have questions, please do not hesitate to call me. I look forward to following your patient along with you.         Sincerely,        Osmany Thornton MD        CC: No Recipients    Osmany Thornton MD  2025 12:19 PM  Sign when Signing Visit  Pre-operative Clearance  Name: Fadi Busby      : 1965      MRN: [id number]  Encounter Provider: Osmany Thornton MD  Encounter Date: 2025   Encounter department: [facility]    :  Assessment & Plan  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, with long-term current use of insulin, unspecified laterality, unspecified retinopathy severity (HCC)    Lab Results   Component Value Date    HGBA1C 7.0 (A) 2025   See below  Orders:  •  POCT hemoglobin A1c    Preop examination  Completed. Cleared for surgery       Primary hypertension  Reports low blood pressure readings at home  BP in office today 120/70  Soft blood pressures per chart review   Currently optimized on Lasix 40 mg daily, metoprolol succinate 24 mg daily, spironolactone 25 mg daily, valsartan 320 mg daily  Continue current regimen and continue to follow-up with cardio.       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, with long-term current use of insulin, unspecified laterality, unspecified retinopathy severity (HCC)    Lab Results   Component Value Date    HGBA1C 7.0 (A) 2025          Previously 6.8   Takes 25 units of  Lantus daily and 10 units of meal time insulin TID  No hypoglycemic symptoms  Reports stepping on broken glass a day ago  Foot exam done today ( see  media)  No s/sx of infection noted  Plan:  - Continue current regimen  - Return to clinic in 3 months    Pre-operative Clearance:     Revised Cardiac Risk Index:  RCI RISK CLASS II (1 risk factor, risk of major cardiac complications approximately 1.3%)    Clearance:  Patient is medically optimized (CLEARED) for proposed surgery without any additional cardiac testing.       RCI risk class II secondary to history of CVA, anticoagulation and preoperative use of insulin.    Medication Instructions:   - Avoid herbs or non-directed vitamins one week prior to surgery    - Avoid aspirin containing medications or non-steroidal anti-inflammatory drugs one week preceding surgery    - May take tylenol for pain up until the night before surgery    - ACE Inhibitors or ARBs: Continue this medication up to the evening before surgery/procedure, but do not take the morning of the day of surgery.  - Beta blockers:  Continue to take this medication on your normal schedule.  - Diuretics: Continue taking this medication up to the evening before surgery/procedure, but do not take in the morning of the day of surgery/procedure.  - Hyperlipidemia meds: Continue to take this medication on your normal schedule.  - Warfarin: Stop medication 5 days prior to procedure/surgery. Bridging anticoagulation may be needed if patient has artificial heart valve or if at high risk for stroke.      Medicine Instructions for Adults with Diabetes (NO Bowel Prep)    Follow these instructions when a BOWEL PREP is NOT required for your procedure or surgery!    NOTE:  GLP Agonists taken weekly: do not take in the 7 days before your procedure. **Bariatric surgery: do not take 4 weeks prior to your procedure.    SGLT-2 Inhibitors: do not take in the 4 days before your procedure    On the Day Before Surgery/Procedure  If you are having a procedure (e.g., Colonoscopy) or surgery which DOES NOT require a bowel prep, follow the directions below based on the type of  medicine you take for your diabetes.  Type of Medicine You Take Examples What to Do   Pre-Mixed Insulin Intermediate  Bbmosqf42/25, Bajmikl38/30, Novolog 70/30, Regular Insulin Take 1/2 your regular dose the evening before our procedure.   Rapid/Fast Acting  Insulin and/or Long-Acting Insulin Humalog U200, NovoLog, Apidra,  Lantus, Levemir, Tresiba, Toujeo,  Fias, Basaglar Take your FULL regular dose the day before procedure.   Oral Diabetic Medicines (sulfonylurea) Glipizide/Glimepiride/  Glucotrol Take your regular dose with dinner the evening before your procedure.   Other Oral Diabetic Medicines Metformin, Glucophage, Glucophage  XR, Riomet, Glumetza, Actose,  Avandia, Gl set, Prandin Take your regular dose with dinner the evening before your procedure   GLP Agonists Adlyxin, Byetta, Bydureon,  Ozempic, Soliqua, Tanzeum,  Trulicity, Victoza, Saxenda,  Rybelsus, Wegovy, Mounjaro, Zepbound If taken daily, take as normal  If taken weekly, do not take this medicine for 7 days before your procedure including the day of the procedure (resume taking after the procedure). **Bariatric surgery: do not take 4 weeks prior to procedure   SGLT-2 Inhibitors Jardiance, Invokana, Farxiga, Steglatro, Brenzavvy, Qtern, Segluromet Glyxambi, Synjardy, Synjardy XR, Invokamet, InvokametXR, Trijary XR, Xigduo X Do not take for 4 days before your procedure including the day of the procedure (resume taking after the procedure)   This educational material has been approved by the Patient Education Advisory Committee.    On the Day of Surgery/Procedure  Follow the directions below based on the type of medicine you take for your diabetes.  Type of Medicine You Take  Examples What to Do   Long-Acting Insulin Lantus, Levemir, Tresiba,  Toujeo, Basaglar, Semglee If you normally take your Long Acting Insulin in the morning, take the full dose as scheduled.   GLP-I Agonists Adlyxin, Byetta, Bydureon,  Ozempic, Soliqua, Tanzeum,  Trulicity,  Frankie, Harry,  Rybelsus, Mounjaro Do NOT take this medicine on the day of your procedure (resume taking after the procedure)   Except for the morning Long-Acting Insulin, DO NOT take ANY diabetic medicine on the day of your procedure unless you were instructed by the doctor who manages your diabetes medicines.  Continue to check your blood sugars.  If you have an insulin pump, ask your endocrinologist for instructions at least 3 days before your procedure. NOTE: If you are not able to ask your endocrinologist in advance, on the day of the procedure set your insulin pump to your basal rate only. Bring your insulin pump supplies to the hospital.        Depression Screening and Follow-up Plan: Patient was screened for depression during today's encounter. They screened negative with a PHQ-2 score of 0.        History of Present Illness    Pre-Op Examination     Surgery: EXCISION  BIOPSY LESION/MASS ABDOMINAL WALL LEFT LOWER QUADRANT   Anticipated Date of Surgery: April 21st 2025   Surgeon: Ezequiel Davison MD     59-year-old male with past medical history of type 2 diabetes, CVA, and biventricular pacemaker scheduled for excision biopsy of lesion in the left abdominal wall on April 21 with Dr. Davison presenting today for preoperative clearance.  Dr. Meade was also present for this visit.      Previous history of bleeding disorders or clots?: Yes    Previous Anesthesia reaction?: No    Prolonged steroid use in the last 6 months?: No      Assessment of Cardiac Risk:   - Unstable or severe angina or MI in the last 6 weeks or history of stent placement in the last year?: No    - Decompensated heart failure (e.g. New onset heart failure, NYHA  Class IV heart failure, or worsening existing heart failure)?: No    - Significant arrhythmias such as high grade AV block, symptomatic ventricular arrhythmia, newly recognized ventricular tachycardia, supraventricular tachycardia with resting heart rate >100, or symptomatic  bradycardia?: No    - Severe heart valve disease including aortic stenosis or symptomatic mitral stenosis?: No      Pre-operative Risk Factors:    - History of cerebrovascular disease: Yes    - History of ischemic heart disease: No    - History of congestive heart failure: Yes    - Pre-operative treatment with insulin: Yes    - Pre-operative creatinine >2 mg/dL: No      Medications of Perioperative Concern:    Anti-coagulants (Coumadin, Xarelto, Pradaxa, Eliquis, Lixiana), Beta blockers, ACE inhibitors/ARBs, Insulin and GLP agonists    Review of Systems   Constitutional: Negative.  Negative for chills.   HENT: Negative.     Respiratory: Negative.     Cardiovascular:  Negative for chest pain and palpitations.   Gastrointestinal: Negative.    Genitourinary: Negative.    Neurological:  Positive for dizziness and weakness. Negative for seizures, syncope, speech difficulty and headaches.   Psychiatric/Behavioral: Negative.       Past Medical History  Past Medical History:   Diagnosis Date   • Acute pain of right shoulder 01/24/2024   • H/O blood clots    • Infected skin lesion 07/02/2024   • Paronychia of right index finger 08/15/2023   • Post-op pain 03/21/2024   • Sleep apnea     does not use cpap   • Stroke (HCC)    • Trichomonas contact, treated 01/24/2024     Past Surgical History:   Procedure Laterality Date   • CARDIAC ELECTROPHYSIOLOGY PROCEDURE N/A 3/21/2024    Procedure: Cardiac biv pacer implant;  Surgeon: Nahun Rees MD;  Location: [facility];  Service: Cardiology   • HERNIA REPAIR      At 18 years old     History reviewed. No pertinent family history.  Social History     Tobacco Use   • Smoking status: Some Days     Types: Cigarettes     Passive exposure: Never   • Smokeless tobacco: Never   Vaping Use   • Vaping status: Some Days   • Substances: Nicotine   Substance and Sexual Activity   • Alcohol use: Never   • Drug use: Never   • Sexual activity: Not on file     Current Outpatient  Medications on File Prior to Visit   Medication Sig   • acetaminophen (TYLENOL) 650 mg CR tablet Take 1 tablet (650 mg total) by mouth every 8 (eight) hours as needed for mild pain   • atorvastatin (LIPITOR) 40 mg tablet take 1 tablet by mouth once daily   • Continuous Glucose  (FreeStyle Ana María 2 Maurice) ALLIE Check blood sugars multiple times per day   • Continuous Glucose Sensor (FreeStyle Ana María 2 Sensor) MISC USE TO CHECK BLOOD SUGAR EVERY 14 DAYS   • Continuous Glucose Sensor (FreeStyle Ana María 2 Sensor) MISC Check blood sugars multiple times per day   • fluticasone (FLONASE) 50 mcg/act nasal spray 1 spray into each nostril daily   • furosemide (LASIX) 40 mg tablet take 1 tablet by mouth every morning   • insulin aspart (NovoLOG FlexPen) 100 UNIT/ML injection pen INJECT 10 UNITS 3 TIMES A DAY BEFORE MEALS   • insulin degludec (Tresiba FlexTouch) 100 units/mL injection pen Inject 35 units daily - obtains through patient assistance program   • Insulin Pen Needle 32G X 4 MM MISC Use up to four times daily with insulin e11.9   • metoprolol succinate (TOPROL-XL) 25 mg 24 hr tablet take 1 tablet by mouth once daily   • semaglutide, 0.25 or 0.5 mg/dose, (Ozempic, 0.25 or 0.5 MG/DOSE,) 2 mg/3 mL injection pen Inject 0.5 mg weekly   • spironolactone (ALDACTONE) 25 mg tablet Take 1 tablet (25 mg total) by mouth daily   • valsartan (DIOVAN) 320 MG tablet Take 1 tablet (320 mg total) by mouth daily   • warfarin (COUMADIN) 5 mg tablet Take one tablet daily   • mupirocin (BACTROBAN) 2 % ointment Apply topically 3 (three) times a day for 7 days (Patient not taking: Reported on 2/5/2025)   • [DISCONTINUED] ammonium lactate (LAC-HYDRIN) 12 % lotion  (Patient not taking: Reported on 3/18/2025)   • [DISCONTINUED] Ascorbic Acid (vitamin C) 1000 MG tablet Take 1,000 mg by mouth daily (Patient not taking: Reported on 3/14/2025)   • [DISCONTINUED] bacitracin topical ointment 500 units/g topical ointment Apply 1 large application  "topically 2 (two) times a day (Patient not taking: Reported on 11/24/2024)   • [DISCONTINUED] chlorhexidine (PERIDEX) 0.12 % solution RINSE WITH 1/2 OUNCE BY MOUTH FOR 30 SECONDS THEN SPIT OUT, AS DIRECTED (Patient not taking: Reported on 2/5/2025)   • [DISCONTINUED] cyanocobalamin (VITAMIN B-12) 2000 MCG tablet Take 2,000 mcg by mouth daily (Patient not taking: Reported on 3/14/2025)   • [DISCONTINUED] cyclobenzaprine (FLEXERIL) 10 mg tablet take 1 tablet by mouth twice a day if needed for SPASM(S) (Patient not taking: Reported on 4/14/2025)   • [DISCONTINUED] hydrocortisone 1 % cream Apply to affected area 2 times daily (Patient not taking: Reported on 11/24/2024)   • [DISCONTINUED] ondansetron (ZOFRAN) 4 mg tablet Take 1 tablet (4 mg total) by mouth every 6 (six) hours (Patient not taking: Reported on 3/18/2025)     No Known Allergies  Objective  /70 (BP Location: Left arm, Patient Position: Sitting, Cuff Size: Standard)   Pulse 80   Temp (!) 97.4 °F (36.3 °C) (Temporal)   Resp 16   Ht 5' 9\" (1.753 m)   Wt 112 kg (247 lb 12.8 oz)   SpO2 95%   BMI 36.59 kg/m²     Physical Exam  Vitals reviewed.   Constitutional:       General: He is not in acute distress.     Appearance: He is obese. He is not ill-appearing.   HENT:      Head: Normocephalic.   Eyes:      General: No scleral icterus.     Extraocular Movements: Extraocular movements intact.   Cardiovascular:      Rate and Rhythm: Normal rate and regular rhythm.      Pulses: Normal pulses.      Heart sounds: Normal heart sounds.   Pulmonary:      Effort: Pulmonary effort is normal. No respiratory distress.      Breath sounds: Normal breath sounds.   Musculoskeletal:      Right lower leg: No edema.      Left lower leg: No edema.   Neurological:      Mental Status: He is alert and oriented to person, place, and time.      Cranial Nerves: Cranial nerves 2-12 are intact.      Motor: Weakness present.      Gait: Gait abnormal.      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   Psychiatric:         Mood and Affect: Mood normal.         Behavior: Behavior normal.           Osmany Thornton MD

## 2025-04-14 NOTE — PROGRESS NOTES
Pre-operative Clearance  Name: Fadi Busby      : 1965      MRN: [id number]  Encounter Provider: Osmany Thornton MD  Encounter Date: 2025   Encounter department: [facility]    :  Assessment & Plan  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, with long-term current use of insulin, unspecified laterality, unspecified retinopathy severity (HCC)    Lab Results   Component Value Date    HGBA1C 7.0 (A) 2025   See below  Orders:    POCT hemoglobin A1c    Preop examination  Completed. Cleared for surgery       Primary hypertension  Reports low blood pressure readings at home  BP in office today 120/70  Soft blood pressures per chart review   Currently optimized on Lasix 40 mg daily, metoprolol succinate 24 mg daily, spironolactone 25 mg daily, valsartan 320 mg daily  Continue current regimen and continue to follow-up with cardio.       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, with long-term current use of insulin, unspecified laterality, unspecified retinopathy severity (HCC)    Lab Results   Component Value Date    HGBA1C 7.0 (A) 2025          Previously 6.8   Takes 25 units of  Lantus daily and 10 units of meal time insulin TID  No hypoglycemic symptoms  Reports stepping on broken glass a day ago  Foot exam done today ( see media)  No s/sx of infection noted  Plan:  - Continue current regimen  - Return to clinic in 3 months    Pre-operative Clearance:     Revised Cardiac Risk Index:  RCI RISK CLASS II (1 risk factor, risk of major cardiac complications approximately 1.3%)    Clearance:  Patient is medically optimized (CLEARED) for proposed surgery without any additional cardiac testing.       RCI risk class II secondary to history of CVA, anticoagulation and preoperative use of insulin.    Medication Instructions:   - Avoid herbs or non-directed vitamins one week prior to surgery    - Avoid aspirin containing  medications or non-steroidal anti-inflammatory drugs one week preceding surgery    - May take tylenol for pain up until the night before surgery    - ACE Inhibitors or ARBs: Continue this medication up to the evening before surgery/procedure, but do not take the morning of the day of surgery.  - Beta blockers:  Continue to take this medication on your normal schedule.  - Diuretics: Continue taking this medication up to the evening before surgery/procedure, but do not take in the morning of the day of surgery/procedure.  - Hyperlipidemia meds: Continue to take this medication on your normal schedule.  - Warfarin: Stop medication 5 days prior to procedure/surgery. Bridging anticoagulation may be needed if patient has artificial heart valve or if at high risk for stroke.      Medicine Instructions for Adults with Diabetes (NO Bowel Prep)    Follow these instructions when a BOWEL PREP is NOT required for your procedure or surgery!    NOTE:  GLP Agonists taken weekly: do not take in the 7 days before your procedure. **Bariatric surgery: do not take 4 weeks prior to your procedure.    SGLT-2 Inhibitors: do not take in the 4 days before your procedure    On the Day Before Surgery/Procedure  If you are having a procedure (e.g., Colonoscopy) or surgery which DOES NOT require a bowel prep, follow the directions below based on the type of medicine you take for your diabetes.  Type of Medicine You Take Examples What to Do   Pre-Mixed Insulin Intermediate  Bsekfnq67/25, Lyuhhgx23/30, Novolog 70/30, Regular Insulin Take 1/2 your regular dose the evening before our procedure.   Rapid/Fast Acting  Insulin and/or Long-Acting Insulin Humalog U200, NovoLog, Apidra,  Lantus, Levemir, Tresiba, Toujeo,  Fias, Basaglar Take your FULL regular dose the day before procedure.   Oral Diabetic Medicines (sulfonylurea) Glipizide/Glimepiride/  Glucotrol Take your regular dose with dinner the evening before your procedure.   Other Oral Diabetic  Medicines Metformin, Glucophage, Glucophage  XR, Riomet, Glumetza, Actose,  Avandia, Gl set, Prandin Take your regular dose with dinner the evening before your procedure   GLP Agonists Adlyxin, Byetta, Bydureon,  Ozempic, Soliqua, Tanzeum,  Trulicity, Victoza, Saxenda,  Rybelsus, Wegovy, Mounjaro, Zepbound If taken daily, take as normal  If taken weekly, do not take this medicine for 7 days before your procedure including the day of the procedure (resume taking after the procedure). **Bariatric surgery: do not take 4 weeks prior to procedure   SGLT-2 Inhibitors Jardiance, Invokana, Farxiga, Steglatro, Brenzavvy, Qtern, Segluromet Glyxambi, Synjardy, Synjardy XR, Invokamet, InvokametXR, Trijary XR, Xigduo X Do not take for 4 days before your procedure including the day of the procedure (resume taking after the procedure)   This educational material has been approved by the Patient Education Advisory Committee.    On the Day of Surgery/Procedure  Follow the directions below based on the type of medicine you take for your diabetes.  Type of Medicine You Take  Examples What to Do   Long-Acting Insulin Lantus, Levemir, Tresiba,  Toujeo, Basaglar, Semglee If you normally take your Long Acting Insulin in the morning, take the full dose as scheduled.   GLP-I Agonists Adlyxin, Byetta, Bydureon,  Ozempic, Soliqua, Tanzeum,  Trulicity, Victoza, Saxenda,  Rybelsus, Mounjaro Do NOT take this medicine on the day of your procedure (resume taking after the procedure)   Except for the morning Long-Acting Insulin, DO NOT take ANY diabetic medicine on the day of your procedure unless you were instructed by the doctor who manages your diabetes medicines.  Continue to check your blood sugars.  If you have an insulin pump, ask your endocrinologist for instructions at least 3 days before your procedure. NOTE: If you are not able to ask your endocrinologist in advance, on the day of the procedure set your insulin pump to your basal rate  only. Bring your insulin pump supplies to the hospital.        Depression Screening and Follow-up Plan: Patient was screened for depression during today's encounter. They screened negative with a PHQ-2 score of 0.        History of Present Illness     Pre-Op Examination     Surgery: EXCISION  BIOPSY LESION/MASS ABDOMINAL WALL LEFT LOWER QUADRANT   Anticipated Date of Surgery: April 21st 2025   Surgeon: Ezequiel Davison MD     59-year-old male with past medical history of type 2 diabetes, CVA, and biventricular pacemaker scheduled for excision biopsy of lesion in the left abdominal wall on April 21 with Dr. Davison presenting today for preoperative clearance.  Dr. Meade was also present for this visit.      Previous history of bleeding disorders or clots?: Yes    Previous Anesthesia reaction?: No    Prolonged steroid use in the last 6 months?: No      Assessment of Cardiac Risk:   - Unstable or severe angina or MI in the last 6 weeks or history of stent placement in the last year?: No    - Decompensated heart failure (e.g. New onset heart failure, NYHA  Class IV heart failure, or worsening existing heart failure)?: No    - Significant arrhythmias such as high grade AV block, symptomatic ventricular arrhythmia, newly recognized ventricular tachycardia, supraventricular tachycardia with resting heart rate >100, or symptomatic bradycardia?: No    - Severe heart valve disease including aortic stenosis or symptomatic mitral stenosis?: No      Pre-operative Risk Factors:    - History of cerebrovascular disease: Yes    - History of ischemic heart disease: No    - History of congestive heart failure: Yes    - Pre-operative treatment with insulin: Yes    - Pre-operative creatinine >2 mg/dL: No      Medications of Perioperative Concern:    Anti-coagulants (Coumadin, Xarelto, Pradaxa, Eliquis, Lixiana), Beta blockers, ACE inhibitors/ARBs, Insulin and GLP agonists    Review of Systems   Constitutional: Negative.  Negative for  chills.   HENT: Negative.     Respiratory: Negative.     Cardiovascular:  Negative for chest pain and palpitations.   Gastrointestinal: Negative.    Genitourinary: Negative.    Neurological:  Positive for dizziness and weakness. Negative for seizures, syncope, speech difficulty and headaches.   Psychiatric/Behavioral: Negative.       Past Medical History   Past Medical History:   Diagnosis Date    Acute pain of right shoulder 01/24/2024    H/O blood clots     Infected skin lesion 07/02/2024    Paronychia of right index finger 08/15/2023    Post-op pain 03/21/2024    Sleep apnea     does not use cpap    Stroke (HCC)     Trichomonas contact, treated 01/24/2024     Past Surgical History:   Procedure Laterality Date    CARDIAC ELECTROPHYSIOLOGY PROCEDURE N/A 3/21/2024    Procedure: Cardiac biv pacer implant;  Surgeon: Nahun Rees MD;  Location: [facility];  Service: Cardiology    HERNIA REPAIR      At 18 years old     History reviewed. No pertinent family history.  Social History     Tobacco Use    Smoking status: Some Days     Types: Cigarettes     Passive exposure: Never    Smokeless tobacco: Never   Vaping Use    Vaping status: Some Days    Substances: Nicotine   Substance and Sexual Activity    Alcohol use: Never    Drug use: Never    Sexual activity: Not on file     Current Outpatient Medications on File Prior to Visit   Medication Sig    acetaminophen (TYLENOL) 650 mg CR tablet Take 1 tablet (650 mg total) by mouth every 8 (eight) hours as needed for mild pain    atorvastatin (LIPITOR) 40 mg tablet take 1 tablet by mouth once daily    Continuous Glucose  (FreeStyle Ana María 2 [place]) ALLIE Check blood sugars multiple times per day    Continuous Glucose Sensor (FreeStyle Ana María 2 Sensor) MISC USE TO CHECK BLOOD SUGAR EVERY 14 DAYS    Continuous Glucose Sensor (FreeStyle Ana María 2 Sensor) MISC Check blood sugars multiple times per day    fluticasone (FLONASE) 50 mcg/act nasal spray 1 spray into each  nostril daily    furosemide (LASIX) 40 mg tablet take 1 tablet by mouth every morning    insulin aspart (NovoLOG FlexPen) 100 UNIT/ML injection pen INJECT 10 UNITS 3 TIMES A DAY BEFORE MEALS    insulin degludec (Tresiba FlexTouch) 100 units/mL injection pen Inject 35 units daily - obtains through patient assistance program    Insulin Pen Needle 32G X 4 MM MISC Use up to four times daily with insulin e11.9    metoprolol succinate (TOPROL-XL) 25 mg 24 hr tablet take 1 tablet by mouth once daily    semaglutide, 0.25 or 0.5 mg/dose, (Ozempic, 0.25 or 0.5 MG/DOSE,) 2 mg/3 mL injection pen Inject 0.5 mg weekly    spironolactone (ALDACTONE) 25 mg tablet Take 1 tablet (25 mg total) by mouth daily    valsartan (DIOVAN) 320 MG tablet Take 1 tablet (320 mg total) by mouth daily    warfarin (COUMADIN) 5 mg tablet Take one tablet daily    mupirocin (BACTROBAN) 2 % ointment Apply topically 3 (three) times a day for 7 days (Patient not taking: Reported on 2/5/2025)    [DISCONTINUED] ammonium lactate (LAC-HYDRIN) 12 % lotion  (Patient not taking: Reported on 3/18/2025)    [DISCONTINUED] Ascorbic Acid (vitamin C) 1000 MG tablet Take 1,000 mg by mouth daily (Patient not taking: Reported on 3/14/2025)    [DISCONTINUED] bacitracin topical ointment 500 units/g topical ointment Apply 1 large application topically 2 (two) times a day (Patient not taking: Reported on 11/24/2024)    [DISCONTINUED] chlorhexidine (PERIDEX) 0.12 % solution RINSE WITH 1/2 OUNCE BY MOUTH FOR 30 SECONDS THEN SPIT OUT, AS DIRECTED (Patient not taking: Reported on 2/5/2025)    [DISCONTINUED] cyanocobalamin (VITAMIN B-12) 2000 MCG tablet Take 2,000 mcg by mouth daily (Patient not taking: Reported on 3/14/2025)    [DISCONTINUED] cyclobenzaprine (FLEXERIL) 10 mg tablet take 1 tablet by mouth twice a day if needed for SPASM(S) (Patient not taking: Reported on 4/14/2025)    [DISCONTINUED] hydrocortisone 1 % cream Apply to affected area 2 times daily (Patient not taking:  "Reported on 11/24/2024)    [DISCONTINUED] ondansetron (ZOFRAN) 4 mg tablet Take 1 tablet (4 mg total) by mouth every 6 (six) hours (Patient not taking: Reported on 3/18/2025)     No Known Allergies  Objective   /70 (BP Location: Left arm, Patient Position: Sitting, Cuff Size: Standard)   Pulse 80   Temp (!) 97.4 °F (36.3 °C) (Temporal)   Resp 16   Ht 5' 9\" (1.753 m)   Wt 112 kg (247 lb 12.8 oz)   SpO2 95%   BMI 36.59 kg/m²     Physical Exam  Vitals reviewed.   Constitutional:       General: He is not in acute distress.     Appearance: He is obese. He is not ill-appearing.   HENT:      Head: Normocephalic.   Eyes:      General: No scleral icterus.     Extraocular Movements: Extraocular movements intact.   Cardiovascular:      Rate and Rhythm: Normal rate and regular rhythm.      Pulses: Normal pulses.      Heart sounds: Normal heart sounds.   Pulmonary:      Effort: Pulmonary effort is normal. No respiratory distress.      Breath sounds: Normal breath sounds.   Musculoskeletal:      Right lower leg: No edema.      Left lower leg: No edema.   Neurological:      Mental Status: He is alert and oriented to person, place, and time.      Cranial Nerves: Cranial nerves 2-12 are intact.      Motor: Weakness present.      Gait: Gait abnormal.      Comments: Abnormal hemiparetic gait and coordination due to residual left-sided weakness from prior CVA.   Psychiatric:         Mood and Affect: Mood normal.         Behavior: Behavior normal.           Osmany Thornton MD  " Doxepin Pregnancy And Lactation Text: This medication is Pregnancy Category C and it isn't known if it is safe during pregnancy. It is also excreted in breast milk and breast feeding isn't recommended.

## 2025-04-14 NOTE — PATIENT INSTRUCTIONS
Pre-operative Medication Instructions    Avoid herbs or non-directed vitamins one week prior to surgery  Avoid aspirin containing medications or non-steroidal anti-inflammatory drugs one week preceding surgery  May take tylenol for pain up until the night before surgery    ACE Inhibitors or ARBs     Medication Name     valsartan (DIOVAN) 320 MG tablet      Continue this medication up to the evening before surgery/procedure, but do not take the morning of the day of surgery.    5HT3 Antagonists     Medication Name     ondansetron (ZOFRAN) 4 mg tablet      Continue to take this medication on your normal schedule.  If this is an oral medication and you take it in the morning, then you may take this medicine with a sip of water.    Beta Blockers     Medication Name     metoprolol succinate (TOPROL-XL) 25 mg 24 hr tablet      Continue to take this heart medication on your normal schedule.  If this is an oral medication and you take it in the morning, then you may take this medicine with a sip of water.    Diuretics     Medication Name     furosemide (LASIX) 40 mg tablet      Continue this medication up to the evening before surgery/procedure, but do not take the morning of the day of surgery.    Cholesterol lowering meds     Medication Name     atorvastatin (LIPITOR) 40 mg tablet      Continue to take this medication on your normal schedule.  If this is an oral medication and you take it in the morning, then you may take this medicine with a sip of water.    Warfarin (Coumadin/Jantoven)     Medication Name     warfarin (COUMADIN) 5 mg tablet      Consult with your Surgeon and prescribing Physician for exact timing of stopping this medication.  Bridging anticoagulation may be needed if you have an artificial heart valve.  Frequently, this medication is stopped 5 days prior to surgery.  Approval to stop this medication should first come from your surgeon and/or prescribing physician.    Diabetic Medications     Medication  Name     insulin aspart (NovoLOG FlexPen) 100 UNIT/ML injection pen     insulin degludec (Tresiba FlexTouch) 100 units/mL injection pen     semaglutide, 0.25 or 0.5 mg/dose, (Ozempic, 0.25 or 0.5 MG/DOSE,) 2 mg/3 mL injection pen        Medicine Instructions for Adults with Diabetes (NO Bowel Prep)    Follow these instructions when a BOWEL PREP is NOT required for your procedure or surgery!    NOTE:  GLP Agonists taken weekly: do not take in the 7 days before your procedure. **Bariatric surgery: do not take 4 weeks prior to your procedure.    SGLT-2 Inhibitors: do not take in the 4 days before your procedure    On the Day Before Surgery/Procedure  If you are having a procedure (e.g., Colonoscopy) or surgery which DOES NOT require a bowel prep, follow the directions below based on the type of medicine you take for your diabetes.  Type of Medicine You Take Examples What to Do   Pre-Mixed Insulin Intermediate  Hmlqlmk03/25, Hujejpk45/30, Novolog 70/30, Regular Insulin Take 1/2 your regular dose the evening before our procedure.   Rapid/Fast Acting  Insulin and/or Long-Acting Insulin Humalog U200, NovoLog, Apidra,  Lantus, Levemir, Tresiba, Toujeo,  Fias, Basaglar Take your FULL regular dose the day before procedure.   Oral Diabetic Medicines (sulfonylurea) Glipizide/Glimepiride/  Glucotrol Take your regular dose with dinner the evening before your procedure.   Other Oral Diabetic Medicines Metformin, Glucophage, Glucophage  XR, Riomet, Glumetza, Actose,  Avandia, Gl set, Prandin Take your regular dose with dinner the evening before your procedure   GLP Agonists Adlyxin, Byetta, Bydureon,  Ozempic, Soliqua, Tanzeum,  Trulicity, Victoza, Saxenda,  Rybelsus, Wegovy, Mounjaro, Zepbound If taken daily, take as normal  If taken weekly, do not take this medicine for 7 days before your procedure including the day of the procedure (resume taking after the procedure). **Bariatric surgery: do not take 4 weeks prior to procedure    SGLT-2 Inhibitors Jardiance, Invokana, Farxiga, Steglatro, Brenzavvy, Qtern, Segluromet Glyxambi, Synjardy, Synjardy XR, Invokamet, InvokametXR, Trijary XR, Xigduo X Do not take for 4 days before your procedure including the day of the procedure (resume taking after the procedure)   This educational material has been approved by the Patient Education Advisory Committee.    On the Day of Surgery/Procedure  Follow the directions below based on the type of medicine you take for your diabetes.  Type of Medicine You Take  Examples What to Do   Long-Acting Insulin Lantus, Levemir, Tresiba,  Toujeo, Basaglar, Semglee If you normally take your Long Acting Insulin in the morning, take the full dose as scheduled.   GLP-I Agonists Adlyxin, Byetta, Bydureon,  Ozempic, Soliqua, Tanzeum,  Trulicity, Victoza, Saxenda,  Rybelsus, Mounjaro Do NOT take this medicine on the day of your procedure (resume taking after the procedure)   Except for the morning Long-Acting Insulin, DO NOT take ANY diabetic medicine on the day of your procedure unless you were instructed by the doctor who manages your diabetes medicines.  Continue to check your blood sugars.  If you have an insulin pump, ask your endocrinologist for instructions at least 3 days before your procedure. NOTE: If you are not able to ask your endocrinologist in advance, on the day of the procedure set your insulin pump to your basal rate only. Bring your insulin pump supplies to the hospital.    If you have any questions about taking your diabetes medicines prior to your procedure, please contact the doctor who manages your diabetes medicines.

## 2025-04-15 ENCOUNTER — RESULTS FOLLOW-UP (OUTPATIENT)
Dept: CARDIOLOGY CLINIC | Facility: CLINIC | Age: 60
End: 2025-04-15

## 2025-04-15 ENCOUNTER — REMOTE DEVICE CLINIC VISIT (OUTPATIENT)
Dept: CARDIOLOGY CLINIC | Facility: CLINIC | Age: 60
End: 2025-04-15
Payer: COMMERCIAL

## 2025-04-15 DIAGNOSIS — I10 PRIMARY HYPERTENSION: ICD-10-CM

## 2025-04-15 DIAGNOSIS — Z95.0 CARDIAC PACEMAKER IN SITU: Primary | ICD-10-CM

## 2025-04-15 PROCEDURE — 93294 REM INTERROG EVL PM/LDLS PM: CPT | Performed by: STUDENT IN AN ORGANIZED HEALTH CARE EDUCATION/TRAINING PROGRAM

## 2025-04-15 PROCEDURE — 93296 REM INTERROG EVL PM/IDS: CPT | Performed by: STUDENT IN AN ORGANIZED HEALTH CARE EDUCATION/TRAINING PROGRAM

## 2025-04-15 RX ORDER — VALSARTAN 320 MG/1
320 TABLET ORAL DAILY
Qty: 90 TABLET | Refills: 3 | Status: SHIPPED | OUTPATIENT
Start: 2025-04-15

## 2025-04-15 NOTE — PROGRESS NOTES
"Results for orders placed or performed in visit on 04/15/25   Cardiac EP device report    Narrative    MDT BI-V PM/ ACTIVE SYSTEM IS MRI CONDITIONAL  CARELINK TRANSMISSION: PRESENTING EGRAM AS @ 86 BPM. BATTERY STATUS \"10 YRS.\" AP 4% CRT PACING (BIV) 1% (LV) 98%. ALL AVAILABLE LEAD PARAMETERS WITHIN NORMAL LIMITS. NO SIGNIFICANT HIGH RATE EPISODES. OPTI-VOL WITHIN NORMAL LIMITS. NORMAL DEVICE FUNCTION. NC         "

## 2025-04-17 ENCOUNTER — PATIENT OUTREACH (OUTPATIENT)
Dept: FAMILY MEDICINE CLINIC | Facility: CLINIC | Age: 60
End: 2025-04-17

## 2025-04-17 NOTE — PROGRESS NOTES
Medication Patient Assistance Program (MPAP) Specialist  received faxed letter from [employer]. MPAP specialist reviewed patient chart prior to outreach. Per faxed letters patient's Tresiba and Ozempic are being processed. Patient's Tresiba and Ozempic should arrive at clinician's office within 10-14 business days. MPAP specialist has placed a personal reminder

## 2025-04-21 ENCOUNTER — ANESTHESIA (OUTPATIENT)
Dept: PERIOP | Facility: HOSPITAL | Age: 60
End: 2025-04-21
Payer: COMMERCIAL

## 2025-04-21 ENCOUNTER — HOSPITAL ENCOUNTER (OUTPATIENT)
Facility: HOSPITAL | Age: 60
Setting detail: OUTPATIENT SURGERY
Discharge: HOME | End: 2025-04-21
Attending: SURGERY | Admitting: SURGERY
Payer: COMMERCIAL

## 2025-04-21 VITALS
HEART RATE: 74 BPM | RESPIRATION RATE: 16 BRPM | SYSTOLIC BLOOD PRESSURE: 117 MMHG | TEMPERATURE: 97.4 F | OXYGEN SATURATION: 93 % | DIASTOLIC BLOOD PRESSURE: 63 MMHG | WEIGHT: 248.68 LBS | HEIGHT: 69 IN | BODY MASS INDEX: 36.83 KG/M2

## 2025-04-21 DIAGNOSIS — L92.8 OTHER GRANULOMATOUS DISORDERS OF THE SKIN AND SUBCUTANEOUS TISSUE: ICD-10-CM

## 2025-04-21 DIAGNOSIS — R19.04 ABDOMINAL WALL MASS OF LEFT LOWER QUADRANT: ICD-10-CM

## 2025-04-21 PROBLEM — Z95.0 STATUS POST BIVENTRICULAR PACEMAKER: Status: ACTIVE | Noted: 2025-04-21

## 2025-04-21 LAB
APTT PPP: 28 SECONDS (ref 23–34)
GLUCOSE SERPL-MCNC: 106 MG/DL (ref 65–140)
GLUCOSE SERPL-MCNC: 132 MG/DL (ref 65–140)
INR PPP: 1.23 (ref 0.85–1.19)
PROTHROMBIN TIME: 15.7 SECONDS (ref 12.3–15)

## 2025-04-21 PROCEDURE — 22902 EXC ABD LES SC < 3 CM: CPT | Performed by: SURGERY

## 2025-04-21 PROCEDURE — NC001 PR NO CHARGE: Performed by: SURGERY

## 2025-04-21 PROCEDURE — 88342 IMHCHEM/IMCYTCHM 1ST ANTB: CPT | Performed by: PATHOLOGY

## 2025-04-21 PROCEDURE — 85730 THROMBOPLASTIN TIME PARTIAL: CPT | Performed by: ANESTHESIOLOGY

## 2025-04-21 PROCEDURE — 82948 REAGENT STRIP/BLOOD GLUCOSE: CPT

## 2025-04-21 PROCEDURE — 85610 PROTHROMBIN TIME: CPT | Performed by: ANESTHESIOLOGY

## 2025-04-21 PROCEDURE — 88304 TISSUE EXAM BY PATHOLOGIST: CPT | Performed by: PATHOLOGY

## 2025-04-21 RX ORDER — BUPIVACAINE HYDROCHLORIDE AND EPINEPHRINE 2.5; 5 MG/ML; UG/ML
INJECTION, SOLUTION EPIDURAL; INFILTRATION; INTRACAUDAL; PERINEURAL AS NEEDED
Status: DISCONTINUED | OUTPATIENT
Start: 2025-04-21 | End: 2025-04-21 | Stop reason: HOSPADM

## 2025-04-21 RX ORDER — PROPOFOL 10 MG/ML
INJECTION, EMULSION INTRAVENOUS CONTINUOUS PRN
Status: DISCONTINUED | OUTPATIENT
Start: 2025-04-21 | End: 2025-04-21

## 2025-04-21 RX ORDER — FENTANYL CITRATE 50 UG/ML
INJECTION, SOLUTION INTRAMUSCULAR; INTRAVENOUS AS NEEDED
Status: DISCONTINUED | OUTPATIENT
Start: 2025-04-21 | End: 2025-04-21

## 2025-04-21 RX ORDER — FENTANYL CITRATE/PF 50 MCG/ML
50 SYRINGE (ML) INJECTION
Status: DISCONTINUED | OUTPATIENT
Start: 2025-04-21 | End: 2025-04-21 | Stop reason: HOSPADM

## 2025-04-21 RX ORDER — MIDAZOLAM HYDROCHLORIDE 2 MG/2ML
INJECTION, SOLUTION INTRAMUSCULAR; INTRAVENOUS AS NEEDED
Status: DISCONTINUED | OUTPATIENT
Start: 2025-04-21 | End: 2025-04-21

## 2025-04-21 RX ORDER — MEPERIDINE HYDROCHLORIDE 25 MG/ML
12.5 INJECTION INTRAMUSCULAR; INTRAVENOUS; SUBCUTANEOUS ONCE AS NEEDED
Status: DISCONTINUED | OUTPATIENT
Start: 2025-04-21 | End: 2025-04-21 | Stop reason: HOSPADM

## 2025-04-21 RX ORDER — SODIUM CHLORIDE, SODIUM LACTATE, POTASSIUM CHLORIDE, CALCIUM CHLORIDE 600; 310; 30; 20 MG/100ML; MG/100ML; MG/100ML; MG/100ML
125 INJECTION, SOLUTION INTRAVENOUS CONTINUOUS
Status: DISCONTINUED | OUTPATIENT
Start: 2025-04-21 | End: 2025-04-21 | Stop reason: HOSPADM

## 2025-04-21 RX ORDER — INSULIN GLARGINE 100 [IU]/ML
25 INJECTION, SOLUTION SUBCUTANEOUS DAILY
COMMUNITY
End: 2025-04-21

## 2025-04-21 RX ORDER — ONDANSETRON 2 MG/ML
4 INJECTION INTRAMUSCULAR; INTRAVENOUS ONCE AS NEEDED
Status: DISCONTINUED | OUTPATIENT
Start: 2025-04-21 | End: 2025-04-21 | Stop reason: HOSPADM

## 2025-04-21 RX ORDER — PHENYLEPHRINE HCL IN 0.9% NACL 1 MG/10 ML
SYRINGE (ML) INTRAVENOUS AS NEEDED
Status: DISCONTINUED | OUTPATIENT
Start: 2025-04-21 | End: 2025-04-21

## 2025-04-21 RX ORDER — ACETAMINOPHEN 325 MG/1
650 TABLET ORAL EVERY 6 HOURS PRN
Status: DISCONTINUED | OUTPATIENT
Start: 2025-04-21 | End: 2025-04-21 | Stop reason: HOSPADM

## 2025-04-21 RX ORDER — METOPROLOL SUCCINATE 25 MG/1
25 TABLET, EXTENDED RELEASE ORAL ONCE
Status: COMPLETED | OUTPATIENT
Start: 2025-04-21 | End: 2025-04-21

## 2025-04-21 RX ORDER — HYDROMORPHONE HCL/PF 1 MG/ML
0.5 SYRINGE (ML) INJECTION
Status: DISCONTINUED | OUTPATIENT
Start: 2025-04-21 | End: 2025-04-21 | Stop reason: HOSPADM

## 2025-04-21 RX ORDER — OXYCODONE HYDROCHLORIDE 5 MG/1
5 TABLET ORAL EVERY 4 HOURS PRN
Refills: 0 | Status: DISCONTINUED | OUTPATIENT
Start: 2025-04-21 | End: 2025-04-21 | Stop reason: HOSPADM

## 2025-04-21 RX ORDER — SODIUM CHLORIDE 9 MG/ML
INJECTION, SOLUTION INTRAVENOUS CONTINUOUS PRN
Status: DISCONTINUED | OUTPATIENT
Start: 2025-04-21 | End: 2025-04-21

## 2025-04-21 RX ORDER — CEFAZOLIN SODIUM 2 G/50ML
2000 SOLUTION INTRAVENOUS ONCE
Status: COMPLETED | OUTPATIENT
Start: 2025-04-21 | End: 2025-04-21

## 2025-04-21 RX ORDER — PROMETHAZINE HYDROCHLORIDE 25 MG/ML
6.25 INJECTION, SOLUTION INTRAMUSCULAR; INTRAVENOUS ONCE AS NEEDED
Status: DISCONTINUED | OUTPATIENT
Start: 2025-04-21 | End: 2025-04-21 | Stop reason: HOSPADM

## 2025-04-21 RX ADMIN — CEFAZOLIN SODIUM 2000 MG: 2 SOLUTION INTRAVENOUS at 12:07

## 2025-04-21 RX ADMIN — METOPROLOL SUCCINATE 25 MG: 25 TABLET, EXTENDED RELEASE ORAL at 10:16

## 2025-04-21 RX ADMIN — SODIUM CHLORIDE 250 ML: 0.9 INJECTION, SOLUTION INTRAVENOUS at 10:02

## 2025-04-21 RX ADMIN — Medication 100 MCG: at 12:42

## 2025-04-21 RX ADMIN — MIDAZOLAM 2 MG: 1 INJECTION INTRAMUSCULAR; INTRAVENOUS at 12:07

## 2025-04-21 RX ADMIN — FENTANYL CITRATE 50 MCG: 50 INJECTION INTRAMUSCULAR; INTRAVENOUS at 12:07

## 2025-04-21 RX ADMIN — Medication 100 MCG: at 12:29

## 2025-04-21 RX ADMIN — PROPOFOL 100 MCG/KG/MIN: 10 INJECTION, EMULSION INTRAVENOUS at 12:08

## 2025-04-21 RX ADMIN — SODIUM CHLORIDE: 0.9 INJECTION, SOLUTION INTRAVENOUS at 12:01

## 2025-04-21 NOTE — ANESTHESIA PREPROCEDURE EVALUATION
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 Wall thickness is mildly increased. There is mild concentric hypertrophy. The left ventricular ejection fraction is 45% by biplane measurement. Global longitudinal strain is reduced at -11.3%. There is mild global hypokinesis with specific regional wall abnormalities.  The septum is paradoxical. Diastolic function is normal.  Left atrial filling pressure is normal.    Pulmonic Valve: There is mild regurgitation.     Device Check:  MDT BI-V PM/ ACTIVE SYSTEM IS MRI CONDITIONAL   CARELINK TRANSMISSION: PRESENTING EGRAM AS @ 86 BPM. BATTERY STATUS \"10 YRS.\" AP 4% CRT PACING (BIV) 1% (LV) 98%. ALL AVAILABLE LEAD PARAMETERS WITHIN NORMAL LIMITS. NO SIGNIFICANT HIGH RATE EPISODES. OPTI-VOL WITHIN NORMAL LIMITS. NORMAL DEVICE FUNCTION. NC     Physical Exam    Airway  Comment: + beard  Mallampati score: I         Dental        Cardiovascular  Rhythm: regular, Rate: normal    Pulmonary   Breath sounds clear to auscultation    Other Findings        Anesthesia Plan  ASA Score- 3 "     Anesthesia Type- IV sedation with anesthesia with ASA Monitors.         Additional Monitors:     Airway Plan:     Comment: GA PRN, oversedation requiring intubation in past with mac case  Hasn't taken metoprolol since Saturday, PO dose ordered.       Plan Factors-Exercise tolerance (METS): >4 METS.    Chart reviewed.   Existing labs reviewed.     Patient is a current smoker.  Patient instructed to abstain from smoking on day of procedure. Patient did not smoke on day of surgery (vape last night).    Obstructive sleep apnea risk education given perioperatively.        Induction- intravenous.    Postoperative Plan-         Informed Consent- Anesthetic plan and risks discussed with patient.        NPO Status:  No vitals data found for the desired time range.

## 2025-04-21 NOTE — H&P
History & Physical    Fadi Busby    59 y.o.  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    HPI    Historical Information   Past Medical History:   Diagnosis Date    Acute pain of right shoulder 01/24/2024    H/O blood clots     Infected skin lesion 07/02/2024    Paronychia of right index finger 08/15/2023    Post-op pain 03/21/2024    Sleep apnea     does not use cpap    Stroke (HCC)     Trichomonas contact, treated 01/24/2024     Past Surgical History:   Procedure Laterality Date    CARDIAC ELECTROPHYSIOLOGY PROCEDURE N/A 3/21/2024    Procedure: Cardiac biv pacer implant;  Surgeon: Nahun Rees MD;  Location: [facility];  Service: Cardiology    HERNIA REPAIR      At 18 years old     Social History   Social History     Substance and Sexual Activity   Alcohol Use Never     Social History     Substance and Sexual Activity   Drug Use Never     Social History     Tobacco Use   Smoking Status Some Days    Types: Cigarettes    Passive exposure: Never   Smokeless Tobacco Never     History reviewed. No pertinent family history.     Meds/Allergies   No Known Allergies    Current Facility-Administered Medications:      ceFAZolin (ANCEF) IVPB (premix in dextrose) 2,000 mg 50 mL, 2,000 mg, Intravenous, Once, Ezequiel Davison MD    lactated ringers infusion, 125 mL/hr, Intravenous, Continuous, Mario Braga,     lactated ringers infusion, 125 mL/hr, Intravenous, Continuous, Ezequiel Davison MD    Review of Systems    Vitals:    04/21/25 1000   BP: 122/67   Pulse: 81   Resp: 16   Temp: (!) 95.5 °F (35.3 °C)   SpO2: 93%     Physical Exam  GEN: NAD, A+OX3   HEENT: Normocephalic, atraumatic,   NECK: Supple, trachea midline,   CARDIAC: regular rate & rhythm, S1 & S2 normal.   LUNGS: Clear to auscultation, No Wheeze, Rales, or Rhonchi  ABDOMEN: Left lower quadrant 2 cm firm nodule about 2 cm across  EXTREMITIES: No evidence of cyanosis, clubbing or edema. Pulses +2 B/L LE  NEURO: CN II-XII intact grossly, No sensory or motor deficits    Lab Results:   Imaging:   EKG, Pathology, and Other Studies:   Lab Results   Component Value Date    CALCIUM 8.9 04/07/2025    K 4.1 04/07/2025    CO2 31 04/07/2025     04/07/2025    BUN 18 04/07/2025    CREATININE 1.41 (H) 04/07/2025     Lab Results   Component Value Date    WBC 5.57 04/07/2025    HGB 13.4 04/07/2025    HCT 39.8 04/07/2025    MCV 93 04/07/2025     04/07/2025     Lab Results   Component Value Date    ALT 20 11/24/2024    AST 24 11/24/2024    ALKPHOS 59 11/24/2024

## 2025-04-21 NOTE — DISCHARGE INSTR - AVS FIRST PAGE
[facility]’s General Surgery [facility]     Post-Operative Care Instructions     Dr. Ezequiel Davison MD, [facility]     [phone]          1. General: You will feel pulling sensations around the wound or funny aches and pains around the incisions. This is normal. Even minor surgery is a change in your body and this is your body’s way of reacting to it. If you have had abdominal surgery, it may help to support the incision with a small pillow or blanket for comfort when moving or coughing.     2. Wound care:  Okay to shower.  The glue will fall off over the next week or 2.   Use ice for at least the 1st 48 hours.  Do not use for longer than 20 minutes at a time. Use 3 times per day.     3. Water: You may shower over the wounds. Do not bathe or use a pool or hot tub until cleared by the physician.   If you were discharged with a drain, make sure drain site is covered with plastic wrap before showering.      4. Activity: You may go up and down stairs, walk as much as you are comfortable, but walk at least 3 times each day. If you have had abdominal surgery, do not lift anything heavier than 20 pounds for at least 4 weeks.      5. Diet: You may resume a regular diet. If you had a same-day surgery or overnight stay surgery, you may wish to eat lightly for a few days: soups, crackers, and sandwiches. You may resume a regular diet when ready.      6. Medications: Resume all of your previous medications, unless told otherwise by the doctor. Avoid aspirin products for 2-3 days after the date of surgery. You may, at that time, began to take them again. Use Tylenol and Ibuprofen for pain control.  You may alternate these medications every 3 hours.  For example: you may take Tylenol at noon, Ibuprofen at 3:00 p.m., and Tylenol again at 6:00 p.m., etc. You should use ice to assist with pain control as above.  You do not need to take narcotic pain medication unless you are having significant pain.   If you were prescribed a  narcotic pain medication containing Tylenol, such as Percocet or Norco, do not use supplemental Tylenol.      7. Driving: You will need someone to drive you home on the day of surgery or discharge. Do not drive or make any important decisions while on narcotic pain medication or 24 hours and after anesthesia or sedation for surgery. Generally, you may drive when your off all narcotic pain medications and you are comfortable.      8. Upset Stomach: You may take Maalox, Tums, or similar items for an upset stomach. If your narcotic pain medication causes an upset stomach, do not take it on an empty stomach. Try taking it with at least some crackers or toast.      9. Constipation: Patients often experience constipation after surgery. You may take over-the-counter medication for this, such as Metamucil, Senokot, Dulcolax, milk of magnesia, etc. You may take a suppository unless you have had anorectal surgery such as a procedure on your hemorrhoids. If you experience significant nausea or vomiting after abdominal surgery, call the office before trying any of these medications.     10. Call the office: If you are experiencing any of the following: fevers above 101.5°, significant nausea or vomiting, if the wound develops drainage and/or there is excessive redness around the wound, or if you have significant diarrhea or other worsening symptoms.     11. Pain: You may be given a prescription for pain medication.  This will be sent to your pharmacy prior to discharge.     12. Sexual Activity: You may resume sexual activity when you feel ready and comfortable and your incision is sealed and healed without apparent infection risk.     13. Urination: If you have not urinated in 6 hours, go directly to the ER for evaluation for urinary retention.      14. Follow-up in 2 weeks.

## 2025-04-21 NOTE — OP NOTE
OPERATIVE REPORT  PATIENT NAME: Fadi Busby    :  1965  MRN: [id number]  Pt Location: [facility] OR ROOM 06    SURGERY DATE: 2025    Surgeons and Role:     * Ezequiel Davison MD - Primary     * Martha Wright MD - Assisting     * Julius Madrid DPM - Assisting    Preop Diagnosis:  Abdominal wall mass of left lower quadrant [R19.04]  Other granulomatous disorders of the skin and subcutaneous tissue [L92.8]    Post-Op Diagnosis Codes:     * Abdominal wall mass of left lower quadrant [R19.04]     * Other granulomatous disorders of the skin and subcutaneous tissue [L92.8]    Procedure(s):  Left - EXCISION  BIOPSY LESION/MASS ABDOMINAL WALL LEFT LOWER QUADRANT    Specimen(s):  ID Type Source Tests Collected by Time Destination   1 : left abdominal subcutaneous mass Tissue Soft Tissue, Other TISSUE EXAM Ezequiel Davison MD 2025 1229        Estimated Blood Loss:   Minimal    Drains:  * No LDAs found *    Anesthesia Type:   IV Sedation with Anesthesia    Operative Indications:  Abdominal wall mass of left lower quadrant [R19.04]  Other granulomatous disorders of the skin and subcutaneous tissue [L92.8]      Operative Findings:  2.5 x 1.5 cm mass in the subcutaneous tissue.      Complications:   None    Procedure and Technique:  The patient was seen again in the Holding Room. The risks, benefits, complications, treatment options, and expected outcomes were discussed with the patient.  The patient and/or family concurred with the proposed plan, giving informed consent. The site of surgery properly noted/marked. The patient was taken to Operating Room, identified as Fadi Busby  and the procedure verified. A Time Out was held after prepping and draping in sterile fashion.  The above information was confirmed.    Prior to the induction of sedation antibiotic prophylaxis was administered. General endotracheal anesthesia was then administered and tolerated well. After the induction, the abdomen was prepped in the usual  sterile fashion.      A mixture of 1% lidocaine with epinephrine and 0.5% Marcaine was used.  Incision was made with a 15 blade scalpel.  The mass identified and free of circumferentially using sharp dissection and Bovie cautery as necessary.  Once the entire mass was excised was sent to pathology.  The wound was irrigated.  It was closed with one 3-0 Vicryl suture in the deep subtenons tissue.  Skin was closed with 4-0 Monocryl and glue.       I was present for the entire procedure.    Patient Disposition:  PACU              SIGNATURE: Ezequiel Davison MD  DATE: April 21, 2025  TIME: 12:30 PM

## 2025-04-21 NOTE — ANESTHESIA POSTPROCEDURE EVALUATION
Post-Op Assessment Note    CV Status:  Stable    Pain management: adequate       Mental Status:  Alert and awake   Hydration Status:  Euvolemic   PONV Controlled:  Controlled   Airway Patency:  Patent     Post Op Vitals Reviewed: Yes    No anethesia notable event occurred.    Staff: Anesthesiologist           Last Filed PACU Vitals:  Vitals Value Taken Time   Temp 97.8 °F (36.6 °C) 04/21/25 1323   Pulse 74 04/21/25 1323   /76 04/21/25 1323   Resp 16 04/21/25 1323   SpO2 95 % 04/21/25 1323       Modified Raman:     Vitals Value Taken Time   Activity 2 04/21/25 1323   Respiration 2 04/21/25 1323   Circulation 2 04/21/25 1323   Consciousness 2 04/21/25 1323   Oxygen Saturation 2 04/21/25 1323     Modified Raman Score: 10

## 2025-04-21 NOTE — ANESTHESIA POSTPROCEDURE EVALUATION
Post-Op Assessment Note    CV Status:  Stable    Pain management: adequate       Mental Status:  Sleepy     Post Op Vitals Reviewed: Yes    No anethesia notable event occurred.    Staff: CRNA           Last Filed PACU Vitals:  Vitals Value Taken Time   Temp 98.3 °F (36.8 °C) 04/21/25 1253   Pulse 86 04/21/25 1254   /69 04/21/25 1253   Resp 16 04/21/25 1253   SpO2 95 % 04/21/25 1254   Vitals shown include unfiled device data.            Skin Substitute Paste Text: The defect edges were debeveled with a #15 scalpel blade.  Given the location of the defect, shape of the defect and the proximity to free margins a skin substitute micronized graft was deemed most appropriate.  The entire vial contents were admixed with 0.5ccs of sterile saline, formed into a paste and then evenly spread over the entire wound bed.

## 2025-04-22 ENCOUNTER — NURSE TRIAGE (OUTPATIENT)
Dept: OTHER | Facility: OTHER | Age: 60
End: 2025-04-22

## 2025-04-22 LAB
LEFT EYE DIABETIC RETINOPATHY: POSITIVE
RIGHT EYE DIABETIC RETINOPATHY: POSITIVE

## 2025-04-22 NOTE — TELEPHONE ENCOUNTER
"FOLLOW UP: Yes, please follow up with patient.     REASON FOR CONVERSATION: Post-op Problem    SYMPTOMS: red area surrounding incision area and feels warm.     OTHER: N/A    DISPOSITION:  Now  On call provider stated patient should ice the area and follow up with office tomorrow.     Reason for Disposition   [1] Caller has URGENT question AND [2] triager unable to answer question    Answer Assessment - Initial Assessment Questions  1. SYMPTOM: \"What's the main symptom you're concerned about?\" (e.g., drainage, incision opened up, pain, redness)      Redness around incision site    2. ONSET: \"When did S/S start?\"      Noticed tonight    3. SURGERY: \"What surgery did you have?\"       EXCISION  BIOPSY LESION/MASS ABDOMINAL WALL LEFT LOWER QUADRANT (Left: Abdomen)    4. DATE of SURGERY: \"When was the surgery?\"       Yesterday    5. INCISION SITE: \"Where is the incision located?\"       Abdomen     6. REDNESS: \"Is there any redness at the incision site?\" If Yes, ask: \"How wide across is the redness?\" (Inches, centimeters)       More than 2 inches of redness     7. PAIN: \"Is there any pain?\" If Yes, ask: \"How bad is it?\"  (Scale 1-10; or mild, moderate, severe)      Denies    8. BLEEDING: \"Is there any bleeding?\" If Yes, ask: \"How much?\" and \"Where?\"      Denies    9. DRAINAGE: \"Is there any drainage from the incision site?\" If Yes, ask: \"What color and how much?\" (e.g., red, cloudy, pus; drops, teaspoon)      Denies    10. FEVER: \"Do you have a fever?\" If Yes, ask: \"What is your temperature, how was it measured, and when did it start?\"        Denies    11. OTHER SYMPTOMS: \"Do you have any other symptoms?\" (e.g., dizziness, rash elsewhere on body, shaking chills, weakness)        denies    Protocols used: Post-Op Incision Symptoms and Questions-Adult-    "

## 2025-04-22 NOTE — TELEPHONE ENCOUNTER
"Regarding: Post Surgery Wound is Red, Warm, size of an Apple.  ----- Message from Mone GRUBER sent at 4/22/2025  6:39 PM EDT -----  \" I had Surgery yesterday for EXCISION  BIOPSY LESION/MASS ABDOMINAL WALL LEFT LOWER QUADRANT and my Wound has changed colors. Yesterday it was bruised and now it's Red and worm to touch. And the size of an Apple.\"    "

## 2025-04-23 ENCOUNTER — OFFICE VISIT (OUTPATIENT)
Dept: SURGERY | Facility: CLINIC | Age: 60
End: 2025-04-23

## 2025-04-23 VITALS
BODY MASS INDEX: 36.88 KG/M2 | SYSTOLIC BLOOD PRESSURE: 124 MMHG | DIASTOLIC BLOOD PRESSURE: 78 MMHG | WEIGHT: 249 LBS | OXYGEN SATURATION: 93 % | HEIGHT: 69 IN | RESPIRATION RATE: 16 BRPM | HEART RATE: 86 BPM | TEMPERATURE: 98 F

## 2025-04-23 DIAGNOSIS — I42.8 NONISCHEMIC CARDIOMYOPATHY (HCC): ICD-10-CM

## 2025-04-23 DIAGNOSIS — Z79.01 LONG TERM (CURRENT) USE OF ANTICOAGULANTS: ICD-10-CM

## 2025-04-23 DIAGNOSIS — R19.04 ABDOMINAL WALL MASS OF LEFT LOWER QUADRANT: Primary | ICD-10-CM

## 2025-04-23 PROCEDURE — 99024 POSTOP FOLLOW-UP VISIT: CPT | Performed by: SURGERY

## 2025-04-23 RX ORDER — WARFARIN SODIUM 5 MG/1
TABLET ORAL
Qty: 192.85 TABLET | Refills: 3 | Status: SHIPPED | OUTPATIENT
Start: 2025-04-23 | End: 2025-04-24 | Stop reason: SDUPTHER

## 2025-04-23 NOTE — PROGRESS NOTES
Here for a quick wound check.  2 days status post excision of a subcutaneous mass from his left lower quad abdominal wall.  Had some ecchymosis and erythema and was concerned about it.  On exam the incision is intact.  No signs of cellulitis or infection.  Some ecchymosis and fluid under the skin.  Reassured the patient and family that there are no signs infection.  He can follow-up at his 2-week postop visit or sooner if things change.

## 2025-04-24 DIAGNOSIS — I42.8 NONISCHEMIC CARDIOMYOPATHY (HCC): ICD-10-CM

## 2025-04-24 DIAGNOSIS — Z79.01 LONG TERM (CURRENT) USE OF ANTICOAGULANTS: ICD-10-CM

## 2025-04-24 RX ORDER — WARFARIN SODIUM 5 MG/1
TABLET ORAL
Qty: 192.85 TABLET | Refills: 3 | Status: SHIPPED | OUTPATIENT
Start: 2025-04-24

## 2025-04-26 PROCEDURE — 99283 EMERGENCY DEPT VISIT LOW MDM: CPT

## 2025-04-27 ENCOUNTER — APPOINTMENT (EMERGENCY)
Dept: RADIOLOGY | Facility: HOSPITAL | Age: 60
End: 2025-04-27
Payer: COMMERCIAL

## 2025-04-27 ENCOUNTER — HOSPITAL ENCOUNTER (EMERGENCY)
Facility: HOSPITAL | Age: 60
Discharge: HOME | End: 2025-04-27
Attending: EMERGENCY MEDICINE | Admitting: EMERGENCY MEDICINE
Payer: COMMERCIAL

## 2025-04-27 VITALS
HEART RATE: 90 BPM | WEIGHT: 247.8 LBS | DIASTOLIC BLOOD PRESSURE: 79 MMHG | TEMPERATURE: 97.6 F | BODY MASS INDEX: 36.7 KG/M2 | OXYGEN SATURATION: 97 % | RESPIRATION RATE: 18 BRPM | SYSTOLIC BLOOD PRESSURE: 149 MMHG | HEIGHT: 69 IN

## 2025-04-27 DIAGNOSIS — M65.341 TRIGGER FINGER, RIGHT RING FINGER: Primary | ICD-10-CM

## 2025-04-27 PROCEDURE — 73130 X-RAY EXAM OF HAND: CPT

## 2025-04-27 NOTE — ED PROVIDER NOTES
Time reflects when diagnosis was documented in both MDM as applicable and the Disposition within this note       Time User Action Codes Description Comment    4/27/2025 12:38 AM Armand Garcias Add [M65.341] Trigger finger, right ring finger           ED Disposition       ED Disposition   Discharge    Condition   Stable    Date/Time   Sun Apr 27, 2025 12:37 AM    Comment   Fadi Busby discharge to home/self care.                   Assessment & Plan       Medical Decision Making  59-year-old male with a history of a trigger finger of the right ring finger presents with similar symptoms, he will be evaluated for possible finger dislocation or fracture, but if his workup is unremarkable he will be discharged home and will be encouraged to follow-up with Ortho hand.    My wet read of the patient's x-ray shows no signs of dislocation, fracture, or other abnormality, it is consistent with a trigger finger.  He is safe for discharge home.    Disposition: Patient stable for outpatient management. Discussed need for follow up with their primary doctor or specialist to review all results, including incidental findings as below. Patient discharged with explanation of ED workup and diagnosis, instructions on how to obtain outpatient follow up, care instructions at home, and strict return precautions if patient develops new or worsening symptoms. Patients questions answered and agreeable with discharge plan.        PLEASE NOTE:  This encounter was completed utilizing the [employer] Direct Speech Voice Recognition Software. Grammatical errors, random word insertions, pronoun errors and incomplete sentences are occasional inherent consequences of the system due to software limitations, ambient noise and hardware issues.These may be missed by proof reading prior to affixing electronic signature. Any questions or concerns about the content, text or information contained within the body of this dictation should be  "directly addressed to the physician for clarification. Please do not hesitate to call me directly if you have any questions or concerns.      Amount and/or Complexity of Data Reviewed  Radiology: ordered.             Medications - No data to display    ED Risk Strat Scores                    No data recorded        SBIRT 22yo+      Flowsheet Row Most Recent Value   Initial Alcohol Screen: US AUDIT-C     1. How often do you have a drink containing alcohol? 0 Filed at: 04/27/2025 0001   2. How many drinks containing alcohol do you have on a typical day you are drinking?  0 Filed at: 04/27/2025 0001   3a. Male UNDER 65: How often do you have five or more drinks on one occasion? 0 Filed at: 04/27/2025 0001   3b. FEMALE Any Age, or MALE 65+: How often do you have 4 or more drinks on one occassion? 0 Filed at: 04/27/2025 0001   Audit-C Score 0 Filed at: 04/27/2025 0001   SHAN: How many times in the past year have you...    Used an illegal drug or used a prescription medication for non-medical reasons? Never Filed at: 04/27/2025 0001                            History of Present Illness       Chief Complaint   Patient presents with    Finger Pain     Patient reports right ring finger \"locked up\" yesterday. Increasing pain.        Past Medical History:   Diagnosis Date    Acute pain of right shoulder 01/24/2024    H/O blood clots     Infected skin lesion 07/02/2024    Paronychia of right index finger 08/15/2023    Post-op pain 03/21/2024    Sleep apnea     does not use cpap    Stroke (HCC)     Trichomonas contact, treated 01/24/2024      Past Surgical History:   Procedure Laterality Date    CARDIAC ELECTROPHYSIOLOGY PROCEDURE N/A 3/21/2024    Procedure: Cardiac biv pacer implant;  Surgeon: Nahun Rees MD;  Location: [facility];  Service: Cardiology    HERNIA REPAIR      At 18 years old    TX EXC B9 LESION MRGN XCP SK TG T/A/L >4.0 CM Left 4/21/2025    Procedure: EXCISION  BIOPSY LESION/MASS ABDOMINAL WALL " LEFT LOWER QUADRANT;  Surgeon: Ezequiel Davison MD;  Location: [facility];  Service: General      History reviewed. No pertinent family history.   Social History     Tobacco Use    Smoking status: Some Days     Types: Cigarettes     Passive exposure: Never    Smokeless tobacco: Never   Vaping Use    Vaping status: Some Days    Substances: Nicotine (last vape 4/21)   Substance Use Topics    Alcohol use: Never    Drug use: Never      E-Cigarette/Vaping    E-Cigarette Use Current Some Day User       E-Cigarette/Vaping Substances    Nicotine Yes last vape 4/21    THC No     CBD No     Flavoring No     Other No     Unknown No       I have reviewed and agree with the history as documented.     59-year-old male with a history of a trigger finger of the right ring finger presents with a recurrent painful inability to extend his right trigger finger consistent with his trigger finger.  The patient reports no recent trauma, no paresthesias in the finger, the pain is worse when he attempts to extend his finger.  Patient reports he has no other complaints.  He previously, on my chart review, was seen by Ortho hand who did steroid injections.           Review of Systems   Constitutional:  Negative for fever.   Respiratory:  Negative for cough and shortness of breath.    Cardiovascular:  Negative for chest pain.   Gastrointestinal:  Negative for abdominal pain, diarrhea, nausea and vomiting.   Genitourinary:  Negative for dysuria and hematuria.   Musculoskeletal:  Positive for arthralgias.   Neurological:  Negative for light-headedness and headaches.           Objective       ED Triage Vitals [04/27/25 0000]   Temperature Pulse Blood Pressure Respirations SpO2 Patient Position - Orthostatic VS   97.6 °F (36.4 °C) 90 149/79 18 97 % --      Temp Source Heart Rate Source BP Location FiO2 (%) Pain Score    Oral Monitor Right arm -- 10 - Worst Possible Pain      Vitals      Date and Time Temp Pulse SpO2 Resp BP Pain Score FACES Pain  Rating User   04/27/25 0000 97.6 °F (36.4 °C) 90 97 % 18 149/79 10 - Worst Possible Pain -- DS            Physical Exam  Vitals and nursing note reviewed.   Constitutional:       General: He is not in acute distress.     Appearance: Normal appearance.   HENT:      Head: Normocephalic and atraumatic.      Right Ear: External ear normal.      Left Ear: External ear normal.      Mouth/Throat:      Mouth: Mucous membranes are moist.      Pharynx: Oropharynx is clear.   Eyes:      Conjunctiva/sclera: Conjunctivae normal.      Pupils: Pupils are equal, round, and reactive to light.   Cardiovascular:      Rate and Rhythm: Normal rate.   Pulmonary:      Effort: Pulmonary effort is normal. No respiratory distress.   Abdominal:      General: Abdomen is flat. There is no distension.   Musculoskeletal:         General: No deformity. Normal range of motion.      Cervical back: Normal range of motion.      Comments: The patient's right ring finger is held in flexion, with significant tenderness with any attempts at extension.  No fusiform swelling, no tenderness over the tendon sheath, CSM is intact distal to the affected area.   Skin:     General: Skin is warm and dry.   Neurological:      General: No focal deficit present.      Mental Status: He is alert and oriented to person, place, and time.   Psychiatric:         Mood and Affect: Mood normal.         Behavior: Behavior normal.         Results Reviewed       None            XR hand 3+ views RIGHT    (Results Pending)       Procedures    ED Medication and Procedure Management   Prior to Admission Medications   Prescriptions Last Dose Informant Patient Reported? Taking?   Continuous Glucose  (FreeStyle Ana María 2 [place]) ALLIE  Self No No   Sig: Check blood sugars multiple times per day   Continuous Glucose Sensor (FreeStyle Ana María 2 Sensor) MISC  Self No No   Sig: USE TO CHECK BLOOD SUGAR EVERY 14 DAYS   Continuous Glucose Sensor (FreeStyle Ana María 2 Sensor) MISC  Self No No    Sig: Check blood sugars multiple times per day   Insulin Pen Needle 32G X 4 MM MISC  Self No No   Sig: Use up to four times daily with insulin e11.9   acetaminophen (TYLENOL) 650 mg CR tablet  Self No No   Sig: Take 1 tablet (650 mg total) by mouth every 8 (eight) hours as needed for mild pain   atorvastatin (LIPITOR) 40 mg tablet  Self No No   Sig: take 1 tablet by mouth once daily   fluticasone (FLONASE) 50 mcg/act nasal spray  Self No No   Si spray into each nostril daily   furosemide (LASIX) 40 mg tablet  Self No No   Sig: take 1 tablet by mouth every morning   insulin aspart (NovoLOG FlexPen) 100 UNIT/ML injection pen  Self No No   Sig: INJECT 10 UNITS 3 TIMES A DAY BEFORE MEALS   insulin degludec (Tresiba FlexTouch) 100 units/mL injection pen  Self Yes No   Sig: Inject 35 units daily - obtains through patient assistance program   metoprolol succinate (TOPROL-XL) 25 mg 24 hr tablet  Self No No   Sig: take 1 tablet by mouth once daily   mupirocin (BACTROBAN) 2 % ointment   No No   Sig: Apply topically 3 (three) times a day for 7 days   Patient not taking: Reported on 2025   semaglutide, 0.25 or 0.5 mg/dose, (Ozempic, 0.25 or 0.5 MG/DOSE,) 2 mg/3 mL injection pen  Self Yes No   Sig: Inject 0.5 mg weekly   spironolactone (ALDACTONE) 25 mg tablet  Self No No   Sig: Take 1 tablet (25 mg total) by mouth daily   valsartan (DIOVAN) 320 MG tablet  Self No No   Sig: take 1 tablet by mouth once daily   warfarin (COUMADIN) 5 mg tablet   No No   Sig: Take one tablet daily      Facility-Administered Medications: None     Discharge Medication List as of 2025 12:40 AM        CONTINUE these medications which have NOT CHANGED    Details   acetaminophen (TYLENOL) 650 mg CR tablet Take 1 tablet (650 mg total) by mouth every 8 (eight) hours as needed for mild pain, Starting Thu 2025, Normal      atorvastatin (LIPITOR) 40 mg tablet take 1 tablet by mouth once daily, Starting Mon 3/31/2025, Normal      Continuous  Glucose  (FreeStyle Ana María 2 [place]) ALLIE Check blood sugars multiple times per day, Normal      !! Continuous Glucose Sensor (FreeStyle Ana María 2 Sensor) MISC USE TO CHECK BLOOD SUGAR EVERY 14 DAYS, Normal      !! Continuous Glucose Sensor (FreeStyle Ana María 2 Sensor) MISC Check blood sugars multiple times per day, Normal      fluticasone (FLONASE) 50 mcg/act nasal spray 1 spray into each nostril daily, Starting Tue 4/8/2025, Normal      furosemide (LASIX) 40 mg tablet take 1 tablet by mouth every morning, Starting Mon 3/31/2025, Normal      insulin aspart (NovoLOG FlexPen) 100 UNIT/ML injection pen INJECT 10 UNITS 3 TIMES A DAY BEFORE MEALS, Normal      insulin degludec (Tresiba FlexTouch) 100 units/mL injection pen Inject 35 units daily - obtains through patient assistance program, Historical Med      Insulin Pen Needle 32G X 4 MM MISC Use up to four times daily with insulin e11.9, Normal      metoprolol succinate (TOPROL-XL) 25 mg 24 hr tablet take 1 tablet by mouth once daily, Starting Mon 3/31/2025, Normal      mupirocin (BACTROBAN) 2 % ointment Apply topically 3 (three) times a day for 7 days, Starting Tue 7/2/2024, Until Tue 7/9/2024, Print      semaglutide, 0.25 or 0.5 mg/dose, (Ozempic, 0.25 or 0.5 MG/DOSE,) 2 mg/3 mL injection pen Inject 0.5 mg weekly, Historical Med      spironolactone (ALDACTONE) 25 mg tablet Take 1 tablet (25 mg total) by mouth daily, Starting Wed 5/29/2024, Normal      valsartan (DIOVAN) 320 MG tablet take 1 tablet by mouth once daily, Starting Tue 4/15/2025, Normal      warfarin (COUMADIN) 5 mg tablet Take one tablet daily, Normal       !! - Potential duplicate medications found. Please discuss with provider.        No discharge procedures on file.  ED SEPSIS DOCUMENTATION   Time reflects when diagnosis was documented in both MDM as applicable and the Disposition within this note       Time User Action Codes Description Comment    4/27/2025 12:38 AM Armand Garcias  [M65.341] Trigger finger, right ring finger                  Armand Garcias MD  04/27/25 0214

## 2025-04-28 ENCOUNTER — OFFICE VISIT (OUTPATIENT)
Dept: OBGYN CLINIC | Facility: CLINIC | Age: 60
End: 2025-04-28
Payer: COMMERCIAL

## 2025-04-28 DIAGNOSIS — M65.341 TRIGGER FINGER, RIGHT RING FINGER: Primary | ICD-10-CM

## 2025-04-28 PROCEDURE — NC001 PR NO CHARGE: Performed by: STUDENT IN AN ORGANIZED HEALTH CARE EDUCATION/TRAINING PROGRAM

## 2025-04-28 PROCEDURE — 88342 IMHCHEM/IMCYTCHM 1ST ANTB: CPT | Performed by: PATHOLOGY

## 2025-04-28 PROCEDURE — 88304 TISSUE EXAM BY PATHOLOGIST: CPT | Performed by: PATHOLOGY

## 2025-04-28 PROCEDURE — 99214 OFFICE O/P EST MOD 30 MIN: CPT | Performed by: STUDENT IN AN ORGANIZED HEALTH CARE EDUCATION/TRAINING PROGRAM

## 2025-04-28 RX ORDER — CHLORHEXIDINE GLUCONATE ORAL RINSE 1.2 MG/ML
15 SOLUTION DENTAL ONCE
OUTPATIENT
Start: 2025-04-28 | End: 2025-04-28

## 2025-04-28 RX ORDER — CEFAZOLIN SODIUM 2 G/50ML
2000 SOLUTION INTRAVENOUS ONCE
OUTPATIENT
Start: 2025-04-28 | End: 2025-04-28

## 2025-04-28 RX ORDER — SODIUM CHLORIDE, SODIUM LACTATE, POTASSIUM CHLORIDE, CALCIUM CHLORIDE 600; 310; 30; 20 MG/100ML; MG/100ML; MG/100ML; MG/100ML
20 INJECTION, SOLUTION INTRAVENOUS CONTINUOUS
OUTPATIENT
Start: 2025-04-28

## 2025-04-29 ENCOUNTER — TELEPHONE (OUTPATIENT)
Dept: OBGYN CLINIC | Facility: CLINIC | Age: 60
End: 2025-04-29

## 2025-04-29 ENCOUNTER — ANESTHESIA EVENT (OUTPATIENT)
Dept: PERIOP | Facility: HOSPITAL | Age: 60
End: 2025-04-29
Payer: COMMERCIAL

## 2025-04-29 ENCOUNTER — HOSPITAL ENCOUNTER (OUTPATIENT)
Dept: RADIOLOGY | Facility: HOSPITAL | Age: 60
Discharge: HOME | End: 2025-04-29

## 2025-04-29 NOTE — PRE-PROCEDURE INSTRUCTIONS
Pre-Surgery Instructions:   Medication Instructions    acetaminophen (TYLENOL) 650 mg CR tablet Uses PRN- OK to take day of surgery    atorvastatin (LIPITOR) 40 mg tablet Take day of surgery.    fluticasone (FLONASE) 50 mcg/act nasal spray Uses PRN- OK to take day of surgery    furosemide (LASIX) 40 mg tablet Hold day of surgery.    insulin aspart (NovoLOG FlexPen) 100 UNIT/ML injection pen Hold day of surgery.    insulin degludec (Tresiba FlexTouch) 100 units/mL injection pen Hold day of surgery.    metoprolol succinate (TOPROL-XL) 25 mg 24 hr tablet Take day of surgery.    semaglutide, 0.25 or 0.5 mg/dose, (Ozempic, 0.25 or 0.5 MG/DOSE,) 2 mg/3 mL injection pen Stop taking 7 days prior to surgery.LD 4/30    spironolactone (ALDACTONE) 25 mg tablet Hold day of surgery.    valsartan (DIOVAN) 320 MG tablet Hold day of surgery.    warfarin (COUMADIN) 5 mg tablet Instructions provided by MD    Medication instructions for day of surgery reviewed. Please take all instructed medications with only a sip of water.       You will receive a call one business day prior to surgery with an arrival time and hospital directions. If your surgery is scheduled on a Monday, the hospital will be calling you on the Friday prior to your surgery. If you have not heard from anyone by 8pm, please call the hospital supervisor through the hospital  at [phone]. ([place] [phone] or [place] [phone]).    Do not eat or drink anything after midnight the night before your surgery, including candy, mints, lifesavers, or chewing gum. Do not drink alcohol 24hrs before your surgery. Try not to smoke at least 24hrs before your surgery.       Follow the pre surgery showering instructions as listed in the “My Surgical Experience Booklet” or otherwise provided by your surgeon's office. Do not use a blade to shave the surgical area 1 week before surgery. It is okay to use a clean electric clippers up to 24 hours before surgery. Do not  apply any lotions, creams, including makeup, cologne, deodorant, or perfumes after showering on the day of your surgery. Do not use dry shampoo, hair spray, hair gel, or any type of hair products.     No contact lenses, eye make-up, or artificial eyelashes. Remove nail polish, including gel polish, and any artificial, gel, or acrylic nails if possible. Remove all jewelry including rings and body piercing jewelry.     Wear causal clothing that is easy to take on and off. Consider your type of surgery.    Keep any valuables, jewelry, piercings at home. Please bring any specially ordered equipment (sling, braces) if indicated.    Arrange for a responsible person to drive you to and from the hospital on the day of your surgery. Please confirm the visitor policy for the day of your procedure when you receive your phone call with an arrival time.     Call the surgeon's office with any new illnesses, exposures, or additional questions prior to surgery.    Please reference your “My Surgical Experience Booklet” for additional information to prepare for your upcoming surgery.    Pt. Verbalized an understanding of all instructions reviewed and offers no concerns at this time.

## 2025-04-29 NOTE — TELEPHONE ENCOUNTER
Please confirm cardiac clearance for patient.  He will be having Right Ring Finger trigger Release on 5/8 with Dr. Montalvo.    Also include Warfarin Hold Instructions.

## 2025-04-29 NOTE — PROGRESS NOTES
Name: Fadi Busby      : 1965      MRN: [id number]  Encounter Provider: Celestina Bethea PA-C  Encounter Date: 2025   Encounter department: [facility] [place]  :  Assessment & Plan  Postop check  Patient is doing well with no issues. The incision is healing well. No pain, constipation or diarrhea.          Pathology:  Final Diagnosis   A.  Subcutaneous mass, left abdomen (excisional biopsy):     - Nodular collection of fat necrosis and histiocytic / granulomatous inflammation. See comment.      Comment:  The findings may be secondary to trauma (correlate clinically).       History of Present Illness   HPI  Fadi Busby is a 59 y.o. male who presents s/p surgical excision of abdominal wall mass with Dr. Davison on 25. No complaints today. No pain, constipation or wound complaints.       Review of Systems   Constitutional:  Negative for chills, diaphoresis, fever and unexpected weight change.   Respiratory:  Negative for chest tightness and shortness of breath.    Cardiovascular:  Negative for chest pain, palpitations and leg swelling.   Gastrointestinal:  Negative for abdominal pain, anal bleeding, blood in stool, constipation, diarrhea, nausea, rectal pain and vomiting.   Genitourinary:  Negative for difficulty urinating and frequency.   Skin:  Negative for color change, rash and wound.   Neurological:  Negative for weakness and numbness.   Hematological:  Does not bruise/bleed easily.   Psychiatric/Behavioral:  Negative for confusion. The patient is not nervous/anxious.         Objective   There were no vitals taken for this visit.     Physical Exam  Skin:     Comments: Wound is healing well with no issues.

## 2025-05-01 DIAGNOSIS — I42.8 NONISCHEMIC CARDIOMYOPATHY (HCC): ICD-10-CM

## 2025-05-02 RX ORDER — SPIRONOLACTONE 25 MG/1
25 TABLET ORAL DAILY
Qty: 60 TABLET | Refills: 5 | Status: SHIPPED | OUTPATIENT
Start: 2025-05-02

## 2025-05-05 ENCOUNTER — TELEPHONE (OUTPATIENT)
Age: 60
End: 2025-05-05

## 2025-05-05 NOTE — TELEPHONE ENCOUNTER
Central scheduling advised this patient to contact ortho regarding MRIs. However, patient has never seen ortho for neck/back issue. Not sure who the ordering provider is but I attempted to help patient based on referral notes. I explained to patient based on referral, his insurance did not authorized yet because he has to complete 6 weeks of therapy. Patient states he spoke to insurance yesterday and they said it was authorized.    In Basket sent to PEC team   No concern  Take 1 mg tab to study (may cut in half prior). Take 1/2 tab (0.5 mg) if trouble falling asleep.   If still unable to fall asleep after 20 minutes, may take 2nd half

## 2025-05-06 ENCOUNTER — OFFICE VISIT (OUTPATIENT)
Dept: SURGERY | Facility: CLINIC | Age: 60
End: 2025-05-06

## 2025-05-06 ENCOUNTER — PRE-ADMISSION TESTING (OUTPATIENT)
Dept: PREADMISSION TESTING | Facility: HOSPITAL | Age: 60
End: 2025-05-06
Payer: COMMERCIAL

## 2025-05-06 VITALS
OXYGEN SATURATION: 95 % | RESPIRATION RATE: 16 BRPM | TEMPERATURE: 98 F | SYSTOLIC BLOOD PRESSURE: 116 MMHG | WEIGHT: 243 LBS | HEART RATE: 74 BPM | HEIGHT: 69 IN | BODY MASS INDEX: 35.99 KG/M2 | DIASTOLIC BLOOD PRESSURE: 68 MMHG

## 2025-05-06 DIAGNOSIS — Z09 POSTOP CHECK: Primary | ICD-10-CM

## 2025-05-06 PROCEDURE — 99024 POSTOP FOLLOW-UP VISIT: CPT | Performed by: PHYSICIAN ASSISTANT

## 2025-05-07 DIAGNOSIS — M65.341 TRIGGER FINGER, RIGHT RING FINGER: Primary | ICD-10-CM

## 2025-05-07 RX ORDER — SENNOSIDES 8.6 MG
650 CAPSULE ORAL EVERY 8 HOURS
Qty: 30 TABLET | Refills: 0 | Status: SHIPPED | OUTPATIENT
Start: 2025-05-07 | End: 2025-05-17

## 2025-05-07 RX ORDER — NAPROXEN 500 MG/1
500 TABLET ORAL 2 TIMES DAILY WITH MEALS
Qty: 20 TABLET | Refills: 0 | Status: SHIPPED | OUTPATIENT
Start: 2025-05-07 | End: 2025-05-17

## 2025-05-07 RX ORDER — ONDANSETRON 4 MG/1
4 TABLET, FILM COATED ORAL EVERY 8 HOURS PRN
Qty: 4 TABLET | Refills: 0 | Status: SHIPPED | OUTPATIENT
Start: 2025-05-07

## 2025-05-08 ENCOUNTER — HOSPITAL ENCOUNTER (OUTPATIENT)
Facility: HOSPITAL | Age: 60
Setting detail: OUTPATIENT SURGERY
Discharge: HOME | End: 2025-05-08
Attending: STUDENT IN AN ORGANIZED HEALTH CARE EDUCATION/TRAINING PROGRAM | Admitting: STUDENT IN AN ORGANIZED HEALTH CARE EDUCATION/TRAINING PROGRAM
Payer: COMMERCIAL

## 2025-05-08 ENCOUNTER — ANESTHESIA (OUTPATIENT)
Dept: PERIOP | Facility: HOSPITAL | Age: 60
End: 2025-05-08
Payer: COMMERCIAL

## 2025-05-08 VITALS
HEIGHT: 69 IN | WEIGHT: 242.51 LBS | OXYGEN SATURATION: 96 % | SYSTOLIC BLOOD PRESSURE: 120 MMHG | DIASTOLIC BLOOD PRESSURE: 60 MMHG | BODY MASS INDEX: 35.92 KG/M2 | HEART RATE: 74 BPM | RESPIRATION RATE: 20 BRPM | TEMPERATURE: 98.3 F

## 2025-05-08 LAB — GLUCOSE SERPL-MCNC: 104 MG/DL (ref 65–140)

## 2025-05-08 PROCEDURE — 82948 REAGENT STRIP/BLOOD GLUCOSE: CPT

## 2025-05-08 PROCEDURE — 26055 INCISE FINGER TENDON SHEATH: CPT | Performed by: STUDENT IN AN ORGANIZED HEALTH CARE EDUCATION/TRAINING PROGRAM

## 2025-05-08 PROCEDURE — 26055 INCISE FINGER TENDON SHEATH: CPT | Performed by: PHYSICIAN ASSISTANT

## 2025-05-08 RX ORDER — FENTANYL CITRATE/PF 50 MCG/ML
50 SYRINGE (ML) INJECTION
Status: DISCONTINUED | OUTPATIENT
Start: 2025-05-08 | End: 2025-05-08 | Stop reason: HOSPADM

## 2025-05-08 RX ORDER — SODIUM CHLORIDE, SODIUM LACTATE, POTASSIUM CHLORIDE, CALCIUM CHLORIDE 600; 310; 30; 20 MG/100ML; MG/100ML; MG/100ML; MG/100ML
INJECTION, SOLUTION INTRAVENOUS CONTINUOUS PRN
Status: DISCONTINUED | OUTPATIENT
Start: 2025-05-08 | End: 2025-05-08

## 2025-05-08 RX ORDER — CHLORHEXIDINE GLUCONATE ORAL RINSE 1.2 MG/ML
15 SOLUTION DENTAL ONCE
Status: COMPLETED | OUTPATIENT
Start: 2025-05-08 | End: 2025-05-08

## 2025-05-08 RX ORDER — MIDAZOLAM HYDROCHLORIDE 2 MG/2ML
INJECTION, SOLUTION INTRAMUSCULAR; INTRAVENOUS AS NEEDED
Status: DISCONTINUED | OUTPATIENT
Start: 2025-05-08 | End: 2025-05-08

## 2025-05-08 RX ORDER — CEFAZOLIN SODIUM 2 G/50ML
2000 SOLUTION INTRAVENOUS ONCE
Status: DISCONTINUED | OUTPATIENT
Start: 2025-05-08 | End: 2025-05-09 | Stop reason: HOSPADM

## 2025-05-08 RX ORDER — LIDOCAINE HYDROCHLORIDE 20 MG/ML
INJECTION, SOLUTION EPIDURAL; INFILTRATION; INTRACAUDAL; PERINEURAL AS NEEDED
Status: DISCONTINUED | OUTPATIENT
Start: 2025-05-08 | End: 2025-05-08

## 2025-05-08 RX ORDER — PROPOFOL 10 MG/ML
INJECTION, EMULSION INTRAVENOUS AS NEEDED
Status: DISCONTINUED | OUTPATIENT
Start: 2025-05-08 | End: 2025-05-08

## 2025-05-08 RX ORDER — ONDANSETRON 2 MG/ML
4 INJECTION INTRAMUSCULAR; INTRAVENOUS ONCE AS NEEDED
Status: DISCONTINUED | OUTPATIENT
Start: 2025-05-08 | End: 2025-05-08 | Stop reason: HOSPADM

## 2025-05-08 RX ORDER — MAGNESIUM HYDROXIDE 1200 MG/15ML
LIQUID ORAL AS NEEDED
Status: DISCONTINUED | OUTPATIENT
Start: 2025-05-08 | End: 2025-05-08 | Stop reason: HOSPADM

## 2025-05-08 RX ORDER — PROPOFOL 10 MG/ML
INJECTION, EMULSION INTRAVENOUS CONTINUOUS PRN
Status: DISCONTINUED | OUTPATIENT
Start: 2025-05-08 | End: 2025-05-08

## 2025-05-08 RX ORDER — SODIUM CHLORIDE, SODIUM LACTATE, POTASSIUM CHLORIDE, CALCIUM CHLORIDE 600; 310; 30; 20 MG/100ML; MG/100ML; MG/100ML; MG/100ML
20 INJECTION, SOLUTION INTRAVENOUS CONTINUOUS
Status: DISCONTINUED | OUTPATIENT
Start: 2025-05-08 | End: 2025-05-09 | Stop reason: HOSPADM

## 2025-05-08 RX ORDER — FENTANYL CITRATE 50 UG/ML
INJECTION, SOLUTION INTRAMUSCULAR; INTRAVENOUS AS NEEDED
Status: DISCONTINUED | OUTPATIENT
Start: 2025-05-08 | End: 2025-05-08

## 2025-05-08 RX ORDER — CEFAZOLIN SODIUM 2 G/50ML
SOLUTION INTRAVENOUS AS NEEDED
Status: DISCONTINUED | OUTPATIENT
Start: 2025-05-08 | End: 2025-05-08

## 2025-05-08 RX ADMIN — FENTANYL CITRATE 25 MCG: 50 INJECTION, SOLUTION INTRAMUSCULAR; INTRAVENOUS at 08:58

## 2025-05-08 RX ADMIN — SODIUM CHLORIDE, SODIUM LACTATE, POTASSIUM CHLORIDE, AND CALCIUM CHLORIDE: .6; .31; .03; .02 INJECTION, SOLUTION INTRAVENOUS at 08:46

## 2025-05-08 RX ADMIN — FENTANYL CITRATE 25 MCG: 50 INJECTION, SOLUTION INTRAMUSCULAR; INTRAVENOUS at 08:51

## 2025-05-08 RX ADMIN — MIDAZOLAM 2 MG: 1 INJECTION INTRAMUSCULAR; INTRAVENOUS at 08:48

## 2025-05-08 RX ADMIN — CHLORHEXIDINE GLUCONATE 15 ML: 1.2 SOLUTION ORAL at 08:00

## 2025-05-08 RX ADMIN — CEFAZOLIN SODIUM 2000 MG: 2 SOLUTION INTRAVENOUS at 08:50

## 2025-05-08 RX ADMIN — FENTANYL CITRATE 25 MCG: 50 INJECTION, SOLUTION INTRAMUSCULAR; INTRAVENOUS at 08:55

## 2025-05-08 RX ADMIN — LIDOCAINE HYDROCHLORIDE 100 MG: 20 INJECTION, SOLUTION EPIDURAL; INFILTRATION; INTRACAUDAL; PERINEURAL at 08:55

## 2025-05-08 RX ADMIN — PROPOFOL 100 MCG/KG/MIN: 10 INJECTION, EMULSION INTRAVENOUS at 08:55

## 2025-05-08 RX ADMIN — PROPOFOL 20 MG: 10 INJECTION, EMULSION INTRAVENOUS at 08:55

## 2025-05-08 RX ADMIN — FENTANYL CITRATE 25 MCG: 50 INJECTION, SOLUTION INTRAMUSCULAR; INTRAVENOUS at 08:53

## 2025-05-08 NOTE — ANESTHESIA POSTPROCEDURE EVALUATION
Post-Op Assessment Note    CV Status:  Stable    Pain management: adequate       Mental Status:  Alert and awake   Hydration Status:  Euvolemic   PONV Controlled:  Controlled   Airway Patency:  Patent     Post Op Vitals Reviewed: Yes    No anethesia notable event occurred.    Staff: Anesthesiologist           Last Filed PACU Vitals:  Vitals Value Taken Time   Temp 98.3 °F (36.8 °C) 05/08/25 0915   Pulse 72 05/08/25 0930   /66 05/08/25 0930   Resp 20 05/08/25 0930   SpO2 96 % 05/08/25 0930       Modified Raman:     Vitals Value Taken Time   Activity 2 05/08/25 0915   Respiration 2 05/08/25 0915   Circulation 2 05/08/25 0915   Consciousness 1 05/08/25 0915   Oxygen Saturation 2 05/08/25 0915     Modified Raman Score: 9

## 2025-05-08 NOTE — INTERVAL H&P NOTE
H&P reviewed. After examining the patient I find no changes in the patients condition since the H&P had been written.    Vitals:    05/08/25 0757   BP: 95/65   Pulse: 69   Resp: 16   Temp: 97.8 °F (36.6 °C)   SpO2: 100%

## 2025-05-08 NOTE — ANESTHESIA POSTPROCEDURE EVALUATION
Post-Op Assessment Note    CV Status:  Stable  Pain Score: 0    Pain management: adequate    Multimodal analgesia used between 6 hours prior to anesthesia start to PACU discharge    Mental Status:  Alert   Hydration Status:  Stable   PONV Controlled:  None   Airway Patency:  Patent  Two or more mitigation strategies used for obstructive sleep apnea   Post Op Vitals Reviewed: Yes    No anethesia notable event occurred.    Staff: CRNA           Last Filed PACU Vitals:  Vitals Value Taken Time   Temp     Pulse 75    BP 95/53    Resp 16    SpO2 99

## 2025-05-08 NOTE — PERIOPERATIVE NURSING NOTE
Patient received from PACU, awake, alert and oriented. Patient not having any complaints of pain or discomfort. Surgical dressing in place, clean, dry and intact. PO fluids given, tolerating well. Stretcher is lowest position and locked, side rails are up, call bell within reach. Wife at bedside.

## 2025-05-08 NOTE — DISCHARGE INSTR - AVS FIRST PAGE
Gonzalo Montalvo MD  Post-operative Instructions  -Trigger Finger Release-    Thank you for allowing me to participate in your care. It is a privilege to be able to take care of you. Should you have any questions about your post-operative care feel free to call my office at #[phone]  during business hours and either myself or a member of my team will address any questions or concerns you may have. If this is an urgent matter at night or on weekends please call [phone]  and ask the page  to page the covering 1st call orthopaedic physician.    Prescription refills or changes cannot be addressed after normal business hours or on weekends.      General Post op Instructions  Please allow your body time to heal. No sports, heavy lifting, or vigorous physical activity until you return for follow-up.   Return to your normal diet as soon as you are able.  It is normal to have some swelling and bruising of the hand and fingers post operatively. This can be uncomfortable. Prevent excessive swelling by performing the following:  Elevate your hand above the level of your heart as much as possible during the first 12-24 hours post op.    Move your fingers as much as possible, curling them gently into making a fist and extending them all the way out. Do this as often as you are able.   You may resume all of your home medications. If you are currently undergoing chemotherapy or treatment for a rheumatologic condition please be sure to discuss the continuation of these medications with me prior to leaving the hospital.   Driving:  You may not return to driving while you are taking narcotic pain medication. We strongly advise against return to driving if your procedure was performed on your Right side and your car has a manual transmission (“stick shift”).     Incision Care and Bathing  You may shower 24 hours after surgery. For the first 3 days you may shower but do not get the dressing wet. If you choose to  shower please tape a waterproof plastic bag around your arm to keep your incision dry and clean.   No swimming or submerging your wound in water until your sutures have been removed and the incision is completely healed.     Please keep you dressing in place for 3 days, after which time you may remove it. Wash your hand with warm, soapy water and apply a Band-Aid to the area to keep it covered.   Avoid placing any creams or ointments over the incision until your first post operative visit.  Pain Control  Following the program below will greatly decrease your post-operative pain.   Medications have been sent to your pharmacy.  Aleve (naproxen) 500 mg and Tylenol Arthritis 650 mg on the afternoon/evening of surgery. Do NOT take Aleve if you have a history of gastric ulcers, uncontrolled reflux or have been told previously by a physician that you should not take anti-inflammatory medications such as Advil/Aleve/Motrin.    Aleve (naproxen) 500 mg in the morning and afternoon, for about 2-3 days after the surgery; even if you have no pain.  You can stop two days after surgery if your hand does not hurt.     Tylenol Arthritis (or any brand of acetaminophen 8-hour), 650 mg every eight hours, with a maximum dose of 3000 mg per day, for about 2-3 days after the surgery, even if you have no pain. Tylenol Arthritis plus Aleve work together as a team to make each other stronger.     The maximum amount of Tylenol is 3000 mg per day, which is the same as 4 of the 650 mg pills. Remember; don't substitute any other medication for the Tylenol: don't take Motrin, aspirin, or any other over the counter medication. It must be Tylenol (or any brand of acetaminophen) for it to work as a team. Remember as well that Tylenol Arthritis is taken every 8 hours, the Aleve is twice a day.   (ondansetron or meclizine) has also been prescribed and is to help control any post-operative nausea you may be experiencing.    You may have had a nerve  block performed. This will last 8-24 hours. As the sensation in your hand returns you will begin to experience pain. Please take your pain medication prior to the return of full sensation in order to help control the pain in your arm.      Emergencies/When to Call  Please call the office if you have any questions or concerns regarding your post-operative care. We need to know if things are not going well.   Please make sure you call the office or page the 1st call orthopaedic physician immediately if you are having any of the following problems:  Fever greater than 101.0  Increasing pain or numbness not controlled on pain medications  Increasing bloody staining on the dressing   Chest pain, difficulty breathing, nausea or vomiting  Cold fingers that are not normal color (pink)   Any other concerning symptoms

## 2025-05-08 NOTE — H&P
ORTHOPAEDIC HAND, WRIST, AND ELBOW OFFICE  VISIT     Name: Fadi Busby      : 1965      MRN: [id number]  Encounter Provider: No name on file  Encounter Date: 2025   Encounter department: [facility] OPERATING ROOM  :  Assessment & Plan  Trigger finger, right ring finger    No associated orders from this encounter found during lookback period of 72 hours.    ASSESSMENT/PLAN:    Fadi Busby is a 59 y.o. RHD male who presents with locked right ring trigger finger.      We discussed the natural history and etiology of this condition detail. We discussed the utility of therapy vs. injection vs. surgery. In this case, the risks of surgery involve infection, persistent pain, and recurrence of the condition.       Since today would be the 4th steroid injection he has had. Therefore, surgery would be more beneficial at this time, for more permanent treatment and prevent functional deficits.   He will need to be off Ozempic for at least 7 days prior to surgery, therefore we will schedule this next week.   He will require cardiac clearance by cardiologist, secondary to his co-morbidities.   We will schedule surgery for 25 or 25    Follow Up:    Post op appointment.   ____________________________________________________________________________________________________________________________________________      CHIEF COMPLAINT:  Right ring finger pain     SUBJECTIVE:  Fadi Busby is a 59 y.o.RHD male who presents with right ring trigger finger, which locked on him 25. This has happened previously, but was able to unlock it himself. This time, it has been locked for 4 days. His last steroid injection hasn't been for months.     He has a history of strokes, with permanent left sided weakness/ paralysis. He can only use his right hand. He is on Coumadin and Ozempic.       Handedness: right  Work status: drives currently     I have personally reviewed all the relevant PMH,  PSH, SH, FH, Medications and allergies      PAST MEDICAL HISTORY:  Past Medical History:   Diagnosis Date    Acute pain of right shoulder 01/24/2024    H/O blood clots     Infected skin lesion 07/02/2024    Paronychia of right index finger 08/15/2023    Post-op pain 03/21/2024    Sleep apnea     does not use cpap    Stroke (HCC)     Trichomonas contact, treated 01/24/2024       PAST SURGICAL HISTORY:  Past Surgical History:   Procedure Laterality Date    CARDIAC ELECTROPHYSIOLOGY PROCEDURE N/A 3/21/2024    Procedure: Cardiac biv pacer implant;  Surgeon: Nahun Rees MD;  Location:  CARDIAC CATH LAB;  Service: Cardiology    HERNIA REPAIR      At 18 years old    ND EXC B9 LESION MRGN XCP SK TG T/A/L >4.0 CM Left 4/21/2025    Procedure: EXCISION  BIOPSY LESION/MASS ABDOMINAL WALL LEFT LOWER QUADRANT;  Surgeon: Ezequiel Davison MD;  Location: [facility];  Service: General       FAMILY HISTORY:  History reviewed. No pertinent family history.    SOCIAL HISTORY:  Social History     Tobacco Use    Smoking status: Some Days     Types: Cigarettes     Passive exposure: Never    Smokeless tobacco: Never   Vaping Use    Vaping status: Some Days    Substances: Nicotine (last vape 4/21)   Substance Use Topics    Alcohol use: Never    Drug use: Never       MEDICATIONS:  No current facility-administered medications for this encounter.    Current Outpatient Medications:     acetaminophen (TYLENOL) 650 mg CR tablet, Take 1 tablet (650 mg total) by mouth every 8 (eight) hours as needed for mild pain, Disp: 30 tablet, Rfl: 0    atorvastatin (LIPITOR) 40 mg tablet, take 1 tablet by mouth once daily, Disp: 90 tablet, Rfl: 3    fluticasone (FLONASE) 50 mcg/act nasal spray, 1 spray into each nostril daily, Disp: 15.8 mL, Rfl: 1    furosemide (LASIX) 40 mg tablet, take 1 tablet by mouth every morning, Disp: 90 tablet, Rfl: 3    insulin aspart (NovoLOG FlexPen) 100 UNIT/ML injection pen, INJECT 10 UNITS 3 TIMES A DAY BEFORE MEALS, Disp:  15 mL, Rfl: 1    insulin degludec (Tresiba FlexTouch) 100 units/mL injection pen, Inject 35 units daily - obtains through patient assistance program, Disp: , Rfl:     metoprolol succinate (TOPROL-XL) 25 mg 24 hr tablet, take 1 tablet by mouth once daily, Disp: 90 tablet, Rfl: 3    semaglutide, 0.25 or 0.5 mg/dose, (Ozempic, 0.25 or 0.5 MG/DOSE,) 2 mg/3 mL injection pen, Inject 0.5 mg weekly, Disp: , Rfl:     valsartan (DIOVAN) 320 MG tablet, take 1 tablet by mouth once daily, Disp: 90 tablet, Rfl: 3    warfarin (COUMADIN) 5 mg tablet, Take one tablet daily, Disp: 192.85 tablet, Rfl: 3    Continuous Glucose  (FreeStyle Ana María 2 Nancy) ALLIE, Check blood sugars multiple times per day, Disp: 1 each, Rfl: 0    Continuous Glucose Sensor (FreeStyle Ana María 2 Sensor) MISC, USE TO CHECK BLOOD SUGAR EVERY 14 DAYS, Disp: 6 each, Rfl: 0    Continuous Glucose Sensor (FreeStyle Ana María 2 Sensor) MISC, Check blood sugars multiple times per day, Disp: 6 each, Rfl: 6    Insulin Pen Needle 32G X 4 MM MISC, Use up to four times daily with insulin e11.9, Disp: 400 each, Rfl: 3    mupirocin (BACTROBAN) 2 % ointment, Apply topically 3 (three) times a day for 7 days (Patient not taking: Reported on 5/6/2025), Disp: 30 g, Rfl: 0    spironolactone (ALDACTONE) 25 mg tablet, take 1 tablet by mouth once daily, Disp: 60 tablet, Rfl: 5    ALLERGIES:  No Known Allergies      REVIEW OF SYSTEMS:  Pertinent items are noted in HPI.  A comprehensive review of systems was negative.    VITALS:  There were no vitals filed for this visit.    LABS:  HgA1c:   Lab Results   Component Value Date    HGBA1C 7.0 (A) 04/14/2025     BMP:   Lab Results   Component Value Date    CALCIUM 8.9 04/07/2025    K 4.1 04/07/2025    CO2 31 04/07/2025     04/07/2025    BUN 18 04/07/2025    CREATININE 1.41 (H) 04/07/2025       _____________________________________________________  PHYSICAL EXAMINATION:  General: well developed and well nourished, alert, oriented  times 3, and appears comfortable  Psychiatric: Normal  HEENT: Normocephalic, Atraumatic Trachea Midline, No torticollis  Pulmonary: No audible wheezing or respiratory distress   Abdomen/GI: Non tender, non distended   Cardiovascular: Regular Rate and Rhythm. No pitting edema, 2+ radial pulse   Skin: No masses, erythema, lacerations, fluctation, ulcerations  Neurovascular: Sensation Intact to the Median, Ulnar, Radial Nerve, Motor Intact to the Median, Ulnar, Radial Nerve, and Pulses Intact  Musculoskeletal: Normal, except as noted in detailed exam and in HPI.        FOCUSED MUSCULOSKELETAL EXAMINATION:  Right Upper Extremity  Inspection: full flexed right ring finger. No edema, ecchymoses.   Palpation: + ttp at right palmar A-1 pulley   Neurologic: 5/5 elbow flexion, 5/5 elbow extension, 5/5 wrist extension, 5/5 wrist flexion, 4/5 finger flexion, 0/5 finger extension, 5/5 FPL, 5/5 EPL, 5/5 APB, 5/5 intrinsics, sensation intact to median, radial, and ulnar nerve distributions  Vascular: Palpable radial pulse, brisk cap refill <2sec, hand warm and well perfused  MSK: strength of finger is primarily of ring finger secondary to locked trigger, full 5/5 strength with other digits without deficit  Right ring finger: contracted, unable to be manually extended.     ___________________________________________________  STUDIES REVIEWED:  Xrays of the right hand were obtained on 4/27/25 were independently reviewed which demonstrates full flexion of right ring finger, without any fracture or dislocation.       LABS REVIEWED:    HgA1c:   Lab Results   Component Value Date    HGBA1C 7.0 (A) 04/14/2025     BMP:   Lab Results   Component Value Date    CALCIUM 8.9 04/07/2025    K 4.1 04/07/2025    CO2 31 04/07/2025     04/07/2025    BUN 18 04/07/2025    CREATININE 1.41 (H) 04/07/2025       PROCEDURES PERFORMED:  Procedures      _____________________________________________________      Brent Martínez I,:   am acting  as a scribe while in the presence of the attending physician.:       I,:   personally performed the services described in this documentation    as scribed in my presence.:               I agree with the history, physical examination, assessment and plan of care as documented above.    Gonzalo Montalvo M.D.  Attending, Orthopaedic Surgery  Hand, Wrist, and Elbow Surgery  [facility]

## 2025-05-08 NOTE — ANESTHESIA PREPROCEDURE EVALUATION
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         Additional Monitors:     Airway Plan:            Plan Factors-Exercise tolerance (METS): >4 METS.    Chart reviewed.   Existing labs reviewed. Patient summary reviewed.    Patient is a current smoker.  Patient did not smoke on day of surgery.    Obstructive sleep apnea risk education given perioperatively.        Induction-     Postoperative Plan-     Perioperative Resuscitation Plan - Level 1 - Full Code.       Informed Consent- Anesthetic plan and risks discussed with patient.  I personally reviewed this patient with the CRNA. Discussed and agreed on the Anesthesia Plan with the CRNA..      NPO Status:  Vitals Value Taken Time   Date of last liquid 05/07/25 05/08/25 0747   Time of last liquid 2000 05/08/25 0747   Date of last solid 05/07/25 05/08/25 0747   Time of last solid 2000 05/08/25 0747

## 2025-05-08 NOTE — OP NOTE
OPERATIVE REPORT  PATIENT NAME: Fadi Busby    :  1965  MRN: [id number]  Pt Location: [facility] OR ROOM 03    SURGERY DATE: 2025    Surgeons and Role:     * Gonzalo Montalvo MD - Primary     * Gonzalo Mandujano PA-C - Assisting    Preop Diagnosis:  Trigger finger, right ring finger [M65.341]    Post-Op Diagnosis Codes:     * Trigger finger, right ring finger [M65.341]    Procedure(s):  Right - RELEASE RIGHT RING TRIGGER FINGER    Specimen(s):  * No specimens in log *    Estimated Blood Loss:   Minimal    Antibiotics: Ancef 2gm    Anesthesia Type:   IV Sedation with Anesthesia    Operative Indications:      This is a very pleasant male patient who unfortunately despite a course of conservative treatment continued to have discomfort due to their trigger finger. After discussing the risks, benefits, potential gains and complications of surgical intervention versus nonoperative treatment, the patient elected to proceed forward with surgical intervention.  All questions were answered.  The patient signed informed consent.       PREPARATION:  The patient was identified in the holding area, the right ring finger finger was marked.  They were then taken to the operating room, in which the arm was scrubbed with a chlorhexidine paint.  After waiting 3 minutes, they were draped in the usual sterile fashion.         PROCEDURE:  A timeout was performed in which the site, side and laterality were confirmed by all members of the team.  Subsequent to that, we marked linear incision over the long axis of the right ring finger at the level of the A1 pulley. The area was anesthetized with 1% lidocaine and 0.25% bupivacaine with epinephrine.  Following that, skin was incised with a #15 blade.  Hemostasis was achieved with bipolar electrocautery.  The Littler scissors to spread  longitudinally and horizontally a pocket, into which two Ragnell retractors were inserted to expose the underlying A1 pulley of the right ring  finger.  This was opened from the level of the A0 pulley to the level of the A2.  The tendons were then interrogated and Adson's forceps and Littler scissors were used to perform a tenosynovectomy of the FDP tendon. Following that, the tendons were much improved.  The wound was copiously irrigated and closed with a 4-0 nylon.  The wound was then dressed in Adaptic and moist 4 x 4 gauze, and a 1-inch Coban.  The patient was transported to the PACU in stable condition with no untoward immediate complications.         POSTOPERATIVE PLAN:  The patient will return to clinic in approximately 10-12 days, at which time the sutures will be removed and they will begin a scar massage protocol.        I was present for all critical portions of the procedure. A qualified resident physician was not available, and a physician assistant was required during the procedure for retraction, tissue handling, dissection and suturing.                SIGNATURE: Gonzalo Montalvo MD  DATE: May 8, 2025  TIME: 9:32 AM

## 2025-05-16 ENCOUNTER — OFFICE VISIT (OUTPATIENT)
Dept: OBGYN CLINIC | Facility: CLINIC | Age: 60
End: 2025-05-16

## 2025-05-16 ENCOUNTER — TELEPHONE (OUTPATIENT)
Dept: OBGYN CLINIC | Facility: CLINIC | Age: 60
End: 2025-05-16

## 2025-05-16 VITALS — WEIGHT: 242 LBS | BODY MASS INDEX: 35.84 KG/M2 | HEIGHT: 69 IN

## 2025-05-16 DIAGNOSIS — Z98.890 S/P TRIGGER FINGER RELEASE: Primary | ICD-10-CM

## 2025-05-16 PROCEDURE — 99024 POSTOP FOLLOW-UP VISIT: CPT | Performed by: PHYSICIAN ASSISTANT

## 2025-05-16 NOTE — PROGRESS NOTES
"  ORTHOPAEDIC HAND, WRIST, AND ELBOW OFFICE  VISIT     Name: Fadi Busby      : 1965      MRN: [id number]  Encounter Provider: Sanjuanita Bishop PA-C  Encounter Date: 2025   Encounter department: [facility]  :  Assessment & Plan  S/P trigger finger release  Sutures removed and steri-strips applied  Avoid submerging hand in standing water  Orders:    Suture removal      Assessment/Plan:  Patient ID: Fadi Busby 59 y.o. male   Surgery: Release Right Ring Trigger Finger - Right  Date of Surgery: 2025      Sutures were removed without complication. Patient educated on regular daily digital ROM to minimize stiffness and maximize functional recovery.    Educated on scar massage, discussed remodeling phase can last months; interventions aim to optimize collagen alignment and reduce hypertrophic scarring. Using aquaphor, perform gentle, circular massage to realign collagen fibers, improve circulation, and prevent adhesions   Several times daily over several months.  Silicone Scar Tape: Provides a moist, occlusive environment to hydrate skin, modulate collagen production, and reduce scar thickness. Used 12-24 hours per day over a period of 3-6 months. monitor for skin irritation from adhesive, otherwise continue to use as tolerated.  Flexcom Professional Silicone Scar Sheets (Roll 1.6 * 160\"- 4.06M) is one cheap option you can purchase online, any silicone scar tape will work.  Sequential or concurrent use of scar tape with scar massage can enhance outcomes.      Patient will follow up in 4 weeks for re-evaluation    CHIEF COMPLAINT:  Chief Complaint   Patient presents with    Right Hand - Post-op         SUBJECTIVE:  Fadi Busby is a 59 y.o. year old male who presents for follow up after Release Right Ring Trigger Finger - Right. Today patient is doing well. Pain well controlled. He denies any locking or catching of the ring finger following " surgery.      PAST MEDICAL HISTORY:  Past Medical History:   Diagnosis Date    Acute pain of right shoulder 01/24/2024    H/O blood clots     Infected skin lesion 07/02/2024    Paronychia of right index finger 08/15/2023    Post-op pain 03/21/2024    Sleep apnea     does not use cpap    Stroke (HCC)     Trichomonas contact, treated 01/24/2024       PAST SURGICAL HISTORY:  Past Surgical History:   Procedure Laterality Date    CARDIAC ELECTROPHYSIOLOGY PROCEDURE N/A 3/21/2024    Procedure: Cardiac biv pacer implant;  Surgeon: Nahun Rees MD;  Location:  CARDIAC CATH LAB;  Service: Cardiology    HERNIA REPAIR      At 18 years old    HI EXC B9 LESION MRGN XCP SK TG T/A/L >4.0 CM Left 4/21/2025    Procedure: EXCISION  BIOPSY LESION/MASS ABDOMINAL WALL LEFT LOWER QUADRANT;  Surgeon: Ezequiel Davison MD;  Location: [facility];  Service: General    HI TENDON SHEATH INCISION Right 5/8/2025    Procedure: RELEASE RIGHT RING TRIGGER FINGER;  Surgeon: Gonzalo Montalvo MD;  Location: [facility];  Service: Orthopedics       FAMILY HISTORY:  No family history on file.    SOCIAL HISTORY:  Social History[1]    MEDICATIONS:  Current Medications[2]    ALLERGIES:  Allergies[3]      REVIEW OF SYSTEMS:  Pertinent items are noted in HPI.  A comprehensive review of systems was negative.    VITALS:  There were no vitals filed for this visit.    LABS:  HgA1c:   Lab Results   Component Value Date    HGBA1C 7.0 (A) 04/14/2025     BMP:   Lab Results   Component Value Date    CALCIUM 8.9 04/07/2025    K 4.1 04/07/2025    CO2 31 04/07/2025     04/07/2025    BUN 18 04/07/2025    CREATININE 1.41 (H) 04/07/2025       _____________________________________________________  PHYSICAL EXAMINATION:  General: well developed and well nourished, alert, oriented times 3, and appears comfortable  Psychiatric: Normal  HEENT: Normocephalic, Atraumatic Trachea Midline, No torticollis  Pulmonary: No audible wheezing or respiratory distress  "  Abdomen/GI: Non tender, non distended   Cardiovascular: Regular Rate and Rhythm. No pitting edema, 2+ radial pulse   Skin: No masses, erythema, lacerations, fluctation, ulcerations  Neurovascular: Sensation Intact to the Median, Ulnar, Radial Nerve, Motor Intact to the Median, Ulnar, Radial Nerve, and Pulses Intact  Musculoskeletal: Normal, except as noted in detailed exam and in HPI.        FOCUSED MUSCULOSKELETAL EXAMINATION:  Right Upper Extremity  Inspection: incision appears clean, dry, and intact  Palpation: no TTP  Neurologic: 5/5 elbow flexion, 5/5 elbow extension, 5/5 wrist extension, 5/5 wrist flexion, 5/5 finger flexion, 5/5 finger extension, 5/5 FPL, 5/5 EPL, 5/5 APB, 5/5 intrinsics, sensation intact to median, radial, and ulnar nerve distributions  Vascular: Palpable radial pulse, brisk cap refill <2sec, hand warm and well perfused  MSK:   Full FDS, FDP, extensor mechanisms are intact  No rotational deformity with composite finger flexion  No reproducible triggering on exam  Demonstrates normal wrist, elbow, and shoulder motion  Forearm compartments are soft and supple  2+ distal radial pulse with brisk capillary refill to the fingers  Radial, median, and ulnar motor and sensory distribution intact  Sensations light to touch intact distally   ___________________________________________________  STUDIES REVIEWED:  No studies to review.    PROCEDURES PERFORMED:  Suture removal    Date/Time: 5/16/2025 1:00 PM    Performed by: Pat Gibson  Authorized by: Sanjuanita Bishop PA-C    Universal Protocol:  Consent: Verbal consent obtained  Risks and benefits: risks, benefits and alternatives were discussed  Consent given by: patient  Time out: Immediately prior to procedure a \"time out\" was called to verify the correct patient, procedure, equipment, support staff and site/side marked as required.  Patient understanding: patient states understanding of the procedure being performed  Patient identity " confirmed: verbally with patient      Patient location:  Clinic  Location:     Laterality:  Right    Location:  Upper extremity    Upper extremity location:  Hand    Hand location:  R hand  Procedure details:     Tools used:  Suture removal kit    Wound appearance:  No sign(s) of infection, good wound healing and clean  Post-procedure details:     Post-removal:  Steri-Strips applied    Patient tolerance of procedure:  Tolerated well, no immediate complications         [1]   Social History  Tobacco Use    Smoking status: Some Days     Types: Cigarettes     Passive exposure: Never    Smokeless tobacco: Never   Vaping Use    Vaping status: Some Days    Substances: Nicotine (last vape 4/21)   Substance Use Topics    Alcohol use: Never    Drug use: Never   [2]   Current Outpatient Medications:     acetaminophen (TYLENOL) 650 mg CR tablet, Take 1 tablet (650 mg total) by mouth every 8 (eight) hours as needed for mild pain, Disp: 30 tablet, Rfl: 0    acetaminophen (TYLENOL) 650 mg CR tablet, Take 1 tablet (650 mg total) by mouth every 8 (eight) hours for 10 days, Disp: 30 tablet, Rfl: 0    atorvastatin (LIPITOR) 40 mg tablet, take 1 tablet by mouth once daily, Disp: 90 tablet, Rfl: 3    Continuous Glucose  (FreeStyle Ana María 2 [place]) ALLIE, Check blood sugars multiple times per day, Disp: 1 each, Rfl: 0    Continuous Glucose Sensor (FreeStyle Ana María 2 Sensor) MISC, USE TO CHECK BLOOD SUGAR EVERY 14 DAYS, Disp: 6 each, Rfl: 0    Continuous Glucose Sensor (FreeStyle Ana María 2 Sensor) MISC, Check blood sugars multiple times per day, Disp: 6 each, Rfl: 6    fluticasone (FLONASE) 50 mcg/act nasal spray, 1 spray into each nostril daily, Disp: 15.8 mL, Rfl: 1    furosemide (LASIX) 40 mg tablet, take 1 tablet by mouth every morning, Disp: 90 tablet, Rfl: 3    insulin aspart (NovoLOG FlexPen) 100 UNIT/ML injection pen, INJECT 10 UNITS 3 TIMES A DAY BEFORE MEALS, Disp: 15 mL, Rfl: 1    insulin degludec (Tresiba FlexTouch) 100  units/mL injection pen, Inject 35 units daily - obtains through patient assistance program, Disp: , Rfl:     Insulin Pen Needle 32G X 4 MM MISC, Use up to four times daily with insulin e11.9, Disp: 400 each, Rfl: 3    metoprolol succinate (TOPROL-XL) 25 mg 24 hr tablet, take 1 tablet by mouth once daily, Disp: 90 tablet, Rfl: 3    naproxen (Naprosyn) 500 mg tablet, Take 1 tablet (500 mg total) by mouth 2 (two) times a day with meals for 20 doses, Disp: 20 tablet, Rfl: 0    ondansetron (ZOFRAN) 4 mg tablet, Take 1 tablet (4 mg total) by mouth every 8 (eight) hours as needed for nausea or vomiting for up to 4 doses, Disp: 4 tablet, Rfl: 0    semaglutide, 0.25 or 0.5 mg/dose, (Ozempic, 0.25 or 0.5 MG/DOSE,) 2 mg/3 mL injection pen, Inject 0.5 mg weekly, Disp: , Rfl:     spironolactone (ALDACTONE) 25 mg tablet, take 1 tablet by mouth once daily, Disp: 60 tablet, Rfl: 5    valsartan (DIOVAN) 320 MG tablet, take 1 tablet by mouth once daily, Disp: 90 tablet, Rfl: 3    warfarin (COUMADIN) 5 mg tablet, Take one tablet daily, Disp: 192.85 tablet, Rfl: 3  [3] No Known Allergies

## 2025-05-16 NOTE — TELEPHONE ENCOUNTER
Spoke with him to change his appointment with Dr. Montalvo    Rescheduled as Sanjuanita is in the OR

## 2025-05-16 NOTE — ASSESSMENT & PLAN NOTE
Sutures removed and steri-strips applied  Avoid submerging hand in standing water  Orders:    Suture removal

## 2025-05-19 ENCOUNTER — APPOINTMENT (OUTPATIENT)
Dept: LAB | Facility: HOSPITAL | Age: 60
End: 2025-05-19
Payer: COMMERCIAL

## 2025-05-19 ENCOUNTER — ANTICOAG VISIT (OUTPATIENT)
Dept: FAMILY MEDICINE CLINIC | Facility: CLINIC | Age: 60
End: 2025-05-19

## 2025-05-19 ENCOUNTER — TELEPHONE (OUTPATIENT)
Dept: FAMILY MEDICINE CLINIC | Facility: CLINIC | Age: 60
End: 2025-05-19

## 2025-05-19 DIAGNOSIS — Z79.01 LONG TERM (CURRENT) USE OF ANTICOAGULANTS: Primary | ICD-10-CM

## 2025-05-19 DIAGNOSIS — Z86.711 HISTORY OF PULMONARY EMBOLISM: ICD-10-CM

## 2025-05-19 DIAGNOSIS — Z79.01 LONG TERM (CURRENT) USE OF ANTICOAGULANTS: ICD-10-CM

## 2025-05-19 LAB
INR PPP: 2.86 (ref 0.85–1.19)
PROTHROMBIN TIME: 29.7 SECONDS (ref 12.3–15)

## 2025-05-19 PROCEDURE — 85610 PROTHROMBIN TIME: CPT

## 2025-05-19 PROCEDURE — 36415 COLL VENOUS BLD VENIPUNCTURE: CPT

## 2025-05-19 NOTE — TELEPHONE ENCOUNTER
Telephone santos to patient to discuss his INR result and need for an updated blood work. He will get it done tomorrow 5/20/2025. Will follow results.

## 2025-05-21 ENCOUNTER — OFFICE VISIT (OUTPATIENT)
Dept: FAMILY MEDICINE CLINIC | Facility: CLINIC | Age: 60
End: 2025-05-21

## 2025-05-21 ENCOUNTER — RESULTS FOLLOW-UP (OUTPATIENT)
Dept: FAMILY MEDICINE CLINIC | Facility: CLINIC | Age: 60
End: 2025-05-21

## 2025-05-21 VITALS
RESPIRATION RATE: 16 BRPM | HEIGHT: 69 IN | WEIGHT: 248 LBS | SYSTOLIC BLOOD PRESSURE: 112 MMHG | HEART RATE: 90 BPM | TEMPERATURE: 98 F | BODY MASS INDEX: 36.73 KG/M2 | DIASTOLIC BLOOD PRESSURE: 80 MMHG | OXYGEN SATURATION: 97 %

## 2025-05-21 DIAGNOSIS — Z79.4 TYPE 2 DIABETES MELLITUS WITH RETINOPATHY AND MACULAR EDEMA, WITH LONG-TERM CURRENT USE OF INSULIN, UNSPECIFIED LATERALITY, UNSPECIFIED RETINOPATHY SEVERITY (HCC): Primary | ICD-10-CM

## 2025-05-21 DIAGNOSIS — E11.311 TYPE 2 DIABETES MELLITUS WITH RETINOPATHY AND MACULAR EDEMA, WITH LONG-TERM CURRENT USE OF INSULIN, UNSPECIFIED LATERALITY, UNSPECIFIED RETINOPATHY SEVERITY (HCC): Primary | ICD-10-CM

## 2025-05-21 DIAGNOSIS — I10 PRIMARY HYPERTENSION: ICD-10-CM

## 2025-05-21 NOTE — ASSESSMENT & PLAN NOTE
Blood pressure 112/80  Controlled on Lasix 40 mg daily, spironolactone 25 mg daily, valsartan 320 mg daily, and Metoprolol succinate  25 mg daily  Continue current regimen

## 2025-05-21 NOTE — ASSESSMENT & PLAN NOTE
Lab Results   Component Value Date    HGBA1C 7.0 (A) 04/14/2025   Controlled  Will need a repeat Hgb A1c in July 2025  Currently on mealtime insulin TID with meals and Degludec daily   Denies sx of hypoglycemia  Discussed symptoms of hyper/hypoglycemia  4/7/25- Cr 1.41; 8/28/2024- Alb/cr ratio-2  Foot exam today wnl  Will repeat albumin/cr ratio and CMP  Orders:  •  Albumin / creatinine urine ratio; Future  •  Comprehensive metabolic panel; Future

## 2025-05-21 NOTE — PROGRESS NOTES
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, with long-term current use of insulin, unspecified laterality, unspecified retinopathy severity (HCC)    Lab Results   Component Value Date    HGBA1C 7.0 (A) 2025   Controlled  Will need a repeat Hgb A1c in 2025  Currently on mealtime insulin TID with meals and Degludec daily   Denies sx of hypoglycemia  Discussed symptoms of hyper/hypoglycemia  25- Cr 1.41; 2024- Alb/cr ratio-2  Foot exam today wnl  Will repeat albumin/cr ratio and CMP  Orders:  •  Albumin / creatinine urine ratio; Future  •  Comprehensive metabolic panel; Future         History of Present Illness {?Quick Links Encounters * My Last Note * Last Note in Specialty * Snapshot * Since Last Visit * History :39580}    Fadi Busby is a 59 y.o. male with pmhx of T2DM, HTN, PE ,CHF and Bi-V PM presenting today for review of chronic conditions. Patient reports that they are doing well/ have been compliant with medications.  Problems stable unless otherwise mentioned.  No acute complaints.       Review of Systems  Per HPI  Objective {?Quick Links Trend Vitals * Enter New Vitals * Results Review * Timeline (Adult) * Labs * Imaging * Cardiology * Procedures * Lung Cancer Screening * Surgical eConsent :77086}  /80 (BP Location: Right arm, Patient Position: Sitting, Cuff Size: Standard)   Pulse 90   Temp 98 °F (36.7 °C) (Temporal)   Resp 16   Ht 5' 9\" (1.753 m)   Wt 112 kg (248 lb)   SpO2 97%   BMI 36.62 kg/m²      Physical Exam  Vitals " reviewed.   Constitutional:       General: He is not in acute distress.     Appearance: Normal appearance. He is obese. He is not ill-appearing.   HENT:      Head: Normocephalic.      Ears:      Comments: Intermittent Pacemaker click    Cardiovascular:      Rate and Rhythm: Normal rate and regular rhythm.      Pulses: no weak pulses.           Dorsalis pedis pulses are 2+ on the right side and 2+ on the left side.        Posterior tibial pulses are 1+ on the right side and 1+ on the left side.      Heart sounds: Normal heart sounds.      No friction rub. No gallop.   Pulmonary:      Effort: Pulmonary effort is normal. No respiratory distress.      Breath sounds: Normal breath sounds.     Musculoskeletal:      Right lower leg: Edema present.      Left lower leg: Edema present.   Feet:      Right foot:      Protective Sensation: 4 sites tested.  4 sites sensed.      Skin integrity: No erythema, warmth, callus or dry skin.      Toenail Condition: Right toenails are abnormally thick and long.      Left foot:      Protective Sensation: 4 sites tested.  4 sites sensed.      Skin integrity: No erythema, warmth, callus or dry skin.      Toenail Condition: Left toenails are abnormally thick and long.     Neurological:      Mental Status: He is oriented to person, place, and time.     Psychiatric:         Behavior: Behavior normal.       Diabetic Foot Exam    Patient's shoes and socks removed.    Right Foot/Ankle   Right Foot Inspection  Skin Exam: skin intact. No dry skin, no warmth, no callus, no erythema and no callus.     Toe Exam: No swelling and erythema    Sensory   Proprioception: intact  Monofilament testing: intact    Vascular  The right DP pulse is 2+. The right PT pulse is 1+.     Left Foot/Ankle  Left Foot Inspection  Skin Exam: skin intact. No dry skin, no warmth, no erythema and no callus.     Toe Exam: No swelling and no erythema.     Sensory   Proprioception: intact  Monofilament testing:  intact    Vascular  The left DP pulse is 2+. The left PT pulse is 1+.     Assign Risk Category  No deformity present  No loss of protective sensation  No weak pulses  Risk: 0

## 2025-05-29 ENCOUNTER — HOSPITAL ENCOUNTER (OUTPATIENT)
Dept: NON INVASIVE DIAGNOSTICS | Facility: HOSPITAL | Age: 60
Discharge: HOME | End: 2025-05-29
Payer: COMMERCIAL

## 2025-05-29 VITALS
HEART RATE: 86 BPM | WEIGHT: 248 LBS | BODY MASS INDEX: 36.73 KG/M2 | SYSTOLIC BLOOD PRESSURE: 106 MMHG | DIASTOLIC BLOOD PRESSURE: 57 MMHG | HEIGHT: 69 IN

## 2025-05-29 DIAGNOSIS — I42.9 CARDIOMYOPATHY, UNSPECIFIED TYPE (HCC): ICD-10-CM

## 2025-05-29 DIAGNOSIS — Z95.0 PRESENCE OF PERMANENT CARDIAC PACEMAKER: ICD-10-CM

## 2025-05-29 PROCEDURE — 93306 TTE W/DOPPLER COMPLETE: CPT

## 2025-05-29 PROCEDURE — 93306 TTE W/DOPPLER COMPLETE: CPT | Performed by: INTERNAL MEDICINE

## 2025-06-01 LAB
AORTIC ROOT: 3.5 CM
AORTIC VALVE MEAN VELOCITY: 8.3 M/S
ASCENDING AORTA: 3.7 CM
AV AREA BY CONTINUOUS VTI: 2.5 CM2
AV AREA PEAK VELOCITY: 2.4 CM2
AV LVOT MEAN GRADIENT: 2 MMHG
AV LVOT PEAK GRADIENT: 3 MMHG
AV MEAN PRESS GRAD SYS DOP V1V2: 3 MMHG
AV ORIFICE AREA US: 2.49 CM2
AV PEAK GRADIENT: 6 MMHG
AV VELOCITY RATIO: 0.79
AV VMAX SYS DOP: 1.18 M/S
BSA FOR ECHO PROCEDURE: 2.26 M2
DOP CALC AO VTI: 21.5 CM
DOP CALC LVOT AREA: 3.14 CM2
DOP CALC LVOT CARDIAC INDEX: 1.96 L/MIN/M2
DOP CALC LVOT CARDIAC OUTPUT: 4.42 L/MIN
DOP CALC LVOT DIAMETER: 2 CM
DOP CALC LVOT PEAK VEL VTI: 17.03 CM
DOP CALC LVOT PEAK VEL: 0.91 M/S
DOP CALC LVOT STROKE INDEX: 24.8 ML/M2
DOP CALC LVOT STROKE VOLUME: 53.47
E WAVE DECELERATION TIME: 173 MS
E/A RATIO: 0.81
FRACTIONAL SHORTENING: 21 (ref 28–44)
INTERVENTRICULAR SEPTUM IN DIASTOLE (PARASTERNAL SHORT AXIS VIEW): 0.9 CM
INTERVENTRICULAR SEPTUM: 0.9 CM (ref 0.6–1.1)
LAAS-AP2: 18.5 CM2
LAAS-AP4: 16.9 CM2
LEFT ATRIUM SIZE: 3.2 CM
LEFT ATRIUM VOLUME (MOD BIPLANE): 50 ML
LEFT ATRIUM VOLUME INDEX (MOD BIPLANE): 22.1 ML/M2
LEFT INTERNAL DIMENSION IN SYSTOLE: 3.7 CM (ref 2.1–4)
LEFT VENTRICLE DIASTOLIC VOLUME (MOD BIPLANE): 96 ML
LEFT VENTRICLE DIASTOLIC VOLUME INDEX (MOD BIPLANE): 42.5 ML/M2
LEFT VENTRICLE SYSTOLIC VOLUME (MOD BIPLANE): 39 ML
LEFT VENTRICLE SYSTOLIC VOLUME INDEX (MOD BIPLANE): 17.3 ML/M2
LEFT VENTRICULAR INTERNAL DIMENSION IN DIASTOLE: 4.7 CM (ref 3.5–6)
LEFT VENTRICULAR POSTERIOR WALL IN END DIASTOLE: 1.1 CM
LEFT VENTRICULAR STROKE VOLUME: 42 ML
LV EF BIPLANE MOD: 59 %
LV EF US.2D.A4C+ESTIMATED: 59 %
LVSV (TEICH): 42 ML
MV E'TISSUE VEL-LAT: 6 CM/S
MV E'TISSUE VEL-SEP: 6 CM/S
MV PEAK A VEL: 0.8 M/S
MV PEAK E VEL: 65 CM/S
MV STENOSIS PRESSURE HALF TIME: 50 MS
MV VALVE AREA P 1/2 METHOD: 4.4
SL CV LEFT ATRIUM LENGTH A2C: 5.4 CM
SL CV PED ECHO LEFT VENTRICLE DIASTOLIC VOLUME (MOD BIPLANE) 2D: 100 ML
SL CV PED ECHO LEFT VENTRICLE SYSTOLIC VOLUME (MOD BIPLANE) 2D: 58 ML
TRICUSPID ANNULAR PLANE SYSTOLIC EXCURSION: 1.5 CM

## 2025-06-03 ENCOUNTER — TELEPHONE (OUTPATIENT)
Dept: FAMILY MEDICINE CLINIC | Facility: CLINIC | Age: 60
End: 2025-06-03

## 2025-06-03 NOTE — TELEPHONE ENCOUNTER
Received in office on 06/03/25  Name of Medication: OZEMPIC  Dosage: 2MG/3ML  Amount received: 4 BOXES  Lot #: [id number]  Expiration Date: 11/30/2027  NDC#: 5922-1347-18    first attempt to contact patient.    Received in office on 06/03/25  Name of Medication: TRESIBA FLEXTOUCH  Dosage: 100UNITS/ML  Amount received: 3 BOXES  Lot #: [id number]  Expiration Date: 08/31/2027  NDC#: 0169-2660-15    first attempt to contact patient. PT INFORMED.

## 2025-06-04 ENCOUNTER — PATIENT OUTREACH (OUTPATIENT)
Dept: FAMILY MEDICINE CLINIC | Facility: CLINIC | Age: 60
End: 2025-06-04

## 2025-06-04 NOTE — PROGRESS NOTES
Pharmaceutical Assistance Programs received In Basket ( IB) message from practice coordinator regarding patient's shipment of Ozempic and Tresiba from [employer] patient assistance program. Ozempic and Tresiba shipment was received at clinician's office.  Patient contacted by Practice coordinator to .  Clinical Pharmacist included in IB message.

## 2025-06-12 ENCOUNTER — ANTICOAG VISIT (OUTPATIENT)
Dept: FAMILY MEDICINE CLINIC | Facility: CLINIC | Age: 60
End: 2025-06-12

## 2025-06-12 ENCOUNTER — TELEPHONE (OUTPATIENT)
Dept: FAMILY MEDICINE CLINIC | Facility: CLINIC | Age: 60
End: 2025-06-12

## 2025-06-12 DIAGNOSIS — Z86.711 HISTORY OF PULMONARY EMBOLISM: ICD-10-CM

## 2025-06-12 DIAGNOSIS — Z79.01 LONG TERM (CURRENT) USE OF ANTICOAGULANTS: Primary | ICD-10-CM

## 2025-06-12 NOTE — TELEPHONE ENCOUNTER
Spoke to pt via phone in regards to INR - he is due next week    Pt canceled 6/30 - he state she was not made aware of this and would like to see his PCP one last time    Appears appt open on 6/23 - he can come this day     Fwd to  for correction    He is agreeable to obtain INR 6/23    Chente Lewis, PharmD, BCACP  Ambulatory Care Clinical Pharmacist

## 2025-06-13 ENCOUNTER — OFFICE VISIT (OUTPATIENT)
Dept: OBGYN CLINIC | Facility: CLINIC | Age: 60
End: 2025-06-13

## 2025-06-13 VITALS — HEIGHT: 69 IN | BODY MASS INDEX: 36.43 KG/M2 | WEIGHT: 246 LBS

## 2025-06-13 DIAGNOSIS — Z98.890 S/P TRIGGER FINGER RELEASE: Primary | ICD-10-CM

## 2025-06-13 PROCEDURE — 99024 POSTOP FOLLOW-UP VISIT: CPT | Performed by: STUDENT IN AN ORGANIZED HEALTH CARE EDUCATION/TRAINING PROGRAM

## 2025-06-13 NOTE — PROGRESS NOTES
ORTHOPAEDIC HAND, WRIST, AND ELBOW OFFICE  VISIT     Name: Fadi Busby      : 1965      MRN: [id number]  Encounter Provider: Gonzalo Montalvo MD  Encounter Date: 2025   Encounter department: [facility]  :  Assessment & Plan           Assessment/Plan:  Patient ID: Fadi Busby 59 y.o. male   Surgery: Release Right Ring Trigger Finger - Right  Date of Surgery: 2025      Patient presents with stiffness of his ring finger on exam today.  He does not demonstrate locking or catching on exam today.  I explained he will need to continue to aggressively work on range of motion and scar tissue massage, I explained this in detail that he may use Aquaphor to perform scar tissue massage and straining exercises.  I did explain this may be uncomfortable to perform however he need to be compliant as to not allow his finger to get stiff.  I did explain that he should discontinue the use of the splint as this may hinder his progress.  I did recommend going to physical therapy, a referral was ordered today for this.       Patient may benefit from course of formal physical therapy, script and protocol provided today.      Patient will follow up in 6 weeks         CHIEF COMPLAINT:  Chief Complaint   Patient presents with   • Right Ring Finger - Post-op         SUBJECTIVE:  Fadi Busby is a 59 y.o. year old male who presents for follow up after Release Right Ring Trigger Finger - Right. Today patient is doing well. Pain well controlled. Patient states he has stiffness and swelling.  He is having trouble stragithening it, and was using a splint at night.  He denies any catching or locking of the finger.      PAST MEDICAL HISTORY:  Past Medical History[1]    PAST SURGICAL HISTORY:  Past Surgical History[2]    FAMILY HISTORY:  Family History[3]    SOCIAL HISTORY:  Social History[4]    MEDICATIONS:  Current Medications[5]    ALLERGIES:  Allergies[6]        REVIEW OF  SYSTEMS:  Pertinent items are noted in HPI.  A comprehensive review of systems was negative.    VITALS:  There were no vitals filed for this visit.    LABS:  HgA1c:   Lab Results   Component Value Date    HGBA1C 7.0 (A) 04/14/2025     BMP:   Lab Results   Component Value Date    CALCIUM 8.9 04/07/2025    K 4.1 04/07/2025    CO2 31 04/07/2025     04/07/2025    BUN 18 04/07/2025    CREATININE 1.41 (H) 04/07/2025       _____________________________________________________  PHYSICAL EXAMINATION:  General: well developed and well nourished, alert, oriented times 3, and appears comfortable  Psychiatric: Normal  HEENT: Normocephalic, Atraumatic Trachea Midline, No torticollis  Pulmonary: No audible wheezing or respiratory distress   Abdomen/GI: Non tender, non distended   Cardiovascular: Regular Rate and Rhythm. No pitting edema, 2+ radial pulse   Skin: No masses, erythema, lacerations, fluctation, ulcerations  Neurovascular: Sensation Intact to the Median, Ulnar, Radial Nerve, Motor Intact to the Median, Ulnar, Radial Nerve, and Pulses Intact  Musculoskeletal: Normal, except as noted in detailed exam and in HPI.        FOCUSED MUSCULOSKELETAL EXAMINATION:  Right Upper Extremity  Inspection: Well-healed incisions, no signs of infection, erythema warmth palpation: no TTP  Neurologic: 5/5 elbow flexion, 5/5 elbow extension, 5/5 wrist extension, 5/5 wrist flexion, 5/5 finger flexion, 5/5 finger extension, 5/5 FPL, 5/5 EPL, 5/5 APB, 5/5 intrinsics, sensation intact to median, radial, and ulnar nerve distributions  Vascular: Palpable radial pulse, brisk cap refill <2sec, hand warm and well perfused  MSK:   Full FDS, FDP, extensor mechanisms are intact  No rotational deformity with composite finger flexion  No reproducible triggering on exam  Demonstrates normal wrist, elbow, and shoulder motion  Forearm compartments are soft and supple  2+ distal radial pulse with brisk capillary refill to the fingers  Radial, median, and  ulnar motor and sensory distribution intact  Sensations light to touch intact distally   ___________________________________________________  STUDIES REVIEWED:  None to review     LABS REVIEWED:    HgA1c:   Lab Results   Component Value Date    HGBA1C 7.0 (A) 04/14/2025     BMP:   Lab Results   Component Value Date    CALCIUM 8.9 04/07/2025    K 4.1 04/07/2025    CO2 31 04/07/2025     04/07/2025    BUN 18 04/07/2025    CREATININE 1.41 (H) 04/07/2025               PROCEDURES PERFORMED:  Procedures  No Procedures performed today    _____________________________________________________      Scribe Attestation    I,:  Bubba Pelletier am acting as a scribe while in the presence of the attending physician.:       I,:  Gonzalo Montalvo MD personally performed the services described in this documentation    as scribed in my presence.:             I agree with the history, physical examination, assessment and plan of care as documented above.    Gonzalo Montalvo M.D.  Attending, Orthopaedic Surgery  Hand, Wrist, and Elbow Surgery  [facility]          [1]  Past Medical History:  Diagnosis Date   • Acute pain of right shoulder 01/24/2024   • H/O blood clots    • Infected skin lesion 07/02/2024   • Paronychia of right index finger 08/15/2023   • Post-op pain 03/21/2024   • Sleep apnea     does not use cpap   • Stroke (HCC)    • Trichomonas contact, treated 01/24/2024   [2]  Past Surgical History:  Procedure Laterality Date   • CARDIAC ELECTROPHYSIOLOGY PROCEDURE N/A 3/21/2024    Procedure: Cardiac biv pacer implant;  Surgeon: Nahun Rees MD;  Location: [facility];  Service: Cardiology   • HERNIA REPAIR      At 18 years old   • SC EXC B9 LESION MRGN XCP SK TG T/A/L >4.0 CM Left 4/21/2025    Procedure: EXCISION  BIOPSY LESION/MASS ABDOMINAL WALL LEFT LOWER QUADRANT;  Surgeon: Ezequiel Davison MD;  Location: [facility];  Service: General   • SC TENDON SHEATH INCISION Right 5/8/2025     Procedure: RELEASE RIGHT RING TRIGGER FINGER;  Surgeon: Gonzalo Montalvo MD;  Location: [facility];  Service: Orthopedics   [3]  No family history on file.[4]  Social History  Tobacco Use   • Smoking status: Some Days     Types: Cigarettes     Passive exposure: Never   • Smokeless tobacco: Never   Vaping Use   • Vaping status: Some Days   • Substances: Nicotine (last vape 4/21)   Substance Use Topics   • Alcohol use: Never   • Drug use: Never   [5]    Current Outpatient Medications:   •  acetaminophen (TYLENOL) 650 mg CR tablet, Take 1 tablet (650 mg total) by mouth every 8 (eight) hours as needed for mild pain, Disp: 30 tablet, Rfl: 0  •  atorvastatin (LIPITOR) 40 mg tablet, take 1 tablet by mouth once daily, Disp: 90 tablet, Rfl: 3  •  Continuous Glucose  (FreeStyle Ana María 2 [place]) ALLIE, Check blood sugars multiple times per day, Disp: 1 each, Rfl: 0  •  Continuous Glucose Sensor (FreeStyle Ana María 2 Sensor) MISC, USE TO CHECK BLOOD SUGAR EVERY 14 DAYS, Disp: 6 each, Rfl: 0  •  Continuous Glucose Sensor (FreeStyle Ana María 2 Sensor) MISC, Check blood sugars multiple times per day, Disp: 6 each, Rfl: 6  •  fluticasone (FLONASE) 50 mcg/act nasal spray, 1 spray into each nostril daily, Disp: 15.8 mL, Rfl: 1  •  furosemide (LASIX) 40 mg tablet, take 1 tablet by mouth every morning, Disp: 90 tablet, Rfl: 3  •  insulin aspart (NovoLOG FlexPen) 100 UNIT/ML injection pen, INJECT 10 UNITS 3 TIMES A DAY BEFORE MEALS, Disp: 15 mL, Rfl: 1  •  insulin degludec (Tresiba FlexTouch) 100 units/mL injection pen, Inject 35 units daily - obtains through patient assistance program, Disp: , Rfl:   •  Insulin Pen Needle 32G X 4 MM MISC, Use up to four times daily with insulin e11.9, Disp: 400 each, Rfl: 3  •  metoprolol succinate (TOPROL-XL) 25 mg 24 hr tablet, take 1 tablet by mouth once daily, Disp: 90 tablet, Rfl: 3  •  naproxen (Naprosyn) 500 mg tablet, Take 1 tablet (500 mg total) by mouth 2 (two) times a day with meals  for 20 doses, Disp: 20 tablet, Rfl: 0  •  ondansetron (ZOFRAN) 4 mg tablet, Take 1 tablet (4 mg total) by mouth every 8 (eight) hours as needed for nausea or vomiting for up to 4 doses, Disp: 4 tablet, Rfl: 0  •  semaglutide, 0.25 or 0.5 mg/dose, (Ozempic, 0.25 or 0.5 MG/DOSE,) 2 mg/3 mL injection pen, Inject 0.5 mg weekly, Disp: , Rfl:   •  spironolactone (ALDACTONE) 25 mg tablet, take 1 tablet by mouth once daily, Disp: 60 tablet, Rfl: 5  •  valsartan (DIOVAN) 320 MG tablet, take 1 tablet by mouth once daily, Disp: 90 tablet, Rfl: 3  •  warfarin (COUMADIN) 5 mg tablet, Take one tablet daily, Disp: 192.85 tablet, Rfl: 3[6]  No Known Allergies

## 2025-06-17 ENCOUNTER — RA CDI HCC (OUTPATIENT)
Dept: OTHER | Facility: HOSPITAL | Age: 60
End: 2025-06-17

## 2025-06-17 PROBLEM — Z79.4 TYPE 2 DIABETES MELLITUS WITH BOTH EYES AFFECTED BY MILD NONPROLIFERATIVE RETINOPATHY WITHOUT MACULAR EDEMA, WITH LONG-TERM CURRENT USE OF INSULIN (HCC): Status: ACTIVE | Noted: 2023-07-19

## 2025-06-17 PROBLEM — Z79.4 TYPE 2 DIABETES MELLITUS WITH BOTH EYES AFFECTED BY MILD NONPROLIFERATIVE RETINOPATHY WITHOUT MACULAR EDEMA, WITH LONG-TERM CURRENT USE OF INSULIN (HCC): Status: ACTIVE | Noted: 2025-06-17

## 2025-06-17 PROBLEM — E11.3293 TYPE 2 DIABETES MELLITUS WITH BOTH EYES AFFECTED BY MILD NONPROLIFERATIVE RETINOPATHY WITHOUT MACULAR EDEMA, WITH LONG-TERM CURRENT USE OF INSULIN (HCC): Status: ACTIVE | Noted: 2025-06-17

## 2025-06-17 PROBLEM — E11.3293 TYPE 2 DIABETES MELLITUS WITH BOTH EYES AFFECTED BY MILD NONPROLIFERATIVE RETINOPATHY WITHOUT MACULAR EDEMA, WITH LONG-TERM CURRENT USE OF INSULIN (HCC): Status: ACTIVE | Noted: 2023-07-19

## 2025-06-17 PROBLEM — I11.0 HYPERTENSIVE HEART DISEASE WITH CONGESTIVE HEART FAILURE (HCC): Status: ACTIVE | Noted: 2025-06-17

## 2025-06-17 NOTE — PROGRESS NOTES
HCC coding opportunities    I11.0     Chart Reviewed number of suggestions sent to Provider: 1     Patients Insurance     Medicare Insurance: [employer]age

## 2025-06-23 ENCOUNTER — OFFICE VISIT (OUTPATIENT)
Dept: FAMILY MEDICINE CLINIC | Facility: CLINIC | Age: 60
End: 2025-06-23

## 2025-06-23 ENCOUNTER — ANTICOAG VISIT (OUTPATIENT)
Dept: FAMILY MEDICINE CLINIC | Facility: CLINIC | Age: 60
End: 2025-06-23

## 2025-06-23 ENCOUNTER — APPOINTMENT (OUTPATIENT)
Dept: LAB | Facility: CLINIC | Age: 60
End: 2025-06-23
Payer: COMMERCIAL

## 2025-06-23 VITALS
SYSTOLIC BLOOD PRESSURE: 100 MMHG | WEIGHT: 242 LBS | RESPIRATION RATE: 16 BRPM | HEIGHT: 69 IN | HEART RATE: 88 BPM | BODY MASS INDEX: 35.84 KG/M2 | DIASTOLIC BLOOD PRESSURE: 60 MMHG | OXYGEN SATURATION: 96 % | TEMPERATURE: 98 F

## 2025-06-23 DIAGNOSIS — E11.311 TYPE 2 DIABETES MELLITUS WITH RETINOPATHY AND MACULAR EDEMA, WITH LONG-TERM CURRENT USE OF INSULIN, UNSPECIFIED LATERALITY, UNSPECIFIED RETINOPATHY SEVERITY (HCC): Primary | ICD-10-CM

## 2025-06-23 DIAGNOSIS — Z79.01 LONG TERM (CURRENT) USE OF ANTICOAGULANTS: ICD-10-CM

## 2025-06-23 DIAGNOSIS — Z86.711 HISTORY OF PULMONARY EMBOLISM: ICD-10-CM

## 2025-06-23 DIAGNOSIS — Z79.01 LONG TERM (CURRENT) USE OF ANTICOAGULANTS: Primary | ICD-10-CM

## 2025-06-23 DIAGNOSIS — R20.9 ALTERATION OF SENSATION AS LATE EFFECT OF CEREBROVASCULAR ACCIDENT (CVA): ICD-10-CM

## 2025-06-23 DIAGNOSIS — I69.398 ALTERATION OF SENSATION AS LATE EFFECT OF CEREBROVASCULAR ACCIDENT (CVA): ICD-10-CM

## 2025-06-23 DIAGNOSIS — Z86.79 HISTORY OF CAROTID ARTERY DISSECTION: ICD-10-CM

## 2025-06-23 DIAGNOSIS — Z79.4 TYPE 2 DIABETES MELLITUS WITH RETINOPATHY AND MACULAR EDEMA, WITH LONG-TERM CURRENT USE OF INSULIN, UNSPECIFIED LATERALITY, UNSPECIFIED RETINOPATHY SEVERITY (HCC): Primary | ICD-10-CM

## 2025-06-23 DIAGNOSIS — Z79.4 TYPE 2 DIABETES MELLITUS WITH RETINOPATHY AND MACULAR EDEMA, WITH LONG-TERM CURRENT USE OF INSULIN, UNSPECIFIED LATERALITY, UNSPECIFIED RETINOPATHY SEVERITY (HCC): ICD-10-CM

## 2025-06-23 DIAGNOSIS — E11.311 TYPE 2 DIABETES MELLITUS WITH RETINOPATHY AND MACULAR EDEMA, WITH LONG-TERM CURRENT USE OF INSULIN, UNSPECIFIED LATERALITY, UNSPECIFIED RETINOPATHY SEVERITY (HCC): ICD-10-CM

## 2025-06-23 LAB
ALBUMIN SERPL BCG-MCNC: 3.9 G/DL (ref 3.5–5)
ALP SERPL-CCNC: 85 U/L (ref 34–104)
ALT SERPL W P-5'-P-CCNC: 20 U/L (ref 7–52)
ANION GAP SERPL CALCULATED.3IONS-SCNC: 7 MMOL/L (ref 4–13)
AST SERPL W P-5'-P-CCNC: 20 U/L (ref 13–39)
BILIRUB SERPL-MCNC: 0.79 MG/DL (ref 0.2–1)
BUN SERPL-MCNC: 19 MG/DL (ref 5–25)
CALCIUM SERPL-MCNC: 8.6 MG/DL (ref 8.4–10.2)
CHLORIDE SERPL-SCNC: 103 MMOL/L (ref 96–108)
CO2 SERPL-SCNC: 28 MMOL/L (ref 21–32)
CREAT SERPL-MCNC: 1.13 MG/DL (ref 0.6–1.3)
CREAT UR-MCNC: 346.5 MG/DL
GFR SERPL CREATININE-BSD FRML MDRD: 70 ML/MIN/1.73SQ M
GLUCOSE P FAST SERPL-MCNC: 139 MG/DL (ref 65–99)
INR PPP: 2.61 (ref 0.85–1.19)
MICROALBUMIN UR-MCNC: 10.6 MG/L
MICROALBUMIN/CREAT 24H UR: 3 MG/G CREATININE (ref 0–30)
POTASSIUM SERPL-SCNC: 3.7 MMOL/L (ref 3.5–5.3)
PROT SERPL-MCNC: 6.9 G/DL (ref 6.4–8.4)
PROTHROMBIN TIME: 27.8 SECONDS (ref 12.3–15)
SL AMB POCT HEMOGLOBIN AIC: 6.2 (ref ?–6.5)
SODIUM SERPL-SCNC: 138 MMOL/L (ref 135–147)

## 2025-06-23 PROCEDURE — 80053 COMPREHEN METABOLIC PANEL: CPT

## 2025-06-23 PROCEDURE — 82043 UR ALBUMIN QUANTITATIVE: CPT

## 2025-06-23 PROCEDURE — 83036 HEMOGLOBIN GLYCOSYLATED A1C: CPT | Performed by: FAMILY MEDICINE

## 2025-06-23 PROCEDURE — 85610 PROTHROMBIN TIME: CPT

## 2025-06-23 PROCEDURE — 99213 OFFICE O/P EST LOW 20 MIN: CPT | Performed by: FAMILY MEDICINE

## 2025-06-23 PROCEDURE — G2211 COMPLEX E/M VISIT ADD ON: HCPCS | Performed by: FAMILY MEDICINE

## 2025-06-23 PROCEDURE — 36415 COLL VENOUS BLD VENIPUNCTURE: CPT

## 2025-06-23 PROCEDURE — 82570 ASSAY OF URINE CREATININE: CPT

## 2025-06-23 NOTE — ASSESSMENT & PLAN NOTE
Previous PT/INR on 5/19/2025 was therapeutic at 2.86  Goal INR: 2-3  Repeat PT/INR and follow results

## 2025-06-23 NOTE — ASSESSMENT & PLAN NOTE
Residual left-sided weakness for which patient has had previous PT sessions  Would like to continue  Referral provided  Orders:    Ambulatory Referral to Physical Therapy; Future

## 2025-06-23 NOTE — PROGRESS NOTES
[facility], [facility] [address]  [facility] [address]  [phone]  Clinical Pharmacy Anticoagulation Consultation  Encounter provider: Danielle Herrera  Assessment/Plan  INR: 2.61. Therapeutic INR for goal of 2.0-3.0  Current warfarin dose: 5 mg once daily   New dose: no change    Recheck INR in 1 month  Pt agreeable to obtain INR 7/30 - pcp to call 7/31     Subjective  Indication: stroke history, and history of PE  Bleeding signs/symptoms: no  Thrombosis signs/symptoms: no    Missed doses: no  Medication changes: no  Dietary changes: no  Bacterial/viral infection: no  Recent alcohol consumption: no  Other concerns: no    Objective  Lab Results   Component Value Date/Time    INR 2.61 (H) 06/23/2025 09:15 AM    INR 2.86 (H) 05/19/2025 04:47 PM    INR 1.23 (H) 04/21/2025 09:56 AM    HGB 13.4 04/07/2025 01:51 PM    HGB 12.7 11/24/2024 04:00 PM    HGB 14.4 09/05/2024 08:47 AM    HCT 39.8 04/07/2025 01:51 PM    HCT 37.9 11/24/2024 04:00 PM    HCT 43.1 09/05/2024 08:47 AM    CREATININE 1.13 06/23/2025 09:15 AM    CREATININE 1.41 (H) 04/07/2025 01:51 PM    CREATININE 1.27 01/09/2025 10:58 AM    EGFR 70 06/23/2025 09:15 AM    EGFR 53 04/07/2025 01:51 PM    EGFR 61 01/09/2025 10:58 AM         Danielle Herrera    ---------------------------------------------    Pharmacist Tracking Tool  Reason For Outreach: Embedded Pharmacist  Demographics:  Intervention Method: Phone  Type of Intervention: Follow-Up  Topics Addressed: Anticoagulation  Pharmacologic Interventions: Prevent or Manage MONI  Non-Pharmacologic Interventions: Care coordination, Labs, Medication Monitoring, and Medication/Device education  Time:  Direct Patient Care: 10 mins  Care Coordination: 10 mins  Recommendation Recipient: Patient/Caregiver  Outcome: Accepted

## 2025-06-23 NOTE — PROGRESS NOTES
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 male with pmhx of hyperlipidemia, CVA and long-term anticoagulation presenting today for the review of his chronic conditions.  Patient is due for new PT/INR  Patient is accompanied by his significant other.  He wants a new glucometer and sensors as well as requesting referral for new sessions of physical therapy for his residual left-sided weakness status post CVA.  He has no new complaints today denying shortness of breath, lightheadedness, dizziness, chest pain, diaphoresis or headaches.         Review of Systems  "  Respiratory:  Negative for cough and shortness of breath.    Cardiovascular:  Negative for chest pain and palpitations.   Gastrointestinal:  Negative for abdominal pain.   Genitourinary:  Negative for dysuria and hematuria.   Neurological:  Negative for dizziness, syncope, light-headedness and headaches.   All other systems reviewed and are negative.      Objective   /60 (BP Location: Right arm, Patient Position: Sitting, Cuff Size: Standard)   Pulse 88   Temp 98 °F (36.7 °C) (Temporal)   Resp 16   Ht 5' 9\" (1.753 m)   Wt 110 kg (242 lb)   SpO2 96%   BMI 35.74 kg/m²      Physical Exam  Vitals reviewed.   Constitutional:       General: He is not in acute distress.     Appearance: He is well-developed. He is not ill-appearing.   HENT:      Head: Normocephalic and atraumatic.     Eyes:      Extraocular Movements: Extraocular movements intact.      Conjunctiva/sclera: Conjunctivae normal.       Cardiovascular:      Rate and Rhythm: Normal rate and regular rhythm.      Heart sounds: Normal heart sounds. No murmur heard.     No friction rub.   Pulmonary:      Effort: Pulmonary effort is normal. No respiratory distress.      Breath sounds: Normal breath sounds.   Abdominal:      Palpations: Abdomen is soft.      Tenderness: There is no abdominal tenderness.     Musculoskeletal:         General: No swelling.      Cervical back: Neck supple.      Right lower leg: Edema present.      Left lower leg: Edema present.      Comments: Trace edema to bilateral lower extremities     Neurological:      Mental Status: He is alert and oriented to person, place, and time.     Psychiatric:         Mood and Affect: Mood normal.         Behavior: Behavior normal.         "

## 2025-06-23 NOTE — ASSESSMENT & PLAN NOTE
As per A/P history of carotid artery dissection  Orders:    Ambulatory Referral to Physical Therapy; Future

## 2025-07-02 DIAGNOSIS — I42.8 NONISCHEMIC CARDIOMYOPATHY (HCC): ICD-10-CM

## 2025-07-02 RX ORDER — SPIRONOLACTONE 25 MG/1
25 TABLET ORAL DAILY
Qty: 90 TABLET | Refills: 5 | Status: SHIPPED | OUTPATIENT
Start: 2025-07-02

## 2025-07-12 ENCOUNTER — NURSE TRIAGE (OUTPATIENT)
Dept: OTHER | Facility: OTHER | Age: 60
End: 2025-07-12

## 2025-07-12 NOTE — TELEPHONE ENCOUNTER
Reason for Disposition  • [1] Caller has URGENT medication or insulin device (e.g., pump, continuous monitoring) question AND [2] triager unable to answer question    Protocols used: Diabetes - Low Blood Sugar-Adult-

## 2025-07-12 NOTE — TELEPHONE ENCOUNTER
"REASON FOR CONVERSATION: Hypoglycemia    SYMPTOMS: 2 day history of sugars dropping into 50's.  Lowest 53 at 230 am.  Otherwise asymptomatic of hypoglycemia    OTHER HEALTH INFORMATION: N/A    PROTOCOL DISPOSITION: Call PCP Now    CARE ADVICE PROVIDED: On call provider advised to decrease Triseba to 30 units at night, eat a snack before bed, do not skip any meals.  If sugar goes below 70 to have apple or orange juice follow by high carb meal.  Info relayed to patient; verbalized understanding; discussed call back precautions.    PRACTICE FOLLOW-UP: N/A - appt booked for Monday          Answer Assessment - Initial Assessment Questions  1. SYMPTOMS: \"What symptoms are you concerned about?\"        Lowest blood sugar has been 53 at 2:26 am      2. ONSET:  \"When did the symptoms start?\"        Over past 2 days    3. BLOOD GLUCOSE: \"What is your blood glucose level?\"         Most recent  = 75 prior to drinking juice, pineapple and candy  During triage = 92        4. USUAL RANGE: \"What is your blood glucose level usually?\" (e.g., usual fasting morning value, usual evening value)        Usually in 120-130, 160 after meals    5. TYPE 1 or 2:  \"Do you know what type of diabetes you have?\"  (e.g., Type 1, Type 2, Gestational; doesn't know)         Type 2    6. INSULIN: \"Do you take insulin?\" \"What type of insulin(s) do you use? What is the mode of delivery? (syringe, pen; injection or pump) \"When did you last give yourself an insulin dose?\" (i.e., time or hours/minutes ago) \"How much did you give?\" (i.e., how many units)        Novolog and trisiba    7. DIABETES PILLS: \"Do you take any pills for your diabetes?\" If Yes, ask: \"What is the name of the medicine(s) that you take for high blood sugar?\"        No    8. OTHER SYMPTOMS: \"Do you have any symptoms?\" (e.g., fever, frequent urination, difficulty breathing, vomiting)        Feels well  No thirst  Not dizzy or sweaty  Unsure if urinating more due to water pills    9. LOW " "BLOOD GLUCOSE TREATMENT: \"What have you done so far to treat the low blood glucose level?\"        Juice, pineapple, candy    10. FOOD: \"When did you last eat or drink?\"          30 minutes ago  Pineapple 15 minutes    11. ALONE: \"Are you alone right now or is someone with you?\"           Someone with patient    Protocols used: Diabetes - Low Blood Sugar-Adult-    "

## 2025-07-12 NOTE — PROGRESS NOTES
I received a message via epic chat from the nurse on call, regarding this patient who is CGM monitor have been going into 50's since yesterday. Lowest 53, which increased to 92 after juice, pineapple and candy. No symptoms, recent illness, feels well. Patient check finger stick to correlate with monitor and found his meters freestyle 138, contour 135, sensor 132.    Medications and previous apps were reviewed, I decreased the tresiba to from 35 to 30 units and highly encouraged do not not skip any meals. Eat an snack before go to bed,    Additionally, if his glucose<70, drink 120 ml of apple or orange juice, or candies, then in 15 mts if the glucose is <70, repeat 15 gr of carbohydrates. Once the glucose is more than 70 eat an snack such us crackers with peanut butter,  or a meal with carbohydrates.    Appointment with PCP was scheduled for Monday 07/14/25.

## 2025-07-12 NOTE — TELEPHONE ENCOUNTER
"Regarding: low BS 69/ no symptoms  ----- Message from Merary MANLEY sent at 7/12/2025 11:29 AM EDT -----  Patient stated, \"My blood sugar is very low at 69 and the lowest it has been is 53. The last time I took my Tresiba insulin was yesterday for 35 units. No other symptoms. I have been eating candy, drinking soda and juice, it helps with the number but then it crashes back down.\"    "

## 2025-07-14 ENCOUNTER — OFFICE VISIT (OUTPATIENT)
Dept: FAMILY MEDICINE CLINIC | Facility: CLINIC | Age: 60
End: 2025-07-14

## 2025-07-14 VITALS
BODY MASS INDEX: 35.98 KG/M2 | SYSTOLIC BLOOD PRESSURE: 102 MMHG | HEIGHT: 69 IN | HEART RATE: 90 BPM | OXYGEN SATURATION: 98 % | TEMPERATURE: 97.7 F | DIASTOLIC BLOOD PRESSURE: 64 MMHG | RESPIRATION RATE: 18 BRPM | WEIGHT: 242.9 LBS

## 2025-07-14 DIAGNOSIS — E11.3293 TYPE 2 DIABETES MELLITUS WITH BOTH EYES AFFECTED BY MILD NONPROLIFERATIVE RETINOPATHY WITHOUT MACULAR EDEMA, WITH LONG-TERM CURRENT USE OF INSULIN (HCC): Primary | ICD-10-CM

## 2025-07-14 DIAGNOSIS — I50.30 HYPERTENSIVE HEART DISEASE WITH DIASTOLIC CONGESTIVE HEART FAILURE, UNSPECIFIED HF CHRONICITY (HCC): ICD-10-CM

## 2025-07-14 DIAGNOSIS — Z79.4 TYPE 2 DIABETES MELLITUS WITH BOTH EYES AFFECTED BY MILD NONPROLIFERATIVE RETINOPATHY WITHOUT MACULAR EDEMA, WITH LONG-TERM CURRENT USE OF INSULIN (HCC): Primary | ICD-10-CM

## 2025-07-14 DIAGNOSIS — I11.0 HYPERTENSIVE HEART DISEASE WITH DIASTOLIC CONGESTIVE HEART FAILURE, UNSPECIFIED HF CHRONICITY (HCC): ICD-10-CM

## 2025-07-14 DIAGNOSIS — I50.22 CHRONIC SYSTOLIC CONGESTIVE HEART FAILURE (HCC): ICD-10-CM

## 2025-07-14 PROCEDURE — G2211 COMPLEX E/M VISIT ADD ON: HCPCS | Performed by: STUDENT IN AN ORGANIZED HEALTH CARE EDUCATION/TRAINING PROGRAM

## 2025-07-14 PROCEDURE — 99213 OFFICE O/P EST LOW 20 MIN: CPT | Performed by: STUDENT IN AN ORGANIZED HEALTH CARE EDUCATION/TRAINING PROGRAM

## 2025-07-14 NOTE — PROGRESS NOTES
Name: Fadi Busby      : 1965      MRN: [id number]  Encounter Provider: Marlene Nixon MD  Encounter Date: 2025   Encounter department: [facility]  :  Assessment & Plan  Type 2 diabetes mellitus with both eyes affected by mild nonproliferative retinopathy without macular edema, with long-term current use of insulin (HCC)    Lab Results   Component Value Date    HGBA1C 6.2 2025     Home medications: Degludec 30 at night & ozempic 0.5 weekly.   Endorses strict adherence to medications.   Remains asymptomatic  Reviewed CGM data w/Dr. Paul, see note of same day, recommendations apreciated.   In office machine read 95 & POCT fingerstick 112  Suspect inaccurate readings w/Freestyle Ana María     Plan:   Decrease Degludec to 25 units in view of potential borderline hypoglycemia.   Reviewed hypoglycemia protocol and ED return precautions in great length.   Start jardiance for dual benefit of T2DM/CHF     Orders:    Empagliflozin (JARDIANCE) 10 MG TABS tablet; Take 1 tablet (10 mg total) by mouth daily    Chronic systolic congestive heart failure (HCC)  Wt Readings from Last 3 Encounters:   25 110 kg (242 lb 14.4 oz)   25 110 kg (242 lb)   25 112 kg (246 lb)     25: EF 55%, Compared to report of previous echocardiogram from 2024 there is improvement and normalization of left ventricular function.  Previously reported EF was around 45% while now it is around 55%. Compensated grade 1 diastolic dysfunction.   Home medications: Lasix 40mg daily, Toprol-XL 25mg daily, valsartan 320mg daily, aldactone 25mg       Plan:   Proceed w/GDMT and Start jardiance 10mg for dual benefit of CHF and T2DM   Will confer w/Zainab Banks as patient meets financial criteria for [employer] application.         Orders:    Empagliflozin (JARDIANCE) 10 MG TABS tablet; Take 1 tablet (10 mg total) by mouth daily    Hypertensive heart disease with diastolic congestive  "heart failure, unspecified HF chronicity ([facility])  Wt Readings from Last 3 Encounters:   07/14/25 110 kg (242 lb 14.4 oz)   06/23/25 110 kg (242 lb)   06/13/25 112 kg (246 lb)     Home medications: Lasix 40mg,valsartan 320mg, aldactone 25mg   BP: 102/64  BP mildly soft today and per chart review at many office visits.    No symptoms of hypotension.     Plan:   Monitor at next visit.   Consider half dosage of valsartan at next visit.                   History of Present Illness   60 y/o M w/PMH of HTN, CHF s/p biventricular pace maker in 2024 (current EF 55% 05/2025), prior strokes (x2 2006 & x1 2009) on warfarin, T2DM presents to the clinic for concerns of hypoglycemia. States monitor has been showing 50's since. Fingersticks have not been correlating well with monitor. Patient has remained asymptomatic during these episodes.         Review of Systems   Constitutional:  Negative for chills, fatigue and fever.   HENT:  Negative for sore throat.    Eyes:  Negative for visual disturbance.   Respiratory:  Negative for cough and shortness of breath.    Cardiovascular:  Negative for chest pain, palpitations and leg swelling.   Gastrointestinal:  Negative for abdominal pain, constipation, diarrhea, nausea and vomiting.   Genitourinary:  Negative for dysuria and hematuria.   Musculoskeletal:  Negative for back pain and myalgias.   Skin:  Negative for rash.   Neurological:  Negative for dizziness, weakness, light-headedness and headaches.       Objective   /64 (BP Location: Right arm, Patient Position: Sitting, Cuff Size: Standard)   Pulse 90   Temp 97.7 °F (36.5 °C) (Temporal)   Resp 18   Ht 5' 9\" (1.753 m)   Wt 110 kg (242 lb 14.4 oz)   SpO2 98%   BMI 35.87 kg/m²      Physical Exam  Vitals and nursing note reviewed.   Constitutional:       General: He is not in acute distress.     Appearance: He is not ill-appearing.   HENT:      Right Ear: External ear normal.      Left Ear: External ear normal.     Eyes:      " General: No scleral icterus.        Right eye: No discharge.         Left eye: No discharge.      Extraocular Movements: Extraocular movements intact.      Conjunctiva/sclera: Conjunctivae normal.       Cardiovascular:      Rate and Rhythm: Normal rate and regular rhythm.      Pulses: Normal pulses.      Heart sounds: Normal heart sounds. No murmur heard.  Pulmonary:      Effort: No respiratory distress.      Breath sounds: No wheezing, rhonchi or rales.   Chest:      Chest wall: No tenderness.   Abdominal:      Palpations: Abdomen is soft.      Tenderness: There is no abdominal tenderness. There is no guarding or rebound.     Musculoskeletal:      Right lower leg: No edema.      Left lower leg: No edema.     Skin:     General: Skin is dry.      Capillary Refill: Capillary refill takes less than 2 seconds.      Findings: No rash.

## 2025-07-14 NOTE — PROGRESS NOTES
Downloaded laron with Dr. Nixon          Reviewed dosing reccomendations     Chente Lewis, PharmD, BCACP  Ambulatory Care Clinical Pharmacist

## 2025-07-23 ENCOUNTER — PATIENT OUTREACH (OUTPATIENT)
Dept: FAMILY MEDICINE CLINIC | Facility: CLINIC | Age: 60
End: 2025-07-23

## 2025-07-23 ENCOUNTER — OFFICE VISIT (OUTPATIENT)
Dept: NEUROLOGY | Facility: CLINIC | Age: 60
End: 2025-07-23
Payer: COMMERCIAL

## 2025-07-23 ENCOUNTER — TELEPHONE (OUTPATIENT)
Age: 60
End: 2025-07-23

## 2025-07-23 ENCOUNTER — IN-CLINIC DEVICE VISIT (OUTPATIENT)
Dept: CARDIOLOGY CLINIC | Facility: CLINIC | Age: 60
End: 2025-07-23
Payer: COMMERCIAL

## 2025-07-23 VITALS
WEIGHT: 246.9 LBS | DIASTOLIC BLOOD PRESSURE: 58 MMHG | TEMPERATURE: 98.8 F | BODY MASS INDEX: 36.46 KG/M2 | OXYGEN SATURATION: 99 % | SYSTOLIC BLOOD PRESSURE: 96 MMHG | HEART RATE: 79 BPM

## 2025-07-23 DIAGNOSIS — I67.2 CEREBRAL ATHEROSCLEROSIS: ICD-10-CM

## 2025-07-23 DIAGNOSIS — Z95.0 PRESENCE OF CARDIAC PACEMAKER: ICD-10-CM

## 2025-07-23 DIAGNOSIS — I42.8 NONISCHEMIC CARDIOMYOPATHY (HCC): Primary | ICD-10-CM

## 2025-07-23 DIAGNOSIS — Z86.73 HISTORY OF STROKE: Primary | ICD-10-CM

## 2025-07-23 PROCEDURE — 93281 PM DEVICE PROGR EVAL MULTI: CPT | Performed by: STUDENT IN AN ORGANIZED HEALTH CARE EDUCATION/TRAINING PROGRAM

## 2025-07-23 PROCEDURE — 99214 OFFICE O/P EST MOD 30 MIN: CPT | Performed by: PHYSICIAN ASSISTANT

## 2025-07-23 NOTE — PROGRESS NOTES
Pharmaceutical Assistance Programs ( PC) received In Basket messages from Clinical Pharmacist and Clinician. Per IB message patient is unable to afford his Jardiance.  has reviewed patient chart prior to outreach. PC successfully reached out to patient. Patient has agreed to meet with PC on July 31 at 9:00 to complete the [employer] applications. Patient will bring in his income documentation. PC has placed a personal reminder.

## 2025-07-23 NOTE — PROGRESS NOTES
Results for orders placed or performed in visit on 07/23/25   Cardiac EP device report    Narrative    MDT BI-V PM/ ACTIVE SYSTEM IS MRI CONDITIONAL  DEVICE INTERROGATED IN THE [place] OFFICE.  BATTERY VOLTAGE ADEQUATE (9.4 YRS). AP 5.3%   98.5% (DDDR 60 BPM).  ALL LEAD PARAMETERS WITHIN NORMAL LIMITS.  NO SIGNIFICANT HIGH RATE EPISODES.  PT TAKES WARFARIN, METOPROLOL SUCC.  2 V SENSE EPISODES, MARKERS FOR ST, FUSION.   PVC COUNT 2.4/HR (SINGLES).  TIME IN AT/AF 0%. OPTI-VOL WITHIN NORMAL LIMITS.  NO PROGRAMMING CHANGES MADE TO DEVICE PARAMETERS.  NORMAL DEVICE FUNCTION. PAS

## 2025-07-23 NOTE — TELEPHONE ENCOUNTER
Patient called  at   7:37 am   to report that they are running late  due to Traffic and their Estimated arrival time as 7:50 am      Patient appointment  is for  7:30 am     Called the  and spoke to Maite GIBBONS  in the  [place]  office to inform them    Informed the MA via secure chat    Patient was informed that if they are too late the appointment may be need to be RS or if provider is still able to see them they may need to wait until provider is available     Patient expressed understanding       MA and  notified

## 2025-07-25 DIAGNOSIS — Z98.890 S/P TRIGGER FINGER RELEASE: Primary | ICD-10-CM

## 2025-07-25 PROCEDURE — 99024 POSTOP FOLLOW-UP VISIT: CPT | Performed by: STUDENT IN AN ORGANIZED HEALTH CARE EDUCATION/TRAINING PROGRAM

## 2025-07-25 NOTE — PROGRESS NOTES
ORTHOPAEDIC HAND, WRIST, AND ELBOW OFFICE  VISIT     Name: Fadi Busby      : 1965      MRN: [id number]  Encounter Provider: Gonzalo Montalvo MD  Encounter Date: 2025   Encounter department: [facility]  :  Assessment & Plan  S/P trigger finger release                Assessment/Plan:  Patient ID: Fadi Busby 59 y.o. male   Surgery: Release Right Ring Trigger Finger - Right  Date of Surgery: 2025      Patient presents with stiffness of his ring finger on exam today.  He does not demonstrate locking or catching on exam today.  I explained he will need to continue to aggressively work on range of motion and scar tissue massage, I explained this in detail that he may use Aquaphor to perform scar tissue massage and straining exercises.  I did explain this may be uncomfortable to perform however he need to be compliant as to not allow his finger to get stiff.    I did recommend going to physical therapy, this was deferred      Patient will follow up in  3 months for repeat clinical evaluation        CHIEF COMPLAINT:  Chief Complaint   Patient presents with    Right Ring Finger - Follow-up         SUBJECTIVE:  Fadi Busby is a 59 y.o. year old male who presents for follow up after Release Right Ring Trigger Finger - Right. Today patient is doing well. Pain well controlled. Patient states he has stiffness and swelling.  Patient understands he has longstanding trigger finger prior to release and that the stiffness and swelling is common however he does not feel like it has been much progress since last visit.  There is no mechanical clicking or locking but swelling and stiffness persist.    PAST MEDICAL HISTORY:  Past Medical History[1]    PAST SURGICAL HISTORY:  Past Surgical History[2]    FAMILY HISTORY:  Family History[3]    SOCIAL HISTORY:  Social History[4]    MEDICATIONS:  Current Medications[5]    ALLERGIES:  Allergies[6]        REVIEW OF  SYSTEMS:  Pertinent items are noted in HPI.  A comprehensive review of systems was negative.    VITALS:  There were no vitals filed for this visit.    LABS:  HgA1c:   Lab Results   Component Value Date    HGBA1C 6.2 06/23/2025     BMP:   Lab Results   Component Value Date    CALCIUM 8.6 06/23/2025    K 3.7 06/23/2025    CO2 28 06/23/2025     06/23/2025    BUN 19 06/23/2025    CREATININE 1.13 06/23/2025       _____________________________________________________  PHYSICAL EXAMINATION:  General: well developed and well nourished, alert, oriented times 3, and appears comfortable  Psychiatric: Normal  HEENT: Normocephalic, Atraumatic Trachea Midline, No torticollis  Pulmonary: No audible wheezing or respiratory distress   Abdomen/GI: Non tender, non distended   Cardiovascular: Regular Rate and Rhythm. No pitting edema, 2+ radial pulse   Skin: No masses, erythema, lacerations, fluctation, ulcerations  Neurovascular: Sensation Intact to the Median, Ulnar, Radial Nerve, Motor Intact to the Median, Ulnar, Radial Nerve, and Pulses Intact  Musculoskeletal: Normal, except as noted in detailed exam and in HPI.        FOCUSED MUSCULOSKELETAL EXAMINATION:  Right Upper Extremity  Inspection: well healed incision  palpation: no TTP, mild hypertrophic scar along palmar incision  Neurologic: 5/5 elbow flexion, 5/5 elbow extension, 5/5 wrist extension, 5/5 wrist flexion, 5/5 finger flexion, 5/5 finger extension, 5/5 FPL, 5/5 EPL, 5/5 APB, 5/5 intrinsics, sensation intact to median, radial, and ulnar nerve distributions  Vascular: Palpable radial pulse, brisk cap refill <2sec, hand warm and well perfused  MSK: No tenderness to palpation    ___________________________________________________  STUDIES REVIEWED:  None to review     LABS REVIEWED:    HgA1c:   Lab Results   Component Value Date    HGBA1C 6.2 06/23/2025     BMP:   Lab Results   Component Value Date    CALCIUM 8.6 06/23/2025    K 3.7 06/23/2025    CO2 28 06/23/2025      06/23/2025    BUN 19 06/23/2025    CREATININE 1.13 06/23/2025               PROCEDURES PERFORMED:  Procedures  No Procedures performed today    _____________________________________________________        I agree with the history, physical examination, assessment and plan of care as documented above.    Gonzalo Montalvo M.D.  Attending, Orthopaedic Surgery  Hand, Wrist, and Elbow Surgery  [facility]            [1]   Past Medical History:  Diagnosis Date    Acute pain of right shoulder 01/24/2024    H/O blood clots     Infected skin lesion 07/02/2024    Paronychia of right index finger 08/15/2023    Post-op pain 03/21/2024    Sleep apnea     does not use cpap    Stroke (HCC)     Trichomonas contact, treated 01/24/2024   [2]   Past Surgical History:  Procedure Laterality Date    CARDIAC ELECTROPHYSIOLOGY PROCEDURE N/A 3/21/2024    Procedure: Cardiac biv pacer implant;  Surgeon: Nahun Rees MD;  Location:  CARDIAC CATH LAB;  Service: Cardiology    HERNIA REPAIR      At 18 years old    VT EXC B9 LESION MRGN XCP SK TG T/A/L >4.0 CM Left 4/21/2025    Procedure: EXCISION  BIOPSY LESION/MASS ABDOMINAL WALL LEFT LOWER QUADRANT;  Surgeon: Ezequiel Davison MD;  Location: [facility];  Service: General    VT TENDON SHEATH INCISION Right 5/8/2025    Procedure: RELEASE RIGHT RING TRIGGER FINGER;  Surgeon: Gonzalo Montalvo MD;  Location: [facility];  Service: Orthopedics   [3] No family history on file.  [4]   Social History  Tobacco Use    Smoking status: Former     Types: Cigarettes     Passive exposure: Never    Smokeless tobacco: Never   Vaping Use    Vaping status: Some Days    Substances: Nicotine (last vape 4/21), Flavoring   Substance Use Topics    Alcohol use: Never    Drug use: Never   [5]   Current Outpatient Medications:     atorvastatin (LIPITOR) 40 mg tablet, take 1 tablet by mouth once daily, Disp: 90 tablet, Rfl: 3    Continuous Glucose  (FreeStyle Ana María 2 Carefree)  ALLIE, Check blood sugars multiple times per day, Disp: 1 each, Rfl: 0    Continuous Glucose Sensor (FreeStyle Ana María 2 Sensor) MISC, USE TO CHECK BLOOD SUGAR EVERY 14 DAYS, Disp: 6 each, Rfl: 0    Continuous Glucose Sensor (FreeStyle Ana María 2 Sensor) MISC, Check blood sugars multiple times per day, Disp: 6 each, Rfl: 6    Empagliflozin (JARDIANCE) 10 MG TABS tablet, Take 1 tablet (10 mg total) by mouth daily, Disp: 30 tablet, Rfl: 2    fluticasone (FLONASE) 50 mcg/act nasal spray, 1 spray into each nostril daily, Disp: 15.8 mL, Rfl: 1    furosemide (LASIX) 40 mg tablet, take 1 tablet by mouth every morning, Disp: 90 tablet, Rfl: 3    insulin degludec (Tresiba FlexTouch) 100 units/mL injection pen, Inject 25 units daily - obtains through patient assistance program, Disp: , Rfl:     Insulin Pen Needle 32G X 4 MM MISC, Use up to four times daily with insulin e11.9, Disp: 400 each, Rfl: 3    metoprolol succinate (TOPROL-XL) 25 mg 24 hr tablet, take 1 tablet by mouth once daily, Disp: 90 tablet, Rfl: 3    semaglutide, 0.25 or 0.5 mg/dose, (Ozempic, 0.25 or 0.5 MG/DOSE,) 2 mg/3 mL injection pen, Inject 0.5 mg weekly, Disp: , Rfl:     spironolactone (ALDACTONE) 25 mg tablet, take 1 tablet by mouth once daily, Disp: 90 tablet, Rfl: 5    valsartan (DIOVAN) 320 MG tablet, take 1 tablet by mouth once daily, Disp: 90 tablet, Rfl: 3    warfarin (COUMADIN) 5 mg tablet, Take one tablet daily, Disp: 192.85 tablet, Rfl: 3  [6]   Allergies  Allergen Reactions    Pollen Extract Allergic Rhinitis      [phone]

## 2025-07-28 DIAGNOSIS — Z86.73 HISTORY OF STROKE: Primary | ICD-10-CM

## 2025-07-31 ENCOUNTER — ANTICOAG VISIT (OUTPATIENT)
Dept: FAMILY MEDICINE CLINIC | Facility: CLINIC | Age: 60
End: 2025-07-31

## 2025-07-31 ENCOUNTER — APPOINTMENT (OUTPATIENT)
Dept: LAB | Facility: CLINIC | Age: 60
End: 2025-07-31
Payer: COMMERCIAL

## 2025-07-31 ENCOUNTER — PATIENT OUTREACH (OUTPATIENT)
Dept: FAMILY MEDICINE CLINIC | Facility: CLINIC | Age: 60
End: 2025-07-31

## 2025-07-31 DIAGNOSIS — Z86.73 HISTORY OF STROKE: ICD-10-CM

## 2025-07-31 DIAGNOSIS — Z79.01 LONG TERM (CURRENT) USE OF ANTICOAGULANTS: Primary | ICD-10-CM

## 2025-07-31 DIAGNOSIS — Z86.711 HISTORY OF PULMONARY EMBOLISM: ICD-10-CM

## 2025-07-31 LAB
INR PPP: 3.95 (ref 0.85–1.19)
PROTHROMBIN TIME: 37.9 SECONDS (ref 12.3–15)

## 2025-07-31 PROCEDURE — 85610 PROTHROMBIN TIME: CPT

## 2025-07-31 PROCEDURE — 36415 COLL VENOUS BLD VENIPUNCTURE: CPT

## 2025-08-05 DIAGNOSIS — E11.311 TYPE 2 DIABETES MELLITUS WITH RETINOPATHY AND MACULAR EDEMA, WITH LONG-TERM CURRENT USE OF INSULIN, UNSPECIFIED LATERALITY, UNSPECIFIED RETINOPATHY SEVERITY (HCC): ICD-10-CM

## 2025-08-05 DIAGNOSIS — Z79.4 TYPE 2 DIABETES MELLITUS WITH RETINOPATHY AND MACULAR EDEMA, WITH LONG-TERM CURRENT USE OF INSULIN, UNSPECIFIED LATERALITY, UNSPECIFIED RETINOPATHY SEVERITY (HCC): ICD-10-CM

## 2025-08-05 RX ORDER — ACYCLOVIR 400 MG/1
TABLET ORAL
Qty: 3 EACH | Refills: 0 | Status: SHIPPED | OUTPATIENT
Start: 2025-08-05

## 2025-08-05 RX ORDER — ACYCLOVIR 400 MG/1
TABLET ORAL
Qty: 1 EACH | Refills: 0 | Status: SHIPPED | OUTPATIENT
Start: 2025-08-05

## 2025-08-12 ENCOUNTER — TELEPHONE (OUTPATIENT)
Dept: OBGYN CLINIC | Facility: CLINIC | Age: 60
End: 2025-08-12

## 2025-08-14 ENCOUNTER — OFFICE VISIT (OUTPATIENT)
Dept: FAMILY MEDICINE CLINIC | Facility: CLINIC | Age: 60
End: 2025-08-14

## 2025-08-14 ENCOUNTER — ANTICOAG VISIT (OUTPATIENT)
Dept: FAMILY MEDICINE CLINIC | Facility: CLINIC | Age: 60
End: 2025-08-14

## 2025-08-14 ENCOUNTER — APPOINTMENT (OUTPATIENT)
Dept: LAB | Facility: CLINIC | Age: 60
End: 2025-08-14
Payer: COMMERCIAL

## 2025-08-18 ENCOUNTER — OFFICE VISIT (OUTPATIENT)
Dept: FAMILY MEDICINE CLINIC | Facility: CLINIC | Age: 60
End: 2025-08-18

## 2025-08-18 VITALS
TEMPERATURE: 98 F | HEIGHT: 69 IN | SYSTOLIC BLOOD PRESSURE: 110 MMHG | WEIGHT: 249 LBS | RESPIRATION RATE: 16 BRPM | OXYGEN SATURATION: 97 % | HEART RATE: 96 BPM | BODY MASS INDEX: 36.88 KG/M2 | DIASTOLIC BLOOD PRESSURE: 80 MMHG

## 2025-08-18 DIAGNOSIS — E11.311 TYPE 2 DIABETES MELLITUS WITH RETINOPATHY AND MACULAR EDEMA, WITH LONG-TERM CURRENT USE OF INSULIN, UNSPECIFIED LATERALITY, UNSPECIFIED RETINOPATHY SEVERITY (HCC): ICD-10-CM

## 2025-08-18 DIAGNOSIS — Z79.4 TYPE 2 DIABETES MELLITUS WITH RETINOPATHY AND MACULAR EDEMA, WITH LONG-TERM CURRENT USE OF INSULIN, UNSPECIFIED LATERALITY, UNSPECIFIED RETINOPATHY SEVERITY (HCC): ICD-10-CM

## 2025-08-18 DIAGNOSIS — Z86.73 HISTORY OF STROKE: Primary | ICD-10-CM

## 2025-08-18 PROCEDURE — 99213 OFFICE O/P EST LOW 20 MIN: CPT

## 2025-08-18 PROCEDURE — G2211 COMPLEX E/M VISIT ADD ON: HCPCS

## 2025-08-18 RX ORDER — ACYCLOVIR 400 MG/1
1 TABLET ORAL
Qty: 3 EACH | Refills: 5 | Status: SHIPPED | OUTPATIENT
Start: 2025-08-18

## 2025-08-18 RX ORDER — ACYCLOVIR 400 MG/1
1 TABLET ORAL
Qty: 1 EACH | Refills: 0 | Status: SHIPPED | OUTPATIENT
Start: 2025-08-18

## 2025-08-21 ENCOUNTER — TELEPHONE (OUTPATIENT)
Dept: FAMILY MEDICINE CLINIC | Facility: CLINIC | Age: 60
End: 2025-08-21

## 2025-08-21 ENCOUNTER — TELEPHONE (OUTPATIENT)
Age: 60
End: 2025-08-21

## 2025-08-21 ENCOUNTER — PATIENT OUTREACH (OUTPATIENT)
Dept: FAMILY MEDICINE CLINIC | Facility: CLINIC | Age: 60
End: 2025-08-21

## 2025-08-21 ENCOUNTER — ANTICOAG VISIT (OUTPATIENT)
Dept: FAMILY MEDICINE CLINIC | Facility: CLINIC | Age: 60
End: 2025-08-21

## 2025-08-21 DIAGNOSIS — Z86.711 HISTORY OF PULMONARY EMBOLISM: ICD-10-CM

## 2025-08-21 DIAGNOSIS — Z79.01 LONG TERM (CURRENT) USE OF ANTICOAGULANTS: Primary | ICD-10-CM

## (undated) DEVICE — BANDAGE, ESMARK LF STR 4"X9'(20/CS): Brand: CYPRESS

## (undated) DEVICE — DGW .035 FC J3MM 150CM TEF: Brand: EMERALD

## (undated) DEVICE — GLOVE PI ULTRA TOUCH SZ.8.0

## (undated) DEVICE — REM POLYHESIVE ADULT PATIENT RETURN ELECTRODE: Brand: VALLEYLAB

## (undated) DEVICE — MICROPUNCTURE INTRODUCER SET SILHOUETTE TRANSITIONLESS PUSH-PLUS DESIGN - STIFFENED CANNULA WITH NITINOL WIRE GUIDE: Brand: MICROPUNCTURE

## (undated) DEVICE — INTENDED FOR TISSUE SEPARATION, AND OTHER PROCEDURES THAT REQUIRE A SHARP SURGICAL BLADE TO PUNCTURE OR CUT.: Brand: BARD-PARKER ® CARBON RIB-BACK BLADES

## (undated) DEVICE — PACK GENERAL LF

## (undated) DEVICE — GUIDE SHEATH 9FR ATTAIN COMMAND 9FR EH CURVE

## (undated) DEVICE — TOWEL SET X-RAY

## (undated) DEVICE — RUNTHROUGH NS EXTRA FLOPPY PTCA GUIDEWIRE: Brand: RUNTHROUGH

## (undated) DEVICE — SWAN-GANZ DOUBLE LUMEN MONITORING CATHETER: Brand: SWAN-GANZ

## (undated) DEVICE — CURITY NON-ADHERENT STRIPS: Brand: CURITY

## (undated) DEVICE — GLOVE PI ULTRA TOUCH SZ.7.5

## (undated) DEVICE — ASTOUND STANDARD SURGICAL GOWN, XL: Brand: CONVERTORS

## (undated) DEVICE — SCD SEQUENTIAL COMPRESSION COMFORT SLEEVE MEDIUM KNEE LENGTH: Brand: KENDALL SCD

## (undated) DEVICE — CHLORAPREP HI-LITE 26ML ORANGE

## (undated) DEVICE — GLOVE INDICATOR PI UNDERGLOVE SZ 8 BLUE

## (undated) DEVICE — MEDI-VAC YANKAUER SUCTION HANDLE W/STRAIGHT TIP & CONTROL VENT: Brand: CARDINAL HEALTH

## (undated) DEVICE — INTRO SHEATH PEEL AWAY 7FR

## (undated) DEVICE — DRAPE SHEET THREE QUARTER

## (undated) DEVICE — SUT ETHILON 4-0 PS-2 18 IN 1667H

## (undated) DEVICE — TUBING SUCTION 5MM X 12 FT

## (undated) DEVICE — INTRO SHEATH PEEL AWAY 9 FR

## (undated) DEVICE — EXOFIN PRECISION PEN HIGH VISCOSITY TOPICAL SKIN ADHESIVE: Brand: EXOFIN PRECISION PEN, 1G

## (undated) DEVICE — BETHLEHEM UNIVERSAL MINOR GEN: Brand: CARDINAL HEALTH

## (undated) DEVICE — TIBURON HAND DRAPE: Brand: CONVERTORS

## (undated) DEVICE — 10FR FRAZIER SUCTION HANDLE: Brand: CARDINAL HEALTH

## (undated) DEVICE — SLITTER ADJUSTABLE

## (undated) DEVICE — CATH GUIDING FIXED SHAPE 43CM

## (undated) DEVICE — Device: Brand: WEBSTER

## (undated) DEVICE — INTENDED FOR TISSUE SEPARATION, AND OTHER PROCEDURES THAT REQUIRE A SHARP SURGICAL BLADE TO PUNCTURE OR CUT.: Brand: BARD-PARKER SAFETY BLADES SIZE 15, STERILE

## (undated) DEVICE — GLOVE SRG BIOGEL ECLIPSE 7.5

## (undated) DEVICE — STERILE DOUBLE BASIN SET PACK: Brand: CARDINAL HEALTH

## (undated) DEVICE — ALUMI-HAND POSITIONER XLG

## (undated) DEVICE — GUIDE SHEATH 7FR 90 D ATTAIN SELECT II SUREVALVE